# Patient Record
Sex: FEMALE | Race: WHITE | NOT HISPANIC OR LATINO
[De-identification: names, ages, dates, MRNs, and addresses within clinical notes are randomized per-mention and may not be internally consistent; named-entity substitution may affect disease eponyms.]

---

## 2017-04-11 ENCOUNTER — APPOINTMENT (OUTPATIENT)
Dept: FAMILY MEDICINE | Facility: CLINIC | Age: 77
End: 2017-04-11

## 2017-04-11 VITALS
BODY MASS INDEX: 35.43 KG/M2 | RESPIRATION RATE: 12 BRPM | OXYGEN SATURATION: 98 % | DIASTOLIC BLOOD PRESSURE: 90 MMHG | SYSTOLIC BLOOD PRESSURE: 154 MMHG | TEMPERATURE: 98.1 F | WEIGHT: 200 LBS | HEART RATE: 8 BPM

## 2017-04-24 ENCOUNTER — APPOINTMENT (OUTPATIENT)
Dept: FAMILY MEDICINE | Facility: CLINIC | Age: 77
End: 2017-04-24

## 2017-04-24 VITALS
BODY MASS INDEX: 34.91 KG/M2 | HEART RATE: 76 BPM | HEIGHT: 63 IN | DIASTOLIC BLOOD PRESSURE: 80 MMHG | TEMPERATURE: 98.5 F | WEIGHT: 197 LBS | OXYGEN SATURATION: 97 % | RESPIRATION RATE: 12 BRPM | SYSTOLIC BLOOD PRESSURE: 152 MMHG

## 2017-04-24 DIAGNOSIS — Z78.9 OTHER SPECIFIED HEALTH STATUS: ICD-10-CM

## 2017-08-12 ENCOUNTER — APPOINTMENT (OUTPATIENT)
Dept: FAMILY MEDICINE | Facility: CLINIC | Age: 77
End: 2017-08-12
Payer: MEDICARE

## 2017-08-12 VITALS
OXYGEN SATURATION: 97 % | HEART RATE: 76 BPM | HEIGHT: 63 IN | BODY MASS INDEX: 35.44 KG/M2 | SYSTOLIC BLOOD PRESSURE: 130 MMHG | DIASTOLIC BLOOD PRESSURE: 82 MMHG | WEIGHT: 200 LBS | RESPIRATION RATE: 14 BRPM

## 2017-08-12 PROCEDURE — 99214 OFFICE O/P EST MOD 30 MIN: CPT | Mod: 25

## 2017-08-12 PROCEDURE — 36415 COLL VENOUS BLD VENIPUNCTURE: CPT

## 2017-08-12 RX ORDER — ATORVASTATIN CALCIUM 80 MG/1
80 TABLET, FILM COATED ORAL
Qty: 30 | Refills: 0 | Status: ACTIVE | COMMUNITY
Start: 2017-01-09

## 2017-08-14 LAB
25(OH)D3 SERPL-MCNC: 34.9 NG/ML
ALBUMIN SERPL ELPH-MCNC: 4.3 G/DL
ALP BLD-CCNC: 93 U/L
ALT SERPL-CCNC: 18 U/L
ANION GAP SERPL CALC-SCNC: 17 MMOL/L
APPEARANCE: CLEAR
AST SERPL-CCNC: 17 U/L
BASOPHILS # BLD AUTO: 0.05 K/UL
BASOPHILS NFR BLD AUTO: 0.6 %
BILIRUB SERPL-MCNC: 0.7 MG/DL
BILIRUBIN URINE: NEGATIVE
BLOOD URINE: NEGATIVE
BUN SERPL-MCNC: 17 MG/DL
C PEPTIDE SERPL-MCNC: 3.4 NG/ML
CALCIUM SERPL-MCNC: 10.3 MG/DL
CHLORIDE SERPL-SCNC: 101 MMOL/L
CHOLEST SERPL-MCNC: 142 MG/DL
CHOLEST/HDLC SERPL: 3.3 RATIO
CO2 SERPL-SCNC: 25 MMOL/L
COLOR: YELLOW
CREAT SERPL-MCNC: 0.81 MG/DL
EOSINOPHIL # BLD AUTO: 0.08 K/UL
EOSINOPHIL NFR BLD AUTO: 1 %
GLUCOSE QUALITATIVE U: NORMAL MG/DL
GLUCOSE SERPL-MCNC: 98 MG/DL
HBA1C MFR BLD HPLC: 5.8 %
HCT VFR BLD CALC: 40.4 %
HDLC SERPL-MCNC: 43 MG/DL
HGB BLD-MCNC: 12.9 G/DL
IMM GRANULOCYTES NFR BLD AUTO: 0.4 %
KETONES URINE: NEGATIVE
LDLC SERPL CALC-MCNC: 68 MG/DL
LEUKOCYTE ESTERASE URINE: NEGATIVE
LYMPHOCYTES # BLD AUTO: 1.85 K/UL
LYMPHOCYTES NFR BLD AUTO: 23.4 %
MAN DIFF?: NORMAL
MCHC RBC-ENTMCNC: 30.6 PG
MCHC RBC-ENTMCNC: 31.9 GM/DL
MCV RBC AUTO: 95.7 FL
MONOCYTES # BLD AUTO: 0.44 K/UL
MONOCYTES NFR BLD AUTO: 5.6 %
NEUTROPHILS # BLD AUTO: 5.47 K/UL
NEUTROPHILS NFR BLD AUTO: 69 %
NITRITE URINE: NEGATIVE
PH URINE: 6
PLATELET # BLD AUTO: 322 K/UL
POTASSIUM SERPL-SCNC: 4.7 MMOL/L
PROT SERPL-MCNC: 7.4 G/DL
PROTEIN URINE: NEGATIVE MG/DL
RBC # BLD: 4.22 M/UL
RBC # FLD: 14.1 %
SODIUM SERPL-SCNC: 143 MMOL/L
SPECIFIC GRAVITY URINE: 1.01
TRIGL SERPL-MCNC: 154 MG/DL
UROBILINOGEN URINE: NORMAL MG/DL
WBC # FLD AUTO: 7.92 K/UL

## 2017-09-23 ENCOUNTER — APPOINTMENT (OUTPATIENT)
Dept: FAMILY MEDICINE | Facility: CLINIC | Age: 77
End: 2017-09-23
Payer: MEDICARE

## 2017-09-23 ENCOUNTER — MED ADMIN CHARGE (OUTPATIENT)
Age: 77
End: 2017-09-23

## 2017-09-23 VITALS
HEART RATE: 68 BPM | RESPIRATION RATE: 14 BRPM | BODY MASS INDEX: 35.44 KG/M2 | SYSTOLIC BLOOD PRESSURE: 122 MMHG | OXYGEN SATURATION: 98 % | HEIGHT: 63 IN | WEIGHT: 200 LBS | DIASTOLIC BLOOD PRESSURE: 82 MMHG | TEMPERATURE: 97.9 F

## 2017-09-23 DIAGNOSIS — Z23 ENCOUNTER FOR IMMUNIZATION: ICD-10-CM

## 2017-09-23 DIAGNOSIS — Z13.1 ENCOUNTER FOR SCREENING FOR DIABETES MELLITUS: ICD-10-CM

## 2017-09-23 DIAGNOSIS — Z12.11 ENCOUNTER FOR SCREENING FOR MALIGNANT NEOPLASM OF COLON: ICD-10-CM

## 2017-09-23 DIAGNOSIS — E55.9 VITAMIN D DEFICIENCY, UNSPECIFIED: ICD-10-CM

## 2017-09-23 DIAGNOSIS — E66.3 OVERWEIGHT: ICD-10-CM

## 2017-09-23 PROCEDURE — 90662 IIV NO PRSV INCREASED AG IM: CPT

## 2017-09-23 PROCEDURE — 99214 OFFICE O/P EST MOD 30 MIN: CPT | Mod: 25

## 2017-09-23 PROCEDURE — G0008: CPT | Mod: 59

## 2017-11-14 ENCOUNTER — APPOINTMENT (OUTPATIENT)
Dept: FAMILY MEDICINE | Facility: CLINIC | Age: 77
End: 2017-11-14
Payer: COMMERCIAL

## 2017-11-14 VITALS
HEIGHT: 63 IN | SYSTOLIC BLOOD PRESSURE: 122 MMHG | OXYGEN SATURATION: 99 % | RESPIRATION RATE: 12 BRPM | BODY MASS INDEX: 36.14 KG/M2 | DIASTOLIC BLOOD PRESSURE: 74 MMHG | WEIGHT: 204 LBS | HEART RATE: 71 BPM | TEMPERATURE: 97.9 F

## 2017-11-14 DIAGNOSIS — R09.81 NASAL CONGESTION: ICD-10-CM

## 2017-11-14 DIAGNOSIS — R05 COUGH: ICD-10-CM

## 2017-11-14 PROCEDURE — 99214 OFFICE O/P EST MOD 30 MIN: CPT

## 2018-05-05 ENCOUNTER — APPOINTMENT (OUTPATIENT)
Dept: FAMILY MEDICINE | Facility: CLINIC | Age: 78
End: 2018-05-05

## 2018-08-10 ENCOUNTER — APPOINTMENT (OUTPATIENT)
Dept: FAMILY MEDICINE | Facility: CLINIC | Age: 78
End: 2018-08-10
Payer: COMMERCIAL

## 2018-08-10 VITALS
HEIGHT: 63 IN | RESPIRATION RATE: 13 BRPM | DIASTOLIC BLOOD PRESSURE: 76 MMHG | OXYGEN SATURATION: 98 % | HEART RATE: 69 BPM | SYSTOLIC BLOOD PRESSURE: 134 MMHG | TEMPERATURE: 98 F
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
## 2018-08-10 DIAGNOSIS — S62.101A FRACTURE OF UNSPECIFIED CARPAL BONE, RIGHT WRIST, INITIAL ENCOUNTER FOR CLOSED FRACTURE: ICD-10-CM

## 2018-08-10 PROCEDURE — 99214 OFFICE O/P EST MOD 30 MIN: CPT

## 2018-08-10 NOTE — DATA REVIEWED
[No studies available for review at this time.] : No studies available for review at this time. [FreeTextEntry1] : H

## 2022-06-22 ENCOUNTER — APPOINTMENT (OUTPATIENT)
Dept: FAMILY MEDICINE | Facility: CLINIC | Age: 82
End: 2022-06-22
Payer: MEDICARE

## 2022-06-22 VITALS
BODY MASS INDEX: 32.64 KG/M2 | HEIGHT: 63 IN | WEIGHT: 184.25 LBS | OXYGEN SATURATION: 99 % | SYSTOLIC BLOOD PRESSURE: 122 MMHG | TEMPERATURE: 99.3 F | DIASTOLIC BLOOD PRESSURE: 68 MMHG | HEART RATE: 80 BPM

## 2022-06-22 DIAGNOSIS — Z95.5 PRESENCE OF CORONARY ANGIOPLASTY IMPLANT AND GRAFT: ICD-10-CM

## 2022-06-22 PROCEDURE — 99203 OFFICE O/P NEW LOW 30 MIN: CPT

## 2022-06-22 RX ORDER — RAMIPRIL 5 MG/1
5 CAPSULE ORAL
Qty: 30 | Refills: 0 | Status: DISCONTINUED | COMMUNITY
Start: 2017-05-08 | End: 2022-06-22

## 2022-06-22 RX ORDER — SPIRONOLACTONE 25 MG/1
25 TABLET ORAL
Qty: 30 | Refills: 0 | Status: DISCONTINUED | COMMUNITY
Start: 2022-05-19 | End: 2022-06-22

## 2022-06-22 RX ORDER — POTASSIUM CHLORIDE 1500 MG/1
20 TABLET, EXTENDED RELEASE ORAL
Qty: 90 | Refills: 0 | Status: ACTIVE | COMMUNITY
Start: 2022-03-14

## 2022-06-22 RX ORDER — CEPHALEXIN 500 MG/1
500 CAPSULE ORAL
Qty: 12 | Refills: 0 | Status: DISCONTINUED | COMMUNITY
Start: 2022-05-19 | End: 2022-06-22

## 2022-06-22 RX ORDER — ERGOCALCIFEROL 1.25 MG/1
1.25 MG CAPSULE, LIQUID FILLED ORAL
Qty: 12 | Refills: 5 | Status: DISCONTINUED | COMMUNITY
Start: 2017-04-24 | End: 2022-06-22

## 2022-06-22 RX ORDER — AZITHROMYCIN 250 MG/1
250 TABLET, FILM COATED ORAL
Qty: 1 | Refills: 3 | Status: DISCONTINUED | COMMUNITY
Start: 2017-11-14 | End: 2022-06-22

## 2022-06-22 RX ORDER — TORSEMIDE 100 MG/1
100 TABLET ORAL
Qty: 30 | Refills: 0 | Status: ACTIVE | COMMUNITY
Start: 2022-05-09

## 2022-06-22 RX ORDER — METOPROLOL TARTRATE 25 MG/1
25 TABLET, FILM COATED ORAL
Qty: 90 | Refills: 0 | Status: DISCONTINUED | COMMUNITY
Start: 2022-06-11 | End: 2022-06-22

## 2022-06-22 RX ORDER — APIXABAN 5 MG/1
5 TABLET, FILM COATED ORAL
Qty: 180 | Refills: 0 | Status: ACTIVE | COMMUNITY
Start: 2022-02-21

## 2022-06-22 RX ORDER — METOPROLOL SUCCINATE 50 MG/1
50 TABLET, EXTENDED RELEASE ORAL
Qty: 30 | Refills: 0 | Status: DISCONTINUED | COMMUNITY
Start: 2016-12-15 | End: 2022-06-22

## 2022-06-22 RX ORDER — PROMETHAZINE HYDROCHLORIDE AND DEXTROMETHORPHAN HYDROBROMIDE ORAL SOLUTION 15; 6.25 MG/5ML; MG/5ML
6.25-15 SOLUTION ORAL
Qty: 240 | Refills: 1 | Status: DISCONTINUED | COMMUNITY
Start: 2017-11-14 | End: 2022-06-22

## 2022-06-22 RX ORDER — FUROSEMIDE 20 MG/1
20 TABLET ORAL
Qty: 90 | Refills: 0 | Status: DISCONTINUED | COMMUNITY
Start: 2021-12-21 | End: 2022-06-22

## 2022-06-22 RX ORDER — CLOPIDOGREL BISULFATE 75 MG/1
75 TABLET, FILM COATED ORAL
Qty: 90 | Refills: 0 | Status: DISCONTINUED | COMMUNITY
Start: 2016-12-27 | End: 2022-06-22

## 2022-06-22 RX ORDER — TORSEMIDE 20 MG/1
20 TABLET ORAL
Qty: 90 | Refills: 0 | Status: DISCONTINUED | COMMUNITY
Start: 2022-03-22 | End: 2022-06-22

## 2022-06-24 PROBLEM — Z95.5 HISTORY OF HEART ARTERY STENT: Status: ACTIVE | Noted: 2017-04-11

## 2022-06-24 NOTE — PHYSICAL EXAM
[Normal Outer Ear/Nose] : the outer ears and nose were normal in appearance [Supple] : supple [No Respiratory Distress] : no respiratory distress  [No Accessory Muscle Use] : no accessory muscle use [Clear to Auscultation] : lungs were clear to auscultation bilaterally [Normal Rate] : normal rate  [Regular Rhythm] : with a regular rhythm [Normal S1, S2] : normal S1 and S2 [Pedal Pulses Present] : the pedal pulses are present [No Edema] : there was no peripheral edema [Normal] : no joint swelling and grossly normal strength and tone [Coordination Grossly Intact] : coordination grossly intact [No Focal Deficits] : no focal deficits [Normal Gait] : normal gait [Normal Affect] : the affect was normal [Normal Insight/Judgement] : insight and judgment were intact [de-identified] : supplemental oxygen, 2L via NC

## 2022-06-24 NOTE — HISTORY OF PRESENT ILLNESS
[Family Member] : family member [FreeTextEntry1] : Patient is here to establish care. [de-identified] : Ms. MARITZA DE LOS SANTOS is a 82 year female in office to establish care. Patient was recently hospitalized for CHF exacerbation due to medication non-adherence. She is now on supplemental oxygen, requests script for inogen. She had labs done by cardiologist, not available for review appointment. We reviewed medication list.

## 2022-06-24 NOTE — HEALTH RISK ASSESSMENT
[Former] : Former [No] : In the past 12 months have you used drugs other than those required for medical reasons? No [One fall no injury in past year] : Patient reported one fall in the past year without injury [0] : 2) Feeling down, depressed, or hopeless: Not at all (0) [PHQ-2 Negative - No further assessment needed] : PHQ-2 Negative - No further assessment needed [Fully functional (bathing, dressing, toileting, transferring, walking, feeding)] : Fully functional (bathing, dressing, toileting, transferring, walking, feeding) [Independent] : managing finances [Some assistance needed] : using transportation [Patient/Caregiver not ready to engage] : , patient/caregiver not ready to engage [Fair] : ~his/her~ current health as fair  [Good] : ~his/her~  mood as  good [de-identified] : Dr. Kennedy Mercer ([facility]) [YearQuit] : 2013 [Audit-CScore] : 0 [CAZ8Csdax] : 0 [AdvancecareDate] : 06/2022

## 2022-09-21 ENCOUNTER — APPOINTMENT (OUTPATIENT)
Dept: FAMILY MEDICINE | Facility: CLINIC | Age: 82
End: 2022-09-21

## 2022-09-21 VITALS
SYSTOLIC BLOOD PRESSURE: 122 MMHG | WEIGHT: 185 LBS | HEART RATE: 69 BPM | BODY MASS INDEX: 32.78 KG/M2 | OXYGEN SATURATION: 94 % | HEIGHT: 63 IN | DIASTOLIC BLOOD PRESSURE: 80 MMHG

## 2022-09-21 DIAGNOSIS — G47.00 INSOMNIA, UNSPECIFIED: ICD-10-CM

## 2022-09-21 PROCEDURE — 99214 OFFICE O/P EST MOD 30 MIN: CPT

## 2022-09-23 NOTE — PLAN
[FreeTextEntry1] : Sleep hygiene, take diuretic 3 days in a row, monitor for symptom improvement. Follow up with cardiology.

## 2022-09-23 NOTE — HISTORY OF PRESENT ILLNESS
[Family Member] : family member [FreeTextEntry1] : Patient is following up on HTN HLD CHF\par She is complaining of problems sleeping. [de-identified] : Patient reports slightly increased SOB and dependence on supplemental oxygen over the past 2 months. Coincidentally, two months ago, cardiologist decreased diuretic to every other day dosing. she used to be able to walk around her house without needing oxygen, but she is now more dependant on it. Her legs seem to be more swollen as well. \par \par She is complaining of difficulty falling asleep, sometimes awake the entire night. she does have a TV in her room, but she tends to turn it off when she wants to sleep. She sleeps with nasal cannula.

## 2022-09-23 NOTE — PHYSICAL EXAM
[Normal Outer Ear/Nose] : the outer ears and nose were normal in appearance [Supple] : supple [No Respiratory Distress] : no respiratory distress  [No Accessory Muscle Use] : no accessory muscle use [Clear to Auscultation] : lungs were clear to auscultation bilaterally [Normal Rate] : normal rate  [Regular Rhythm] : with a regular rhythm [Normal S1, S2] : normal S1 and S2 [Pedal Pulses Present] : the pedal pulses are present [Normal] : no joint swelling and grossly normal strength and tone [Coordination Grossly Intact] : coordination grossly intact [No Focal Deficits] : no focal deficits [Normal Gait] : normal gait [Normal Affect] : the affect was normal [Normal Insight/Judgement] : insight and judgment were intact [de-identified] : supplemental oxygen, 2.5L via NC [de-identified] : pedal edema L>R ~ +2

## 2022-10-17 ENCOUNTER — APPOINTMENT (OUTPATIENT)
Dept: PULMONOLOGY | Facility: CLINIC | Age: 82
End: 2022-10-17

## 2022-10-17 VITALS
HEIGHT: 63 IN | BODY MASS INDEX: 32.78 KG/M2 | DIASTOLIC BLOOD PRESSURE: 70 MMHG | TEMPERATURE: 98 F | OXYGEN SATURATION: 95 % | RESPIRATION RATE: 16 BRPM | WEIGHT: 185 LBS | HEART RATE: 68 BPM | SYSTOLIC BLOOD PRESSURE: 112 MMHG

## 2022-10-17 DIAGNOSIS — R06.02 SHORTNESS OF BREATH: ICD-10-CM

## 2022-10-17 DIAGNOSIS — Z86.39 PERSONAL HISTORY OF OTHER ENDOCRINE, NUTRITIONAL AND METABOLIC DISEASE: ICD-10-CM

## 2022-10-17 DIAGNOSIS — Z87.891 PERSONAL HISTORY OF NICOTINE DEPENDENCE: ICD-10-CM

## 2022-10-17 PROCEDURE — 99205 OFFICE O/P NEW HI 60 MIN: CPT

## 2022-10-17 NOTE — CONSULT LETTER
[Dear  ___] : Dear  [unfilled], [FreeTextEntry1] : I had the pleasure of evaluating your patient, MARITZA DE LOS SANTOS , in the office today.  Please review my consultation and evaluation report that follows below.  Please do not hesitate to call me if further information is necessary or if you wish to discuss ongoing care or diagnostic work-up.   \par I very much appreciate your referral and it is a privilege to be able to provide care for your patient.\par \par Sincerely,\par  \par Zack Dolan MD, MHCM, FACP, GEORGE-C\par Pulmonary Medicine\par  of Medicine\par [facility] [employer] [facility] [employer] [facility] at [facility]\par [email or web address]\par \par [facility] [place]\par [address]\[place]  [address]\par    Fax \par \par [facility] [place]\par [address]\par [place], [place] \par \par

## 2022-10-17 NOTE — ASSESSMENT
[FreeTextEntry1] : 81 yo woman comes with her daughter and son in law\par On oxygen since hospitalization at [facility] in [place]--wants to get off\par Smoker of 1-2 ppd for 50 yrs until 7 years ago without signficant COPD symptoms\par \par Treated for CHF in [facility] in May, hx CAD s/p stents in past, saw Dr Jeter last week who felt she was NOT in CHF\par Afib for which she takes amiodarone, metoprolol 200 ER\par On Eliquis ASA, Torsemide and atorvastatin\par Nasal oxygen at 2 LPM with exercsie and with sleep\par \par No DM, C section 3 children\par Hx of Jan 22 Covid not hospitalized\par Recent Hct 25 %\par Recent TSH elevated\par \par \par Imp\par 81 yo woman with CAD, previous CHF, atrial fibrillation\par Long smoking history--can have COPD\par Amiodarone treatment,  new hypothyroidism and new anemia\par \par Multiple issues:\par R/o COPD, hypoxemia possibly from COPD, ??amiodarone toxicity---obtain PFTs and CT chest\par New anemia contributing to dyspnea--needs blood studies , ? GI evaluation, this appears to be new\par She will be seeing Dr Quan this week\par Hypothyroid --could be related to amiodarone treatment\par \par

## 2022-10-17 NOTE — HISTORY OF PRESENT ILLNESS
[TextBox_4] : 81 yo woman comes with her daughter and son in law\par On oxygen since hospitalization at [facility] in [place]--wants to get off\par Smoker of 1-2 ppd for 50 yrs until 7 years ago without signficant COPD symptoms\par \par Treated for CHF in [facility] in May, hx CAD s/p stents in past, saw Dr Jeter last week who felt she was NOT in CHF\par Afib for which she takes amiodarone, metoprolol 200 ER\par On Eliquis ASA, Torsemide and atorvastatin\par Nasal oxygen at 2 LPM with exercsie and with sleep\par \par No DM, C section 3 children\par Hx of Jan 22 Covid not hospitalized\par Recent Hct 25 %\par Recent TSH elevated\par

## 2022-10-20 ENCOUNTER — APPOINTMENT (OUTPATIENT)
Dept: FAMILY MEDICINE | Facility: CLINIC | Age: 82
End: 2022-10-20

## 2022-10-20 VITALS
SYSTOLIC BLOOD PRESSURE: 134 MMHG | DIASTOLIC BLOOD PRESSURE: 80 MMHG | TEMPERATURE: 97.3 F | OXYGEN SATURATION: 97 % | HEIGHT: 63 IN | HEART RATE: 62 BPM | BODY MASS INDEX: 32.78 KG/M2 | WEIGHT: 185 LBS

## 2022-10-20 DIAGNOSIS — E03.2 POISONING BY OTHER ANTIDYSRHYTHMIC DRUGS, ACCIDENTAL (UNINTENTIONAL), INITIAL ENCOUNTER: ICD-10-CM

## 2022-10-20 DIAGNOSIS — T46.2X1A POISONING BY OTHER ANTIDYSRHYTHMIC DRUGS, ACCIDENTAL (UNINTENTIONAL), INITIAL ENCOUNTER: ICD-10-CM

## 2022-10-20 DIAGNOSIS — R22.1 LOCALIZED SWELLING, MASS AND LUMP, NECK: ICD-10-CM

## 2022-10-20 PROCEDURE — 99214 OFFICE O/P EST MOD 30 MIN: CPT | Mod: 25

## 2022-10-20 PROCEDURE — 36415 COLL VENOUS BLD VENIPUNCTURE: CPT

## 2022-10-23 PROBLEM — T46.2X1A HYPOTHYROIDISM DUE TO AMIODARONE: Status: ACTIVE | Noted: 2022-10-23

## 2022-10-23 NOTE — PHYSICAL EXAM
[Normal Outer Ear/Nose] : the outer ears and nose were normal in appearance [No Respiratory Distress] : no respiratory distress  [No Accessory Muscle Use] : no accessory muscle use [Clear to Auscultation] : lungs were clear to auscultation bilaterally [Normal Rate] : normal rate  [Regular Rhythm] : with a regular rhythm [Normal S1, S2] : normal S1 and S2 [Pedal Pulses Present] : the pedal pulses are present [Normal] : no joint swelling and grossly normal strength and tone [Coordination Grossly Intact] : coordination grossly intact [No Focal Deficits] : no focal deficits [Normal Affect] : the affect was normal [Normal Insight/Judgement] : insight and judgment were intact [de-identified] : Right lateral neck with soft tissue mass [de-identified] : supplemental oxygen, 2.5L via NC [de-identified] : pedal edema L>R ~ +2

## 2022-10-23 NOTE — HISTORY OF PRESENT ILLNESS
[FreeTextEntry1] : Patient is following up on abnormal labs. [de-identified] : Patient was noted with worsening anemia and elevated TSH with normal T4. Patient had also complained of feeling SOB and it was unclear if decompensating re: CHF or COPD. Patient with exercise induced hypoxemia. From cardiac standpoint, patient was noted to be stable.\par \par Patient also states that she has noticed swelling on the right side of neck for past few weeks. \par She has been taking oral iron for past 2 weeks.

## 2022-10-27 LAB
FERRITIN SERPL-MCNC: 116 NG/ML
IRON SATN MFR SERPL: 30 %
IRON SERPL-MCNC: 114 UG/DL
TIBC SERPL-MCNC: 379 UG/DL
TRANSFERRIN SERPL-MCNC: 307 MG/DL
UIBC SERPL-MCNC: 265 UG/DL

## 2022-10-31 ENCOUNTER — OUTPATIENT (OUTPATIENT)
Dept: OUTPATIENT SERVICES | Facility: HOSPITAL | Age: 82
LOS: 1 days | End: 2022-10-31
Payer: MEDICARE

## 2022-10-31 ENCOUNTER — APPOINTMENT (OUTPATIENT)
Dept: ULTRASOUND IMAGING | Facility: CLINIC | Age: 82
End: 2022-10-31

## 2022-10-31 DIAGNOSIS — R22.1 LOCALIZED SWELLING, MASS AND LUMP, NECK: ICD-10-CM

## 2022-10-31 PROCEDURE — 76536 US EXAM OF HEAD AND NECK: CPT | Mod: 26

## 2022-10-31 PROCEDURE — 76536 US EXAM OF HEAD AND NECK: CPT

## 2022-11-03 ENCOUNTER — APPOINTMENT (OUTPATIENT)
Dept: FAMILY MEDICINE | Facility: CLINIC | Age: 82
End: 2022-11-03

## 2022-11-03 DIAGNOSIS — R22.1 LOCALIZED SWELLING, MASS AND LUMP, NECK: ICD-10-CM

## 2022-11-03 PROCEDURE — 99441: CPT

## 2022-11-03 RX ORDER — POTASSIUM CHLORIDE 1500 MG/1
20 TABLET, FILM COATED, EXTENDED RELEASE ORAL
Qty: 45 | Refills: 0 | Status: COMPLETED | COMMUNITY
Start: 2022-08-09

## 2022-11-03 RX ORDER — POTASSIUM CHLORIDE 750 MG/1
10 TABLET, FILM COATED, EXTENDED RELEASE ORAL
Qty: 90 | Refills: 0 | Status: COMPLETED | COMMUNITY
Start: 2022-08-02

## 2022-11-03 NOTE — HISTORY OF PRESENT ILLNESS
[Home] : at home, [unfilled] , at the time of the visit. [Medical Office: ([facility])___] : at the medical office located in  [Verbal consent obtained from patient] : the patient, [unfilled] [FreeTextEntry1] : Patient following up to discuss findings on US of the neck [de-identified] : Results show that  there is a 3.2 cm nodule at site of concern, and a recommendation for biopsy. Patient admits to feeling scared that it could be cancer. She notes she has no pain in the area of concern.  Surgeon Performing Repair (Optional): FRANCES DonisPPabloH

## 2022-11-03 NOTE — END OF VISIT
[FreeTextEntry3] : start. 1:16 pm\par end.  1: 21 pm [Time Spent: ___ minutes] : I have spent [unfilled] minutes of time on the encounter.

## 2022-11-03 NOTE — PHYSICAL EXAM
[de-identified] : Telehealth precludes traditional, comprehensive physical exam. Patient appeared stable and alert.

## 2022-11-03 NOTE — PLAN
[FreeTextEntry1] : Discussed with patient importance of timely follow up to get biopsy done, she expressed understanding. She was allowed to ask questions. Further follow up pending results of biopsy.

## 2022-11-08 ENCOUNTER — RX RENEWAL (OUTPATIENT)
Age: 82
End: 2022-11-08

## 2022-11-14 ENCOUNTER — APPOINTMENT (OUTPATIENT)
Dept: PULMONOLOGY | Facility: CLINIC | Age: 82
End: 2022-11-14

## 2022-11-14 VITALS — HEIGHT: 60 IN | WEIGHT: 186 LBS | BODY MASS INDEX: 36.52 KG/M2

## 2022-11-14 VITALS
HEART RATE: 58 BPM | DIASTOLIC BLOOD PRESSURE: 80 MMHG | OXYGEN SATURATION: 92 % | SYSTOLIC BLOOD PRESSURE: 126 MMHG | RESPIRATION RATE: 16 BRPM

## 2022-11-14 DIAGNOSIS — Z79.899 OTHER LONG TERM (CURRENT) DRUG THERAPY: ICD-10-CM

## 2022-11-14 PROCEDURE — 85018 HEMOGLOBIN: CPT | Mod: QW

## 2022-11-14 PROCEDURE — 94010 BREATHING CAPACITY TEST: CPT

## 2022-11-14 PROCEDURE — 94727 GAS DIL/WSHOT DETER LNG VOL: CPT

## 2022-11-14 PROCEDURE — 99215 OFFICE O/P EST HI 40 MIN: CPT | Mod: 25

## 2022-11-14 PROCEDURE — 94729 DIFFUSING CAPACITY: CPT

## 2022-11-14 RX ORDER — METOPROLOL SUCCINATE 100 MG/1
100 TABLET, EXTENDED RELEASE ORAL
Qty: 30 | Refills: 4 | Status: ACTIVE | COMMUNITY
Start: 2022-11-14 | End: 1900-01-01

## 2022-11-14 NOTE — CONSULT LETTER
[Dear  ___] : Dear  [unfilled], [FreeTextEntry1] : I had the pleasure of evaluating your patient, MARITZA DE LOS SANTOS , in the office today.  Please review my consultation and evaluation report that follows below.  Please do not hesitate to call me if further information is necessary or if you wish to discuss ongoing care or diagnostic work-up.   \par I very much appreciate your referral and it is a privilege to be able to provide care for your patient.\par \par Sincerely,\par  \par Zack Dolan MD, MHCM, FACP, GEORGE-C\par Pulmonary Medicine\par  of Medicine\par [employer] [facility] [employer] [facility] at [facility]\par [email or web address]\par \par [facility] in [place]\par [address]\[place]  [address]\par    Fax \par \par [facility] [place]\par [address]\par [place], [place] \par \par

## 2022-11-14 NOTE — ASSESSMENT
[FreeTextEntry1] : 81 yo woman returns with daughter and son in law\par Hx ASHD, CAD and s/p stents in pasts, CHF treated at [facility] in May 22\par On oxygen 24 hrs, found to desaturate easily when first seen in office\par A fib on Amiodarone, metoprolol 200 ER a day, Eliquis, torsemide atorvastatin\par HCt 25 % in past recently, now on iron but iron studies are normal\par recent elevation TSH\par \par Interim\par Dyspneic with any exertion without oxygen\par Otherwise has been stable \par Did not get CT chest just yet--just got her approval\par Also, has new Right neck lesion, about to have biopsy of this lesion\par \par PFTs\par FEV1 1.04 63% predicted\par FEV1 % 60%\par <midexpir 32% pred\par \par Restriction to 74% \par Normalized DLCO\par Hgb today  9.6\par \par Imp\par 81 yo woman with CAD, atrial fib, hx CHF on amiodarone and Eliquis\par Long smoking history\par PFTs confirm signficant obstructive airways disease ---COPD\par Recommend begin Anoro LABA/LAMA\par Would decrease metoprolol to 100 mg a day now that we have confirmed COPD\par Will likely require oxygen longterm, c/w her COPD\par Still await CT chest\par \par Etiology of anemia is unknown, it is improved but not resolved\par ??Hypothyroid\par ??Cervical node etiology--await biopsy\par \par Patient to notify Dr SIRIA Khan that her metoprolol was cut

## 2022-11-14 NOTE — PROCEDURE
[FreeTextEntry1] : FEV1 1.04 63% predicted\par FEV1 % 60%\par <midexpir 32% pred\par \par Restriction to 74% \par Normalized DLCO\par Hgb today  9.6

## 2022-11-14 NOTE — HISTORY OF PRESENT ILLNESS
[TextBox_4] : 81 yo woman returns with daughter and son in law\par Hx ASHD, CAD and s/p stents in pasts, CHF treated at [facility] in May 22\par On oxygen 24 hrs, found to desaturate easily when first seen in office\par A fib on Amiodarone, metoprolol 200 ER a day, Eliquis, torsemide atorvastatin\par HCt 25 % in past recently, now on iron but iron studies are normal\par recent elevation TSH\par \par Interim\par Dyspneic with any exertion without oxygen\par Otherwise has been stable \par Did not get CT chest just yet--just got her approval

## 2022-11-16 RX ORDER — METOPROLOL SUCCINATE 200 MG/1
200 TABLET, EXTENDED RELEASE ORAL
Qty: 30 | Refills: 0 | Status: DISCONTINUED | COMMUNITY
Start: 2022-05-10 | End: 2022-11-16

## 2022-11-21 ENCOUNTER — RESULT REVIEW (OUTPATIENT)
Age: 82
End: 2022-11-21

## 2022-11-21 RX ORDER — UMECLIDINIUM BROMIDE AND VILANTEROL TRIFENATATE 62.5; 25 UG/1; UG/1
62.5-25 POWDER RESPIRATORY (INHALATION)
Qty: 1 | Refills: 5 | Status: DISCONTINUED | COMMUNITY
Start: 2022-11-14 | End: 2022-11-21

## 2022-11-28 ENCOUNTER — APPOINTMENT (OUTPATIENT)
Dept: CT IMAGING | Facility: CLINIC | Age: 82
End: 2022-11-28

## 2022-11-28 ENCOUNTER — OUTPATIENT (OUTPATIENT)
Dept: OUTPATIENT SERVICES | Facility: HOSPITAL | Age: 82
LOS: 1 days | End: 2022-11-28
Payer: MEDICARE

## 2022-11-28 DIAGNOSIS — R09.02 HYPOXEMIA: ICD-10-CM

## 2022-11-28 PROCEDURE — 71250 CT THORAX DX C-: CPT

## 2022-11-28 PROCEDURE — 71250 CT THORAX DX C-: CPT | Mod: 26

## 2022-12-07 ENCOUNTER — APPOINTMENT (OUTPATIENT)
Dept: FAMILY MEDICINE | Facility: CLINIC | Age: 82
End: 2022-12-07

## 2022-12-08 ENCOUNTER — LABORATORY RESULT (OUTPATIENT)
Age: 82
End: 2022-12-08

## 2022-12-09 ENCOUNTER — APPOINTMENT (OUTPATIENT)
Dept: INTERVENTIONAL RADIOLOGY/VASCULAR | Facility: CLINIC | Age: 82
End: 2022-12-09

## 2022-12-09 ENCOUNTER — OUTPATIENT (OUTPATIENT)
Dept: OUTPATIENT SERVICES | Facility: HOSPITAL | Age: 82
LOS: 1 days | End: 2022-12-09

## 2022-12-09 ENCOUNTER — RESULT REVIEW (OUTPATIENT)
Age: 82
End: 2022-12-09

## 2022-12-09 DIAGNOSIS — R22.1 LOCALIZED SWELLING, MASS AND LUMP, NECK: ICD-10-CM

## 2022-12-09 PROCEDURE — 10005 FNA BX W/US GDN 1ST LES: CPT

## 2022-12-09 PROCEDURE — 88305 TISSUE EXAM BY PATHOLOGIST: CPT | Mod: 26

## 2022-12-09 PROCEDURE — 88173 CYTOPATH EVAL FNA REPORT: CPT | Mod: 26

## 2022-12-12 ENCOUNTER — NON-APPOINTMENT (OUTPATIENT)
Age: 82
End: 2022-12-12

## 2022-12-12 ENCOUNTER — APPOINTMENT (OUTPATIENT)
Dept: PULMONOLOGY | Facility: CLINIC | Age: 82
End: 2022-12-12

## 2022-12-12 VITALS
SYSTOLIC BLOOD PRESSURE: 126 MMHG | HEIGHT: 60 IN | DIASTOLIC BLOOD PRESSURE: 74 MMHG | OXYGEN SATURATION: 92 % | RESPIRATION RATE: 16 BRPM | BODY MASS INDEX: 35.93 KG/M2 | WEIGHT: 183 LBS | HEART RATE: 64 BPM

## 2022-12-12 DIAGNOSIS — R09.02 HYPOXEMIA: ICD-10-CM

## 2022-12-12 PROCEDURE — 99214 OFFICE O/P EST MOD 30 MIN: CPT

## 2022-12-12 RX ORDER — AMIODARONE HYDROCHLORIDE 200 MG/1
200 TABLET ORAL
Qty: 90 | Refills: 0 | Status: DISCONTINUED | COMMUNITY
Start: 2022-04-21 | End: 2022-12-12

## 2022-12-12 RX ORDER — TRAZODONE HYDROCHLORIDE 50 MG/1
50 TABLET ORAL
Qty: 30 | Refills: 3 | Status: DISCONTINUED | COMMUNITY
Start: 2022-09-21 | End: 2022-12-12

## 2022-12-12 NOTE — ASSESSMENT
[FreeTextEntry1] : 83 yo woman with hx ASHD, CAD and s/p stenting in past\par CHF last in [facility] in May 22\par On oxygen for 24 hrs and has 02sat 84% on RA rest\par A fib on amiodarone, metoprolol 200 Eliquis torsemide\par Anemia from ???cause treated now with Iron\par \par PFTs showed restriction but also obstructive airways disease\par She was started on Stiolto Rather than anoro sec to insurance\par \par \par She says she is doing very well on current regimen\par Her daughter agrees\par \par CT was obtained and showed NO evidence of interstitial lung disease--does not support amiodarone toxicity\par Note emphysema changes were noted\par \par Imp\par 83 yo womna with heart failure, atrial fibrillation and CAD\par She also has COPD based upon her PFTs\par Responding well to Stiolto\par Would also consider drop in Metoprolol dosage in view of her airways disease\par \par RTC 3 months

## 2022-12-12 NOTE — HISTORY OF PRESENT ILLNESS
[TextBox_4] : 81 yo woman with hx ASHD, CAD and s/p stenting in past\par CHF last in [facility] in May 22\par On oxygen for 24 hrs and has 02sat 84% on RA rest\par A fib on amiodarone, metoprolol 200 Eliquis torsemide\par Anemia from ???cause treated now with Iron\par \par PFTs showed restriction but also obstructive airways disease\par She was started on Stiolto Rather than anoro sec to insurance\par \par \par She says she is doing very well on current regimen\par Her daughter agrees\par \par CT was obtained and showed NO evidence of interstitial lung disease--does not support amiodarone toxicity\par Note emphysema changes were noted

## 2022-12-27 ENCOUNTER — INPATIENT (INPATIENT)
Facility: HOSPITAL | Age: 82
LOS: 3 days | Discharge: HOME | DRG: 378 | End: 2022-12-31
Attending: INTERNAL MEDICINE | Admitting: HOSPITALIST
Payer: MEDICARE

## 2022-12-27 VITALS
HEIGHT: 60 IN | DIASTOLIC BLOOD PRESSURE: 66 MMHG | TEMPERATURE: 98 F | RESPIRATION RATE: 18 BRPM | HEART RATE: 63 BPM | WEIGHT: 175.05 LBS | SYSTOLIC BLOOD PRESSURE: 101 MMHG | OXYGEN SATURATION: 97 %

## 2022-12-27 DIAGNOSIS — K92.2 GASTROINTESTINAL HEMORRHAGE, UNSPECIFIED: ICD-10-CM

## 2022-12-27 LAB
ABO RH CONFIRMATION: SIGNIFICANT CHANGE UP
ALBUMIN SERPL ELPH-MCNC: 3.1 G/DL — LOW (ref 3.3–5.2)
ALP SERPL-CCNC: 191 U/L — HIGH (ref 40–120)
ALT FLD-CCNC: 20 U/L — SIGNIFICANT CHANGE UP
ANION GAP SERPL CALC-SCNC: 13 MMOL/L — SIGNIFICANT CHANGE UP (ref 5–17)
ANISOCYTOSIS BLD QL: SLIGHT — SIGNIFICANT CHANGE UP
APTT BLD: 43.3 SEC — HIGH (ref 27.5–35.5)
AST SERPL-CCNC: 20 U/L — SIGNIFICANT CHANGE UP
BASOPHILS # BLD AUTO: 0 K/UL — SIGNIFICANT CHANGE UP (ref 0–0.2)
BASOPHILS NFR BLD AUTO: 0 % — SIGNIFICANT CHANGE UP (ref 0–2)
BILIRUB SERPL-MCNC: 0.6 MG/DL — SIGNIFICANT CHANGE UP (ref 0.4–2)
BLD GP AB SCN SERPL QL: SIGNIFICANT CHANGE UP
BUN SERPL-MCNC: 33.3 MG/DL — HIGH (ref 8–20)
CALCIUM SERPL-MCNC: 8.7 MG/DL — SIGNIFICANT CHANGE UP (ref 8.4–10.5)
CHLORIDE SERPL-SCNC: 100 MMOL/L — SIGNIFICANT CHANGE UP (ref 96–108)
CO2 SERPL-SCNC: 25 MMOL/L — SIGNIFICANT CHANGE UP (ref 22–29)
CREAT SERPL-MCNC: 1.66 MG/DL — HIGH (ref 0.5–1.3)
EGFR: 31 ML/MIN/1.73M2 — LOW
EOSINOPHIL # BLD AUTO: 0.18 K/UL — SIGNIFICANT CHANGE UP (ref 0–0.5)
EOSINOPHIL NFR BLD AUTO: 1.8 % — SIGNIFICANT CHANGE UP (ref 0–6)
GIANT PLATELETS BLD QL SMEAR: PRESENT — SIGNIFICANT CHANGE UP
GLUCOSE SERPL-MCNC: 104 MG/DL — HIGH (ref 70–99)
HCT VFR BLD CALC: 16 % — CRITICAL LOW (ref 34.5–45)
HGB BLD-MCNC: 4.5 G/DL — CRITICAL LOW (ref 11.5–15.5)
HYPOCHROMIA BLD QL: SIGNIFICANT CHANGE UP
INR BLD: 2.8 RATIO — HIGH (ref 0.88–1.16)
LYMPHOCYTES # BLD AUTO: 0.25 K/UL — LOW (ref 1–3.3)
LYMPHOCYTES # BLD AUTO: 2.6 % — LOW (ref 13–44)
MACROCYTES BLD QL: SLIGHT — SIGNIFICANT CHANGE UP
MANUAL SMEAR VERIFICATION: SIGNIFICANT CHANGE UP
MCHC RBC-ENTMCNC: 28.1 GM/DL — LOW (ref 32–36)
MCHC RBC-ENTMCNC: 30.4 PG — SIGNIFICANT CHANGE UP (ref 27–34)
MCV RBC AUTO: 108.1 FL — HIGH (ref 80–100)
MONOCYTES # BLD AUTO: 0.43 K/UL — SIGNIFICANT CHANGE UP (ref 0–0.9)
MONOCYTES NFR BLD AUTO: 4.4 % — SIGNIFICANT CHANGE UP (ref 2–14)
NEUTROPHILS # BLD AUTO: 8.81 K/UL — HIGH (ref 1.8–7.4)
NEUTROPHILS NFR BLD AUTO: 90.3 % — HIGH (ref 43–77)
NRBC # BLD: 1 /100 — HIGH (ref 0–0)
OB PNL STL: POSITIVE
PLAT MORPH BLD: NORMAL — SIGNIFICANT CHANGE UP
PLATELET # BLD AUTO: 357 K/UL — SIGNIFICANT CHANGE UP (ref 150–400)
POIKILOCYTOSIS BLD QL AUTO: SLIGHT — SIGNIFICANT CHANGE UP
POLYCHROMASIA BLD QL SMEAR: SIGNIFICANT CHANGE UP
POTASSIUM SERPL-MCNC: 4.3 MMOL/L — SIGNIFICANT CHANGE UP (ref 3.5–5.3)
POTASSIUM SERPL-SCNC: 4.3 MMOL/L — SIGNIFICANT CHANGE UP (ref 3.5–5.3)
PROMYELOCYTES # FLD: 0.9 % — HIGH (ref 0–0)
PROT SERPL-MCNC: 5.5 G/DL — LOW (ref 6.6–8.7)
PROTHROM AB SERPL-ACNC: 32.8 SEC — HIGH (ref 10.5–13.4)
RBC # BLD: 1.48 M/UL — LOW (ref 3.8–5.2)
RBC # FLD: 18.6 % — HIGH (ref 10.3–14.5)
RBC BLD AUTO: ABNORMAL
SODIUM SERPL-SCNC: 138 MMOL/L — SIGNIFICANT CHANGE UP (ref 135–145)
WBC # BLD: 9.76 K/UL — SIGNIFICANT CHANGE UP (ref 3.8–10.5)
WBC # FLD AUTO: 9.76 K/UL — SIGNIFICANT CHANGE UP (ref 3.8–10.5)

## 2022-12-27 PROCEDURE — 99497 ADVNCD CARE PLAN 30 MIN: CPT | Mod: 25

## 2022-12-27 PROCEDURE — 99223 1ST HOSP IP/OBS HIGH 75: CPT

## 2022-12-27 PROCEDURE — 99285 EMERGENCY DEPT VISIT HI MDM: CPT

## 2022-12-27 RX ORDER — PANTOPRAZOLE SODIUM 20 MG/1
40 TABLET, DELAYED RELEASE ORAL EVERY 12 HOURS
Refills: 0 | Status: DISCONTINUED | OUTPATIENT
Start: 2022-12-27 | End: 2022-12-31

## 2022-12-27 RX ORDER — ONDANSETRON 8 MG/1
4 TABLET, FILM COATED ORAL EVERY 8 HOURS
Refills: 0 | Status: DISCONTINUED | OUTPATIENT
Start: 2022-12-27 | End: 2022-12-31

## 2022-12-27 RX ORDER — SODIUM CHLORIDE 9 MG/ML
500 INJECTION INTRAMUSCULAR; INTRAVENOUS; SUBCUTANEOUS ONCE
Refills: 0 | Status: COMPLETED | OUTPATIENT
Start: 2022-12-27 | End: 2022-12-27

## 2022-12-27 RX ORDER — LANOLIN ALCOHOL/MO/W.PET/CERES
3 CREAM (GRAM) TOPICAL AT BEDTIME
Refills: 0 | Status: DISCONTINUED | OUTPATIENT
Start: 2022-12-27 | End: 2022-12-31

## 2022-12-27 RX ORDER — ACETAMINOPHEN 500 MG
650 TABLET ORAL EVERY 6 HOURS
Refills: 0 | Status: DISCONTINUED | OUTPATIENT
Start: 2022-12-27 | End: 2022-12-31

## 2022-12-27 RX ORDER — PANTOPRAZOLE SODIUM 20 MG/1
80 TABLET, DELAYED RELEASE ORAL ONCE
Refills: 0 | Status: COMPLETED | OUTPATIENT
Start: 2022-12-27 | End: 2022-12-27

## 2022-12-27 RX ADMIN — SODIUM CHLORIDE 500 MILLILITER(S): 9 INJECTION INTRAMUSCULAR; INTRAVENOUS; SUBCUTANEOUS at 20:35

## 2022-12-27 RX ADMIN — PANTOPRAZOLE SODIUM 80 MILLIGRAM(S): 20 TABLET, DELAYED RELEASE ORAL at 20:35

## 2022-12-27 NOTE — ED PROVIDER NOTE - NS ED MD DISPO ISOLATION TYPES
SW received call from pt today to reschedule her appointment for tomorrow. Pt rescheduled for Monday, 3/20/17 at 8:00 a.m.       Electronically signed by:JEFF DIXON   Mar 15 2017  4:54PM CST Author    None

## 2022-12-27 NOTE — ED ADULT NURSE NOTE - NS ED PATIENT SAFETY CONCERN
Inpatient Rehabilitation  Weekly Team Conference Note  The [address] [place] [address],[address]  [phone]  Patient Name: Fabien Hdez        MRN: [id number]    : 1934  (80 y.o.)  Gender: female   The team conference for this patient was held on 2022 at 10:30am by:  Amanuel Rucker DO    Current/Goal QM SCORES  QM Current/Goal Score   Eating CARE Score: 5 / Discharge Goal: Independent   Oral Hygiene CARE Score: 4 / Discharge Goal: Independent   Shower/Bathing CARE Score: 4 / Discharge Goal: Independent   UB Dressing CARE Score: 5 / Discharge Goal: Independent   LB Dressing CARE Score: 1 / Discharge Goal: Independent   Putting on/off Footwear CARE Score: 1 / Discharge Goal: Independent   Toileting Hygiene CARE Score: 4 / Discharge Goal: Independent   Bladder Continence Bladder Continence: Stress incontinence only    Bowel Continence Bowel Continence: Always continent    Toilet Transfers CARE Score: 4 / Discharge Goal: Independent   Shower/Bathe Self  CARE Score: 4 / Discharge Goal: Independent   Rolling Left and Right CARE Score: 4 / Discharge Goal: Independent   Sit to Lying CARE Score: 3 / Discharge Goal: Independent   Lying to Sitting on Bedside CARE Score: 3 / Discharge Goal: Independent   Sit to Stand CARE Score: 3 / Discharge Goal: Independent   Chair/Bed to Chair Transfer CARE Score: 1 / Discharge Goal: Independent   Car Transfers CARE Score: 3 / Discharge Goal: Independent   Walk 10 Feet CARE Score: 4 / Discharge Goal: Independent   Walk 50 Feet with Two Turns CARE Score: 4 / Discharge Goal: Independent   Walk 150 Feet CARE Score: 88 / Discharge Goal: Independent   Walk 10 Feet on Uneven Surfaces CARE Score: 4 / Discharge Goal: Independent   1 Step (Curb) CARE Score: 88 / Discharge Goal: Independent   4 Steps CARE Score: 88 / Discharge Goal: Not Applicable   12 Steps CARE Score: 88 / Discharge Goal: Not Applicable   Picking up Object from Floor CARE Score: 88 / Discharge Goal: Independent   Wheel 50 Feet with 2 Turns   /     Type         [] Manual        [] Motorized        [x] N/A   Wheel 150 Feet   /     Type         [] Manual        [] Motorized        [x] N/A     NURSING:  A&Ox: Level of Consciousness: Alert (0)  Orientation Level: Oriented to person,Oriented to place,Oriented to situation,Disoriented to time  Matlock Grahn Risk Score: Total Score: 65  Admission BIMS: 7  Wounds/Incisions/Ulcers: scattered bruising and abrasions, tears to BUE, R head laceration w/ sutures, R hip bruising   Medication Review: reviewed with pt   Pain: no  Consultations: case management   Imaging: no new  No orders to display     Active Comorbid Conditions: GERD, hyperlipids  Systems Review:   Renal: W, :   Neurological: x  Musculoskeletal: x  Respiratory: x  Cardiac/Circulatory/Peripheral Vascular: x  Abnormal/Relevant Test Results: BUN 24   RBCs 3.94  Abnormal/Relevant Lab Values:   CBC:   Recent Labs     06/08/22 0724   WBC 5.5   HGB 12.6   HCT 37.9   MCV 96.2        BMP:   Recent Labs     06/08/22 0724      K 4.1   CL 98*   CO2 29   BUN 24*   CREATININE 1.0     PT/INR: No results for input(s): PROTIME, INR in the last 72 hours. APTT: No results for input(s): APTT in the last 72 hours. Liver Profile:  Lab Results   Component Value Date    AST 19 05/30/2022    ALT 11 05/30/2022    BILIDIR 0.21 05/16/2012    BILITOT 0.3 05/30/2022    ALKPHOS 43 05/30/2022     Lab Results   Component Value Date    CHOL 183 05/31/2022    HDL 52 05/31/2022    HDL 46 05/16/2012    TRIG 58 05/31/2022     Recent Labs     06/08/22 0724   WBC 5.5   HGB 12.6   HCT 37.9      MCV 96.2     Recent Labs     06/08/22 0724      K 4.1   CL 98*   CO2 29   BUN 24*   CREATININE 1.0     No results for input(s): AST, ALT, ALB, BILIDIR, BILITOT, ALKPHOS in the last 72 hours. No results for input(s): MG in the last 72 hours.   Recent Labs     06/08/22  0724   WBC 5.5   HGB 12.6   HCT 37.9      Recent Labs     06/08/22  0724      K 4.1   CL 98*   CO2 29   BUN 24*   CREATININE 1.0   CALCIUM 9.3     No results for input(s): AST, ALT, BILIDIR, BILITOT, ALKPHOS in the last 72 hours. No results for input(s): INR in the last 72 hours. No results for input(s): Ardelle Chalk in the last 72 hours. PHYSICAL THERAPY:  Bed Mobility: Scooting: Contact guard assistance (seated EOB)    Transfers:  Sit to Stand: Minimal Assistance,Moderate Assistance (fluctuates fluctuates min<>mod at EOB/wc, min A at raised toilet seat ; all with RW)  Stand to sit: Contact guard assistance,Minimal Assistance (completed at [facility]. min A d/t decreased eccentric control, CGA with increased VC for eccentric control)    Ambulation  Device: Rolling Walker  Assistance: Contact guard assistance,Minimal assistance (CGA with occasional min A required for RW placement)  Quality of Gait: slow, effortful, flexed trunk, decreased step height/length B LE, shuffling steps, step to pattern with increased step length  Distance: 15' x2, small distances in bathroom  Comments: VC for WBing status, increased step length, and safe RW placement    OCCUPATIONAL THERAPY:  ADL  Grooming: Setup  Grooming Skilled Clinical Factors: Pt combed her hair seated in recliner chair  UE Bathing: Setup,Verbal cueing,Increased time to complete,Stand by assistance  UE Bathing Skilled Clinical Factors: Pt washed and dried 4/4 components of UE bathing seated on TTB. Pt demonstrated slow initiation and sequencing of all bathing tasks  LE Bathing: Setup,Verbal cueing,Increased time to complete,Contact guard assistance  LE Bathing Skilled Clinical Factors: Pt washed and dried BLEs and mindi area seated on TTB. Pt was able to reach BLEs but required ++ time to complete and slow to initiate and sequence. Pt required CGA in stance at the GB to dry buttocks.   UE Dressing: Setup  UE Dressing Skilled Clinical Factors: Pt donned button up shirt w/ setup  LE Dressing: Setup,Verbal cueing,Increased time to complete,Maximum assistance  LE Dressing Skilled Clinical Factors: Pt required total A to thread BLEs into underwear and pants seated on TTB w/ min A in stance to manage clothes over hips. Pt required + assist due to fatigue following the shower. Pt required total A to don socks  Toileting: Moderate assistance  Toileting Skilled Clinical Factors: Pt required  Mod A for pants management in the back. Pt completed mindi care I'ly. Pt was continent of bladder 2x during session however pt's urine was foul smelling-RN notified    Toilet Transfers: Toilet Transfers  Toilet - Technique: Ambulating  Equipment Used: Raised toilet seat with rails  Toilet Transfer: Minimal assistance    Tub/ShowerTransfers:  Shower Transfers  Shower - Transfer From: Lendel Lolling - Transfer Type: To and From  Shower - Transfer To: Transfer tub bench  Shower - Technique: Ambulating  Shower Transfers: Minimal assistance  Shower Transfers Comments: Pt w/ slow pace and required VCs for safe approach to TTB. Min A needed to stand from TTB    SPEECH THERAPY:  Assessment: Speech Therapy Diagnosis  Cognitive Diagnosis: Moderate cognitive-linguistic impairment  Aphasia Diagnosis: Mild to resolving expressive aphasia  Speech Diagnosis: Mild dysarthria   Pt with moderate cognitive impairment characterized by reduced memory, attention, cognitive flexibility, and executive function skills. Reduced insight to deficits and safety awareness. NUTRITION:    Current Weight: 124 lb 1.9 oz (56.3 kg) BMI (Calculated): 18.9  Current diet order: Current diet and supplement order: ADULT DIET;  Regular  ADULT ORAL NUTRITION SUPPLEMENT; Breakfast, Dinner; Standard High Calorie/High Protein Oral Supplement  ADULT ORAL NUTRITION SUPPLEMENT; Lunch; Frozen Oral Supplement     Feeding: Able to feed self,Needs set up  Room Service: Selective   NSG Recorded PO: PO Meals Eaten (%): 26 - 50%    Malnutrition Status Malnutrition Status: Mild malnutrition  Please see nutrition evaluation per nutrition standards of care for additional info. CASE MANAGEMENT:  Psychosocial/Behavioral Issues: NA  Assessment:  Family hopes that pt can return home with home health care, but also open to short-term SNF stay if needed before returning home. CM discussed options for aide services - COA, private pay. Pt lives in [place] so Willie Paris found the [facility] which has similar services. CM can make referral.  Family is aware that [facility] 78 may be difficult to staff in pt's area, as it happened in the past.  Pt stayed at son and DIL's home in [place] to receive therapies, then returned to her home in [place]. They are agreeable to doing this again if needed. Patient/Family Education provided by team:  Role of PT/OT, role of SLP, safe transfers, delirium precautions     Patient Goals for Rehab stay:  1. get stronger      Rehab Team Goals for patient for the Upcoming Week:  1. increase independence w/ ADLs  2.  Increase independence w/ functional transfers and gait     Barriers to Progress/Attainment of Goals & Efforts/Interventions to remove Barriers:  1. difficulty w/ PWB precautions- cont to address in therapy     Discharge Plan:  Estimated Length of Stay: 13 days  Destination: home health and discharge home with supervision  Services at Discharge: [id number], Occupational Therapy, Speech Therapy and Nursing 3x week  Equipment at Discharge: cont to assess OT needs , RW  Factors facilitating achievement of predicted outcomes: Cooperative and Pleasant  Barriers to the achievement of predicted outcomes: Cognitive deficit    Special Needs in the Upcoming Week:   [x] Family/Caregiver Education  [] Home visit  []Therapeutic Pass [] Consults:_______   [] Family/Caregiver Training  [] Stroke Risk Factor Education     [] Other;_______     TEAM SUMMARY: Will continue with current poc & goals until anticipated d/c date of 6/19/2022.     MD:   Stroke Risk Factors:   [] N/A for this patient [] HTN  []  Diabetes  [x] Hyperlipidemia  []Obesity BMI >25  [] Atrial Fibrillation [] Smoker (current)  [] Smoker (quit in last 12 months)  [] Sleep Apnea [] Other    Risk for Readmission: Moderate (12%)    Justification for Continued Stay:   Criteria for continued IRF stay:  Based on my medical assessment of the patient and review of information from the interdisciplinary team, as part of this weekly team conference, the patient continues to meet the following criteria for IRF level of care:  [x] The patient requires 24-hour rehabilitation nursing care   [x] The patient requires an intensive rehabilitation therapy program  [x] The patient requires active and ongoing intervention of multiple therapy disciplines  [x] The patient requires continued physician supervision by a rehabilitation physician  [x] The patient requires an intensive and coordinated interdisciplinary team approach to the delivery of rehabilitative care    Medical Necessity-continued close physician medical management is required for:   [] Cardiac/Circulatory dysfunction  [] Respiratory/Pulmonary dysfunction  [] Integumentary complications  [] Peripheral Vascular dysfunction  [x] Musculoskeletal dysfunction  [x] Neurological dysfunction d/t:  [x] CVA  [] SCI  [] TBI  [] Other: __________  [] Renal dysfunction  [] Hematologic dysfunction    [] Endocrine disorders  [] GI disorders     [] Genito-Urinary dysfunction    Assessment/Plan:  [x] The patient is making good progression towards their LTG's, is actively participating in, and has a reasonable expectation to continue to benefit from the intensive rehabilitation program.  [] The estimated discharge date has been changed from initial team conference due to:   [] The estimated discharge destination has been changed from initial team conference due to:     Rehab Team Members in attendance for Team Conference:  ARU Supervisor/PPS Coordinator:  Aniyah Hanna, OTR/L    Therapy Manager/ARU :  Gabe Shankar, PT, DPT    Nursing Manager:  Celia Núñez, RN    Social Work:  OLIVER Salamanca    Nursing:  Demetrio Watson, ARLEEN Hdez, RN    Therapy:  Milagro Chatterjee, PT  Alberto Mancera, PT, DPT  Padma Holt PT, DPT     Betty Wong, OTR/L  Angel Luis Foreman, OTR/L  Sherrell, OTR/L    Wilfrid Mccullough, [address] Cath[address] Peguero, -SLP    Nutrition:  Alex Ortiz, ROGERIO LD    I approve the established interdisciplinary plan of care as documented within the medical record of [address] [place] [address].     MD Lazara Bliss D.O. M.P.H  PM&R  6/9/2022  11:44 AM No

## 2022-12-27 NOTE — ED ADULT TRIAGE NOTE - CHIEF COMPLAINT QUOTE
Pt BIBA from home c.o weakness and shakiness x 1 week. Pt denies fever, cough, congestion, chest pain or SOB.  Pt states 2 weeks ago, she had an episode of rectal bleeding that has now resolved.

## 2022-12-27 NOTE — H&P ADULT - HISTORY OF PRESENT ILLNESS
81 yo female with pmhx of COPD, CAD s/p stents, HLD, HTN, pAF on Eliquis, anemia (unknown baseline hgb) presenting to the ED with complaint of generalized weakness.  83 yo female with pmhx of COPD, CAD s/p stents, HLD, HTN, pAF on Eliquis, anemia (unknown baseline hgb) presenting to the ED with complaint of generalized weakness. Patient states she only noted the weakness starting this morning. She does, however, endorse BRBPR that started 12/16, approx 2 episodes per day x 2 -3 days. After that, she developed diarrhea with bright red blood. For the past 5 days, she hasn't noted any bright red blood, but has noticed her stool to be particularly dark. She does admit a history of anemia, but does not know her baseline hgb. She has never required a blood transfusion in the past, she has never had a colonoscopy. She denies chest pain, SOB, headache, dizziness, or other associated symptoms.

## 2022-12-27 NOTE — H&P ADULT - ASSESSMENT
83 yo female with pmhx of COPD, CAD s/p stents, HLD, HTN, pAF on Eliquis, anemia (unknown baseline hgb) presenting to the ED with complaint of generalized weakness.     Acute Blood Loss Anemia  -Admit to medicine  -Hgb 4.5  -CTA could not be done due to GFR  -Patient states she initially had BRBPR starting 12/16, however, has had only dark stool since 12/21 without bright red blood  -Transfuse 2 units PRBCs now, repeat CBC post-transfusion  -Start Protonix 40 mg IV BID  -Keep NPO pending GI evaluation for possible EGD  -Monitor CBC  -Transfuse for Hgb < 8.0 in patient with cardiac disease     pAF on Eliquis, CAD, HTN  -Hold Eliquis in setting of acute bleed  -Continue Metoprolol 100 mg daily and Amiodarone 200 mg daily  -Continue Torsemide 100 mg daily    HLD  -Continue Plavix 75 mg daily  -Continue Atorvastatin 80 mg daily    Macrocytic Anemia  -Check Vitamin B12/Folate    COPD  -Uses Stiolito Respimat inhaler  -Continue     DVTppx: SCDs, hold chemical ppx in setting of acute bleed  GOC: 83 yo female with pmhx of COPD, CAD s/p stents, HLD, HTN, pAF on Eliquis, anemia (unknown baseline hgb) presenting to the ED with complaint of generalized weakness.     Acute Blood Loss Anemia  -Admit to medicine  -Hgb 4.5  -CTA could not be done due to GFR  -Patient states she initially had BRBPR starting 12/16, however, has had only dark stool since 12/21 without bright red blood  -Transfuse 2 units PRBCs now, repeat CBC post-transfusion  -Start Protonix 40 mg IV BID  -Keep NPO pending GI evaluation for possible EGD  -Monitor CBC  -Transfuse for Hgb < 8.0 in patient with cardiac disease     DANETTE, unknown baseline Cr   -Last Cr from 2017 in patient record was 0.8, no known hx of CKD  -Possibly 2/2 hypovolemia with severe anemia  -Repeat BMP after transfusion    pAF on Eliquis, CAD, HTN  -Hold Eliquis in setting of acute bleed  -Continue Metoprolol 100 mg daily and Amiodarone 200 mg daily  -Continue Torsemide 100 mg daily    HLD  -Continue Plavix 75 mg daily  -Continue Atorvastatin 80 mg daily    Macrocytic Anemia  -Check Vitamin B12/Folate  -Trend CBC    COPD  -Uses Stiolito Respimat inhaler  -Continue inhaler as prescribeed    DVTppx: SCDs, hold chemical ppx in setting of acute bleed  GOC: Full Code, conversation as documented above 81 yo female with pmhx of COPD, CAD s/p stents, HLD, HTN, pAF on Eliquis, anemia (unknown baseline hgb) presenting to the ED with complaint of generalized weakness.     Acute Blood Loss Anemia  -Admit to medicine  -Hgb 4.5  -CTA could not be done due to GFR  -Patient states she initially had BRBPR starting 12/16, however, has had only dark stool since 12/21 without bright red blood  -Transfuse 2 units PRBCs now, repeat CBC post-transfusion  -Start Protonix 40 mg IV BID  -Keep NPO pending GI evaluation for possible EGD  -Monitor CBC  -Transfuse for Hgb < 8.0 in patient with cardiac disease     DANETTE, unknown baseline Cr   -Last Cr from 2017 in patient record was 0.8, no known hx of CKD  -Possibly 2/2 hypovolemia with severe anemia  -Repeat BMP after transfusion    pAF on Eliquis, CAD, HTN  -Hold Eliquis in setting of acute bleed, can hold Plavix for now as patient states stents were many years ago  -Continue Metoprolol 100 mg daily and Amiodarone 200 mg daily  -Continue Torsemide 100 mg daily    HLD  -Continue Plavix 75 mg daily  -Continue Atorvastatin 80 mg daily    Macrocytic Anemia  -Check Vitamin B12/Folate  -Trend CBC    COPD  -Uses Stiolito Respimat inhaler  -Continue inhaler as prescribeed    DVTppx: SCDs, hold chemical ppx in setting of acute bleed  GOC: Full Code, conversation as documented above

## 2022-12-27 NOTE — H&P ADULT - NSICDXFAMILYHX_GEN_ALL_CORE_FT
FAMILY HISTORY:  Sibling  Still living? Unknown  FHx: heart disease, Age at diagnosis: Age Unknown

## 2022-12-27 NOTE — ED ADULT NURSE NOTE - OBJECTIVE STATEMENT
pt reporting weakness x more than 1 week. pt also reports diarrhea which has resolved a couple of days ago. pt on eliquis (for stents) and appears pale. hemoccult specimen done by Dr. hernandez and sent to lab. pt AOX4. on chronic 0xygen at 2lpm via nc for COPD.

## 2022-12-27 NOTE — ED ADULT NURSE NOTE - NSICDXPASTMEDICALHX_GEN_ALL_CORE_FT
PAST MEDICAL HISTORY:  CAD (coronary artery disease)     COPD (chronic obstructive pulmonary disease)     HLD (hyperlipidemia)     HTN (hypertension)

## 2022-12-27 NOTE — H&P ADULT - NSHPPHYSICALEXAM_GEN_ALL_CORE
Vital Signs Last 24 Hrs  T(C): 36.6 (27 Dec 2022 19:24), Max: 36.6 (27 Dec 2022 19:24)  T(F): 97.9 (27 Dec 2022 19:24), Max: 97.9 (27 Dec 2022 19:24)  HR: 63 (27 Dec 2022 19:24) (63 - 63)  BP: 101/66 (27 Dec 2022 19:24) (101/66 - 101/66)  BP(mean): --  RR: 18 (27 Dec 2022 19:24) (18 - 18)  SpO2: 97% (27 Dec 2022 19:24) (97% - 97%)    Parameters below as of 27 Dec 2022 19:24  Patient On (Oxygen Delivery Method): nasal cannula  O2 Flow (L/min): 2      General: Age-appearing, in no acute distress  Head: Normochephalic, atraumatic  ENMT: EOMI, neck supple  Cardiovascular: +S1, S2; Regular rate and rhythm, no murmurs, rubs, gallops  Respiratory: CTA BL, no wheezes, rales, rhonchi  Gastrointestinal: Abdomen soft, non-tender, +BS in all 4 quadrants  Extremities: No clubbing, cyanosis, or edema  Vascular: 2+ pulses, cap refill < 2 seconds  Neuro: Non-focal, AAOx4, sensation intact BL  Musculoskeletal: Normal tone, no deformities  Skin: Warm, dry; no acute rash seen  Psych: Appropriate, cooperative Vital Signs Last 24 Hrs  T(C): 36.6 (27 Dec 2022 19:24), Max: 36.6 (27 Dec 2022 19:24)  T(F): 97.9 (27 Dec 2022 19:24), Max: 97.9 (27 Dec 2022 19:24)  HR: 63 (27 Dec 2022 19:24) (63 - 63)  BP: 101/66 (27 Dec 2022 19:24) (101/66 - 101/66)  BP(mean): --  RR: 18 (27 Dec 2022 19:24) (18 - 18)  SpO2: 97% (27 Dec 2022 19:24) (97% - 97%)    Parameters below as of 27 Dec 2022 19:24  Patient On (Oxygen Delivery Method): nasal cannula  O2 Flow (L/min): 2    General: Age-appearing, in no acute distress  Head: Normocephalic, atraumatic  ENMT: EOMI, neck supple, pale sclera  Cardiovascular: +S1, S2; Regular rate and rhythm, no murmurs, rubs, gallops  Respiratory: CTA BL, no wheezes, rales, rhonchi  Gastrointestinal: Abdomen soft, non-tender, +BS in all 4 quadrants  Extremities: No clubbing, cyanosis, or edema  Vascular: 2+ pulses, cap refill > 2 seconds  Neuro: Non-focal, AAOx4, sensation intact BL  Musculoskeletal: Normal tone, no deformities  Skin: Warm, dry; no acute rash seen  Psych: Appropriate, cooperative

## 2022-12-27 NOTE — ED PROVIDER NOTE - IV ALTEPLASE EXCL ABS HIDDEN
show Picato Counseling:  I discussed with the patient the risks of Picato including but not limited to erythema, scaling, itching, weeping, crusting, and pain.

## 2022-12-27 NOTE — ED PROVIDER NOTE - NSICDXPASTMEDICALHX_GEN_ALL_CORE_FT
PAST MEDICAL HISTORY:  CAD (coronary artery disease)     COPD (chronic obstructive pulmonary disease)     HLD (hyperlipidemia)     HTN (hypertension)     
Initial

## 2022-12-27 NOTE — H&P ADULT - NSHPLABSRESULTS_GEN_ALL_CORE
4.5    9.76  )-----------( 357      ( 27 Dec 2022 20:35 )             16.0     27 Dec 2022 20:35    138    |  100    |  33.3   ----------------------------<  104    4.3     |  25.0   |  1.66     Ca    8.7        27 Dec 2022 20:35    TPro  5.5    /  Alb  3.1    /  TBili  0.6    /  DBili  x      /  AST  20     /  ALT  20     /  AlkPhos  191    27 Dec 2022 20:35    PT/INR - ( 27 Dec 2022 20:35 )   PT: 32.8 sec;   INR: 2.80 ratio         PTT - ( 27 Dec 2022 20:35 )  PTT:43.3 sec  CAPILLARY BLOOD GLUCOSE        LIVER FUNCTIONS - ( 27 Dec 2022 20:35 )  Alb: 3.1 g/dL / Pro: 5.5 g/dL / ALK PHOS: 191 U/L / ALT: 20 U/L / AST: 20 U/L / GGT: x

## 2022-12-27 NOTE — ED PROVIDER NOTE - OBJECTIVE STATEMENT
81 y/o female with PMHx of COPD, CAD s/p stents, HLD, HTN, on eliquis, anemia on iron presents to the ED c/o generalized weakness. Pt states starting on 12/16 she developed painless bright red blood per rectum that lasted for about 3 days, had about 2 episodes per day, states 2 days ago she developed diarrhea that had blood in it, last time she had blood in stool was on 12/21. Pt takes metamucil chronically for constipation. Pt states she has hx of anemia for which she takes iron for, notes she feels like somebody is sitting on her legs. Pt denies rectal pain. Pt last BM was yesterday. She states she feels like she is sitting on a hemorrhoid. 81 y/o female with PMHx of COPD, CAD s/p stents, HLD, HTN, on Eliquis, anemia on iron presents to the ED c/o generalized weakness. Pt states starting on 12/16 she developed painless bright red blood per rectum that lasted for about 3 days, had about 2 episodes per day, states 2 days ago she developed diarrhea that had blood in it, last time she had blood in stool was on 12/21. Pt takes metamucil chronically for constipation. Pt states she has hx of anemia for which she takes iron for, notes she feels like somebody is sitting on her legs. Pt denies rectal pain. Pt last BM was yesterday. She states she feels like she is sitting on a hemorrhoid.

## 2022-12-27 NOTE — ED PROVIDER NOTE - PHYSICAL EXAMINATION
Gen: Well appearing in NAD  Head: NC/AT  Neck: trachea midline  Card: regular rate and rhythm  Resp:  CTAB  Abd: soft, non-distended, non-tender, Rectal chaperoned by RNShilpi: no hemorrhoids, + dark stool   Ext: no deformities  Neuro:  A&O appears non focal  Skin:  Warm and dry as visualized  Psych:  Normal affect and mood

## 2022-12-27 NOTE — ED ADULT NURSE REASSESSMENT NOTE - NS ED NURSE REASSESS COMMENT FT1
consent for blood transfusion signed by patient and witnessed by Mitchell CAMPBELL. consent given to kim clerk to have consent scanned into chart.

## 2022-12-27 NOTE — ED ADULT NURSE REASSESSMENT NOTE - NS ED NURSE REASSESS COMMENT FT1
spoke with blood bank at this time, confirmatory pink top just received, was instructed to callback in 1 hour to check if unit of blood is ready for pt.

## 2022-12-27 NOTE — H&P ADULT - CONVERSATION DETAILS
Patient AAOx4. Discussed the treatment options in the event that patient were to go into cardiac arrest including chest compressions and intubation. Explain in detail what this entails. Patient would like all measure to be taken. Verbalized understanding and consent.
[Negative] : Heme/Lymph

## 2022-12-27 NOTE — ED PROVIDER NOTE - CLINICAL SUMMARY MEDICAL DECISION MAKING FREE TEXT BOX
82-year-old female history of CAD on Eliquis presenting with worsening weakness after bright red blood per rectum last week, and dark stools for last few days. Concern for anemia and GI bleed.  plan for Protonix, labs, fecal occult, transfuse for hemoglobin less than 7 and reassess.

## 2022-12-28 DIAGNOSIS — Z78.9 OTHER SPECIFIED HEALTH STATUS: Chronic | ICD-10-CM

## 2022-12-28 DIAGNOSIS — D64.9 ANEMIA, UNSPECIFIED: ICD-10-CM

## 2022-12-28 DIAGNOSIS — Z98.891 HISTORY OF UTERINE SCAR FROM PREVIOUS SURGERY: Chronic | ICD-10-CM

## 2022-12-28 LAB
ANION GAP SERPL CALC-SCNC: 12 MMOL/L — SIGNIFICANT CHANGE UP (ref 5–17)
APTT BLD: 44.4 SEC — HIGH (ref 27.5–35.5)
BASOPHILS # BLD AUTO: 0.07 K/UL — SIGNIFICANT CHANGE UP (ref 0–0.2)
BASOPHILS NFR BLD AUTO: 0.8 % — SIGNIFICANT CHANGE UP (ref 0–2)
BUN SERPL-MCNC: 36.2 MG/DL — HIGH (ref 8–20)
CALCIUM SERPL-MCNC: 7.8 MG/DL — LOW (ref 8.4–10.5)
CHLORIDE SERPL-SCNC: 101 MMOL/L — SIGNIFICANT CHANGE UP (ref 96–108)
CO2 SERPL-SCNC: 24 MMOL/L — SIGNIFICANT CHANGE UP (ref 22–29)
CREAT SERPL-MCNC: 1.84 MG/DL — HIGH (ref 0.5–1.3)
EGFR: 27 ML/MIN/1.73M2 — LOW
EOSINOPHIL # BLD AUTO: 0 K/UL — SIGNIFICANT CHANGE UP (ref 0–0.5)
EOSINOPHIL NFR BLD AUTO: 0 % — SIGNIFICANT CHANGE UP (ref 0–6)
FOLATE SERPL-MCNC: >20 NG/ML — SIGNIFICANT CHANGE UP
GLUCOSE SERPL-MCNC: 96 MG/DL — SIGNIFICANT CHANGE UP (ref 70–99)
HCOV PNL SPEC NAA+PROBE: DETECTED
HCT VFR BLD CALC: 23.5 % — LOW (ref 34.5–45)
HGB BLD-MCNC: 7.1 G/DL — LOW (ref 11.5–15.5)
IMM GRANULOCYTES NFR BLD AUTO: 1.3 % — HIGH (ref 0–0.9)
INR BLD: 2.16 RATIO — HIGH (ref 0.88–1.16)
LYMPHOCYTES # BLD AUTO: 0.9 K/UL — LOW (ref 1–3.3)
LYMPHOCYTES # BLD AUTO: 10.7 % — LOW (ref 13–44)
MCHC RBC-ENTMCNC: 29.7 PG — SIGNIFICANT CHANGE UP (ref 27–34)
MCHC RBC-ENTMCNC: 30.2 GM/DL — LOW (ref 32–36)
MCV RBC AUTO: 98.3 FL — SIGNIFICANT CHANGE UP (ref 80–100)
MONOCYTES # BLD AUTO: 0.94 K/UL — HIGH (ref 0–0.9)
MONOCYTES NFR BLD AUTO: 11.2 % — SIGNIFICANT CHANGE UP (ref 2–14)
NEUTROPHILS # BLD AUTO: 6.39 K/UL — SIGNIFICANT CHANGE UP (ref 1.8–7.4)
NEUTROPHILS NFR BLD AUTO: 76 % — SIGNIFICANT CHANGE UP (ref 43–77)
PLATELET # BLD AUTO: 327 K/UL — SIGNIFICANT CHANGE UP (ref 150–400)
POTASSIUM SERPL-MCNC: 4.1 MMOL/L — SIGNIFICANT CHANGE UP (ref 3.5–5.3)
POTASSIUM SERPL-SCNC: 4.1 MMOL/L — SIGNIFICANT CHANGE UP (ref 3.5–5.3)
PROTHROM AB SERPL-ACNC: 25.2 SEC — HIGH (ref 10.5–13.4)
RAPID RVP RESULT: DETECTED
RBC # BLD: 2.39 M/UL — LOW (ref 3.8–5.2)
RBC # FLD: 20.9 % — HIGH (ref 10.3–14.5)
SARS-COV-2 RNA SPEC QL NAA+PROBE: SIGNIFICANT CHANGE UP
SODIUM SERPL-SCNC: 137 MMOL/L — SIGNIFICANT CHANGE UP (ref 135–145)
VIT B12 SERPL-MCNC: 831 PG/ML — SIGNIFICANT CHANGE UP (ref 232–1245)
WBC # BLD: 8.41 K/UL — SIGNIFICANT CHANGE UP (ref 3.8–10.5)
WBC # FLD AUTO: 8.41 K/UL — SIGNIFICANT CHANGE UP (ref 3.8–10.5)

## 2022-12-28 PROCEDURE — 99233 SBSQ HOSP IP/OBS HIGH 50: CPT

## 2022-12-28 PROCEDURE — 99223 1ST HOSP IP/OBS HIGH 75: CPT

## 2022-12-28 RX ORDER — SOD SULF/SODIUM/NAHCO3/KCL/PEG
4000 SOLUTION, RECONSTITUTED, ORAL ORAL ONCE
Refills: 0 | Status: COMPLETED | OUTPATIENT
Start: 2022-12-28 | End: 2022-12-28

## 2022-12-28 RX ORDER — METOPROLOL TARTRATE 50 MG
100 TABLET ORAL DAILY
Refills: 0 | Status: DISCONTINUED | OUTPATIENT
Start: 2022-12-28 | End: 2022-12-31

## 2022-12-28 RX ORDER — AMIODARONE HYDROCHLORIDE 400 MG/1
200 TABLET ORAL DAILY
Refills: 0 | Status: DISCONTINUED | OUTPATIENT
Start: 2022-12-28 | End: 2022-12-31

## 2022-12-28 RX ORDER — PHYTONADIONE (VIT K1) 5 MG
5 TABLET ORAL ONCE
Refills: 0 | Status: COMPLETED | OUTPATIENT
Start: 2022-12-28 | End: 2022-12-28

## 2022-12-28 RX ORDER — SODIUM CHLORIDE 9 MG/ML
1000 INJECTION INTRAMUSCULAR; INTRAVENOUS; SUBCUTANEOUS
Refills: 0 | Status: DISCONTINUED | OUTPATIENT
Start: 2022-12-28 | End: 2022-12-30

## 2022-12-28 RX ORDER — ATORVASTATIN CALCIUM 80 MG/1
80 TABLET, FILM COATED ORAL AT BEDTIME
Refills: 0 | Status: DISCONTINUED | OUTPATIENT
Start: 2022-12-28 | End: 2022-12-31

## 2022-12-28 RX ORDER — TIOTROPIUM BROMIDE AND OLODATEROL 3.124; 2.736 UG/1; UG/1
2 SPRAY, METERED RESPIRATORY (INHALATION) DAILY
Refills: 0 | Status: DISCONTINUED | OUTPATIENT
Start: 2022-12-28 | End: 2022-12-31

## 2022-12-28 RX ADMIN — ATORVASTATIN CALCIUM 80 MILLIGRAM(S): 80 TABLET, FILM COATED ORAL at 21:23

## 2022-12-28 RX ADMIN — Medication 5 MILLIGRAM(S): at 11:04

## 2022-12-28 RX ADMIN — Medication 4000 MILLILITER(S): at 18:27

## 2022-12-28 RX ADMIN — SODIUM CHLORIDE 65 MILLILITER(S): 9 INJECTION INTRAMUSCULAR; INTRAVENOUS; SUBCUTANEOUS at 20:17

## 2022-12-28 RX ADMIN — PANTOPRAZOLE SODIUM 40 MILLIGRAM(S): 20 TABLET, DELAYED RELEASE ORAL at 18:27

## 2022-12-28 RX ADMIN — PANTOPRAZOLE SODIUM 40 MILLIGRAM(S): 20 TABLET, DELAYED RELEASE ORAL at 05:34

## 2022-12-28 NOTE — CONSULT NOTE ADULT - PROBLEM SELECTOR RECOMMENDATION 9
Initial Hgb 4.5, + FOBT. Eliquis last dose 12/27 AM. S/p 1 u pRBC 12/27, 1 u pRBC 12/28     - Clear liquid diet okay for today. NPO after midnight  - Correct INR  - Tentative plan for EGD/colonoscopy tomorrow (12/29) if medically optimized   - Colonoscopy prep ordered, goal bowel movements clear and yellow to ensure complete exam  - Maintain current COVID PCR, T&S, INR <1.5, Hgb >7.0, Plt >50 prior to procedure.  - Trend CBC, transfuse as needed. Monitor for signs of bleeding. Avoid NSAIDs  - IV PPI BID  _________________________________________________________________  Assessment and recommendations are final when note is signed by the attending physician.

## 2022-12-28 NOTE — CONSULT NOTE ADULT - NS ATTEND AMEND GEN_ALL_CORE FT
81 y/o F with PMHx COPD (home O2), CAD s/p stent, HLD, HTN, pAF on Eliquis, anemia 4.5.   Plan  EGD and colonoscopy tomorrow  will need pulm clearance for COPD/home O2  clears, prep, NPO after midnight

## 2022-12-28 NOTE — CONSULT NOTE ADULT - SUBJECTIVE AND OBJECTIVE BOX
Patient is a 82y old  Female who presents with a chief complaint of Acute Blood Loss Anemia (27 Dec 2022 23:22)      HPI: 81 y/o F with PMHx COPD (home O2), CAD s/p stent, HLD, HTN, pAF on Eliquis, Anemia presented to ED with generalized weakness starting yesterday morning. She states she was so weak she needed to come to the hospital. Around 1 week ago she had BRBPR approx 2 episodes per day for several days, after that she developed diarrhea with bright red blood. This resolved for several days. She does report history of anemia but does not know her baseline H/H. No hx of blood transfusion. She has never had colonoscopy or endoscopy. She is on Eliquis at home, last dose  in the morning.     Initial Hgb 4.5, + FOBT, INR 2.80, BUN 33.3, Cr 1.66, transfused 2 u pRBC, awaiting repeat labs.         PAST MEDICAL & SURGICAL HISTORY:  HTN (hypertension)      HLD (hyperlipidemia)      CAD (coronary artery disease)      COPD (chronic obstructive pulmonary disease)      S/P  section          Allergies    No Known Allergies    Intolerances        MEDICATIONS  (STANDING):  aMIOdarone    Tablet 200 milliGRAM(s) Oral daily  atorvastatin 80 milliGRAM(s) Oral at bedtime  metoprolol succinate  milliGRAM(s) Oral daily  pantoprazole  Injectable 40 milliGRAM(s) IV Push every 12 hours  tiotropium 2.5 MICROgram(s)/olodaterol 2.5 MICROgram(s) (STIOLTO) Inhaler 2 Puff(s) Inhalation daily    MEDICATIONS  (PRN):  acetaminophen     Tablet .. 650 milliGRAM(s) Oral every 6 hours PRN Temp greater or equal to 38C (100.4F), Mild Pain (1 - 3)  aluminum hydroxide/magnesium hydroxide/simethicone Suspension 30 milliLiter(s) Oral every 4 hours PRN Dyspepsia  melatonin 3 milliGRAM(s) Oral at bedtime PRN Insomnia  ondansetron Injectable 4 milliGRAM(s) IV Push every 8 hours PRN Nausea and/or Vomiting      Social History:    Marital Status:  (   )    (   ) Single    (   )    (  )   Occupation:   Lives with: (  ) alone  (  ) children   (  ) spouse   (  ) parents  (  ) other    Substance Use (street drugs): (  ) never used  (  ) other:  Tobacco Usage:  (   ) never smoked   ( x  ) former smoker   (   ) current smoker  (     ) pack year  (        ) last cigarette date  Alcohol Usage:  Sexual History:     Family History   IBD (  ) Yes   (  ) No  GI Malignancy (  )  Yes    (  ) No    Health Management     Last Colonoscopy -      (     ) Advanced Directives: (     ) None    (      ) DNR    (     ) DNI    (     ) Health Care Proxy:       REVIEW OF SYSTEMS:   General: Negative  HEENT: Negative  CV: Negative  Respiratory: Negative  GI: See HPI  : Negative  MSK: Negative  Hematologic: Negative  Skin: Negative      Vital Signs Last 24 Hrs  T(C): 36.5 (28 Dec 2022 07:18), Max: 37.1 (28 Dec 2022 01:40)  T(F): 97.7 (28 Dec 2022 07:18), Max: 98.7 (28 Dec 2022 01:40)  HR: 55 (28 Dec 2022 07:18) (55 - 63)  BP: 90/58 (28 Dec 2022 07:18) (83/48 - 101/66)  BP(mean): --  RR: 18 (28 Dec 2022 07:18) (18 - 18)  SpO2: 96% (28 Dec 2022 07:18) (96% - 99%)    Parameters below as of 28 Dec 2022 07:18  Patient On (Oxygen Delivery Method): mask, nonrebreather  O2 Flow (L/min): 2      PHYSICAL EXAM:   GENERAL:  No acute distress  HEENT:  NC/AT, conjunctiva clear, sclera anicteric, on nasal cannula   CHEST:  No increased effort, breath sounds clear  HEART:  Regular rhythm  ABDOMEN:  Soft, non-tender, non-distended, normoactive bowel sounds, no rebound or guarding  EXTREMITIES: No edema  SKIN:  Warm, dry  NEURO:  Calm, cooperative        LABS:                        4.5    9.76  )-----------( 357      ( 27 Dec 2022 20:35 )             16.0     12-    138  |  100  |  33.3<H>  ----------------------------<  104<H>  4.3   |  25.0  |  1.66<H>    Ca    8.7      27 Dec 2022 20:35    TPro  5.5<L>  /  Alb  3.1<L>  /  TBili  0.6  /  DBili  x   /  AST  20  /  ALT  20  /  AlkPhos  191<H>  12-    PT/INR - ( 27 Dec 2022 20:35 )   PT: 32.8 sec;   INR: 2.80 ratio         PTT - ( 27 Dec 2022 20:35 )  PTT:43.3 sec    LIVER FUNCTIONS - ( 27 Dec 2022 20:35 )  Alb: 3.1 g/dL / Pro: 5.5 g/dL / ALK PHOS: 191 U/L / ALT: 20 U/L / AST: 20 U/L / GGT: x             RADIOLOGY & ADDITIONAL TESTS:       Patient is a 82y old  Female who presents with a chief complaint of Acute Blood Loss Anemia (27 Dec 2022 23:22)      HPI: 81 y/o F with PMHx COPD (home O2), CAD s/p stent, HLD, HTN, pAF on Eliquis (last dose yesterday), Anemia presented to ED with generalized weakness starting yesterday morning. She states she was so weak she needed to come to the hospital. Around 1 week ago she had BRBPR approx 2 episodes per day for several days, after that she developed diarrhea with bright red blood. This resolved for several days. She does report history of anemia but does not know her baseline H/H. No hx of blood transfusion. She has never had colonoscopy or endoscopy. She is on Eliquis at home, last dose  in the morning.     Initial Hgb 4.5, + FOBT, INR 2.80, BUN 33.3, Cr 1.66, transfused 2 u pRBC, awaiting repeat labs.         PAST MEDICAL & SURGICAL HISTORY:  HTN (hypertension)      HLD (hyperlipidemia)      CAD (coronary artery disease)      COPD (chronic obstructive pulmonary disease)      S/P  section          Allergies    No Known Allergies    Intolerances        MEDICATIONS  (STANDING):  aMIOdarone    Tablet 200 milliGRAM(s) Oral daily  atorvastatin 80 milliGRAM(s) Oral at bedtime  metoprolol succinate  milliGRAM(s) Oral daily  pantoprazole  Injectable 40 milliGRAM(s) IV Push every 12 hours  tiotropium 2.5 MICROgram(s)/olodaterol 2.5 MICROgram(s) (STIOLTO) Inhaler 2 Puff(s) Inhalation daily    MEDICATIONS  (PRN):  acetaminophen     Tablet .. 650 milliGRAM(s) Oral every 6 hours PRN Temp greater or equal to 38C (100.4F), Mild Pain (1 - 3)  aluminum hydroxide/magnesium hydroxide/simethicone Suspension 30 milliLiter(s) Oral every 4 hours PRN Dyspepsia  melatonin 3 milliGRAM(s) Oral at bedtime PRN Insomnia  ondansetron Injectable 4 milliGRAM(s) IV Push every 8 hours PRN Nausea and/or Vomiting      Social History:    Marital Status:  (   )    (   ) Single    (   )    (  )   Occupation:   Lives with: (  ) alone  (  ) children   (  ) spouse   (  ) parents  (  ) other    Substance Use (street drugs): (  ) never used  (  ) other:  Tobacco Usage:  (   ) never smoked   ( x  ) former smoker   (   ) current smoker  (     ) pack year  (        ) last cigarette date  Alcohol Usage:  Sexual History:     Family History   IBD (  ) Yes   (  ) No  GI Malignancy (  )  Yes    (  ) No    Health Management     Last Colonoscopy -      (     ) Advanced Directives: (     ) None    (      ) DNR    (     ) DNI    (     ) Health Care Proxy:       REVIEW OF SYSTEMS:   General: Negative  HEENT: Negative  CV: Negative  Respiratory: Negative  GI: See HPI  : Negative  MSK: Negative  Hematologic: Negative  Skin: Negative      Vital Signs Last 24 Hrs  T(C): 36.5 (28 Dec 2022 07:18), Max: 37.1 (28 Dec 2022 01:40)  T(F): 97.7 (28 Dec 2022 07:18), Max: 98.7 (28 Dec 2022 01:40)  HR: 55 (28 Dec 2022 07:18) (55 - 63)  BP: 90/58 (28 Dec 2022 07:18) (83/48 - 101/66)  BP(mean): --  RR: 18 (28 Dec 2022 07:18) (18 - 18)  SpO2: 96% (28 Dec 2022 07:18) (96% - 99%)    Parameters below as of 28 Dec 2022 07:18  Patient On (Oxygen Delivery Method): mask, nonrebreather  O2 Flow (L/min): 2      PHYSICAL EXAM:   GENERAL:  No acute distress  HEENT:  NC/AT, conjunctiva clear, sclera anicteric, on nasal cannula   CHEST:  No increased effort, breath sounds clear  HEART:  Regular rhythm  ABDOMEN:  Soft, non-tender, non-distended, normoactive bowel sounds, no rebound or guarding  EXTREMITIES: No edema  SKIN:  Warm, dry  NEURO:  Calm, cooperative        LABS:                        4.5    9.76  )-----------( 357      ( 27 Dec 2022 20:35 )             16.0     12    138  |  100  |  33.3<H>  ----------------------------<  104<H>  4.3   |  25.0  |  1.66<H>    Ca    8.7      27 Dec 2022 20:35    TPro  5.5<L>  /  Alb  3.1<L>  /  TBili  0.6  /  DBili  x   /  AST  20  /  ALT  20  /  AlkPhos  191<H>  12-    PT/INR - ( 27 Dec 2022 20:35 )   PT: 32.8 sec;   INR: 2.80 ratio         PTT - ( 27 Dec 2022 20:35 )  PTT:43.3 sec    LIVER FUNCTIONS - ( 27 Dec 2022 20:35 )  Alb: 3.1 g/dL / Pro: 5.5 g/dL / ALK PHOS: 191 U/L / ALT: 20 U/L / AST: 20 U/L / GGT: x             RADIOLOGY & ADDITIONAL TESTS:

## 2022-12-28 NOTE — PATIENT PROFILE ADULT - FALL HARM RISK - HARM RISK INTERVENTIONS

## 2022-12-29 ENCOUNTER — TRANSCRIPTION ENCOUNTER (OUTPATIENT)
Age: 82
End: 2022-12-29

## 2022-12-29 ENCOUNTER — RESULT REVIEW (OUTPATIENT)
Age: 82
End: 2022-12-29

## 2022-12-29 LAB
ANION GAP SERPL CALC-SCNC: 11 MMOL/L — SIGNIFICANT CHANGE UP (ref 5–17)
APTT BLD: 37.5 SEC — HIGH (ref 27.5–35.5)
BUN SERPL-MCNC: 30.6 MG/DL — HIGH (ref 8–20)
CALCIUM SERPL-MCNC: 7.9 MG/DL — LOW (ref 8.4–10.5)
CHLORIDE SERPL-SCNC: 102 MMOL/L — SIGNIFICANT CHANGE UP (ref 96–108)
CO2 SERPL-SCNC: 25 MMOL/L — SIGNIFICANT CHANGE UP (ref 22–29)
CREAT SERPL-MCNC: 1.39 MG/DL — HIGH (ref 0.5–1.3)
EGFR: 38 ML/MIN/1.73M2 — LOW
GLUCOSE SERPL-MCNC: 84 MG/DL — SIGNIFICANT CHANGE UP (ref 70–99)
HCT VFR BLD CALC: 25.7 % — LOW (ref 34.5–45)
HCT VFR BLD CALC: 29.7 % — LOW (ref 34.5–45)
HCT VFR BLD CALC: 30.4 % — LOW (ref 34.5–45)
HGB BLD-MCNC: 7.8 G/DL — LOW (ref 11.5–15.5)
HGB BLD-MCNC: 9.1 G/DL — LOW (ref 11.5–15.5)
HGB BLD-MCNC: 9.4 G/DL — LOW (ref 11.5–15.5)
INR BLD: 1.41 RATIO — HIGH (ref 0.88–1.16)
MAGNESIUM SERPL-MCNC: 2.4 MG/DL — SIGNIFICANT CHANGE UP (ref 1.6–2.6)
MCHC RBC-ENTMCNC: 28.6 PG — SIGNIFICANT CHANGE UP (ref 27–34)
MCHC RBC-ENTMCNC: 28.9 PG — SIGNIFICANT CHANGE UP (ref 27–34)
MCHC RBC-ENTMCNC: 29 PG — SIGNIFICANT CHANGE UP (ref 27–34)
MCHC RBC-ENTMCNC: 30.4 GM/DL — LOW (ref 32–36)
MCHC RBC-ENTMCNC: 30.6 GM/DL — LOW (ref 32–36)
MCHC RBC-ENTMCNC: 30.9 GM/DL — LOW (ref 32–36)
MCV RBC AUTO: 93.4 FL — SIGNIFICANT CHANGE UP (ref 80–100)
MCV RBC AUTO: 93.5 FL — SIGNIFICANT CHANGE UP (ref 80–100)
MCV RBC AUTO: 95.5 FL — SIGNIFICANT CHANGE UP (ref 80–100)
PLATELET # BLD AUTO: 290 K/UL — SIGNIFICANT CHANGE UP (ref 150–400)
PLATELET # BLD AUTO: 298 K/UL — SIGNIFICANT CHANGE UP (ref 150–400)
PLATELET # BLD AUTO: 309 K/UL — SIGNIFICANT CHANGE UP (ref 150–400)
POTASSIUM SERPL-MCNC: 3.9 MMOL/L — SIGNIFICANT CHANGE UP (ref 3.5–5.3)
POTASSIUM SERPL-SCNC: 3.9 MMOL/L — SIGNIFICANT CHANGE UP (ref 3.5–5.3)
PROTHROM AB SERPL-ACNC: 16.4 SEC — HIGH (ref 10.5–13.4)
RBC # BLD: 2.69 M/UL — LOW (ref 3.8–5.2)
RBC # BLD: 3.18 M/UL — LOW (ref 3.8–5.2)
RBC # BLD: 3.25 M/UL — LOW (ref 3.8–5.2)
RBC # FLD: 21.2 % — HIGH (ref 10.3–14.5)
RBC # FLD: 21.2 % — HIGH (ref 10.3–14.5)
RBC # FLD: 21.3 % — HIGH (ref 10.3–14.5)
SODIUM SERPL-SCNC: 138 MMOL/L — SIGNIFICANT CHANGE UP (ref 135–145)
WBC # BLD: 10.11 K/UL — SIGNIFICANT CHANGE UP (ref 3.8–10.5)
WBC # BLD: 9.35 K/UL — SIGNIFICANT CHANGE UP (ref 3.8–10.5)
WBC # BLD: 9.8 K/UL — SIGNIFICANT CHANGE UP (ref 3.8–10.5)
WBC # FLD AUTO: 10.11 K/UL — SIGNIFICANT CHANGE UP (ref 3.8–10.5)
WBC # FLD AUTO: 9.35 K/UL — SIGNIFICANT CHANGE UP (ref 3.8–10.5)
WBC # FLD AUTO: 9.8 K/UL — SIGNIFICANT CHANGE UP (ref 3.8–10.5)

## 2022-12-29 PROCEDURE — 99233 SBSQ HOSP IP/OBS HIGH 50: CPT

## 2022-12-29 PROCEDURE — 88305 TISSUE EXAM BY PATHOLOGIST: CPT | Mod: 26

## 2022-12-29 PROCEDURE — 88342 IMHCHEM/IMCYTCHM 1ST ANTB: CPT | Mod: 26

## 2022-12-29 PROCEDURE — 99223 1ST HOSP IP/OBS HIGH 75: CPT

## 2022-12-29 PROCEDURE — 43239 EGD BIOPSY SINGLE/MULTIPLE: CPT

## 2022-12-29 PROCEDURE — 45378 DIAGNOSTIC COLONOSCOPY: CPT | Mod: 59

## 2022-12-29 DEVICE — NAIL OSTEO 1.5X16MM STRL: Type: IMPLANTABLE DEVICE | Status: FUNCTIONAL

## 2022-12-29 RX ADMIN — PANTOPRAZOLE SODIUM 40 MILLIGRAM(S): 20 TABLET, DELAYED RELEASE ORAL at 17:20

## 2022-12-29 RX ADMIN — PANTOPRAZOLE SODIUM 40 MILLIGRAM(S): 20 TABLET, DELAYED RELEASE ORAL at 05:18

## 2022-12-29 RX ADMIN — ATORVASTATIN CALCIUM 80 MILLIGRAM(S): 80 TABLET, FILM COATED ORAL at 21:32

## 2022-12-29 RX ADMIN — Medication 3 MILLIGRAM(S): at 21:33

## 2022-12-29 NOTE — CONSULT NOTE ADULT - ASSESSMENT
81 y/o F with PMHx COPD (home O2), CAD s/p stent, HLD, HTN, pAF on Eliquis, Anemia presented to ED with generalized weakness starting yesterday morning; reporting BRBPR, found to be anemic initial Hgb 4.5 with + FOBT 
82 y.o.  female with past medical history of rapid atrial fibrillation, fluid overload, status post severe COVID-19 infection, history of drug-eluting stent therapy to the RCA, LAD, first diagonal branch, known occlusion of the left circumflex with congestive heart failure, with preserved ejection fraction, carotid disease. Patient came to the ED complaining of BRBPR, was found to be profoundly anemic  had 3 units PRBC transfusions thus far since admission, admitted for GIB. Pt s/p EGD/colonoscopy. Cardiology service consulted for recommendations regarding long term anticoagulation and antiplatelet therapy.  Patient denies cardiac symptoms at this time.     "Normal EGD to D2. Duodenal biopsies taken to r/o celiac disease and gastric biopsies taken to r/o H. Pylori infection. Colonoscopy: Sigmoid diverticulosis and internal hemorrhoids. OK to restart AC +/or DAP therapy from a GI standpoint"      Plan   patient optimized from GI perspective to receive AC + antiplatelets   given worsening of anemia and old PCI (Over a year ago), will continue with AC+ plavix   if recurrent issues related to worsening of anemia, plavix would need to be discontinued and at that point patient will just continue with AC  continue with current CV meds   No cardiac testing indicated at this time   Please call us back with questions or concerns.

## 2022-12-29 NOTE — CHART NOTE - NSCHARTNOTEFT_GEN_A_CORE
Repeat H&H 7.8/25.7 S/P PRBC transfusion. Pt had 3 units PRBC transfusions thus far since admission, admitted for GIB.  GI onboard. Pt scheduled for EGD/colonoscopy in AM.   Patient is stable at this time and without complaints.  Will transfuse 1 unit PRBC STAT for goal Hgb >/=8. Repeat CBC s/p completion.   PMD will be notified in AM.  RN to continue to monitor and escalate PRN.

## 2022-12-29 NOTE — CHART NOTE - NSCHARTNOTEFT_GEN_A_CORE
Normal EGD to D2. Duodenal biopsies taken to r/o celiac disease and gastric biopsies taken to r/o H. Pylori infection. Colonoscopy: Sigmoid diverticulosis and internal hemorrhoids. BUN and creatinine abnormal therefore can't do CT Enterography. Will need OPT VCE. OK to restart AC +/or DAP therapy from a GI standpoint. OK to feed pt.

## 2022-12-30 ENCOUNTER — TRANSCRIPTION ENCOUNTER (OUTPATIENT)
Age: 82
End: 2022-12-30

## 2022-12-30 LAB
ANION GAP SERPL CALC-SCNC: 9 MMOL/L — SIGNIFICANT CHANGE UP (ref 5–17)
BASOPHILS # BLD AUTO: 0.04 K/UL — SIGNIFICANT CHANGE UP (ref 0–0.2)
BASOPHILS NFR BLD AUTO: 0.5 % — SIGNIFICANT CHANGE UP (ref 0–2)
BUN SERPL-MCNC: 24.6 MG/DL — HIGH (ref 8–20)
CALCIUM SERPL-MCNC: 8.2 MG/DL — LOW (ref 8.4–10.5)
CHLORIDE SERPL-SCNC: 104 MMOL/L — SIGNIFICANT CHANGE UP (ref 96–108)
CO2 SERPL-SCNC: 26 MMOL/L — SIGNIFICANT CHANGE UP (ref 22–29)
CREAT SERPL-MCNC: 1.17 MG/DL — SIGNIFICANT CHANGE UP (ref 0.5–1.3)
EGFR: 47 ML/MIN/1.73M2 — LOW
EOSINOPHIL # BLD AUTO: 0 K/UL — SIGNIFICANT CHANGE UP (ref 0–0.5)
EOSINOPHIL NFR BLD AUTO: 0 % — SIGNIFICANT CHANGE UP (ref 0–6)
GLUCOSE SERPL-MCNC: 102 MG/DL — HIGH (ref 70–99)
HCT VFR BLD CALC: 29.3 % — LOW (ref 34.5–45)
HCT VFR BLD CALC: 30.5 % — LOW (ref 34.5–45)
HGB BLD-MCNC: 8.8 G/DL — LOW (ref 11.5–15.5)
HGB BLD-MCNC: 9 G/DL — LOW (ref 11.5–15.5)
IMM GRANULOCYTES NFR BLD AUTO: 0.6 % — SIGNIFICANT CHANGE UP (ref 0–0.9)
LYMPHOCYTES # BLD AUTO: 0.62 K/UL — LOW (ref 1–3.3)
LYMPHOCYTES # BLD AUTO: 7.3 % — LOW (ref 13–44)
MAGNESIUM SERPL-MCNC: 2.4 MG/DL — SIGNIFICANT CHANGE UP (ref 1.8–2.6)
MCHC RBC-ENTMCNC: 28.3 PG — SIGNIFICANT CHANGE UP (ref 27–34)
MCHC RBC-ENTMCNC: 28.4 PG — SIGNIFICANT CHANGE UP (ref 27–34)
MCHC RBC-ENTMCNC: 29.5 GM/DL — LOW (ref 32–36)
MCHC RBC-ENTMCNC: 30 GM/DL — LOW (ref 32–36)
MCV RBC AUTO: 94.2 FL — SIGNIFICANT CHANGE UP (ref 80–100)
MCV RBC AUTO: 96.2 FL — SIGNIFICANT CHANGE UP (ref 80–100)
MONOCYTES # BLD AUTO: 0.7 K/UL — SIGNIFICANT CHANGE UP (ref 0–0.9)
MONOCYTES NFR BLD AUTO: 8.2 % — SIGNIFICANT CHANGE UP (ref 2–14)
NEUTROPHILS # BLD AUTO: 7.13 K/UL — SIGNIFICANT CHANGE UP (ref 1.8–7.4)
NEUTROPHILS NFR BLD AUTO: 83.4 % — HIGH (ref 43–77)
PHOSPHATE SERPL-MCNC: 3.5 MG/DL — SIGNIFICANT CHANGE UP (ref 2.4–4.7)
PLATELET # BLD AUTO: 263 K/UL — SIGNIFICANT CHANGE UP (ref 150–400)
PLATELET # BLD AUTO: 274 K/UL — SIGNIFICANT CHANGE UP (ref 150–400)
POTASSIUM SERPL-MCNC: 3.4 MMOL/L — LOW (ref 3.5–5.3)
POTASSIUM SERPL-SCNC: 3.4 MMOL/L — LOW (ref 3.5–5.3)
RBC # BLD: 3.11 M/UL — LOW (ref 3.8–5.2)
RBC # BLD: 3.17 M/UL — LOW (ref 3.8–5.2)
RBC # FLD: 20.3 % — HIGH (ref 10.3–14.5)
RBC # FLD: 20.7 % — HIGH (ref 10.3–14.5)
SODIUM SERPL-SCNC: 139 MMOL/L — SIGNIFICANT CHANGE UP (ref 135–145)
WBC # BLD: 8.54 K/UL — SIGNIFICANT CHANGE UP (ref 3.8–10.5)
WBC # BLD: 9.22 K/UL — SIGNIFICANT CHANGE UP (ref 3.8–10.5)
WBC # FLD AUTO: 8.54 K/UL — SIGNIFICANT CHANGE UP (ref 3.8–10.5)
WBC # FLD AUTO: 9.22 K/UL — SIGNIFICANT CHANGE UP (ref 3.8–10.5)

## 2022-12-30 PROCEDURE — 99233 SBSQ HOSP IP/OBS HIGH 50: CPT

## 2022-12-30 RX ORDER — POTASSIUM CHLORIDE 20 MEQ
40 PACKET (EA) ORAL ONCE
Refills: 0 | Status: COMPLETED | OUTPATIENT
Start: 2022-12-30 | End: 2022-12-30

## 2022-12-30 RX ORDER — CLOPIDOGREL BISULFATE 75 MG/1
75 TABLET, FILM COATED ORAL DAILY
Refills: 0 | Status: DISCONTINUED | OUTPATIENT
Start: 2022-12-30 | End: 2022-12-31

## 2022-12-30 RX ORDER — APIXABAN 2.5 MG/1
5 TABLET, FILM COATED ORAL EVERY 12 HOURS
Refills: 0 | Status: DISCONTINUED | OUTPATIENT
Start: 2022-12-30 | End: 2022-12-31

## 2022-12-30 RX ADMIN — Medication 100 MILLIGRAM(S): at 05:29

## 2022-12-30 RX ADMIN — APIXABAN 5 MILLIGRAM(S): 2.5 TABLET, FILM COATED ORAL at 17:31

## 2022-12-30 RX ADMIN — PANTOPRAZOLE SODIUM 40 MILLIGRAM(S): 20 TABLET, DELAYED RELEASE ORAL at 05:29

## 2022-12-30 RX ADMIN — Medication 3 MILLIGRAM(S): at 22:09

## 2022-12-30 RX ADMIN — Medication 40 MILLIEQUIVALENT(S): at 08:28

## 2022-12-30 RX ADMIN — PANTOPRAZOLE SODIUM 40 MILLIGRAM(S): 20 TABLET, DELAYED RELEASE ORAL at 17:30

## 2022-12-30 RX ADMIN — AMIODARONE HYDROCHLORIDE 200 MILLIGRAM(S): 400 TABLET ORAL at 05:29

## 2022-12-30 RX ADMIN — CLOPIDOGREL BISULFATE 75 MILLIGRAM(S): 75 TABLET, FILM COATED ORAL at 17:31

## 2022-12-30 RX ADMIN — ATORVASTATIN CALCIUM 80 MILLIGRAM(S): 80 TABLET, FILM COATED ORAL at 22:09

## 2022-12-30 NOTE — DISCHARGE NOTE PROVIDER - NSDCMRMEDTOKEN_GEN_ALL_CORE_FT
amiodarone 200 mg oral tablet: 1 tab(s) orally once a day  atorvastatin 80 mg oral tablet: 1 tab(s) orally once a day  Eliquis 5 mg oral tablet: 1 tab(s) orally 2 times a day  metoprolol succinate 100 mg oral tablet, extended release: 1 tab(s) orally once a day  Plavix 75 mg oral tablet: 1 tab(s) orally once a day  potassium chloride 20 mEq oral tablet, extended release: 1 tab(s) orally every other day  Stiolto Respimat 10 ACT 2.5 mcg-2.5 mcg/inh inhalation aerosol: 2 puff(s) inhaled every 24 hours  torsemide 100 mg oral tablet: 1 tab(s) orally once a day   amiodarone 200 mg oral tablet: 1 tab(s) orally once a day  atorvastatin 80 mg oral tablet: 1 tab(s) orally once a day  Eliquis 5 mg oral tablet: 1 tab(s) orally 2 times a day  metoprolol succinate 100 mg oral tablet, extended release: 1 tab(s) orally once a day  pantoprazole 40 mg oral granule, delayed release: 1 each orally once a day   Plavix 75 mg oral tablet: 1 tab(s) orally once a day  torsemide 10 mg oral tablet: 1 tab(s) orally once a day

## 2022-12-30 NOTE — DISCHARGE NOTE PROVIDER - CARE PROVIDERS DIRECT ADDRESSES
,DirectAddress_Unknown,[email or web address],DirectAddress_Unknown

## 2022-12-30 NOTE — DISCHARGE NOTE PROVIDER - ATTENDING DISCHARGE PHYSICAL EXAMINATION:
Vital Signs Last 24 Hrs  T(C): 36.4 (31 Dec 2022 11:23), Max: 36.8 (30 Dec 2022 16:58)  T(F): 97.5 (31 Dec 2022 11:23), Max: 98.2 (30 Dec 2022 16:58)  HR: 60 (31 Dec 2022 11:23) (58 - 62)  BP: 105/63 (31 Dec 2022 11:23) (104/67 - 115/60)  BP(mean): --  RR: 20 (31 Dec 2022 11:23) (18 - 20)  SpO2: 96% (31 Dec 2022 11:23) (96% - 97%)    Parameters below as of 31 Dec 2022 09:46  Patient On (Oxygen Delivery Method): nasal cannula     General: NAD,    ENMT: no conjunctival injection, moist oral mucoses, good dentition   Neck and Lymph: no palpable masses in thyroids, no JVD, no lymphadenopathy   Lungs: good air entry b/l, no wheezing/rails/crackles on auscultation, no stridor, on 2.5 L nc - base line  CVS: RRR, no M/R/G, trace ble ededma, palpable pedal pulses +2  Abdomen: soft, not distended  Extremities: no apparent deformities, preserved ROM  Skin: warm, dry, no rashes,  Neuro: OAX3, didn't noted CN abnormalities during my exam, observed moving all 4 ext against gravity and cooperating with physical exam, no apparent loss of sensation.   Pschyc; appropriate thought process, mood, response

## 2022-12-30 NOTE — PROGRESS NOTE ADULT - PROBLEM SELECTOR PLAN 1
Initial Hgb 4.5, + FOBT. Eliquis last dose 12/27 AM. S/p 1 u pRBC 12/27, 2 u pRBC 12/28, 1 u pRBC 12/29  EGD 12/29/22- normal; colonoscopy- severe diverticulosis of sigmoid colon, grade/stage III internal hemorrhoids.     - Trend CBC, transfuse as needed. Monitor for signs of bleeding.   - Advance diet as tolerated  - IV PPI BID for GI mucosal cytoprotection   - Await pathology   - Will need outpatient VCE. May follow up in our office:     [facility] at [address], [place] [address]  [phone]   _________________________________________________________________  Assessment and recommendations are final when note is signed by the attending physician.

## 2022-12-30 NOTE — PROGRESS NOTE ADULT - ASSESSMENT
81 y/o F with PMHx COPD (home O2), CAD s/p stent, HLD, HTN, pAF on Eliquis, Anemia presented to ED with generalized weakness starting yesterday morning; reporting BRBPR, found to be anemic initial Hgb 4.5 with + FOBT 
83 yo female with pmhx of COPD, CAD s/p stents, HLD, HTN, pAF on Eliquis, anemia (unknown baseline hgb) presenting to the ED with complaint of generalized weakness.     Acute Blood Loss Anemia  -Hgb 4.5 on admission s/p 2 units rbc, pending repeat hgb  -CTA could not be done due to GFR  -Continue Protonix 40 mg IV BID  -CLD  -GI consulted, egd/colon tentatively for tomorrow.   -Monitor CBC  -Transfuse for Hgb < 8.0 in patient with cardiac disease     Elevated INR  - One dose vitamin K    DANETTE, unknown baseline Cr   -Last Cr from 2017 in patient record was 0.8, no known hx of CKD  -Possibly 2/2 hypovolemia with severe anemia  -Monitor cr    pAF on Eliquis, CAD, HTN  -Hold Eliquis in setting of acute bleed, can hold Plavix for now as patient states stents were many years ago  -Continue Metoprolol 100 mg daily and Amiodarone 200 mg daily  -Hold Torsemide 100 mg daily due to Hypotension  - Follows with [facility] Dr. Jeter.     HLD  -Hold Plavix 75 mg daily  -Continue Atorvastatin 80 mg daily    Macrocytic Anemia  -Check Vitamin B12/Folate  -Trend CBC    Chronic Respiratory Failure due to COPD  on 2.5L at home  -Uses Stiolito Respimat inhaler  -Continue inhaler as prescribed  - Follows with Dr Mcintyre    DVTppx: SCDs, hold chemical ppx in setting of acute bleed  GOC: Full Code.   
81 y/o F w/ PMH of COPD, CAD (s/p stents), HLD, HTN, pAF (on Eliquis), anemia (unknown baseline hgb) came in c/o generalized weakness.  Initial w/u on arrival significant for hb 4.5.  Pt admitted for acute blood loss anemia, GI consulted and pt transfused PRBCs.  Had EGD/colonoscopy and significant for severe diverticulosis and internal hemorrhoids.        Acute Blood Loss Anemia  - Hb 4.5 on admission s/p 4 units PRBC total w/ improved H/H   - EGD/colonoscopy reported severe diverticulosis and internal hemorrhoids   - Will resume eliquis and plavix today and if no signs of bleeding over next 24hrs pt can be d/c'd and will f/u w/ GI outpt for VCE   - CTA could not be done due to GFR  - c/w Protonix 40 mg IV BID will transition to po   - Hemodynamically stable and no acitve bleeding reported   - Monitor CBC and transfuse for Hgb < 8.0 given cardiac hx       DANETTE likely 2/2 hypoperfusion from severe anemia   - Unknown baseline Cr; last Cr from 2017 in pt record was 0.8  - Cr trending down since being transfused  - Monitor renal function and I/O's   - Avoid nephrotoxic meds or renally dose if needed       Hypokalemia  - Supplemented  - Maintain K>4       pAF / CAD / HTN / HLD  - Eliquis and plavix resumed and ASA d/c'd per cardio recs   - c/w Metoprolol and Amiodarone for rate control   - c/w atorvastatin   - Torsemide held due to hypotension however will likely resume after EGD/colonoscopy   - Follows with [facility] Dr. Jeter       Chronic Respiratory Failure due to COPD  - Not in exacerbation   - On 2.5L at home  - Uses Stiolito Respimat inhaler  - Continue inhaler as prescribed  - Follows with Dr Mcintyre      VTE ppx: resumed eliquis     Dispo: If tolerates resuming AC for 24hrs will d/c home tomorrow.    
83 y/o F w/ PMH of COPD, CAD (s/p stents), HLD, HTN, pAF (on Eliquis), anemia (unknown baseline hgb) came in c/o generalized weakness.  Initial w/u on arrival significant for hb 4.5.  Pt admitted for acute blood loss anemia, GI consulted and pt transfused PRBCs.  Awaiting EGD/colonoscopy likely today.       Acute Blood Loss Anemia  - Hb 4.5 on admission s/p 4 units PRBC total, repeat Hb this morning 9.4  - INR on admission elevated, likely 2/2 eliquis   - Eliquis held and pt given Vit K yesterday to lower INR to goal of <1.5; INR this morning 1.41  - CTA could not be done due to GFR  - c/w Protonix 40 mg IV BID  - Hemodynamically stable and no acitve bleeding reported   - Anticipate EGD/colonoscopy today   - Monitor CBC and transfuse for Hgb < 8.0 given cardiac hx       DANETTE likely 2/2 hypoperfusion from severe anemia   - Unknown baseline Cr; last Cr from 2017 in pt record was 0.8  - Cr trending down since being transfused  - Monitor renal function and I/O's   - Avoid nephrotoxic meds or renally dose if needed       pAF / CAD / HTN / HLD  - Eliquis on hold given concern for GIB   - Plavix also on hold as pts stents were many years ago   - c/w Metoprolol and Amiodarone for rate control   - c/w atorvastatin   - Torsemide held due to hypotension however will likely resume after EGD/colonoscopy   - Follows with [facility] Dr. Jeter       Chronic Respiratory Failure due to COPD  - Not in exacerbation   - On 2.5L at home  - Uses Stiolito Respimat inhaler  - Continue inhaler as prescribed  - Follows with Dr Mcintyre      VTE ppx: SCDs, hold chemical ppx in setting of acute bleed    Dispo: Pt remains acute requiring inpt hospitalization.

## 2022-12-30 NOTE — DISCHARGE NOTE PROVIDER - CARE PROVIDER_API CALL
Sara Calix)  Family Medicine  [address], [address]  [place] [address]  Phone: [phone]  Fax: [phone]  Follow Up Time: 1-3 days    Jong Castanon)  Gastroenterology; Internal Medicine  [address], [address]  [place] [address]  Phone: [phone]  Fax: [phone]  Follow Up Time: 1-3 days    Dr. Jeter,   Phone: (   )    -  Fax: (   )    -  Follow Up Time: 1 week

## 2022-12-30 NOTE — PROGRESS NOTE ADULT - SUBJECTIVE AND OBJECTIVE BOX
Chief Complaint:  anemia     SUBJECTIVE / OVERNIGHT EVENTS:  No acute complaints reported overnight.  pt offers no acute complaints at this time.  Denies chest pain, SOB, abd pain, N/V, fever, chills, dysuria.      I&O's Summary    28 Dec 2022 07:01  -  29 Dec 2022 07:00  --------------------------------------------------------  IN: 312 mL / OUT: 0 mL / NET: 312 mL          PHYSICAL EXAM:  Vital Signs Last 24 Hrs  T(C): 36.8 (29 Dec 2022 10:52), Max: 37.1 (28 Dec 2022 17:01)  T(F): 98.2 (29 Dec 2022 10:52), Max: 98.8 (28 Dec 2022 17:01)  HR: 61 (29 Dec 2022 10:52) (56 - 74)  BP: 100/52 (29 Dec 2022 10:52) (97/62 - 116/68)  BP(mean): --  RR: 18 (29 Dec 2022 10:52) (18 - 18)  SpO2: 95% (29 Dec 2022 06:05) (95% - 97%)    Parameters below as of 29 Dec 2022 06:05  Patient On (Oxygen Delivery Method): nasal cannula  O2 Flow (L/min): 2        GENERAL: pt examined bedside, laying comfortably in bed in NAD  HEENT: NC/AT, moist oral mucosa, clear conjunctiva, sclera nonicteric  RESPIRATORY: Normal respiratory effort, no wheezing, rhonchi, rales  CARDIOVASCULAR: RRR, normal S1 and S2, no murmur/rub/gallop  ABDOMEN: soft, NT/ND, +bowel sounds, no rebound/guarding  EXTREMITIES: No cynaosis, no clubbing, no LE edema, pulses are 2+ bilaterally  NEUROLOGY: A+O to person, place, and time, no focal neurologic deficits appreciated   SKIN: No rashes or no palpable lesions        LABS:                                         8.8    8.54  )-----------( 263      ( 30 Dec 2022 05:58 )             29.3       12-30    139  |  104  |  24.6<H>  ----------------------------<  102<H>  3.4<L>   |  26.0  |  1.17    Ca    8.2<L>      30 Dec 2022 05:58  Phos  3.5     12-30  Mg     2.4     12-30          CAPILLARY BLOOD GLUCOSE            RADIOLOGY & ADDITIONAL TESTS:    < from: EGD-Colonoscopy (12.29.22 @ 10:53) >  Plan:        CT Scan Small Bowel Enterography        Additional Notes:        Normal EGD and Sigmoid diverticulosis and internal hemorrhoids.    < end of copied text >        MEDICATIONS  (STANDING):  aMIOdarone    Tablet 200 milliGRAM(s) Oral daily  atorvastatin 80 milliGRAM(s) Oral at bedtime  metoprolol succinate  milliGRAM(s) Oral daily  pantoprazole  Injectable 40 milliGRAM(s) IV Push every 12 hours  sodium chloride 0.9%. 1000 milliLiter(s) (65 mL/Hr) IV Continuous <Continuous>  tiotropium 2.5 MICROgram(s)/olodaterol 2.5 MICROgram(s) (STIOLTO) Inhaler 2 Puff(s) Inhalation daily    MEDICATIONS  (PRN):  acetaminophen     Tablet .. 650 milliGRAM(s) Oral every 6 hours PRN Temp greater or equal to 38C (100.4F), Mild Pain (1 - 3)  aluminum hydroxide/magnesium hydroxide/simethicone Suspension 30 milliLiter(s) Oral every 4 hours PRN Dyspepsia  melatonin 3 milliGRAM(s) Oral at bedtime PRN Insomnia  ondansetron Injectable 4 milliGRAM(s) IV Push every 8 hours PRN Nausea and/or Vomiting                                                                        
  Chief Complaint:  anemia     SUBJECTIVE / OVERNIGHT EVENTS:  No acute complaints reported overnight.  pt offers no acute complaints at this time.  Denies chest pain, SOB, abd pain, N/V, fever, chills, dysuria.      I&O's Summary    28 Dec 2022 07:01  -  29 Dec 2022 07:00  --------------------------------------------------------  IN: 312 mL / OUT: 0 mL / NET: 312 mL          PHYSICAL EXAM:  Vital Signs Last 24 Hrs  T(C): 36.8 (29 Dec 2022 10:52), Max: 37.1 (28 Dec 2022 17:01)  T(F): 98.2 (29 Dec 2022 10:52), Max: 98.8 (28 Dec 2022 17:01)  HR: 61 (29 Dec 2022 10:52) (56 - 74)  BP: 100/52 (29 Dec 2022 10:52) (97/62 - 116/68)  BP(mean): --  RR: 18 (29 Dec 2022 10:52) (18 - 18)  SpO2: 95% (29 Dec 2022 06:05) (95% - 97%)    Parameters below as of 29 Dec 2022 06:05  Patient On (Oxygen Delivery Method): nasal cannula  O2 Flow (L/min): 2        GENERAL: pt examined bedside, laying comfortably in bed in NAD  HEENT: NC/AT, moist oral mucosa, clear conjunctiva, sclera nonicteric  RESPIRATORY: Normal respiratory effort, no wheezing, rhonchi, rales  CARDIOVASCULAR: RRR, normal S1 and S2, no murmur/rub/gallop  ABDOMEN: soft, NT/ND, +bowel sounds, no rebound/guarding  EXTREMITIES: No cynaosis, no clubbing, no LE edema, pulses are 2+ bilaterally  NEUROLOGY: A+O to person, place, and time, no focal neurologic deficits appreciated   SKIN: No rashes or no palpable lesions        LABS:                        9.4    10.11 )-----------( 309      ( 29 Dec 2022 08:45 )             30.4     12-29    138  |  102  |  30.6<H>  ----------------------------<  84  3.9   |  25.0  |  1.39<H>    Ca    7.9<L>      29 Dec 2022 08:45  Mg     2.4     12-29    TPro  5.5<L>  /  Alb  3.1<L>  /  TBili  0.6  /  DBili  x   /  AST  20  /  ALT  20  /  AlkPhos  191<H>  12-27    PT/INR - ( 29 Dec 2022 08:45 )   PT: 16.4 sec;   INR: 1.41 ratio         PTT - ( 29 Dec 2022 08:45 )  PTT:37.5 sec          CAPILLARY BLOOD GLUCOSE            RADIOLOGY & ADDITIONAL TESTS:          MEDICATIONS  (STANDING):  aMIOdarone    Tablet 200 milliGRAM(s) Oral daily  atorvastatin 80 milliGRAM(s) Oral at bedtime  metoprolol succinate  milliGRAM(s) Oral daily  pantoprazole  Injectable 40 milliGRAM(s) IV Push every 12 hours  sodium chloride 0.9%. 1000 milliLiter(s) (65 mL/Hr) IV Continuous <Continuous>  tiotropium 2.5 MICROgram(s)/olodaterol 2.5 MICROgram(s) (STIOLTO) Inhaler 2 Puff(s) Inhalation daily    MEDICATIONS  (PRN):  acetaminophen     Tablet .. 650 milliGRAM(s) Oral every 6 hours PRN Temp greater or equal to 38C (100.4F), Mild Pain (1 - 3)  aluminum hydroxide/magnesium hydroxide/simethicone Suspension 30 milliLiter(s) Oral every 4 hours PRN Dyspepsia  melatonin 3 milliGRAM(s) Oral at bedtime PRN Insomnia  ondansetron Injectable 4 milliGRAM(s) IV Push every 8 hours PRN Nausea and/or Vomiting                                                                        
[facility] Division of Hospital Medicine    Chief Complaint:  Acute blood loss anemia    SUBJECTIVE / OVERNIGHT EVENTS: Patient seen and examined. Denies feeling lightheaded or dizzy. No abdominal pain. Last BM was yesterday. Reports she has COPD and is on 2.5L NC at baseline.     Patient denies chest pain, SOB, abd pain, N/V, fever, chills, dysuria or any other complaints. All remainder ROS negative.     MEDICATIONS  (STANDING):  aMIOdarone    Tablet 200 milliGRAM(s) Oral daily  atorvastatin 80 milliGRAM(s) Oral at bedtime  metoprolol succinate  milliGRAM(s) Oral daily  pantoprazole  Injectable 40 milliGRAM(s) IV Push every 12 hours  polyethylene glycol/electrolyte Solution. 4000 milliLiter(s) Oral once  tiotropium 2.5 MICROgram(s)/olodaterol 2.5 MICROgram(s) (STIOLTO) Inhaler 2 Puff(s) Inhalation daily    MEDICATIONS  (PRN):  acetaminophen     Tablet .. 650 milliGRAM(s) Oral every 6 hours PRN Temp greater or equal to 38C (100.4F), Mild Pain (1 - 3)  aluminum hydroxide/magnesium hydroxide/simethicone Suspension 30 milliLiter(s) Oral every 4 hours PRN Dyspepsia  melatonin 3 milliGRAM(s) Oral at bedtime PRN Insomnia  ondansetron Injectable 4 milliGRAM(s) IV Push every 8 hours PRN Nausea and/or Vomiting        I&O's Summary      PHYSICAL EXAM:  Vital Signs Last 24 Hrs  T(C): 36.5 (28 Dec 2022 07:18), Max: 37.1 (28 Dec 2022 01:40)  T(F): 97.7 (28 Dec 2022 07:18), Max: 98.7 (28 Dec 2022 01:40)  HR: 55 (28 Dec 2022 07:18) (55 - 63)  BP: 90/58 (28 Dec 2022 07:18) (83/48 - 101/66)  BP(mean): --  RR: 18 (28 Dec 2022 07:18) (18 - 18)  SpO2: 96% (28 Dec 2022 07:18) (96% - 99%)    Parameters below as of 28 Dec 2022 07:18  Patient On (Oxygen Delivery Method): mask, nonrebreather  O2 Flow (L/min): 2          CONSTITUTIONAL: NAD,   ENMT: Moist oral mucosa, no pharyngeal injection or exudates;   RESPIRATORY: Normal respiratory effort; lungs are clear to auscultation bilaterally, + NC  CARDIOVASCULAR: Regular rate and rhythm, normal S1 and S2,  No lower extremity edema;   ABDOMEN: Nontender to palpation, normoactive bowel sounds, no rebound/guarding; No hepatosplenomegaly  MUSCLOSKELETAL:   no joint swelling or tenderness to palpation  PSYCH: A+O to person, place, and time; affect appropriate  NEUROLOGY: CN 2-12 are intact and symmetric; no gross sensory deficits;   SKIN: No rashes; no palpable lesions    LABS:                        7.1    8.41  )-----------( 327      ( 28 Dec 2022 08:42 )             23.5     12-28    137  |  101  |  36.2<H>  ----------------------------<  96  4.1   |  24.0  |  1.84<H>    Ca    7.8<L>      28 Dec 2022 08:42    TPro  5.5<L>  /  Alb  3.1<L>  /  TBili  0.6  /  DBili  x   /  AST  20  /  ALT  20  /  AlkPhos  191<H>  12-27    PT/INR - ( 28 Dec 2022 08:42 )   PT: 25.2 sec;   INR: 2.16 ratio         PTT - ( 28 Dec 2022 08:42 )  PTT:44.4 sec          CAPILLARY BLOOD GLUCOSE            RADIOLOGY & ADDITIONAL TESTS:  Results Reviewed:   Imaging Personally Reviewed:  Electrocardiogram Personally Reviewed:                                          
    Chief Complaint:  Patient is a 82y old  Female who presents with a chief complaint of Acute Blood Loss Anemia (29 Dec 2022 12:04)      HPI/ 24 hr events: Patient seen and examined at bedside. Pt feeling well this morning. She is tolerating diet. No BM since procedure yesterday. Denies nausea, vomiting, abdominal pain. Vitals are overall stable, Hgb 8.8. EGD yesterday was normal, colonoscopy showed severe diverticulosis of sigmoid colon and grade/stage III internal hemorrhoids.        REVIEW OF SYSTEMS:   General: Negative  HEENT: Negative  CV: Negative  Respiratory: Negative  GI: See HPI  : Negative  MSK: Negative  Hematologic: Negative  Skin: Negative    MEDICATIONS:   MEDICATIONS  (STANDING):  aMIOdarone    Tablet 200 milliGRAM(s) Oral daily  atorvastatin 80 milliGRAM(s) Oral at bedtime  metoprolol succinate  milliGRAM(s) Oral daily  pantoprazole  Injectable 40 milliGRAM(s) IV Push every 12 hours  sodium chloride 0.9%. 1000 milliLiter(s) (65 mL/Hr) IV Continuous <Continuous>  tiotropium 2.5 MICROgram(s)/olodaterol 2.5 MICROgram(s) (STIOLTO) Inhaler 2 Puff(s) Inhalation daily    MEDICATIONS  (PRN):  acetaminophen     Tablet .. 650 milliGRAM(s) Oral every 6 hours PRN Temp greater or equal to 38C (100.4F), Mild Pain (1 - 3)  aluminum hydroxide/magnesium hydroxide/simethicone Suspension 30 milliLiter(s) Oral every 4 hours PRN Dyspepsia  melatonin 3 milliGRAM(s) Oral at bedtime PRN Insomnia  ondansetron Injectable 4 milliGRAM(s) IV Push every 8 hours PRN Nausea and/or Vomiting      ALLERGIES:   Allergies    No Known Allergies    Intolerances        VITAL SIGNS:   Vital Signs Last 24 Hrs  T(C): 37.1 (30 Dec 2022 11:16), Max: 37.1 (30 Dec 2022 11:16)  T(F): 98.8 (30 Dec 2022 11:16), Max: 98.8 (30 Dec 2022 11:16)  HR: 60 (30 Dec 2022 11:16) (60 - 75)  BP: 92/57 (30 Dec 2022 11:16) (92/57 - 115/62)  BP(mean): --  RR: 20 (30 Dec 2022 11:16) (18 - 20)  SpO2: 97% (30 Dec 2022 11:16) (95% - 97%)    Parameters below as of 30 Dec 2022 04:24  Patient On (Oxygen Delivery Method): nasal cannula  O2 Flow (L/min): 2    I&O's Summary      PHYSICAL EXAM:   GENERAL:  No acute distress  HEENT:  NC/AT, conjunctiva clear, sclera anicteric  CHEST:  No increased effort, on nasal cannula   HEART:  Regular rhythm  ABDOMEN:  Soft, non-tender, non-distended, normoactive bowel sounds, no rebound or guarding  SKIN:  Warm, dry  NEURO:  Calm, cooperative    LABS:                        8.8    8.54  )-----------( 263      ( 30 Dec 2022 05:58 )             29.3     12-30    139  |  104  |  24.6<H>  ----------------------------<  102<H>  3.4<L>   |  26.0  |  1.17    Ca    8.2<L>      30 Dec 2022 05:58  Phos  3.5     12-30  Mg     2.4     12-30        PT/INR - ( 29 Dec 2022 08:45 )   PT: 16.4 sec;   INR: 1.41 ratio         PTT - ( 29 Dec 2022 08:45 )  PTT:37.5 sec                                    RADIOLOGY & ADDITIONAL STUDIES:          EGD-Colonoscopy Report    Date: 12/29/2022 10:53 AM          EGD Findings:    Esophagus Mucosa Normal mucosa was noted in the whole esophagus. No esophagitis    or Z-line disruption or hiatal hernia seen. G-E jncn. @ 40 cm.    Stomach Mucosa Normal mucosa was noted in the whole stomach. Biopsies were    obtained to evaluate for H.Pylori infection.Multiple cold forceps biopsies were    performed for histology.    Normal mucosa was noted in the whole stomach. On retroflexed view, the stomach    appeared to be normal. No hiatal hernia seen.    Duodenum Mucosa Normal mucosa was noted in the whole duodenum. Random mucosal    biopsies were obtained to evaluate for histologic features of celiac    disease.Multiple cold forceps biopsies were performed for histology in the    second part of the duodenum.        EGD Impressions:    Normal mucosa in the whole examined duodenum. (Biopsy).    Normal mucosa in the antrum, stomach body and whole stomach. (Biopsy).    Normal mucosa in the cardia and fundus.    Normal mucosa in the whole esophagus.            Colonoscopy Findings:    Excavated lesions Multiple non-bleeding diverticula with extensive openings were    seen in the sigmoid colon. Diverticulosis appeared to be severe. Sigmoid    diverticulosis.    Mucosa Normal mucosa was noted in the whole colon. There were no AVMs, masses,    evidence of colitis or other abnormalities seen. Retroflexion of the scope in    the rectum revealed no abnormalities other than internal hemorrhoids.    Protruding lesions Large grade/stage III internal hemorrhoids that were    thrombosed were noted. Thrombosed internal hemorrhoids.        Colonoscopy Impressions:    Normal mucosa in the whole colon.    Severe diverticulosis of the sigmoid colon.    Grade/Stage III internal hemorrhoids.            Plan:    CT Scan Small Bowel Enterography        Additional Notes:    Normal EGD and Sigmoid diverticulosis and internal hemorrhoids.        Attending Statement:        I was present and participated during the entire procedure, including non-key    portions.        Jong Castanon MD        Version 1, Electronically signed on 12/29/2022 12:10:20 PM by Jong Castanon MD

## 2022-12-30 NOTE — DISCHARGE NOTE PROVIDER - HOSPITAL COURSE
82 year old female with PMHx of COPD (home O2), CAD s/p stent, HLD, HTN, pAF on Eliquis, & anemia, presented to [facility] ED with c/o generalized weakness and recent episodes of bloody diarrhea In the ED, patient found to be severely anemic with Hgb of 4.5 with elevated INR, FOBT positive. Cr elevated to 1.66. Eliquis was held, and she received pRBCs and vitamin K. GI and Cardiology consulted, and patient admitted for acute blood loss anemia. Patient underwent EGD, which was normal to D2, with biopsies taken to rule out celiac and H. pylori. Colonoscopy revealed sigmoid diverticulosis and internal hemorrhoids. Patient was unable to undergo CTA due to renal function, and will need to follow up with GI outpatient for VCE. She was cleared to restart anticoagulation. Renal function monitored closely and remained stable.     - Tomorrow after AC restarted.      82 year old female with PMHx of COPD (home O2), CAD s/p stent, HLD, HTN, pAF on Eliquis, & anemia, presented to [facility] ED with c/o generalized weakness and recent episodes of bloody diarrhea In the ED, patient found to be severely anemic with Hgb of 4.5 with elevated INR, FOBT positive. Cr elevated to 1.66. Eliquis was held, and she received pRBCs and vitamin K. GI and Cardiology consulted, and patient admitted for acute blood loss anemia. Patient underwent EGD, which was normal to D2, with biopsies taken to rule out celiac and H. pylori. Colonoscopy revealed sigmoid diverticulosis and internal hemorrhoids. Patient was unable to undergo CTA due to renal function, and will need to follow up with GI outpatient for VCE. She was cleared to restart anticoagulation. Renal function monitored closely and remained stable. Pt is stable to be discharge home.

## 2022-12-30 NOTE — DISCHARGE NOTE PROVIDER - NSDCCPCAREPLAN_GEN_ALL_CORE_FT
PRINCIPAL DISCHARGE DIAGNOSIS  Diagnosis: Severe anemia  Assessment and Plan of Treatment: - Treated and improved with blood transfusion, blood levels remained stable with no episodes of acute bleeding.   - EGD and Colonoscopy were completed without active GI bleeding identified. Follow up outpatient with GI for VCE and with your PCP to monitor your blood levels.      SECONDARY DISCHARGE DIAGNOSES  Diagnosis: GI bleed  Assessment and Plan of Treatment: - Your stool was positive for blood. No active bleeding on EGD or Colonoscopy, unable to undergo CTE due to renal function.   - Follow up with GI as above.    Diagnosis: DANETTE (acute kidney injury)  Assessment and Plan of Treatment: - Improving, likely secondary to poor perfusion.   - Follow up outpatient with your PCP for monitoring.    Diagnosis: Chronic atrial fibrillation  Assessment and Plan of Treatment: - Your blood thinner was held due to severe anemia. You were cleared to restart your anticoagulation by the GI and cardiology teams.   - Follow up outpatient with your cardiologist for monitoring.    Diagnosis: Severe anemia  Assessment and Plan of Treatment:      PRINCIPAL DISCHARGE DIAGNOSIS  Diagnosis: Severe anemia  Assessment and Plan of Treatment: - Treated and improved with blood transfusion, blood levels remained stable with no episodes of acute bleeding.   - EGD and Colonoscopy were completed without active GI bleeding identified. We found you have extenseve deverticulosis and grade 3 internal hemorrhoids, which could be reason for your anemia most likely.   -  Follow up outpatient with GI for VCE  - Follow up with your PCP to monitor your blood levels.      SECONDARY DISCHARGE DIAGNOSES  Diagnosis: Severe anemia  Assessment and Plan of Treatment:     Diagnosis: GI bleed  Assessment and Plan of Treatment: - Your stool was positive for blood. No active bleeding on EGD or Colonoscopy, unable to undergo CTE due to renal function.   - Follow up with GI as above.    Diagnosis: DANETTE (acute kidney injury)  Assessment and Plan of Treatment: - Improving, likely secondary to poor perfusion.   - may resume yout Torsemide on 01/02/23  - Follow up outpatient with your PCP for monitoring.    Diagnosis: Chronic atrial fibrillation  Assessment and Plan of Treatment: - Your blood thinner was held due to severe anemia. You were cleared to restart your anticoagulation by the GI and cardiology teams.   - Follow up outpatient with your cardiologist for monitoring.

## 2022-12-30 NOTE — PROGRESS NOTE ADULT - NS ATTEND AMEND GEN_ALL_CORE FT
82 F  Hb 4.5 on admission  extensive tics and large hemorrhoids, which might have caused anemia, though severity out of proportion to these findings  Will need to see me in the office for VCE evaluation of small bowel  Stable to advance diet, restart Eliquis.

## 2022-12-31 ENCOUNTER — TRANSCRIPTION ENCOUNTER (OUTPATIENT)
Age: 82
End: 2022-12-31

## 2022-12-31 VITALS
HEART RATE: 65 BPM | SYSTOLIC BLOOD PRESSURE: 130 MMHG | RESPIRATION RATE: 19 BRPM | OXYGEN SATURATION: 96 % | TEMPERATURE: 98 F | DIASTOLIC BLOOD PRESSURE: 63 MMHG

## 2022-12-31 LAB
ANION GAP SERPL CALC-SCNC: 10 MMOL/L — SIGNIFICANT CHANGE UP (ref 5–17)
BUN SERPL-MCNC: 26.5 MG/DL — HIGH (ref 8–20)
CALCIUM SERPL-MCNC: 8.2 MG/DL — LOW (ref 8.4–10.5)
CHLORIDE SERPL-SCNC: 104 MMOL/L — SIGNIFICANT CHANGE UP (ref 96–108)
CO2 SERPL-SCNC: 23 MMOL/L — SIGNIFICANT CHANGE UP (ref 22–29)
CREAT SERPL-MCNC: 1.21 MG/DL — SIGNIFICANT CHANGE UP (ref 0.5–1.3)
EGFR: 45 ML/MIN/1.73M2 — LOW
GLUCOSE SERPL-MCNC: 98 MG/DL — SIGNIFICANT CHANGE UP (ref 70–99)
HCT VFR BLD CALC: 28.8 % — LOW (ref 34.5–45)
HGB BLD-MCNC: 8.6 G/DL — LOW (ref 11.5–15.5)
MAGNESIUM SERPL-MCNC: 2.4 MG/DL — SIGNIFICANT CHANGE UP (ref 1.8–2.6)
MCHC RBC-ENTMCNC: 28.3 PG — SIGNIFICANT CHANGE UP (ref 27–34)
MCHC RBC-ENTMCNC: 29.9 GM/DL — LOW (ref 32–36)
MCV RBC AUTO: 94.7 FL — SIGNIFICANT CHANGE UP (ref 80–100)
PLATELET # BLD AUTO: 270 K/UL — SIGNIFICANT CHANGE UP (ref 150–400)
POTASSIUM SERPL-MCNC: 3.9 MMOL/L — SIGNIFICANT CHANGE UP (ref 3.5–5.3)
POTASSIUM SERPL-SCNC: 3.9 MMOL/L — SIGNIFICANT CHANGE UP (ref 3.5–5.3)
RBC # BLD: 3.04 M/UL — LOW (ref 3.8–5.2)
RBC # FLD: 19.8 % — HIGH (ref 10.3–14.5)
SODIUM SERPL-SCNC: 137 MMOL/L — SIGNIFICANT CHANGE UP (ref 135–145)
WBC # BLD: 8.39 K/UL — SIGNIFICANT CHANGE UP (ref 3.8–10.5)
WBC # FLD AUTO: 8.39 K/UL — SIGNIFICANT CHANGE UP (ref 3.8–10.5)

## 2022-12-31 PROCEDURE — 82746 ASSAY OF FOLIC ACID SERUM: CPT

## 2022-12-31 PROCEDURE — 85027 COMPLETE CBC AUTOMATED: CPT

## 2022-12-31 PROCEDURE — 82272 OCCULT BLD FECES 1-3 TESTS: CPT

## 2022-12-31 PROCEDURE — 86923 COMPATIBILITY TEST ELECTRIC: CPT

## 2022-12-31 PROCEDURE — 96374 THER/PROPH/DIAG INJ IV PUSH: CPT

## 2022-12-31 PROCEDURE — 94640 AIRWAY INHALATION TREATMENT: CPT

## 2022-12-31 PROCEDURE — 99239 HOSP IP/OBS DSCHRG MGMT >30: CPT

## 2022-12-31 PROCEDURE — 85610 PROTHROMBIN TIME: CPT

## 2022-12-31 PROCEDURE — 83735 ASSAY OF MAGNESIUM: CPT

## 2022-12-31 PROCEDURE — 85025 COMPLETE CBC W/AUTO DIFF WBC: CPT

## 2022-12-31 PROCEDURE — 36415 COLL VENOUS BLD VENIPUNCTURE: CPT

## 2022-12-31 PROCEDURE — 0225U NFCT DS DNA&RNA 21 SARSCOV2: CPT

## 2022-12-31 PROCEDURE — 86850 RBC ANTIBODY SCREEN: CPT

## 2022-12-31 PROCEDURE — P9016: CPT

## 2022-12-31 PROCEDURE — 88305 TISSUE EXAM BY PATHOLOGIST: CPT

## 2022-12-31 PROCEDURE — 84100 ASSAY OF PHOSPHORUS: CPT

## 2022-12-31 PROCEDURE — 80053 COMPREHEN METABOLIC PANEL: CPT

## 2022-12-31 PROCEDURE — 82607 VITAMIN B-12: CPT

## 2022-12-31 PROCEDURE — 86901 BLOOD TYPING SEROLOGIC RH(D): CPT

## 2022-12-31 PROCEDURE — 99285 EMERGENCY DEPT VISIT HI MDM: CPT

## 2022-12-31 PROCEDURE — 86900 BLOOD TYPING SEROLOGIC ABO: CPT

## 2022-12-31 PROCEDURE — 88342 IMHCHEM/IMCYTCHM 1ST ANTB: CPT

## 2022-12-31 PROCEDURE — 85730 THROMBOPLASTIN TIME PARTIAL: CPT

## 2022-12-31 PROCEDURE — 36430 TRANSFUSION BLD/BLD COMPNT: CPT

## 2022-12-31 PROCEDURE — 80048 BASIC METABOLIC PNL TOTAL CA: CPT

## 2022-12-31 RX ORDER — PANTOPRAZOLE SODIUM 20 MG/1
1 TABLET, DELAYED RELEASE ORAL
Qty: 28 | Refills: 0
Start: 2022-12-31 | End: 2023-01-27

## 2022-12-31 RX ADMIN — PANTOPRAZOLE SODIUM 40 MILLIGRAM(S): 20 TABLET, DELAYED RELEASE ORAL at 06:17

## 2022-12-31 RX ADMIN — AMIODARONE HYDROCHLORIDE 200 MILLIGRAM(S): 400 TABLET ORAL at 06:17

## 2022-12-31 RX ADMIN — CLOPIDOGREL BISULFATE 75 MILLIGRAM(S): 75 TABLET, FILM COATED ORAL at 12:55

## 2022-12-31 RX ADMIN — APIXABAN 5 MILLIGRAM(S): 2.5 TABLET, FILM COATED ORAL at 06:17

## 2022-12-31 RX ADMIN — TIOTROPIUM BROMIDE AND OLODATEROL 2 PUFF(S): 3.124; 2.736 SPRAY, METERED RESPIRATORY (INHALATION) at 09:44

## 2022-12-31 RX ADMIN — Medication 100 MILLIGRAM(S): at 06:17

## 2022-12-31 NOTE — DISCHARGE NOTE NURSING/CASE MANAGEMENT/SOCIAL WORK - PATIENT PORTAL LINK FT
You can access the FollowMyHealth Patient Portal offered by [facility] by registering at the following website: [email or web address]. By joining [facility]’s FollowMyHealth portal, you will also be able to view your health information using other applications (apps) compatible with our system.

## 2023-01-03 ENCOUNTER — NON-APPOINTMENT (OUTPATIENT)
Age: 83
End: 2023-01-03

## 2023-01-04 LAB — SURGICAL PATHOLOGY STUDY: SIGNIFICANT CHANGE UP

## 2023-01-27 PROBLEM — Z00.00 ENCOUNTER FOR PREVENTIVE HEALTH EXAMINATION: Noted: 2023-01-27

## 2023-01-30 ENCOUNTER — APPOINTMENT (OUTPATIENT)
Dept: FAMILY MEDICINE | Facility: CLINIC | Age: 83
End: 2023-01-30

## 2023-01-30 ENCOUNTER — APPOINTMENT (OUTPATIENT)
Dept: FAMILY MEDICINE | Facility: CLINIC | Age: 83
End: 2023-01-30
Payer: MEDICARE

## 2023-01-30 VITALS
TEMPERATURE: 97.3 F | DIASTOLIC BLOOD PRESSURE: 86 MMHG | BODY MASS INDEX: 34.95 KG/M2 | OXYGEN SATURATION: 90 % | HEART RATE: 69 BPM | HEIGHT: 60 IN | WEIGHT: 178 LBS | SYSTOLIC BLOOD PRESSURE: 126 MMHG

## 2023-01-30 DIAGNOSIS — J96.90 RESPIRATORY FAILURE, UNSPECIFIED, UNSPECIFIED WHETHER WITH HYPOXIA OR HYPERCAPNIA: ICD-10-CM

## 2023-01-30 DIAGNOSIS — Z99.11 DEPENDENCE ON RESPIRATOR [VENTILATOR] STATUS: ICD-10-CM

## 2023-01-30 DIAGNOSIS — J44.9 CHRONIC OBSTRUCTIVE PULMONARY DISEASE, UNSPECIFIED: ICD-10-CM

## 2023-01-30 DIAGNOSIS — I50.9 HEART FAILURE, UNSPECIFIED: ICD-10-CM

## 2023-01-30 DIAGNOSIS — I48.0 PAROXYSMAL ATRIAL FIBRILLATION: ICD-10-CM

## 2023-01-30 DIAGNOSIS — I73.9 PERIPHERAL VASCULAR DISEASE, UNSPECIFIED: ICD-10-CM

## 2023-01-30 PROCEDURE — 99215 OFFICE O/P EST HI 40 MIN: CPT

## 2023-01-30 NOTE — HISTORY OF PRESENT ILLNESS
[Post-hospitalization from ___ Hospital] : Post-hospitalization from [unfilled] Hospital [Admitted on: ___] : The patient was admitted on [unfilled] [Discharged on ___] : discharged on [unfilled] [Patient Contacted By: ____] : and contacted by [unfilled] [FreeTextEntry2] : 82 year female with PMH of CHF, COPD (home 02), CAD s/p stent, claudication, afib (on Eliquis), HTN, HLD seen today in office for followup. Patient BIB daughter whom assist with exam. Patient report she is doing well, c/o constipation x 1 month. Patient admitted to [facility] on 12/27 for gastrointestinal hemorrhage. Patient found to be severely anemic with Hgb 4.5 with elevated INR, FOBT positive, Cr elevated to 1.66. Patient received pRBC x 4 and vitamin K. Patient underwent EGD which was normal to D2, biopsies taken to rule out celiac and H. pylori. Colonoscopy revealed sigmoid diverticulosis and internal hemorrhoids. No CTA done due to renal function. Patient discharge 12/31. Patient report following up with cardiology ([facility]) approximately January 9, blood drawn Wednesday, will have cardio office fax results. Awake, alert and oriented x4, in no acute distress. Denies any chest pain, shortness of breath, palpitation or dizziness. Patient verified medications and medical diagnosis. Report taking medications as prescribed

## 2023-01-30 NOTE — HEALTH RISK ASSESSMENT
[No falls in past year] : Patient reported no falls in the past year [0] : 2) Feeling down, depressed, or hopeless: Not at all (0) [PHQ-2 Negative - No further assessment needed] : PHQ-2 Negative - No further assessment needed [de-identified] : walk as tolerated [de-identified] : good [[id number]] : 0 [Change in mental status noted] : No change in mental status noted [None] : None [Fully functional (bathing, dressing, toileting, transferring, walking, feeding)] : Fully functional (bathing, dressing, toileting, transferring, walking, feeding) [Reports changes in hearing] : Reports no changes in hearing [Reports changes in vision] : Reports no changes in vision [Reports normal functional visual acuity (ie: able to read med bottle)] : Reports poor functional visual acuity.  [Reports changes in dental health] : Reports no changes in dental health [Smoke Detector] : smoke detector [Carbon Monoxide Detector] : carbon monoxide detector [Guns at Home] : no guns at home [Seat Belt] :  uses seat belt [Travel to Developing Areas] : does not  travel to developing areas [TB Exposure] : is not being exposed to tuberculosis [ColonoscopyDate] : 12/22 [ColonoscopyComments] : [facility] [de-identified] : Limited IADL - daughter and son-in-law assist with care [Patient/Caregiver unclear of wishes] : , patient/caregiver unclear of wishes [AdvancecareDate] : 01/23

## 2023-01-30 NOTE — HEALTH RISK ASSESSMENT
[No falls in past year] : Patient reported no falls in the past year [0] : 2) Feeling down, depressed, or hopeless: Not at all (0) [PHQ-2 Negative - No further assessment needed] : PHQ-2 Negative - No further assessment needed [de-identified] : walk as tolerated [de-identified] : good [[id number]] : 0 [Change in mental status noted] : No change in mental status noted [None] : None [Fully functional (bathing, dressing, toileting, transferring, walking, feeding)] : Fully functional (bathing, dressing, toileting, transferring, walking, feeding) [Reports changes in hearing] : Reports no changes in hearing [Reports changes in vision] : Reports no changes in vision [Reports normal functional visual acuity (ie: able to read med bottle)] : Reports poor functional visual acuity.  [Reports changes in dental health] : Reports no changes in dental health [Smoke Detector] : smoke detector [Carbon Monoxide Detector] : carbon monoxide detector [Guns at Home] : no guns at home [Seat Belt] :  uses seat belt [Travel to Developing Areas] : does not  travel to developing areas [TB Exposure] : is not being exposed to tuberculosis [ColonoscopyDate] : 12/22 [ColonoscopyComments] : [facility] [de-identified] : Limited IADL - daughter and son-in-law assist with care [Patient/Caregiver unclear of wishes] : , patient/caregiver unclear of wishes [AdvancecareDate] : 01/23

## 2023-01-30 NOTE — ASSESSMENT
[FreeTextEntry1] : Monitor for signs of GI bleed\par Continue medication as prescribed\par Continue to F/U with Pulmonology and Cardiology\par Fax over blood results\par Report any change in condition\par Discussed with patient importance of timely follow up RTO 3 months\par \par Patient and daughter understands and agree with plan of care

## 2023-01-30 NOTE — PHYSICAL EXAM
[No Respiratory Distress] : no respiratory distress  [No Accessory Muscle Use] : no accessory muscle use [Normal] : affect was normal and insight and judgment were intact [de-identified] : supplemental oxygen, 2.5L via NC

## 2023-01-30 NOTE — PHYSICAL EXAM
[No Respiratory Distress] : no respiratory distress  [No Accessory Muscle Use] : no accessory muscle use [Normal] : affect was normal and insight and judgment were intact [de-identified] : supplemental oxygen, 2.5L via NC

## 2023-01-30 NOTE — HISTORY OF PRESENT ILLNESS
[Post-hospitalization from ___ Hospital] : Post-hospitalization from [unfilled] Hospital [Admitted on: ___] : The patient was admitted on [unfilled] [Discharged on ___] : discharged on [unfilled] [Patient Contacted By: ____] : and contacted by [unfilled] [FreeTextEntry2] : 82 year female with PMH of CHF, COPD (home 02), CAD s/p stent, claudication, afib (on Eliquis), HTN, HLD seen today in office for followup. Patient BIB daughter whom assist with exam. Patient report she is doing well, c/o constipation x 1 month. Patient admitted to [facility] on 12/27 for gastrointestinal hemorrhage. Patient found to be severely anemic with Hgb 4.5 with elevated INR, FOBT positive, Cr elevated to 1.66. Patient received pRBC x 4 and vitamin K. Patient underwent EGD which was normal to D2, biopsies taken to rule out celiac and H. pylori. Colonoscopy revealed sigmoid diverticulosis and internal hemorrhoids. No CTA done due to renal function. Patient discharge 12/31. Patient report following up with cardiology ([facility]) approximately January 9, blood drawn Wednesday, will have cardio office fax results. Awake, alert and oriented x4, in no acute distress. Denies any chest pain, shortness of breath, palpitation or dizziness. Patient verified medications and medical diagnosis. Report taking medications as prescribed

## 2023-02-20 NOTE — PATIENT PROFILE ADULT - FLU SEASON?
"33 year old  Chief Complaint   Patient presents with     Pharyngitis     Ongoing since Saturday, strep sx, covid negative.        Blood pressure 127/82, pulse 55, temperature 98  F (36.7  C), temperature source Skin, resp. rate 15, height 1.778 m (5' 10\"), weight 83.9 kg (185 lb), SpO2 96 %. Body mass index is 26.54 kg/m .  Patient Active Problem List   Diagnosis     Anxiety and depression     Adjustment disorder with mixed anxiety and depressed mood       Wt Readings from Last 2 Encounters:   02/20/23 83.9 kg (185 lb)   09/16/22 83.9 kg (185 lb 0.6 oz)     BP Readings from Last 3 Encounters:   02/20/23 127/82   09/16/22 118/75   03/25/22 132/70         Current Outpatient Medications   Medication     citalopram (CELEXA) 10 MG tablet     loratadine (CLARITIN) 10 MG tablet     erythromycin (ROMYCIN) 5 MG/GM ophthalmic ointment     triamcinolone (KENALOG) 0.025 % cream     No current facility-administered medications for this visit.       Social History     Tobacco Use     Smoking status: Former     Smokeless tobacco: Never     Tobacco comments:     occasionally   Vaping Use     Vaping Use: Never used   Substance Use Topics     Alcohol use: Yes     Drug use: Never       Health Maintenance Due   Topic Date Due     ADVANCE CARE PLANNING  Never done     HIV SCREENING  Never done     HEPATITIS C SCREENING  Never done     YEARLY PREVENTIVE VISIT  09/30/2021     PHQ-9  07/21/2022       No results found for: PAP      February 20, 2023 10:12 AM    "
Yes...

## 2023-03-10 RX ORDER — TIOTROPIUM BROMIDE AND OLODATEROL 3.124; 2.736 UG/1; UG/1
2 SPRAY, METERED RESPIRATORY (INHALATION)
Qty: 0 | Refills: 0 | DISCHARGE

## 2023-03-10 RX ORDER — POTASSIUM CHLORIDE 20 MEQ
1 PACKET (EA) ORAL
Qty: 0 | Refills: 0 | DISCHARGE

## 2023-03-13 ENCOUNTER — APPOINTMENT (OUTPATIENT)
Dept: PULMONOLOGY | Facility: CLINIC | Age: 83
End: 2023-03-13

## 2023-07-07 PROBLEM — I10 ESSENTIAL (PRIMARY) HYPERTENSION: Chronic | Status: ACTIVE | Noted: 2022-12-27

## 2023-07-07 PROBLEM — J44.9 CHRONIC OBSTRUCTIVE PULMONARY DISEASE, UNSPECIFIED: Chronic | Status: ACTIVE | Noted: 2022-12-27

## 2023-07-07 PROBLEM — I25.10 ATHEROSCLEROTIC HEART DISEASE OF NATIVE CORONARY ARTERY WITHOUT ANGINA PECTORIS: Chronic | Status: ACTIVE | Noted: 2022-12-27

## 2023-07-07 PROBLEM — E78.5 HYPERLIPIDEMIA, UNSPECIFIED: Chronic | Status: ACTIVE | Noted: 2022-12-27

## 2023-07-18 RX ORDER — TIOTROPIUM BROMIDE AND OLODATEROL 3.124; 2.736 UG/1; UG/1
2.5-2.5 SPRAY, METERED RESPIRATORY (INHALATION)
Qty: 3 | Refills: 3 | Status: ACTIVE | COMMUNITY
Start: 2022-11-21 | End: 1900-01-01

## 2023-07-20 ENCOUNTER — LABORATORY RESULT (OUTPATIENT)
Age: 83
End: 2023-07-20

## 2023-07-20 ENCOUNTER — APPOINTMENT (OUTPATIENT)
Dept: FAMILY MEDICINE | Facility: CLINIC | Age: 83
End: 2023-07-20
Payer: MEDICARE

## 2023-07-20 VITALS
HEIGHT: 60 IN | HEART RATE: 64 BPM | TEMPERATURE: 97 F | SYSTOLIC BLOOD PRESSURE: 112 MMHG | BODY MASS INDEX: 33.38 KG/M2 | DIASTOLIC BLOOD PRESSURE: 62 MMHG | OXYGEN SATURATION: 91 % | WEIGHT: 170 LBS

## 2023-07-20 DIAGNOSIS — I10 ESSENTIAL (PRIMARY) HYPERTENSION: ICD-10-CM

## 2023-07-20 DIAGNOSIS — E03.9 HYPOTHYROIDISM, UNSPECIFIED: ICD-10-CM

## 2023-07-20 DIAGNOSIS — D64.9 ANEMIA, UNSPECIFIED: ICD-10-CM

## 2023-07-20 DIAGNOSIS — R26.81 UNSTEADINESS ON FEET: ICD-10-CM

## 2023-07-20 DIAGNOSIS — E78.5 HYPERLIPIDEMIA, UNSPECIFIED: ICD-10-CM

## 2023-07-20 DIAGNOSIS — L29.9 PRURITUS, UNSPECIFIED: ICD-10-CM

## 2023-07-20 PROCEDURE — 36415 COLL VENOUS BLD VENIPUNCTURE: CPT

## 2023-07-20 PROCEDURE — 99214 OFFICE O/P EST MOD 30 MIN: CPT | Mod: 25

## 2023-07-22 PROBLEM — L29.9 PRURITUS: Status: ACTIVE | Noted: 2023-07-22

## 2023-07-22 RX ORDER — HYDROCORTISONE ACETATE AND PRAMOXINE HYDROCHLORIDE 10; 10 MG/G; MG/G
1-1 CREAM TOPICAL
Qty: 1 | Refills: 0 | Status: ACTIVE | COMMUNITY
Start: 2023-07-22 | End: 1900-01-01

## 2023-07-24 PROBLEM — D64.9 ANEMIA: Status: ACTIVE | Noted: 2022-10-17

## 2023-07-24 PROBLEM — R26.81 UNSTEADY GAIT WHEN WALKING: Status: ACTIVE | Noted: 2023-07-24

## 2023-07-24 NOTE — HISTORY OF PRESENT ILLNESS
[Family Member] : family member [FreeTextEntry1] : Patient is following up on HTN, HLD, Hypothyroidism. [de-identified] : Ms. MARITZA DE LOS SANTOS is a 83 year female who presents following up on HTN, HLD, Hypothyroidism. \par Patient is complaining of itching skin of bilateral arms and chest at night. Sometimes it is a sensation of insect crawling on her skin. She has tried various creams, including OTC hydrocortisone at night, with no relief.\par Patient is additionally complaining of feeling wobbly. She is holding on to the walls of her home in order to ambulate. she has not had a fall recently but has a hx of fall in the past.

## 2023-07-24 NOTE — PHYSICAL EXAM
[Normal Outer Ear/Nose] : the outer ears and nose were normal in appearance [Supple] : supple [Pedal Pulses Present] : the pedal pulses are present [No Edema] : there was no peripheral edema [Normal] : no joint swelling and grossly normal strength and tone [No Rash] : no rash [No Focal Deficits] : no focal deficits [Coordination Grossly Intact] : coordination grossly intact [Normal Gait] : normal gait [Normal Affect] : the affect was normal [Normal Insight/Judgement] : insight and judgment were intact

## 2023-07-25 LAB
ALBUMIN SERPL ELPH-MCNC: 4 G/DL
ALP BLD-CCNC: 478 U/L
ALT SERPL-CCNC: 22 U/L
ANION GAP SERPL CALC-SCNC: 14 MMOL/L
AST SERPL-CCNC: 18 U/L
BASOPHILS # BLD AUTO: 0.1 K/UL
BASOPHILS NFR BLD AUTO: 1.3 %
BILIRUB SERPL-MCNC: 0.8 MG/DL
BUN SERPL-MCNC: 54 MG/DL
CALCIUM SERPL-MCNC: 9.7 MG/DL
CHLORIDE SERPL-SCNC: 95 MMOL/L
CHOLEST SERPL-MCNC: 109 MG/DL
CO2 SERPL-SCNC: 27 MMOL/L
CREAT SERPL-MCNC: 1.85 MG/DL
EGFR: 27 ML/MIN/1.73M2
EOSINOPHIL # BLD AUTO: 0.07 K/UL
EOSINOPHIL NFR BLD AUTO: 0.9 %
ESTIMATED AVERAGE GLUCOSE: 108 MG/DL
GLUCOSE SERPL-MCNC: 100 MG/DL
HBA1C MFR BLD HPLC: 5.4 %
HCT VFR BLD CALC: 31.6 %
HDLC SERPL-MCNC: 22 MG/DL
HGB BLD-MCNC: 10 G/DL
IMM GRANULOCYTES NFR BLD AUTO: 0.7 %
LDLC SERPL CALC-MCNC: 60 MG/DL
LYMPHOCYTES # BLD AUTO: 1.5 K/UL
LYMPHOCYTES NFR BLD AUTO: 20 %
MAN DIFF?: NORMAL
MCHC RBC-ENTMCNC: 31.6 GM/DL
MCHC RBC-ENTMCNC: 31.8 PG
MCV RBC AUTO: 100.6 FL
MONOCYTES # BLD AUTO: 0.99 K/UL
MONOCYTES NFR BLD AUTO: 13.2 %
NEUTROPHILS # BLD AUTO: 4.78 K/UL
NEUTROPHILS NFR BLD AUTO: 63.9 %
NONHDLC SERPL-MCNC: 87 MG/DL
PLATELET # BLD AUTO: 218 K/UL
POTASSIUM SERPL-SCNC: 4.4 MMOL/L
PROT SERPL-MCNC: 6.9 G/DL
RBC # BLD: 3.14 M/UL
RBC # FLD: 15.9 %
SODIUM SERPL-SCNC: 136 MMOL/L
TRIGL SERPL-MCNC: 160 MG/DL
TSH SERPL-ACNC: 21.5 UIU/ML
WBC # FLD AUTO: 7.49 K/UL

## 2023-07-26 ENCOUNTER — INPATIENT (INPATIENT)
Facility: HOSPITAL | Age: 83
LOS: 27 days | Discharge: NURSING, REHAB OR LONG-TERM CARE | DRG: 377 | End: 2023-08-23
Attending: INTERNAL MEDICINE | Admitting: STUDENT IN AN ORGANIZED HEALTH CARE EDUCATION/TRAINING PROGRAM
Payer: MEDICARE

## 2023-07-26 VITALS
WEIGHT: 169.98 LBS | SYSTOLIC BLOOD PRESSURE: 72 MMHG | OXYGEN SATURATION: 99 % | TEMPERATURE: 98 F | HEART RATE: 74 BPM | HEIGHT: 63 IN | RESPIRATION RATE: 16 BRPM | DIASTOLIC BLOOD PRESSURE: 31 MMHG

## 2023-07-26 DIAGNOSIS — Z98.891 HISTORY OF UTERINE SCAR FROM PREVIOUS SURGERY: Chronic | ICD-10-CM

## 2023-07-26 DIAGNOSIS — K92.2 GASTROINTESTINAL HEMORRHAGE, UNSPECIFIED: ICD-10-CM

## 2023-07-26 DIAGNOSIS — R93.89 ABNORMAL FINDINGS ON DIAGNOSTIC IMAGING OF OTHER SPECIFIED BODY STRUCTURES: ICD-10-CM

## 2023-07-26 DIAGNOSIS — D62 ACUTE POSTHEMORRHAGIC ANEMIA: ICD-10-CM

## 2023-07-26 LAB
ALBUMIN SERPL ELPH-MCNC: 3 G/DL — LOW (ref 3.3–5.2)
ALBUMIN SERPL ELPH-MCNC: 3.1 G/DL — LOW (ref 3.3–5.2)
ALP SERPL-CCNC: 211 U/L — HIGH (ref 40–120)
ALP SERPL-CCNC: 231 U/L — HIGH (ref 40–120)
ALT FLD-CCNC: 13 U/L — SIGNIFICANT CHANGE UP
ALT FLD-CCNC: 14 U/L — SIGNIFICANT CHANGE UP
ANION GAP SERPL CALC-SCNC: 15 MMOL/L — SIGNIFICANT CHANGE UP (ref 5–17)
ANION GAP SERPL CALC-SCNC: 19 MMOL/L — HIGH (ref 5–17)
ANISOCYTOSIS BLD QL: SLIGHT — SIGNIFICANT CHANGE UP
APPEARANCE UR: CLEAR — SIGNIFICANT CHANGE UP
APTT BLD: 37.7 SEC — HIGH (ref 24.5–35.6)
APTT BLD: 46 SEC — HIGH (ref 24.5–35.6)
AST SERPL-CCNC: 13 U/L — SIGNIFICANT CHANGE UP
AST SERPL-CCNC: 14 U/L — SIGNIFICANT CHANGE UP
BACTERIA # UR AUTO: ABNORMAL
BASE EXCESS BLDV CALC-SCNC: -4.5 MMOL/L — LOW (ref -2–3)
BASOPHILS # BLD AUTO: 0 K/UL — SIGNIFICANT CHANGE UP (ref 0–0.2)
BASOPHILS # BLD AUTO: 0.07 K/UL — SIGNIFICANT CHANGE UP (ref 0–0.2)
BASOPHILS NFR BLD AUTO: 0 % — SIGNIFICANT CHANGE UP (ref 0–2)
BASOPHILS NFR BLD AUTO: 0.6 % — SIGNIFICANT CHANGE UP (ref 0–2)
BILIRUB SERPL-MCNC: 0.4 MG/DL — SIGNIFICANT CHANGE UP (ref 0.4–2)
BILIRUB SERPL-MCNC: 0.7 MG/DL — SIGNIFICANT CHANGE UP (ref 0.4–2)
BILIRUB UR-MCNC: NEGATIVE — SIGNIFICANT CHANGE UP
BLD GP AB SCN SERPL QL: SIGNIFICANT CHANGE UP
BUN SERPL-MCNC: 74.2 MG/DL — HIGH (ref 8–20)
BUN SERPL-MCNC: 74.9 MG/DL — HIGH (ref 8–20)
CA-I SERPL-SCNC: 1.18 MMOL/L — SIGNIFICANT CHANGE UP (ref 1.15–1.33)
CALCIUM SERPL-MCNC: 7.9 MG/DL — LOW (ref 8.4–10.5)
CALCIUM SERPL-MCNC: 8.7 MG/DL — SIGNIFICANT CHANGE UP (ref 8.4–10.5)
CHLORIDE BLDV-SCNC: 105 MMOL/L — SIGNIFICANT CHANGE UP (ref 96–108)
CHLORIDE SERPL-SCNC: 102 MMOL/L — SIGNIFICANT CHANGE UP (ref 96–108)
CHLORIDE SERPL-SCNC: 103 MMOL/L — SIGNIFICANT CHANGE UP (ref 96–108)
CO2 SERPL-SCNC: 19 MMOL/L — LOW (ref 22–29)
CO2 SERPL-SCNC: 20 MMOL/L — LOW (ref 22–29)
COLOR SPEC: YELLOW — SIGNIFICANT CHANGE UP
CREAT SERPL-MCNC: 1.78 MG/DL — HIGH (ref 0.5–1.3)
CREAT SERPL-MCNC: 1.81 MG/DL — HIGH (ref 0.5–1.3)
DACRYOCYTES BLD QL SMEAR: SLIGHT — SIGNIFICANT CHANGE UP
DIFF PNL FLD: NEGATIVE — SIGNIFICANT CHANGE UP
EGFR: 27 ML/MIN/1.73M2 — LOW
EGFR: 28 ML/MIN/1.73M2 — LOW
ELLIPTOCYTES BLD QL SMEAR: SLIGHT — SIGNIFICANT CHANGE UP
EOSINOPHIL # BLD AUTO: 0 K/UL — SIGNIFICANT CHANGE UP (ref 0–0.5)
EOSINOPHIL # BLD AUTO: 0 K/UL — SIGNIFICANT CHANGE UP (ref 0–0.5)
EOSINOPHIL NFR BLD AUTO: 0 % — SIGNIFICANT CHANGE UP (ref 0–6)
EOSINOPHIL NFR BLD AUTO: 0 % — SIGNIFICANT CHANGE UP (ref 0–6)
EPI CELLS # UR: SIGNIFICANT CHANGE UP
GAS PNL BLDV: 137 MMOL/L — SIGNIFICANT CHANGE UP (ref 136–145)
GAS PNL BLDV: SIGNIFICANT CHANGE UP
GAS PNL BLDV: SIGNIFICANT CHANGE UP
GIANT PLATELETS BLD QL SMEAR: PRESENT — SIGNIFICANT CHANGE UP
GLUCOSE BLDV-MCNC: 140 MG/DL — HIGH (ref 70–99)
GLUCOSE SERPL-MCNC: 110 MG/DL — HIGH (ref 70–99)
GLUCOSE SERPL-MCNC: 146 MG/DL — HIGH (ref 70–99)
GLUCOSE UR QL: NEGATIVE MG/DL — SIGNIFICANT CHANGE UP
HCO3 BLDV-SCNC: 21 MMOL/L — LOW (ref 22–29)
HCT VFR BLD CALC: 14.5 % — CRITICAL LOW (ref 34.5–45)
HCT VFR BLD CALC: 23 % — LOW (ref 34.5–45)
HCT VFR BLD CALC: 23.3 % — LOW (ref 34.5–45)
HCT VFR BLD CALC: 24.3 % — LOW (ref 34.5–45)
HCT VFR BLDA CALC: 15 % — SIGNIFICANT CHANGE UP
HGB BLD CALC-MCNC: <6.9 G/DL — CRITICAL LOW (ref 11.7–16.1)
HGB BLD-MCNC: 4.6 G/DL — CRITICAL LOW (ref 11.5–15.5)
HGB BLD-MCNC: 7.6 G/DL — LOW (ref 11.5–15.5)
HGB BLD-MCNC: 7.8 G/DL — LOW (ref 11.5–15.5)
HGB BLD-MCNC: 7.9 G/DL — LOW (ref 11.5–15.5)
HYALINE CASTS # UR AUTO: ABNORMAL /LPF
HYPOCHROMIA BLD QL: SLIGHT — SIGNIFICANT CHANGE UP
IMM GRANULOCYTES NFR BLD AUTO: 1.6 % — HIGH (ref 0–0.9)
INR BLD: 1.23 RATIO — HIGH (ref 0.85–1.18)
INR BLD: 2.04 RATIO — HIGH (ref 0.85–1.18)
KETONES UR-MCNC: NEGATIVE — SIGNIFICANT CHANGE UP
LACTATE BLDV-MCNC: 1.6 MMOL/L — SIGNIFICANT CHANGE UP (ref 0.5–2)
LACTATE BLDV-MCNC: 4.3 MMOL/L — CRITICAL HIGH (ref 0.5–2)
LEUKOCYTE ESTERASE UR-ACNC: NEGATIVE — SIGNIFICANT CHANGE UP
LYMPHOCYTES # BLD AUTO: 1.49 K/UL — SIGNIFICANT CHANGE UP (ref 1–3.3)
LYMPHOCYTES # BLD AUTO: 1.64 K/UL — SIGNIFICANT CHANGE UP (ref 1–3.3)
LYMPHOCYTES # BLD AUTO: 12 % — LOW (ref 13–44)
LYMPHOCYTES # BLD AUTO: 13 % — SIGNIFICANT CHANGE UP (ref 13–44)
MACROCYTES BLD QL: SLIGHT — SIGNIFICANT CHANGE UP
MAGNESIUM SERPL-MCNC: 2.2 MG/DL — SIGNIFICANT CHANGE UP (ref 1.6–2.6)
MANUAL SMEAR VERIFICATION: SIGNIFICANT CHANGE UP
MCHC RBC-ENTMCNC: 30.7 PG — SIGNIFICANT CHANGE UP (ref 27–34)
MCHC RBC-ENTMCNC: 31 PG — SIGNIFICANT CHANGE UP (ref 27–34)
MCHC RBC-ENTMCNC: 31.1 PG — SIGNIFICANT CHANGE UP (ref 27–34)
MCHC RBC-ENTMCNC: 31.7 GM/DL — LOW (ref 32–36)
MCHC RBC-ENTMCNC: 32.5 GM/DL — SIGNIFICANT CHANGE UP (ref 32–36)
MCHC RBC-ENTMCNC: 33 GM/DL — SIGNIFICANT CHANGE UP (ref 32–36)
MCHC RBC-ENTMCNC: 33.3 PG — SIGNIFICANT CHANGE UP (ref 27–34)
MCHC RBC-ENTMCNC: 33.5 GM/DL — SIGNIFICANT CHANGE UP (ref 32–36)
MCV RBC AUTO: 105.1 FL — HIGH (ref 80–100)
MCV RBC AUTO: 92.5 FL — SIGNIFICANT CHANGE UP (ref 80–100)
MCV RBC AUTO: 94.3 FL — SIGNIFICANT CHANGE UP (ref 80–100)
MCV RBC AUTO: 94.6 FL — SIGNIFICANT CHANGE UP (ref 80–100)
METAMYELOCYTES # FLD: 0.9 % — HIGH (ref 0–0)
MONOCYTES # BLD AUTO: 0.44 K/UL — SIGNIFICANT CHANGE UP (ref 0–0.9)
MONOCYTES # BLD AUTO: 1.34 K/UL — HIGH (ref 0–0.9)
MONOCYTES NFR BLD AUTO: 10.8 % — SIGNIFICANT CHANGE UP (ref 2–14)
MONOCYTES NFR BLD AUTO: 3.5 % — SIGNIFICANT CHANGE UP (ref 2–14)
MRSA PCR RESULT.: SIGNIFICANT CHANGE UP
MYELOCYTES NFR BLD: 0.9 % — HIGH (ref 0–0)
NEUTROPHILS # BLD AUTO: 10.28 K/UL — HIGH (ref 1.8–7.4)
NEUTROPHILS # BLD AUTO: 9.28 K/UL — HIGH (ref 1.8–7.4)
NEUTROPHILS NFR BLD AUTO: 75 % — SIGNIFICANT CHANGE UP (ref 43–77)
NEUTROPHILS NFR BLD AUTO: 81.7 % — HIGH (ref 43–77)
NITRITE UR-MCNC: NEGATIVE — SIGNIFICANT CHANGE UP
NRBC # BLD: 1 /100 — HIGH (ref 0–0)
NRBC # BLD: 2 /100 WBCS — HIGH (ref 0–0)
NRBC # BLD: 2 /100 WBCS — HIGH (ref 0–0)
OB PNL STL: POSITIVE
OVALOCYTES BLD QL SMEAR: SLIGHT — SIGNIFICANT CHANGE UP
PCO2 BLDV: 36 MMHG — LOW (ref 39–42)
PH BLDV: 7.37 — SIGNIFICANT CHANGE UP (ref 7.32–7.43)
PH UR: 5 — SIGNIFICANT CHANGE UP (ref 5–8)
PHOSPHATE SERPL-MCNC: 4.4 MG/DL — SIGNIFICANT CHANGE UP (ref 2.4–4.7)
PLAT MORPH BLD: NORMAL — SIGNIFICANT CHANGE UP
PLATELET # BLD AUTO: 265 K/UL — SIGNIFICANT CHANGE UP (ref 150–400)
PLATELET # BLD AUTO: 295 K/UL — SIGNIFICANT CHANGE UP (ref 150–400)
PLATELET # BLD AUTO: 312 K/UL — SIGNIFICANT CHANGE UP (ref 150–400)
PLATELET # BLD AUTO: 347 K/UL — SIGNIFICANT CHANGE UP (ref 150–400)
PO2 BLDV: 154 MMHG — HIGH (ref 25–45)
POIKILOCYTOSIS BLD QL AUTO: SLIGHT — SIGNIFICANT CHANGE UP
POLYCHROMASIA BLD QL SMEAR: SLIGHT — SIGNIFICANT CHANGE UP
POTASSIUM BLDV-SCNC: 3.9 MMOL/L — SIGNIFICANT CHANGE UP (ref 3.5–5.1)
POTASSIUM SERPL-MCNC: 3.7 MMOL/L — SIGNIFICANT CHANGE UP (ref 3.5–5.3)
POTASSIUM SERPL-MCNC: 4 MMOL/L — SIGNIFICANT CHANGE UP (ref 3.5–5.3)
POTASSIUM SERPL-SCNC: 3.7 MMOL/L — SIGNIFICANT CHANGE UP (ref 3.5–5.3)
POTASSIUM SERPL-SCNC: 4 MMOL/L — SIGNIFICANT CHANGE UP (ref 3.5–5.3)
PROT SERPL-MCNC: 5 G/DL — LOW (ref 6.6–8.7)
PROT SERPL-MCNC: 5.4 G/DL — LOW (ref 6.6–8.7)
PROT UR-MCNC: NEGATIVE — SIGNIFICANT CHANGE UP
PROTHROM AB SERPL-ACNC: 13.6 SEC — HIGH (ref 9.5–13)
PROTHROM AB SERPL-ACNC: 22.2 SEC — HIGH (ref 9.5–13)
RBC # BLD: 1.38 M/UL — LOW (ref 3.8–5.2)
RBC # BLD: 2.44 M/UL — LOW (ref 3.8–5.2)
RBC # BLD: 2.52 M/UL — LOW (ref 3.8–5.2)
RBC # BLD: 2.57 M/UL — LOW (ref 3.8–5.2)
RBC # FLD: 17.7 % — HIGH (ref 10.3–14.5)
RBC # FLD: 18.4 % — HIGH (ref 10.3–14.5)
RBC # FLD: 18.7 % — HIGH (ref 10.3–14.5)
RBC # FLD: 18.9 % — HIGH (ref 10.3–14.5)
RBC BLD AUTO: ABNORMAL
RBC CASTS # UR COMP ASSIST: NEGATIVE /HPF — SIGNIFICANT CHANGE UP (ref 0–4)
S AUREUS DNA NOSE QL NAA+PROBE: SIGNIFICANT CHANGE UP
SAO2 % BLDV: 99 % — SIGNIFICANT CHANGE UP
SODIUM SERPL-SCNC: 138 MMOL/L — SIGNIFICANT CHANGE UP (ref 135–145)
SODIUM SERPL-SCNC: 139 MMOL/L — SIGNIFICANT CHANGE UP (ref 135–145)
SP GR SPEC: 1.01 — SIGNIFICANT CHANGE UP (ref 1.01–1.02)
TROPONIN T SERPL-MCNC: <0.01 NG/ML — SIGNIFICANT CHANGE UP (ref 0–0.06)
UROBILINOGEN FLD QL: NEGATIVE MG/DL — SIGNIFICANT CHANGE UP
WBC # BLD: 11.08 K/UL — HIGH (ref 3.8–10.5)
WBC # BLD: 12.38 K/UL — HIGH (ref 3.8–10.5)
WBC # BLD: 12.58 K/UL — HIGH (ref 3.8–10.5)
WBC # BLD: 15.27 K/UL — HIGH (ref 3.8–10.5)
WBC # FLD AUTO: 11.08 K/UL — HIGH (ref 3.8–10.5)
WBC # FLD AUTO: 12.38 K/UL — HIGH (ref 3.8–10.5)
WBC # FLD AUTO: 12.58 K/UL — HIGH (ref 3.8–10.5)
WBC # FLD AUTO: 15.27 K/UL — HIGH (ref 3.8–10.5)
WBC UR QL: NEGATIVE /HPF — SIGNIFICANT CHANGE UP (ref 0–5)

## 2023-07-26 PROCEDURE — 99291 CRITICAL CARE FIRST HOUR: CPT

## 2023-07-26 PROCEDURE — 99222 1ST HOSP IP/OBS MODERATE 55: CPT

## 2023-07-26 PROCEDURE — 93010 ELECTROCARDIOGRAM REPORT: CPT | Mod: 76

## 2023-07-26 PROCEDURE — 93306 TTE W/DOPPLER COMPLETE: CPT | Mod: 26

## 2023-07-26 PROCEDURE — 74177 CT ABD & PELVIS W/CONTRAST: CPT | Mod: 26,MA

## 2023-07-26 PROCEDURE — 99223 1ST HOSP IP/OBS HIGH 75: CPT

## 2023-07-26 PROCEDURE — 71045 X-RAY EXAM CHEST 1 VIEW: CPT | Mod: 26

## 2023-07-26 RX ORDER — ERGOCALCIFEROL 1.25 MG/1
1 CAPSULE ORAL
Refills: 0 | DISCHARGE

## 2023-07-26 RX ORDER — NOREPINEPHRINE BITARTRATE/D5W 8 MG/250ML
0.05 PLASTIC BAG, INJECTION (ML) INTRAVENOUS
Qty: 8 | Refills: 0 | Status: DISCONTINUED | OUTPATIENT
Start: 2023-07-26 | End: 2023-07-26

## 2023-07-26 RX ORDER — VANCOMYCIN HCL 1 G
500 VIAL (EA) INTRAVENOUS EVERY 24 HOURS
Refills: 0 | Status: DISCONTINUED | OUTPATIENT
Start: 2023-07-26 | End: 2023-07-27

## 2023-07-26 RX ORDER — METOPROLOL TARTRATE 50 MG
1 TABLET ORAL
Qty: 0 | Refills: 0 | DISCHARGE

## 2023-07-26 RX ORDER — PROTHROMBIN COMPLEX CONCENTRATE (HUMAN) 25.5; 16.5; 24; 22; 22; 26 [IU]/ML; [IU]/ML; [IU]/ML; [IU]/ML; [IU]/ML; [IU]/ML
4000 POWDER, FOR SOLUTION INTRAVENOUS ONCE
Refills: 0 | Status: COMPLETED | OUTPATIENT
Start: 2023-07-26 | End: 2023-07-26

## 2023-07-26 RX ORDER — TIOTROPIUM BROMIDE AND OLODATEROL 3.124; 2.736 UG/1; UG/1
2 SPRAY, METERED RESPIRATORY (INHALATION) DAILY
Refills: 0 | Status: DISCONTINUED | OUTPATIENT
Start: 2023-07-26 | End: 2023-08-23

## 2023-07-26 RX ORDER — PIPERACILLIN AND TAZOBACTAM 4; .5 G/20ML; G/20ML
3.38 INJECTION, POWDER, LYOPHILIZED, FOR SOLUTION INTRAVENOUS EVERY 8 HOURS
Refills: 0 | Status: DISCONTINUED | OUTPATIENT
Start: 2023-07-27 | End: 2023-07-30

## 2023-07-26 RX ORDER — CLOPIDOGREL BISULFATE 75 MG/1
1 TABLET, FILM COATED ORAL
Qty: 0 | Refills: 0 | DISCHARGE

## 2023-07-26 RX ORDER — VANCOMYCIN HCL 1 G
1000 VIAL (EA) INTRAVENOUS EVERY 12 HOURS
Refills: 0 | Status: DISCONTINUED | OUTPATIENT
Start: 2023-07-26 | End: 2023-07-26

## 2023-07-26 RX ORDER — PIPERACILLIN AND TAZOBACTAM 4; .5 G/20ML; G/20ML
3.38 INJECTION, POWDER, LYOPHILIZED, FOR SOLUTION INTRAVENOUS ONCE
Refills: 0 | Status: COMPLETED | OUTPATIENT
Start: 2023-07-26 | End: 2023-07-26

## 2023-07-26 RX ORDER — TIOTROPIUM BROMIDE AND OLODATEROL 3.124; 2.736 UG/1; UG/1
2 SPRAY, METERED RESPIRATORY (INHALATION)
Refills: 0 | DISCHARGE

## 2023-07-26 RX ORDER — SODIUM CHLORIDE 9 MG/ML
1000 INJECTION INTRAMUSCULAR; INTRAVENOUS; SUBCUTANEOUS ONCE
Refills: 0 | Status: COMPLETED | OUTPATIENT
Start: 2023-07-26 | End: 2023-07-26

## 2023-07-26 RX ORDER — PANTOPRAZOLE SODIUM 20 MG/1
80 TABLET, DELAYED RELEASE ORAL ONCE
Refills: 0 | Status: COMPLETED | OUTPATIENT
Start: 2023-07-26 | End: 2023-07-26

## 2023-07-26 RX ORDER — PIPERACILLIN AND TAZOBACTAM 4; .5 G/20ML; G/20ML
3.38 INJECTION, POWDER, LYOPHILIZED, FOR SOLUTION INTRAVENOUS ONCE
Refills: 0 | Status: COMPLETED | OUTPATIENT
Start: 2023-07-26 | End: 2023-07-27

## 2023-07-26 RX ORDER — ATORVASTATIN CALCIUM 80 MG/1
1 TABLET, FILM COATED ORAL
Qty: 0 | Refills: 0 | DISCHARGE

## 2023-07-26 RX ORDER — ATORVASTATIN CALCIUM 80 MG/1
80 TABLET, FILM COATED ORAL AT BEDTIME
Refills: 0 | Status: DISCONTINUED | OUTPATIENT
Start: 2023-07-26 | End: 2023-08-02

## 2023-07-26 RX ORDER — PANTOPRAZOLE SODIUM 20 MG/1
8 TABLET, DELAYED RELEASE ORAL
Qty: 80 | Refills: 0 | Status: DISCONTINUED | OUTPATIENT
Start: 2023-07-26 | End: 2023-07-28

## 2023-07-26 RX ORDER — PIPERACILLIN AND TAZOBACTAM 4; .5 G/20ML; G/20ML
3.38 INJECTION, POWDER, LYOPHILIZED, FOR SOLUTION INTRAVENOUS ONCE
Refills: 0 | Status: COMPLETED | OUTPATIENT
Start: 2023-07-27 | End: 2023-07-27

## 2023-07-26 RX ORDER — NOREPINEPHRINE BITARTRATE/D5W 8 MG/250ML
0.05 PLASTIC BAG, INJECTION (ML) INTRAVENOUS
Qty: 8 | Refills: 0 | Status: DISCONTINUED | OUTPATIENT
Start: 2023-07-26 | End: 2023-07-29

## 2023-07-26 RX ORDER — FUROSEMIDE 40 MG
20 TABLET ORAL ONCE
Refills: 0 | Status: COMPLETED | OUTPATIENT
Start: 2023-07-26 | End: 2023-07-26

## 2023-07-26 RX ADMIN — Medication 100 MILLIGRAM(S): at 18:18

## 2023-07-26 RX ADMIN — SODIUM CHLORIDE 1000 MILLILITER(S): 9 INJECTION INTRAMUSCULAR; INTRAVENOUS; SUBCUTANEOUS at 11:00

## 2023-07-26 RX ADMIN — PIPERACILLIN AND TAZOBACTAM 200 GRAM(S): 4; .5 INJECTION, POWDER, LYOPHILIZED, FOR SOLUTION INTRAVENOUS at 20:27

## 2023-07-26 RX ADMIN — PANTOPRAZOLE SODIUM 10 MG/HR: 20 TABLET, DELAYED RELEASE ORAL at 10:47

## 2023-07-26 RX ADMIN — PANTOPRAZOLE SODIUM 80 MILLIGRAM(S): 20 TABLET, DELAYED RELEASE ORAL at 10:14

## 2023-07-26 RX ADMIN — Medication 7.23 MICROGRAM(S)/KG/MIN: at 14:54

## 2023-07-26 RX ADMIN — PANTOPRAZOLE SODIUM 10 MG/HR: 20 TABLET, DELAYED RELEASE ORAL at 21:07

## 2023-07-26 RX ADMIN — ATORVASTATIN CALCIUM 80 MILLIGRAM(S): 80 TABLET, FILM COATED ORAL at 22:14

## 2023-07-26 RX ADMIN — PROTHROMBIN COMPLEX CONCENTRATE (HUMAN) 400 INTERNATIONAL UNIT(S): 25.5; 16.5; 24; 22; 22; 26 POWDER, FOR SOLUTION INTRAVENOUS at 13:59

## 2023-07-26 NOTE — CONSULT NOTE ADULT - PROBLEM SELECTOR RECOMMENDATION 9
Initial Hgb 4.6, BUN 74.9, INR 2.04. Recently on Eliquis until mid last week, now on Xarelto. Also on aspirin. Melena on rectal exam. Hypotensive.  CT A/P 7/27/23- 1.7cm intraluminal fatty lesion in stomach antrum likely reflecting lipoma, colonic diverticulosis, possible short segment of luminal narrowing near the splenic flexure, no CT evidence for active GIB.    - Recommend reversal of anticoagulation due to hemodynamic instability   - Recommend ICU admission  - Will plan for EGD tomorrow 7/27/23  - Maintain current T&S, INR <1.5, Hgb >7.0, Plt >50 prior to procedure  - Maintain NPO   - Trend CBC, transfuse as needed. Monitor for signs of bleeding.   - Avoid NSAIDs  - PPI ggt for GI mucosal cytoprotection

## 2023-07-26 NOTE — ED ADULT NURSE NOTE - NSFALLUNIVINTERV_ED_ALL_ED
Bed/Stretcher in lowest position, wheels locked, appropriate side rails in place/Call bell, personal items and telephone in reach/Instruct patient to call for assistance before getting out of bed/chair/stretcher/Non-slip footwear applied when patient is off stretcher/[place] to call system/Physically safe environment - no spills, clutter or unnecessary equipment/Purposeful proactive rounding/Room/bathroom lighting operational, light cord in reach

## 2023-07-26 NOTE — ED PROVIDER NOTE - PHYSICAL EXAMINATION
VITAL SIGNS: I have reviewed nursing notes and confirm.  CONSTITUTIONAL: pale appearing elderly female in no acute distress.  SKIN: Skin exam is warm and dry, no acute rash.  HEAD: Normocephalic; atraumatic.  EYES: PERRL, EOM intact; conjunctiva and sclera clear.  ENT: No nasal discharge; airway clear. Throat clear.  NECK: Supple; non tender.    CARD: Regular rate with occasional rapid a-fib on monitor  RESP: No wheezes,  no rales or rhonchi.   ABD:  soft; non-distended; non-tender;   RECTAL: Chaperoned by SHANNAN Jacinto: melena   EXT: Normal ROM. No clubbing, cyanosis or edema.  NEURO: Alert, oriented. Grossly unremarkable. No focal deficits.   PSYCH: Cooperative, appropriate.

## 2023-07-26 NOTE — CONSULT NOTE ADULT - TIME BILLING
I reviewed the last admission notes and procedure notes  reviewed labs, notes from this admission  reviewed CT images  spoke to MICU attending regarding plan   spoke to pt and daughter at bedside regarding risk and benefits of EGD

## 2023-07-26 NOTE — ED ADULT TRIAGE NOTE - CHIEF COMPLAINT QUOTE
Pt recently had her blood thinner changed to Xarelto and since then she has had generalized weakness for the last few days. She states that she has urinary frequency but that is normal for her. Has no other complanmits. Pt is O2 dependent on 2l NC. As per EMS she was initially hypotensive at home in the 80's .

## 2023-07-26 NOTE — H&P ADULT - NSHPSOCIALHISTORY_GEN_ALL_CORE
Former smoker, quit in 2019. No alcohol or other substance use. Lives with her son, daughter in law, and grandson.

## 2023-07-26 NOTE — CONSULT NOTE ADULT - NS ATTEND AMEND GEN_ALL_CORE FT
Patient seen and examined  with ACP   Patient D/C ed elaquis and started xeralto. Is on ASA as well.   Having weakness and black stool X 4 days. Hgb mid 4's. Hypotension.     NO abdominal pain , no nausea, no vomiting.   Had 3 units PRBC .   rectal exam- melena    CT images reviewed. My interpretation- panc diverticulosis. NO diverticulitis. No active bleeding     A/P  UGI bleed, melena  check post transfusion hemoglobin  PPI drip  NPO after midnight  needs cardiac clearance for EGD tomorrow  EGD after adequate resuscitation

## 2023-07-26 NOTE — H&P ADULT - HISTORY OF PRESENT ILLNESS
MARITZA DE LOS SANTOS  [id number]  Patient is a 83y old  Female who presents with a chief complaint of     HPI: 82 y/o female with PMHx of COPD on 2L home O2, CHF, CAD s/p stents, HTN, HLD, pAF previously on eliquis now xarelto (switched 1 week ago 2/2 cost), diverticulosis, and grade/stage 3 hemorrhoids who presents to the ED c/o generalized weakness and dark stools over the past few days. Hypotensive on ED arrival and hgb found to be 4.6 with +occult/melanotic stool. CT showed diverticulosis, no active bleed visualized. GI consulted and patient made NPO for endoscopy tomorrow. Protonix gtt started, patient given 3U PRBC and 1L IVF but persistently hypotensive. Started on levophed and admitted to MICU.     Last 24 hours: Started on levophed. s/p 3u prbc but hypotensive at 86/43. Repeat CBC ordered and pending. Protonix running. Cleared by cardiology for EGD in AM.    REVIEW OF SYSTEMS:  +dizziness, weakness, MCKAY, melena  -vomiting, abdominal pain, leg swelling      Physical Exam:  Vital Signs Last 24 Hrs  T(C): 36.4 (2023 13:20), Max: 36.6 (2023 12:15)  T(F): 97.5 (2023 13:20), Max: 97.9 (2023 12:15)  HR: 57 (2023 14:45) (57 - 87)  BP: 86/43 (2023 14:45) (72/31 - 100/48)  BP(mean): --  RR: 16 (2023 13:20) (16 - 19)  SpO2: 100% (2023 13:20) (99% - 100%)    Parameters below as of 2023 13:20  Patient On (Oxygen Delivery Method): nasal cannula  O2 Flow (L/min): 2      General: well appearing, NAD  Head: NC, AT  EENT: EOMI, no scleral icterus  Cardiac: RRR, no apparent murmurs, no lower extremity edema or calf TTP   Respiratory: CTABL, no respiratory distress   Abdomen: soft, ND, NT, nonperitonitic  MSK/Vascular: full ROM, soft compartments, warm extremities  Neuro: AAOx3, sensation to light touch intact  Psych: calm, cooperative      ============================I/O===========================   I&O's Detail    ============================ LABS =========================                        4.6    12.58 )-----------( 347      ( 2023 09:50 )             14.5         139  |  102  |  74.9<H>  ----------------------------<  146<H>  4.0   |  19.0<L>  |  1.78<H>    Ca    8.7      2023 09:50    TPro  5.4<L>  /  Alb  3.1<L>  /  TBili  0.4  /  DBili  x   /  AST  14  /  ALT  14  /  AlkPhos  231<H>      LIVER FUNCTIONS - ( 2023 09:50 )  Alb: 3.1 g/dL / Pro: 5.4 g/dL / ALK PHOS: 231 U/L / ALT: 14 U/L / AST: 14 U/L / GGT: x           PT/INR - ( 2023 09:50 )   PT: 22.2 sec;   INR: 2.04 ratio         PTT - ( 2023 09:50 )  PTT:46.0 sec    Urinalysis Basic - ( 2023 09:50 )    Color: x / Appearance: x / SG: x / pH: x  Gluc: 146 mg/dL / Ketone: x  / Bili: x / Urobili: x   Blood: x / Protein: x / Nitrite: x   Leuk Esterase: x / RBC: x / WBC x   Sq Epi: x / Non Sq Epi: x / Bacteria: x      ======================================================  PAST MEDICAL & SURGICAL HISTORY:  HTN (hypertension)      HLD (hyperlipidemia)      CAD (coronary artery disease)      COPD (chronic obstructive pulmonary disease)      S/P  section

## 2023-07-26 NOTE — CONSULT NOTE ADULT - PROBLEM SELECTOR RECOMMENDATION 2
CT A/P 7/27/23- 1.7cm intraluminal fatty lesion in stomach antrum likely reflecting lipoma, colonic diverticulosis, possible short segment of luminal narrowing near the splenic flexure, no CT evidence for active GIB.  Dec 2022- EGD- unremarkable. Colon- severe diverticulosis of sigmoid colon and stage III internal hemorrhoids.     - EGD as above  _________________________________________________________________  Assessment and recommendations are final when note is signed by the attending physician.

## 2023-07-26 NOTE — PATIENT PROFILE ADULT - ARE SIGNIFICANT INDICATORS COMPLETE.
Refill Request  Medication name: Pending Prescriptions:                       Disp   Refills    busPIRone (BUSPAR) 15 MG tablet           90 tab*0            Sig: Take 1 tablet (15 mg) by mouth daily    gabapentin (NEURONTIN) 300 MG capsule     270 ca*0            Sig: TAKE 3 CAPSULES BY MOUTH EVERY DAY AT BEDTIME    atorvastatin (LIPITOR) 80 MG tablet       90 tab*1            Sig: Take 1 tablet (80 mg) by mouth At Bedtime    Requested Pharmacy: [facility]   Yes

## 2023-07-26 NOTE — CONSULT NOTE ADULT - SUBJECTIVE AND OBJECTIVE BOX
Patient is a 83y old  Female who presents with a chief complaint of generalized weakness     HPI: 84 y/o F with PMHx HTN, HLD, CAD s/p stent, COPD (2L home O2), pAF presents to ED c/o worsening generalized weakness over the past few days. She notes that her stool has been "black" in color for past 3-4 days but she attributed this to her iron pills. She has been having ~1 soft BM per day. She was on Eliquis until mid last week that was switched over to Xarelto. She notes the weakness started shortly after starting Xarelto. She does not use NSAIDs. Upon arrival to the ED she was hypotensive to ~70/30, she was given 2 u pRBC with improvement in blood pressure.            PAST MEDICAL & SURGICAL HISTORY:  HTN (hypertension)      HLD (hyperlipidemia)      CAD (coronary artery disease)      COPD (chronic obstructive pulmonary disease)      S/P  section          Allergies    No Known Allergies    Intolerances        MEDICATIONS  (STANDING):  furosemide   Injectable 20 milliGRAM(s) IV Push Once  pantoprazole Infusion 8 mG/Hr (10 mL/Hr) IV Continuous <Continuous>    MEDICATIONS  (PRN):      Social History:    Marital Status:  (   )    (   ) Single    (   )    (  )   Occupation:   Lives with: (  ) alone  (  ) children   (  ) spouse   (  ) parents  (  ) other    Substance Use (street drugs): (  ) never used  (  ) other:  Tobacco Usage:  (   ) never smoked   ( x  ) former smoker   (   ) current smoker  (     ) pack year  (        ) last cigarette date  Alcohol Usage: denies   Sexual History:     Family History   IBD (  ) Yes   (  ) No  GI Malignancy (  )  Yes    (  ) No    Health Management     Last Colonoscopy -      (     ) Advanced Directives: (     ) None    (      ) DNR    (     ) DNI    (     ) Health Care Proxy:       REVIEW OF SYSTEMS:   General: Negative  HEENT: Negative  CV: Negative  Respiratory: Negative  GI: See HPI  : Negative  MSK: Negative  Hematologic: Negative  Skin: Negative      Vital Signs Last 24 Hrs  T(C): 36.6 (2023 12:15), Max: 36.6 (2023 12:15)  T(F): 97.9 (2023 12:15), Max: 97.9 (2023 12:15)  HR: 58 (2023 12:40) (58 - 87)  BP: 92/41 (2023 12:40) (72/31 - 95/44)  BP(mean): --  RR: 16 (2023 12:15) (16 - 19)  SpO2: 100% (2023 12:15) (99% - 100%)    Parameters below as of 2023 12:15  Patient On (Oxygen Delivery Method): nasal cannula  O2 Flow (L/min): 2      PHYSICAL EXAM:   GENERAL:  No acute distress, pale   HEENT:  NC/AT, conjunctiva clear, sclera anicteric  CHEST:  No increased effort  HEART:  Regular rate  ABDOMEN:  Soft, non-tender, non-distended, normoactive bowel sounds, no rebound or guarding  EXTREMITIES: No edema  SKIN:  Warm, dry  NEURO:  Calm, cooperative        LABS:                        4.6    12.58 )-----------( 347      ( 2023 09:50 )             14.5         139  |  102  |  74.9<H>  ----------------------------<  146<H>  4.0   |  19.0<L>  |  1.78<H>    Ca    8.7      2023 09:50    TPro  5.4<L>  /  Alb  3.1<L>  /  TBili  0.4  /  DBili  x   /  AST  14  /  ALT  14  /  AlkPhos  231<H>      PT/INR - ( 2023 09:50 )   PT: 22.2 sec;   INR: 2.04 ratio         PTT - ( 2023 09:50 )  PTT:46.0 sec  LIVER FUNCTIONS - ( 2023 09:50 )  Alb: 3.1 g/dL / Pro: 5.4 g/dL / ALK PHOS: 231 U/L / ALT: 14 U/L / AST: 14 U/L / GGT: x             RADIOLOGY & ADDITIONAL TESTS:    < from: CT Abdomen and Pelvis w/ IV Cont (23 @ 11:28) >    ACC: [id number] EXAM:  CT ABDOMEN AND PELVIS IC   ORDERED BY: JAQUI BUSCH     PROCEDURE DATE:  2023          INTERPRETATION:  CLINICAL INFORMATION: Melena. Hypotension.    COMPARISON: None.    CONTRAST/COMPLICATIONS:  IV Contrast: Omnipaque 350  90cc administered   10 cc discarded  Oral Contrast: NONE  Complications: None reported at time of study completion    PROCEDURE:  CT of the Abdomen and Pelvis was performed.  Precontrast, Arterial and Delayed phases were performed.  Sagittal and coronal reformats were performed.    FINDINGS:    LOWER CHEST: Cardiomegaly. Coronary artery calcification.    LIVER: Within normal limits.  BILE DUCTS: Normal caliber.  GALLBLADDER: Cholelithiasis.  SPLEEN: Within normal limits.  PANCREAS: Within normal limits.  ADRENALS: Within normal limits.  KIDNEYS/URETERS: Focal scarring upper pole left kidney.  Small hypodense lesions right kidney too small for characterization.  No hydronephrosis.    BLADDER: Within normal limits.  REPRODUCTIVE ORGANS: 1.7 cm left adnexal cyst.    BOWEL:   No bowel obstruction.  1.7 cm intraluminal fatty lesion antrum of stomach, likely reflecting   lipoma.  Colonic diverticulosis, without CT evidence of diverticulitis.  There is a possible short segment of intraluminal narrowing at the   splenic flexure.  There is no intraluminal extravasation of contrast to suggest active   gastrointestinal bleeding.  Appendix is not visualized.  PERITONEUM: No ascites.    VESSELS:  Atherosclerotic changes.  RETROPERITONEUM/LYMPH NODES: No lymphadenopathy.    ABDOMINAL WALL: Within normal limits.    BONES: Degenerative changes.    IMPRESSION:    Colonic diverticulosis.  Possible short segment of luminal narrowing near the splenic flexure.    Recommend further evaluation with colonoscopy to exclude underlying   intrinsic mucosal lesion or neoplasm.  No CT evidence for active gastrointestinal bleeding.    Other findings as discussed above.    --- End of Report ---            JENSEN MUSTAFA MD; Attending Radiologist  This document has been electronically signed. 2023 11:54AM    < end of copied text >    < from: EGD-Colonoscopy (12.29.22 @ 10:53) >    EGD Impressions:    Normal mucosa in the whole examined duodenum. (Biopsy).    Normal mucosa in the antrum, stomach body and whole stomach. (Biopsy).    Normal mucosa in the cardia and fundus.    Normal mucosa in the whole esophagus.    Colonoscopy Impressions:    Normal mucosa in the whole colon.    Severe diverticulosis of the sigmoid colon.    Grade/Stage III internal hemorrhoids.    < end of copied text >       Patient is a 83y old  Female who presents with a chief complaint of generalized weakness     HPI: 82 y/o F with PMHx HTN, HLD, CAD s/p stent, COPD (2L home O2), pAF presents to ED c/o worsening generalized weakness over the past few days. She notes that her stool has been "black" in color for past 3-4 days but she attributed this to her iron pills but she does not usually have dark BMs. She has been having ~1 soft BM per day. She was on Eliquis until mid last week that was switched over to Xarelto. She notes the weakness started shortly after starting Xarelto. She does not use NSAIDs other than daily aspirin. She is unsure if she is on a PPI. Upon arrival to the ED she was hypotensive to ~70/30, she was given 2 u pRBC with improvement in blood pressure. She currently is receiving her 3rd unit of pRBC. ICU is consulted. CT A/P was performed that showed 1.7cm intraluminal fatty lesion in stomach antrum likely reflecting lipoma, colonic diverticulosis, possible short segment of luminal narrowing near the splenic flexure, no CT evidence for active GIB. Labs in the ED remarakble for Hgb 4.6, BUN 74.9, INR 2.04, Alk phos 231. Of note she was admitted to hospital in 2022 with rectal bleeding requiring transfusion. She had an EGD that was unremarkable and a colonoscopy that showed severe diverticulosis of sigmoid colon and stage III internal hemorrhoids.         PAST MEDICAL & SURGICAL HISTORY:  HTN (hypertension)      HLD (hyperlipidemia)      CAD (coronary artery disease)      COPD (chronic obstructive pulmonary disease)      S/P  section          Allergies    No Known Allergies    Intolerances        MEDICATIONS  (STANDING):  furosemide   Injectable 20 milliGRAM(s) IV Push Once  pantoprazole Infusion 8 mG/Hr (10 mL/Hr) IV Continuous <Continuous>    MEDICATIONS  (PRN):      Social History:    Marital Status:  (   )    (   ) Single    (   )    (  )   Occupation:   Lives with: (  ) alone  (  ) children   (  ) spouse   (  ) parents  (  ) other    Substance Use (street drugs): (  ) never used  (  ) other:  Tobacco Usage:  (   ) never smoked   ( x  ) former smoker   (   ) current smoker  (     ) pack year  (        ) last cigarette date  Alcohol Usage: denies   Sexual History:     Family History   IBD (  ) Yes   (  ) No  GI Malignancy (  )  Yes    (  ) No    Health Management     Last Colonoscopy -      (     ) Advanced Directives: (     ) None    (      ) DNR    (     ) DNI    (     ) Health Care Proxy:       REVIEW OF SYSTEMS:   General: Negative  HEENT: Negative  CV: Negative  Respiratory: Negative  GI: See HPI  : Negative  MSK: Negative  Hematologic: Negative  Skin: Negative      Vital Signs Last 24 Hrs  T(C): 36.6 (2023 12:15), Max: 36.6 (2023 12:15)  T(F): 97.9 (2023 12:15), Max: 97.9 (2023 12:15)  HR: 58 (2023 12:40) (58 - 87)  BP: 92/41 (2023 12:40) (72/31 - 95/44)  BP(mean): --  RR: 16 (2023 12:15) (16 - 19)  SpO2: 100% (2023 12:15) (99% - 100%)    Parameters below as of 2023 12:15  Patient On (Oxygen Delivery Method): nasal cannula  O2 Flow (L/min): 2      PHYSICAL EXAM:   GENERAL:  No acute distress, pale   HEENT:  NC/AT, conjunctiva clear, sclera anicteric  CHEST:  No increased effort  HEART:  Regular rate  ABDOMEN:  Soft, non-tender, non-distended, normoactive bowel sounds, no rebound or guarding  EXTREMITIES: No edema  SKIN:  Warm, dry  NEURO:  Calm, cooperative        LABS:                        4.6    12.58 )-----------( 347      ( 2023 09:50 )             14.5     -    139  |  102  |  74.9<H>  ----------------------------<  146<H>  4.0   |  19.0<L>  |  1.78<H>    Ca    8.7      2023 09:50    TPro  5.4<L>  /  Alb  3.1<L>  /  TBili  0.4  /  DBili  x   /  AST  14  /  ALT  14  /  AlkPhos  231<H>  07-26    PT/INR - ( 2023 09:50 )   PT: 22.2 sec;   INR: 2.04 ratio         PTT - ( 2023 09:50 )  PTT:46.0 sec  LIVER FUNCTIONS - ( 2023 09:50 )  Alb: 3.1 g/dL / Pro: 5.4 g/dL / ALK PHOS: 231 U/L / ALT: 14 U/L / AST: 14 U/L / GGT: x             RADIOLOGY & ADDITIONAL TESTS:    < from: CT Abdomen and Pelvis w/ IV Cont (23 @ 11:28) >    ACC: [id number] EXAM:  CT ABDOMEN AND PELVIS IC   ORDERED BY: JAQUI BUSCH     PROCEDURE DATE:  2023          INTERPRETATION:  CLINICAL INFORMATION: Melena. Hypotension.    COMPARISON: None.    CONTRAST/COMPLICATIONS:  IV Contrast: Omnipaque 350  90cc administered   10 cc discarded  Oral Contrast: NONE  Complications: None reported at time of study completion    PROCEDURE:  CT of the Abdomen and Pelvis was performed.  Precontrast, Arterial and Delayed phases were performed.  Sagittal and coronal reformats were performed.    FINDINGS:    LOWER CHEST: Cardiomegaly. Coronary artery calcification.    LIVER: Within normal limits.  BILE DUCTS: Normal caliber.  GALLBLADDER: Cholelithiasis.  SPLEEN: Within normal limits.  PANCREAS: Within normal limits.  ADRENALS: Within normal limits.  KIDNEYS/URETERS: Focal scarring upper pole left kidney.  Small hypodense lesions right kidney too small for characterization.  No hydronephrosis.    BLADDER: Within normal limits.  REPRODUCTIVE ORGANS: 1.7 cm left adnexal cyst.    BOWEL:   No bowel obstruction.  1.7 cm intraluminal fatty lesion antrum of stomach, likely reflecting   lipoma.  Colonic diverticulosis, without CT evidence of diverticulitis.  There is a possible short segment of intraluminal narrowing at the   splenic flexure.  There is no intraluminal extravasation of contrast to suggest active   gastrointestinal bleeding.  Appendix is not visualized.  PERITONEUM: No ascites.    VESSELS:  Atherosclerotic changes.  RETROPERITONEUM/LYMPH NODES: No lymphadenopathy.    ABDOMINAL WALL: Within normal limits.    BONES: Degenerative changes.    IMPRESSION:    Colonic diverticulosis.  Possible short segment of luminal narrowing near the splenic flexure.    Recommend further evaluation with colonoscopy to exclude underlying   intrinsic mucosal lesion or neoplasm.  No CT evidence for active gastrointestinal bleeding.    Other findings as discussed above.    --- End of Report ---            JENSEN MUSTAFA MD; Attending Radiologist  This document has been electronically signed. 2023 11:54AM    < end of copied text >    < from: EGD-Colonoscopy (22 @ 10:53) >    EGD Impressions:    Normal mucosa in the whole examined duodenum. (Biopsy).    Normal mucosa in the antrum, stomach body and whole stomach. (Biopsy).    Normal mucosa in the cardia and fundus.    Normal mucosa in the whole esophagus.    Colonoscopy Impressions:    Normal mucosa in the whole colon.    Severe diverticulosis of the sigmoid colon.    Grade/Stage III internal hemorrhoids.    < end of copied text >

## 2023-07-26 NOTE — ED PROVIDER NOTE - NS ED ROS FT
Review of Systems  •	CONSTITUTIONAL - no  fever, no diaphoresis, no weight change  •	SKIN - no rash  •	HEMATOLOGIC - no bleeding, no bruising  •	EYES - no eye pain, no blurred vision  •	ENT - no change in hearing, no pain  •	RESPIRATORY - no shortness of breath, no cough  •	CARDIAC - no chest pain, no palpitations  •	GI - no abd pain, no nausea, no vomiting, no diarrhea, no constipation, no bleeding, + dark stool  •	GENITO-URINARY - no discharge, no dysuria; no hematuria,   •	ENDO - no polydipsia, no polyuria, no heat/no cold intolerance  •	MUSCULOSKELETAL - no joint pain, no swelling, no redness  •	NEUROLOGIC - no weakness, no headache, no anesthesia, no paresthesias  •	PSYCH - no anxiety, non suicidal, non homicidal, no hallucination, no depression

## 2023-07-26 NOTE — H&P ADULT - ATTENDING COMMENTS
84 y/o F, BMI 30, COPD on home 2 ( saw Dr. Dolan outpt, on stiolto), CHF, CAD s/p stents, pAF on eliquis and recently changed to xarelto due to cost, hx of diverticulosis / hemorrhoid, present to ED for melena for 4 days.  Patient reported some dizziness, but overall denies acute complaint.  In the ED, patien twas hypotensive despite resuscitation, started on levo.  S/P 3U pRBC.  Cardiology called to optimize the patient,   Given significant shock state, will empirically treat for possible sepsis.  No 30 cc/kg given due to heart failure history.  Currently started on antibiotic, sent cultures,   Currently on vasopressor, actively managing, patient at high risk for deterioration.

## 2023-07-26 NOTE — ED PROVIDER NOTE - CLINICAL SUMMARY MEDICAL DECISION MAKING FREE TEXT BOX
82 y/o female with PMHx of COPD on 2L home O2, CAD s/p stents, HTN, HLD, pAF on eliquis presents to the ED c/o generalized weakness over the past few days, rectal exam with melena, will give 1 unit of emergency release PRBC secondary to hypotension, give Protonix 80 and start Protonix drip, give 1L NS, obtain CT after BP stabilizes, will require admission

## 2023-07-26 NOTE — CONSULT NOTE ADULT - SUBJECTIVE AND OBJECTIVE BOX
CHIEF COMPLAINT:  weakness/dark black stools    HPI: Patient is a  83y Female with h/o PAF, CAD s/p PCI to RCA/LAD/D1, occluded LCX, chronic  HFpEF, COPD on home O2, HLD here with weakness and dark stools.      PAST MEDICAL & SURGICAL HISTORY:  HTN (hypertension)      HLD (hyperlipidemia)      CAD (coronary artery disease)      COPD (chronic obstructive pulmonary disease)      S/P  section          MEDICATIONS:  MEDICATIONS  (STANDING):  pantoprazole Infusion 8 mG/Hr (10 mL/Hr) IV Continuous <Continuous>  prothrombin complex concentrate IVPB (KCENTRA) 4000 International Unit(s) IV Intermittent Once    MEDICATIONS  (PRN):    pantoprazole Infusion 8 mG/Hr IV Continuous <Continuous>  prothrombin complex concentrate IVPB (KCENTRA) 4000 International Unit(s) IV Intermittent Once      FAMILY HISTORY:  FAMILY HISTORY:  FHx: heart disease (Sibling)        SOCIAL HISTORY: no EtOH, drugs or tobacco    ROS: All others negative    PHYSCIAL EXAM:  Vital Signs Last 24 Hrs  T(C): 36.4 (2023 13:20), Max: 36.6 (2023 12:15)  T(F): 97.5 (2023 13:20), Max: 97.9 (2023 12:15)  HR: 57 (2023 13:29) (57 - 87)  BP: 91/53 (2023 13:29) (72/31 - 100/48)  BP(mean): --  RR: 16 (2023 13:20) (16 - 19)  SpO2: 100% (2023 13:20) (99% - 100%)    Parameters below as of 2023 13:20  Patient On (Oxygen Delivery Method): nasal cannula  O2 Flow (L/min): 2    I&O's Summary    GEN: NAD  HEENT: MMM, sclera anicteric  RESP: CTA bilaterally  CVS: S1S2, RRR, no JVD, no M/R/G  GI: Soft, NT, ND, BS+  EXT: no C/C/E  NEURO: AAOX3  PSYCH: Normal affect    ECG:    LABS:                        4.6    12.58 )-----------( 347      ( 2023 09:50 )             14.5     07-    139  |  102  |  74.9<H>  ----------------------------<  146<H>  4.0   |  19.0<L>  |  1.78<H>    Ca    8.7      2023 09:50    TPro  5.4<L>  /  Alb  3.1<L>  /  TBili  0.4  /  DBili  x   /  AST  14  /  ALT  14  /  AlkPhos  231<H>  07-    CARDIAC MARKERS ( 2023 09:50 )  x     / <0.01 ng/mL / x     / x     / x          PT/INR - ( 2023 09:50 )   PT: 22.2 sec;   INR: 2.04 ratio         PTT - ( 2023 09:50 )  PTT:46.0 sec    Nuclear stress test 20; large fixed defect involving the lateral wall of severe intensity consistent with antecedent infarction, no significant residual ischemia, EF 70%  Echocardiogram 8/10/20; ejection fraction 50%, moderate mitral regurgitation, moderate tricuspid regurgitation.  Carotid ultrasound 8/10/20; 50-65% bilateral carotid stenosis.  Holter monitor 20; sinus rhythm with no additional significant arrhythmia.    CT Abdomen/pelvis:  IMPRESSION:  1. Colonic diverticulosis.  2. Possible short segment of luminal narrowing near the splenic flexure.  3. Recommend further evaluation with colonoscopy to exclude underlying intrinsic mucosal lesion or neoplasm.  4. No CT evidence for active gastrointestinal bleeding.    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   2. GI evaluation   3. Will need to hold antiplatelets and A/C until bleed stabilized then readdress best agent  4. Hold diuretic, will need to monitor for signs volume overload  5. CV stable at moderate risk for EGD/colonoscopy if deemed necessary    Ronen Álvarez MD CHIEF COMPLAINT:  weakness/dark black stools    HPI: Patient is a  83y Female with h/o PAF, CAD s/p PCI to RCA/LAD/D1, occluded LCX, chronic  HFpEF, COPD on home O2, HLD here with weakness and dark stools.  HAs been feeling weak for past month or so. States was no Xarelto more recently instead of eliquis. No BRBPR. No exertional CP and no pnd/orthopnea/palps/syncope/edema. INtermittent mild SOB that occurs when she has back pain. Otherwise feeling fairly well.     PAST MEDICAL & SURGICAL HISTORY:  HTN (hypertension)      HLD (hyperlipidemia)      CAD (coronary artery disease)      COPD (chronic obstructive pulmonary disease)      S/P  section          MEDICATIONS:  MEDICATIONS  (STANDING):  pantoprazole Infusion 8 mG/Hr (10 mL/Hr) IV Continuous <Continuous>  prothrombin complex concentrate IVPB (KCENTRA) 4000 International Unit(s) IV Intermittent Once    MEDICATIONS  (PRN):    pantoprazole Infusion 8 mG/Hr IV Continuous <Continuous>  prothrombin complex concentrate IVPB (KCENTRA) 4000 International Unit(s) IV Intermittent Once      FAMILY HISTORY:  FAMILY HISTORY:  FHx: heart disease (Sibling)        SOCIAL HISTORY: no EtOH, drugs or tobacco    ROS: All others negative    PHYSCIAL EXAM:  Vital Signs Last 24 Hrs  T(C): 36.4 (2023 13:20), Max: 36.6 (2023 12:15)  T(F): 97.5 (2023 13:20), Max: 97.9 (2023 12:15)  HR: 57 (2023 13:29) (57 - 87)  BP: 91/53 (2023 13:29) (72/31 - 100/48)  BP(mean): --  RR: 16 (2023 13:20) (16 - 19)  SpO2: 100% (2023 13:20) (99% - 100%)    Parameters below as of 2023 13:20  Patient On (Oxygen Delivery Method): nasal cannula  O2 Flow (L/min): 2    I&O's Summary    GEN: NAD  HEENT: MMM, sclera anicteric  RESP: CTA bilaterally  CVS: S1S2, RRR, no JVD, II/VI systolic murmur  GI: Soft, NT, ND,   EXT: no C/C/E  NEURO: AAOX3  PSYCH: Normal affect        LABS:                        4.6    12.58 )-----------( 347      ( 2023 09:50 )             14.5         139  |  102  |  74.9<H>  ----------------------------<  146<H>  4.0   |  19.0<L>  |  1.78<H>    Ca    8.7      2023 09:50    TPro  5.4<L>  /  Alb  3.1<L>  /  TBili  0.4  /  DBili  x   /  AST  14  /  ALT  14  /  AlkPhos  231<H>      CARDIAC MARKERS ( 2023 09:50 )  x     / <0.01 ng/mL / x     / x     / x          PT/INR - ( 2023 09:50 )   PT: 22.2 sec;   INR: 2.04 ratio         PTT - ( 2023 09:50 )  PTT:46.0 sec    Nuclear stress test 20; large fixed defect involving the lateral wall of severe intensity consistent with antecedent infarction, no significant residual ischemia, EF 70%  Echocardiogram 8/10/20; ejection fraction 50%, moderate mitral regurgitation, moderate tricuspid regurgitation.  Carotid ultrasound 8/10/20; 50-65% bilateral carotid stenosis.  Holter monitor 20; sinus rhythm with no additional significant arrhythmia.    CT Abdomen/pelvis:  IMPRESSION:  1. Colonic diverticulosis.  2. Possible short segment of luminal narrowing near the splenic flexure.  3. Recommend further evaluation with colonoscopy to exclude underlying intrinsic mucosal lesion or neoplasm.  4. No CT evidence for active gastrointestinal bleeding.    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 PAtient to be admitted to ICU, transfusion in progress. Will need to hold antihypertensives until stabilizes then reinstitute. Continue statin   2. GI evaluation   3. Will need to hold antiplatelets and A/C until bleed stabilized then readdress best agent  4. Hold diuretic, will need to monitor for signs volume overload as may need dose post transfusion   5. CV stable at moderate risk for EGD/colonoscopy   6. Will follow, thank you!       Ronen Álvarez MD

## 2023-07-26 NOTE — PATIENT PROFILE ADULT - FALL HARM RISK - HARM RISK INTERVENTIONS

## 2023-07-26 NOTE — ED PROVIDER NOTE - PROGRESS NOTE DETAILS
patient has creatine 1.7. given GI bleed. medically necessary to for IV contrast. patient on 2nd U PRBC, still hypotensive. GI and MICU consulted. patient finished 3rd unit of blood, still hypotensive. MICU will admit, recommend repeat cbc and start on levophed. kecentra ordered.

## 2023-07-26 NOTE — ED PROVIDER NOTE - CRITICAL CARE ATTENDING CONTRIBUTION TO CARE
Upon my evaluation, this patient had a high probability of imminent or life-threatening deterioration due to hypotensive GI bleed  , which required my direct attention, intervention, and personal management.    I have personally provided _60_ minutes of critical care time exculsive of time spent on separately billable procedures.  Time includes review of laboratory data, radiology results, discussion with consultants, and monitoring for potential decompensation.  Interventions were performed as documented.

## 2023-07-26 NOTE — ED PROVIDER NOTE - OBJECTIVE STATEMENT
84 y/o female with PMHx of COPD on 2L home O2, CAD s/p stents, HTN, HLD, pAF on eliquis presents to the ED c/o generalized weakness over the past few days. Pt states dose of eliquis was recently changed and weakness started shortly after. Pt notes dark stools but is also on iron pills. Pt denies gross blood. Pt denies chest pain, denies SOB. Pt noted to by hypotensive on arrival, evaluated in critical care.

## 2023-07-26 NOTE — ED ADULT NURSE NOTE - OBJECTIVE STATEMENT
Pt BIBA to ED c/o of weakness x3 days. Pt a&ox3 states the weakness started Sunday but has gotten progressively worse since. Pt on arrival hypotensive. brought to CC area for further eval. pt placed on MD ELKIN at bedside evaluating pt. Pt admits to being on 2L of O2 at home and blood thinner. Pt reports her dose of Eliquis was recently changed and believes this is all related, also states she has noticed her stool is more darker than usual but takes iron pills so was not concerned. denies cp, sob, abdominal pain. 1U PRB administered urgently.

## 2023-07-26 NOTE — ED ADULT NURSE NOTE - SUICIDE SCREENING QUESTION 1
Last office visit 02/17/17. Current med list: Norethindrone-ethinyl estradiol (LOESTRIN 1/20, 21,) 1-20 MG-MCG per tablet.    No

## 2023-07-26 NOTE — H&P ADULT - ASSESSMENT
82 y/o female with PMHx of COPD on 2L home O2, CHF, CAD s/p stents, HTN, HLD, pAF previously on eliquis now xarelto (switched 1 week ago 2/2 cost), diverticulosis, and grade/stage 3 hemorrhoids who presented to ED with 4 days of melena. Likely hemorrhagic shock,    #Hemorrhagic  84 y/o female with PMHx of COPD on 2L home O2, CHF, CAD s/p stents, HTN, HLD, pAF previously on eliquis now xarelto (switched 1 week ago 2/2 cost), diverticulosis, and grade/stage 3 hemorrhoids who presented to ED with 4 days of melena. Found to have ABLA likely 2/2 UGIB    #Hemorrhagic shock   #Upper GI bleed  #Acute blood loss anemia  -admit to MICU    -levophed for BP support   -s/p 1L IVF and 3U PRBC, repeat CBC pending  -maintain hgb >8 in patient with cardiac disease   -c/w protonix gtt  -avoid NSAIDs   -NPO at midnight in anticipation of endoscopy in AM, GI following   -holding aspirin and plavix for now     #Atrial fibrillation on xarelto   #CAD  #HTN  -cardiology following, hold A/C until bleed stabilized     #DANETTE  -unclear baseline   -possible DANETTE 2/2 hypovolemia with severe anemia   -strict I&O, avoid nephrotoxic medications     #Hyperlipidemia  -c/w statin, monitor LFTs    DVT ppx: holding given active GIB  GI ppx: protonix gtt  Dispo: admit to MICU      84 y/o female with PMHx of COPD on 2L home O2, CHF, CAD s/p stents, HTN, HLD, pAF previously on eliquis now xarelto (switched 1 week ago 2/2 cost), diverticulosis, and grade/stage 3 hemorrhoids who presented to ED with 4 days of melena. Found to have ABLA likely 2/2 UGIB    #Hemorrhagic shock   #Upper GI bleed  #Acute blood loss anemia  -admit to MICU    -levophed for BP support   -s/p 1L IVF and 3U PRBC, repeat CBC pending  -rpt labs on ICU arrival including BCx, UCx  -maintain hgb >8 in patient with cardiac disease   -c/w protonix gtt  -start empiric abx (Vanc/Zosyn ordered)   -avoid NSAIDs   -NPO at midnight in anticipation of endoscopy in AM, GI following   -holding aspirin and plavix for now     #Atrial fibrillation on xarelto   #CAD  #HTN  -s/p K centra for xarelto reversal   -cardiology following, hold A/C until bleed stabilized   -TTE ordered     #DANETTE  -unclear baseline   -possible DANETTE 2/2 hypovolemia with severe anemia   -strict I&O, avoid nephrotoxic medications     #Hyperlipidemia  -c/w statin, monitor LFTs    DVT ppx: holding given active GIB  GI ppx: protonix gtt  Dispo: admit to MICU

## 2023-07-26 NOTE — H&P ADULT - CRITICAL CARE ATTENDING COMMENT
greater than 50% of time spent reviewing labs, notes, orders and radiographs, coordinating care  discussed with nursing, MICU resident, consultants.

## 2023-07-26 NOTE — H&P ADULT - NSICDXPASTMEDICALHX_GEN_ALL_CORE_FT
PAST MEDICAL HISTORY:  Acute CHF     CAD (coronary artery disease)     COPD (chronic obstructive pulmonary disease)     HLD (hyperlipidemia)     HTN (hypertension)      good balance

## 2023-07-27 ENCOUNTER — RESULT REVIEW (OUTPATIENT)
Age: 83
End: 2023-07-27

## 2023-07-27 ENCOUNTER — TRANSCRIPTION ENCOUNTER (OUTPATIENT)
Age: 83
End: 2023-07-27

## 2023-07-27 LAB
ALBUMIN SERPL ELPH-MCNC: 2.6 G/DL — LOW (ref 3.3–5.2)
ALP SERPL-CCNC: 195 U/L — HIGH (ref 40–120)
ALT FLD-CCNC: 13 U/L — SIGNIFICANT CHANGE UP
ANION GAP SERPL CALC-SCNC: 15 MMOL/L — SIGNIFICANT CHANGE UP (ref 5–17)
AST SERPL-CCNC: 23 U/L — SIGNIFICANT CHANGE UP
BILIRUB SERPL-MCNC: 0.7 MG/DL — SIGNIFICANT CHANGE UP (ref 0.4–2)
BUN SERPL-MCNC: 68.3 MG/DL — HIGH (ref 8–20)
CALCIUM SERPL-MCNC: 7.4 MG/DL — LOW (ref 8.4–10.5)
CHLORIDE SERPL-SCNC: 107 MMOL/L — SIGNIFICANT CHANGE UP (ref 96–108)
CO2 SERPL-SCNC: 17 MMOL/L — LOW (ref 22–29)
CREAT SERPL-MCNC: 1.62 MG/DL — HIGH (ref 0.5–1.3)
EGFR: 31 ML/MIN/1.73M2 — LOW
GLUCOSE SERPL-MCNC: 98 MG/DL — SIGNIFICANT CHANGE UP (ref 70–99)
HCT VFR BLD CALC: 22.2 % — LOW (ref 34.5–45)
HCT VFR BLD CALC: 26.5 % — LOW (ref 34.5–45)
HGB BLD-MCNC: 7.4 G/DL — LOW (ref 11.5–15.5)
HGB BLD-MCNC: 8.8 G/DL — LOW (ref 11.5–15.5)
INR BLD: 1.23 RATIO — HIGH (ref 0.85–1.18)
MCHC RBC-ENTMCNC: 31.2 PG — SIGNIFICANT CHANGE UP (ref 27–34)
MCHC RBC-ENTMCNC: 31.4 PG — SIGNIFICANT CHANGE UP (ref 27–34)
MCHC RBC-ENTMCNC: 33.2 GM/DL — SIGNIFICANT CHANGE UP (ref 32–36)
MCHC RBC-ENTMCNC: 33.3 GM/DL — SIGNIFICANT CHANGE UP (ref 32–36)
MCV RBC AUTO: 94 FL — SIGNIFICANT CHANGE UP (ref 80–100)
MCV RBC AUTO: 94.1 FL — SIGNIFICANT CHANGE UP (ref 80–100)
NRBC # BLD: 2 /100 WBCS — HIGH (ref 0–0)
PLATELET # BLD AUTO: 231 K/UL — SIGNIFICANT CHANGE UP (ref 150–400)
PLATELET # BLD AUTO: 279 K/UL — SIGNIFICANT CHANGE UP (ref 150–400)
POTASSIUM SERPL-MCNC: 3.4 MMOL/L — LOW (ref 3.5–5.3)
POTASSIUM SERPL-SCNC: 3.4 MMOL/L — LOW (ref 3.5–5.3)
PROT SERPL-MCNC: 4.8 G/DL — LOW (ref 6.6–8.7)
PROTHROM AB SERPL-ACNC: 13.6 SEC — HIGH (ref 9.5–13)
RBC # BLD: 2.36 M/UL — LOW (ref 3.8–5.2)
RBC # BLD: 2.82 M/UL — LOW (ref 3.8–5.2)
RBC # FLD: 18.6 % — HIGH (ref 10.3–14.5)
RBC # FLD: 19.5 % — HIGH (ref 10.3–14.5)
SODIUM SERPL-SCNC: 139 MMOL/L — SIGNIFICANT CHANGE UP (ref 135–145)
VANCOMYCIN TROUGH SERPL-MCNC: <4 UG/ML — LOW (ref 10–20)
WBC # BLD: 13.57 K/UL — HIGH (ref 3.8–10.5)
WBC # BLD: 9.85 K/UL — SIGNIFICANT CHANGE UP (ref 3.8–10.5)
WBC # FLD AUTO: 13.57 K/UL — HIGH (ref 3.8–10.5)
WBC # FLD AUTO: 9.85 K/UL — SIGNIFICANT CHANGE UP (ref 3.8–10.5)

## 2023-07-27 PROCEDURE — 99291 CRITICAL CARE FIRST HOUR: CPT | Mod: GC

## 2023-07-27 PROCEDURE — 88305 TISSUE EXAM BY PATHOLOGIST: CPT | Mod: 26

## 2023-07-27 PROCEDURE — 43239 EGD BIOPSY SINGLE/MULTIPLE: CPT

## 2023-07-27 PROCEDURE — 88342 IMHCHEM/IMCYTCHM 1ST ANTB: CPT | Mod: 26

## 2023-07-27 RX ORDER — VANCOMYCIN HCL 1 G
750 VIAL (EA) INTRAVENOUS EVERY 24 HOURS
Refills: 0 | Status: DISCONTINUED | OUTPATIENT
Start: 2023-07-28 | End: 2023-07-29

## 2023-07-27 RX ORDER — SOD SULF/SODIUM/NAHCO3/KCL/PEG
4000 SOLUTION, RECONSTITUTED, ORAL ORAL ONCE
Refills: 0 | Status: COMPLETED | OUTPATIENT
Start: 2023-07-27 | End: 2023-07-27

## 2023-07-27 RX ORDER — POTASSIUM CHLORIDE 20 MEQ
20 PACKET (EA) ORAL
Refills: 0 | Status: COMPLETED | OUTPATIENT
Start: 2023-07-27 | End: 2023-07-27

## 2023-07-27 RX ORDER — DIPHENHYDRAMINE HCL 50 MG
50 CAPSULE ORAL ONCE
Refills: 0 | Status: COMPLETED | OUTPATIENT
Start: 2023-07-27 | End: 2023-07-27

## 2023-07-27 RX ORDER — LANOLIN ALCOHOL/MO/W.PET/CERES
5 CREAM (GRAM) TOPICAL AT BEDTIME
Refills: 0 | Status: DISCONTINUED | OUTPATIENT
Start: 2023-07-27 | End: 2023-08-23

## 2023-07-27 RX ORDER — VANCOMYCIN HCL 1 G
500 VIAL (EA) INTRAVENOUS ONCE
Refills: 0 | Status: COMPLETED | OUTPATIENT
Start: 2023-07-27 | End: 2023-07-27

## 2023-07-27 RX ADMIN — TIOTROPIUM BROMIDE AND OLODATEROL 2 PUFF(S): 3.124; 2.736 SPRAY, METERED RESPIRATORY (INHALATION) at 15:55

## 2023-07-27 RX ADMIN — Medication 4000 MILLILITER(S): at 18:20

## 2023-07-27 RX ADMIN — PIPERACILLIN AND TAZOBACTAM 25 GRAM(S): 4; .5 INJECTION, POWDER, LYOPHILIZED, FOR SOLUTION INTRAVENOUS at 01:43

## 2023-07-27 RX ADMIN — Medication 7.23 MICROGRAM(S)/KG/MIN: at 23:47

## 2023-07-27 RX ADMIN — PIPERACILLIN AND TAZOBACTAM 25 GRAM(S): 4; .5 INJECTION, POWDER, LYOPHILIZED, FOR SOLUTION INTRAVENOUS at 11:33

## 2023-07-27 RX ADMIN — Medication 0.5 MILLIGRAM(S): at 02:11

## 2023-07-27 RX ADMIN — Medication 50 MILLIEQUIVALENT(S): at 20:26

## 2023-07-27 RX ADMIN — ATORVASTATIN CALCIUM 80 MILLIGRAM(S): 80 TABLET, FILM COATED ORAL at 22:43

## 2023-07-27 RX ADMIN — Medication 50 MILLIEQUIVALENT(S): at 15:45

## 2023-07-27 RX ADMIN — PIPERACILLIN AND TAZOBACTAM 25 GRAM(S): 4; .5 INJECTION, POWDER, LYOPHILIZED, FOR SOLUTION INTRAVENOUS at 22:44

## 2023-07-27 RX ADMIN — Medication 100 MILLIGRAM(S): at 22:46

## 2023-07-27 RX ADMIN — Medication 50 MILLIEQUIVALENT(S): at 18:23

## 2023-07-27 RX ADMIN — Medication 7.23 MICROGRAM(S)/KG/MIN: at 04:49

## 2023-07-27 RX ADMIN — Medication 5 MILLIGRAM(S): at 22:43

## 2023-07-27 RX ADMIN — Medication 50 MILLIGRAM(S): at 00:27

## 2023-07-27 RX ADMIN — PIPERACILLIN AND TAZOBACTAM 25 GRAM(S): 4; .5 INJECTION, POWDER, LYOPHILIZED, FOR SOLUTION INTRAVENOUS at 15:44

## 2023-07-27 RX ADMIN — Medication 100 MILLIGRAM(S): at 18:44

## 2023-07-27 RX ADMIN — PANTOPRAZOLE SODIUM 10 MG/HR: 20 TABLET, DELAYED RELEASE ORAL at 05:56

## 2023-07-27 NOTE — PROGRESS NOTE ADULT - SUBJECTIVE AND OBJECTIVE BOX
Patient is a 83y old  Female who presents with a chief complaint of     HOSPITAL COURSE: 84 y/o F with a h/o COPD on 2L home O2, CHFpEF, CAD s/p PCI, HTN, HLD, pAF (on rivaroxaban), diverticulosis, hemorrhoids, admitted on  with c/o generalized weakness and dark stools over the past few days. Hypotensive on ED arrival and hgb found to be 4.6 with +occult/melanotic stool. CT showed diverticulosis, no active bleed visualized. GI consulted and patient made NPO for endoscopy on . Transfused 3U PRBC and 1L IVF but persistently hypotensive. Started on IV vasopressor therapy.    Last 24 hours: She remains dependent on IV vasopressor. Recurrent melena, however H/H holding stable. Plan for EGD this morning. She is complaining of difficulty sleeping and pruritis.          PAST MEDICAL & SURGICAL HISTORY:  HTN (hypertension)      HLD (hyperlipidemia)      CAD (coronary artery disease)      COPD (chronic obstructive pulmonary disease)      Acute CHF      S/P  section          Review of Systems:  CONSTITUTIONAL: No fever, chills, or fatigue  EYES: No eye pain, visual disturbances, or discharge  ENMT:  No difficulty hearing, tinnitus, vertigo; No sinus or throat pain  NECK: No pain or stiffness  RESPIRATORY: No cough, wheezing, chills or hemoptysis; No shortness of breath  CARDIOVASCULAR: No chest pain, palpitations, dizziness, or leg swelling  GASTROINTESTINAL: No abdominal or epigastric pain. No nausea, vomiting, or hematemesis; No diarrhea or constipation. (+) melena   GENITOURINARY: No dysuria, frequency, hematuria, or incontinence  NEUROLOGICAL: No headaches, memory loss, loss of strength, numbness, or tremors  SKIN: (+) itching, no burning, rashes, or lesions   MUSCULOSKELETAL: No joint pain or swelling; No muscle, back, or extremity pain  PSYCHIATRIC: No depression, anxiety, mood swings, (+)difficulty sleeping      Medications:  piperacillin/tazobactam IVPB.- 3.375 Gram(s) IV Intermittent once  piperacillin/tazobactam IVPB.. 3.375 Gram(s) IV Intermittent every 8 hours  vancomycin  IVPB 500 milliGRAM(s) IV Intermittent every 24 hours  norepinephrine Infusion 0.05 MICROgram(s)/kG/Min IV Continuous <Continuous>  tiotropium 2.5 MICROgram(s)/olodaterol 2.5 MICROgram(s) (STIOLTO) Inhaler 2 Puff(s) Inhalation daily  LORazepam   Injectable 0.5 milliGRAM(s) IV Push onc  pantoprazole Infusion 8 mG/Hr IV Continuous <Continuous>  atorvastatin 80 milliGRAM(s) Oral at bedtime  multivitamin 1 Tablet(s) Oral daily                ICU Vital Signs Last 24 Hrs  T(C): 36.9 (2023 23:56), Max: 36.9 (2023 20:00)  T(F): 98.4 (2023 23:56), Max: 98.5 (2023 20:00)  HR: 64 (2023 01:15) (56 - 87)  BP: 113/53 (2023 01:15) (67/54 - 140/93)  BP(mean): 69 (2023 01:15) (48 - 108)  ABP: --  ABP(mean): --  RR: 25 (2023 01:15) (12 - 26)  SpO2: 100% (2023 01:15) (91% - 100%)    O2 Parameters below as of 2023 00:00  Patient On (Oxygen Delivery Method): nasal cannula  O2 Flow (L/min): 2              I&O's Detail    2023 07:01  -  2023 02:06  --------------------------------------------------------  IN:    IV PiggyBack: 100 mL    IV PiggyBack: 100 mL    Norepinephrine: 138.8 mL    Pantoprazole: 80 mL  Total IN: 418.8 mL    OUT:    Voided (mL): 100 mL  Total OUT: 100 mL    Total NET: 318.8 mL            LABS:                        7.8    15.27 )-----------( 312      ( 2023 23:00 )             23.3     07-26    138  |  103  |  74.2<H>  ----------------------------<  110<H>  3.7   |  20.0<L>  |  1.81<H>    Ca    7.9<L>      2023 17:18  Phos  4.4       Mg     2.2         TPro  5.0<L>  /  Alb  3.0<L>  /  TBili  0.7  /  DBili  x   /  AST  13  /  ALT  13  /  AlkPhos  211<H>        CARDIAC MARKERS ( 2023 09:50 )  x     / <0.01 ng/mL / x     / x     / x          CAPILLARY BLOOD GLUCOSE        PT/INR - ( 2023 17:18 )   PT: 13.6 sec;   INR: 1.23 ratio         PTT - ( 2023 17:18 )  PTT:37.7 sec  Urinalysis Basic - ( 2023 17:18 )    Color: Yellow / Appearance: Clear / S.010 / pH: x  Gluc: 110 mg/dL / Ketone: Negative  / Bili: Negative / Urobili: Negative mg/dL   Blood: x / Protein: Negative / Nitrite: Negative   Leuk Esterase: Negative / RBC: Negative /HPF / WBC Negative /HPF   Sq Epi: x / Non Sq Epi: x / Bacteria: Occasional      CULTURES:        Physical Examination:    General: No acute distress.  Alert, oriented, interactive, nonfocal    HEENT: Pupils equal, reactive to light.  Symmetric.    PULM: Clear to auscultation bilaterally, no significant sputum production    CVS: Regular rate and rhythm, no murmurs, rubs, or gallops    ABD: Soft, nondistended, nontender, normoactive bowel sounds, no masses    EXT: No edema, nontender    SKIN: pale    NEURO: A&Ox3, strength 5/5 all extremities, cranial nerves grossly intact, no focal deficits        RADIOLOGY:     < from: CT Abdomen and Pelvis w/ IV Cont (23 @ 11:28) >  FINDINGS:    LOWER CHEST: Cardiomegaly. Coronary artery calcification.    LIVER: Within normal limits.  BILE DUCTS: Normal caliber.  GALLBLADDER: Cholelithiasis.  SPLEEN: Within normal limits.  PANCREAS: Within normal limits.  ADRENALS: Within normal limits.  KIDNEYS/URETERS: Focal scarring upper pole left kidney.  Small hypodense lesions right kidney too small for characterization.  No hydronephrosis.    BLADDER: Within normal limits.  REPRODUCTIVE ORGANS: 1.7 cm left adnexal cyst.    BOWEL:   No bowel obstruction.  1.7 cm intraluminal fatty lesion antrum of stomach, likely reflecting   lipoma.  Colonic diverticulosis, without CT evidence of diverticulitis.  There is a possible short segment of intraluminal narrowing at the   splenic flexure.  There is no intraluminal extravasation of contrast to suggest active   gastrointestinal bleeding.  Appendix is not visualized.  PERITONEUM: No ascites.    VESSELS:  Atherosclerotic changes.  RETROPERITONEUM/LYMPH NODES: No lymphadenopathy.    ABDOMINAL WALL: Within normal limits.    BONES: Degenerative changes.    IMPRESSION:    Colonic diverticulosis.  Possible short segment of luminal narrowing near the splenic flexure.    Recommend further evaluation with colonoscopy to exclude underlying   intrinsic mucosal lesion or neoplasm.  No CT evidence for active gastrointestinal bleeding.          CRITICAL CARE TIME SPENT: 35 mins  Time spent evaluating/treating patient with medical issues that pose a high risk for life threatening deterioration and/or end-organ damage, reviewing data/labs/imaging, discussing case with multidisciplinary team, discussing plan/goals of care with patient/family. Non-inclusive of procedure time.

## 2023-07-27 NOTE — CHART NOTE - NSCHARTNOTEFT_GEN_A_CORE
EGD: LA Grade A distal esophagitis and submucosal gastric nodule and thickened antral fold. Biopsies taken. No source of bleeding found on today's EGD. Will need colonoscopy tomorrow. Bowel prep ordered. EGD findings d/w Dr. Hiram THOMPSON attending.

## 2023-07-27 NOTE — PROGRESS NOTE ADULT - NS PANP COMMENT GEN_ALL_CORE FT
Patient received EGD today, found to have distal esophagitis and submucosal gastric nodule, no obvious source of bleeding.  Decision was made to do colonoscopy tomorrow.  Patient s/p 1 unit of PRBC today, with hgb improved from 7.4 to 8.8.  Pressor requirement is gradually dereasing.  Blood culture NGTD, UA negative.  Likely can d/c antibiotic.

## 2023-07-27 NOTE — PROGRESS NOTE ADULT - ASSESSMENT
82 y/o F with a h/o COPD on 2L home O2, CHFpEF, CAD s/p PCI, HTN, HLD, pAF (on rivaroxaban), diverticulosis, hemorrhoids, with:    # Hemorrhagic shock  # GIB  # Acute blood loss anemia  # DANETTE    - may have distributive component (occult infection vs SIRS from massive blood loss) to shock state given persistent vasopressor requirement despite volume resuscitation, she does not appear to have any brisk gastrointestinal bleeding occurring from a clinical standpoint, H/H holding stable on serial repeats  - actively titrating norepinephrine infusion to maintain a MAP > 65  - formal TTE reviewed, LVEF 70-75%, moderate MR, severe pHTN  - will be more apt to transfuse blood product pending this morning's H/H given the hyperdynamic LV function  - trend CBC, maintain HgB > 7  - blood and urine cultures are pending, continue empiric Zosyn and vancomycin  - PPI gtt  - hold anticoagulation/antiplatelet agents  - keep NPO  - plan for EGD this morning  - DANETTE likely pre-renal in nature with possible ischemic ATN component related to blood loss, optimize end-organ perfusion as above  - trend BUN/Cr, electrolytes, acid-base balance, and monitor hourly UOP  - no indication for urgent RRT at this time    Case discussed with MICU physician, Dr. Pardo.

## 2023-07-28 ENCOUNTER — TRANSCRIPTION ENCOUNTER (OUTPATIENT)
Age: 83
End: 2023-07-28

## 2023-07-28 LAB
ANION GAP SERPL CALC-SCNC: 13 MMOL/L — SIGNIFICANT CHANGE UP (ref 5–17)
ANION GAP SERPL CALC-SCNC: 14 MMOL/L — SIGNIFICANT CHANGE UP (ref 5–17)
BASOPHILS # BLD AUTO: 0.14 K/UL — SIGNIFICANT CHANGE UP (ref 0–0.2)
BASOPHILS NFR BLD AUTO: 1.1 % — SIGNIFICANT CHANGE UP (ref 0–2)
BUN SERPL-MCNC: 38.8 MG/DL — HIGH (ref 8–20)
BUN SERPL-MCNC: 50.6 MG/DL — HIGH (ref 8–20)
CALCIUM SERPL-MCNC: 7.9 MG/DL — LOW (ref 8.4–10.5)
CALCIUM SERPL-MCNC: 8 MG/DL — LOW (ref 8.4–10.5)
CHLORIDE SERPL-SCNC: 110 MMOL/L — HIGH (ref 96–108)
CHLORIDE SERPL-SCNC: 111 MMOL/L — HIGH (ref 96–108)
CO2 SERPL-SCNC: 20 MMOL/L — LOW (ref 22–29)
CO2 SERPL-SCNC: 20 MMOL/L — LOW (ref 22–29)
CREAT SERPL-MCNC: 1.45 MG/DL — HIGH (ref 0.5–1.3)
CREAT SERPL-MCNC: 1.62 MG/DL — HIGH (ref 0.5–1.3)
CULTURE RESULTS: SIGNIFICANT CHANGE UP
EGFR: 31 ML/MIN/1.73M2 — LOW
EGFR: 36 ML/MIN/1.73M2 — LOW
EOSINOPHIL # BLD AUTO: 0 K/UL — SIGNIFICANT CHANGE UP (ref 0–0.5)
EOSINOPHIL NFR BLD AUTO: 0 % — SIGNIFICANT CHANGE UP (ref 0–6)
GLUCOSE SERPL-MCNC: 120 MG/DL — HIGH (ref 70–99)
GLUCOSE SERPL-MCNC: 192 MG/DL — HIGH (ref 70–99)
HCT VFR BLD CALC: 26.6 % — LOW (ref 34.5–45)
HCT VFR BLD CALC: 26.9 % — LOW (ref 34.5–45)
HCT VFR BLD CALC: 28 % — LOW (ref 34.5–45)
HGB BLD-MCNC: 8.6 G/DL — LOW (ref 11.5–15.5)
HGB BLD-MCNC: 8.6 G/DL — LOW (ref 11.5–15.5)
HGB BLD-MCNC: 9.1 G/DL — LOW (ref 11.5–15.5)
IMM GRANULOCYTES NFR BLD AUTO: 1.4 % — HIGH (ref 0–0.9)
LYMPHOCYTES # BLD AUTO: 1.32 K/UL — SIGNIFICANT CHANGE UP (ref 1–3.3)
LYMPHOCYTES # BLD AUTO: 9.9 % — LOW (ref 13–44)
MAGNESIUM SERPL-MCNC: 2.1 MG/DL — SIGNIFICANT CHANGE UP (ref 1.6–2.6)
MAGNESIUM SERPL-MCNC: 2.2 MG/DL — SIGNIFICANT CHANGE UP (ref 1.6–2.6)
MCHC RBC-ENTMCNC: 30.6 PG — SIGNIFICANT CHANGE UP (ref 27–34)
MCHC RBC-ENTMCNC: 30.7 PG — SIGNIFICANT CHANGE UP (ref 27–34)
MCHC RBC-ENTMCNC: 30.8 PG — SIGNIFICANT CHANGE UP (ref 27–34)
MCHC RBC-ENTMCNC: 32 GM/DL — SIGNIFICANT CHANGE UP (ref 32–36)
MCHC RBC-ENTMCNC: 32.3 GM/DL — SIGNIFICANT CHANGE UP (ref 32–36)
MCHC RBC-ENTMCNC: 32.5 GM/DL — SIGNIFICANT CHANGE UP (ref 32–36)
MCV RBC AUTO: 94.9 FL — SIGNIFICANT CHANGE UP (ref 80–100)
MCV RBC AUTO: 95 FL — SIGNIFICANT CHANGE UP (ref 80–100)
MCV RBC AUTO: 95.7 FL — SIGNIFICANT CHANGE UP (ref 80–100)
MONOCYTES # BLD AUTO: 1.37 K/UL — HIGH (ref 0–0.9)
MONOCYTES NFR BLD AUTO: 10.3 % — SIGNIFICANT CHANGE UP (ref 2–14)
NEUTROPHILS # BLD AUTO: 10.26 K/UL — HIGH (ref 1.8–7.4)
NEUTROPHILS NFR BLD AUTO: 77.3 % — HIGH (ref 43–77)
PHOSPHATE SERPL-MCNC: 2.5 MG/DL — SIGNIFICANT CHANGE UP (ref 2.4–4.7)
PHOSPHATE SERPL-MCNC: 2.8 MG/DL — SIGNIFICANT CHANGE UP (ref 2.4–4.7)
PLATELET # BLD AUTO: 242 K/UL — SIGNIFICANT CHANGE UP (ref 150–400)
PLATELET # BLD AUTO: 260 K/UL — SIGNIFICANT CHANGE UP (ref 150–400)
PLATELET # BLD AUTO: 323 K/UL — SIGNIFICANT CHANGE UP (ref 150–400)
POTASSIUM SERPL-MCNC: 3.7 MMOL/L — SIGNIFICANT CHANGE UP (ref 3.5–5.3)
POTASSIUM SERPL-MCNC: 4.2 MMOL/L — SIGNIFICANT CHANGE UP (ref 3.5–5.3)
POTASSIUM SERPL-SCNC: 3.7 MMOL/L — SIGNIFICANT CHANGE UP (ref 3.5–5.3)
POTASSIUM SERPL-SCNC: 4.2 MMOL/L — SIGNIFICANT CHANGE UP (ref 3.5–5.3)
RBC # BLD: 2.8 M/UL — LOW (ref 3.8–5.2)
RBC # BLD: 2.81 M/UL — LOW (ref 3.8–5.2)
RBC # BLD: 2.95 M/UL — LOW (ref 3.8–5.2)
RBC # FLD: 18.7 % — HIGH (ref 10.3–14.5)
RBC # FLD: 18.8 % — HIGH (ref 10.3–14.5)
RBC # FLD: 19 % — HIGH (ref 10.3–14.5)
SODIUM SERPL-SCNC: 144 MMOL/L — SIGNIFICANT CHANGE UP (ref 135–145)
SODIUM SERPL-SCNC: 144 MMOL/L — SIGNIFICANT CHANGE UP (ref 135–145)
SPECIMEN SOURCE: SIGNIFICANT CHANGE UP
WBC # BLD: 13.27 K/UL — HIGH (ref 3.8–10.5)
WBC # BLD: 13.55 K/UL — HIGH (ref 3.8–10.5)
WBC # BLD: 16.22 K/UL — HIGH (ref 3.8–10.5)
WBC # FLD AUTO: 13.27 K/UL — HIGH (ref 3.8–10.5)
WBC # FLD AUTO: 13.55 K/UL — HIGH (ref 3.8–10.5)
WBC # FLD AUTO: 16.22 K/UL — HIGH (ref 3.8–10.5)

## 2023-07-28 PROCEDURE — 45378 DIAGNOSTIC COLONOSCOPY: CPT

## 2023-07-28 DEVICE — NAIL OSTEO 1.5X16MM STRL: Type: IMPLANTABLE DEVICE | Status: FUNCTIONAL

## 2023-07-28 RX ORDER — AMIODARONE HYDROCHLORIDE 400 MG/1
200 TABLET ORAL DAILY
Refills: 0 | Status: DISCONTINUED | OUTPATIENT
Start: 2023-07-28 | End: 2023-08-23

## 2023-07-28 RX ORDER — MIDODRINE HYDROCHLORIDE 2.5 MG/1
10 TABLET ORAL EVERY 8 HOURS
Refills: 0 | Status: DISCONTINUED | OUTPATIENT
Start: 2023-07-28 | End: 2023-08-02

## 2023-07-28 RX ORDER — SODIUM CHLORIDE 9 MG/ML
1000 INJECTION, SOLUTION INTRAVENOUS ONCE
Refills: 0 | Status: COMPLETED | OUTPATIENT
Start: 2023-07-28 | End: 2023-07-28

## 2023-07-28 RX ORDER — SODIUM CHLORIDE 9 MG/ML
500 INJECTION, SOLUTION INTRAVENOUS ONCE
Refills: 0 | Status: COMPLETED | OUTPATIENT
Start: 2023-07-28 | End: 2023-07-28

## 2023-07-28 RX ORDER — PANTOPRAZOLE SODIUM 20 MG/1
40 TABLET, DELAYED RELEASE ORAL DAILY
Refills: 0 | Status: DISCONTINUED | OUTPATIENT
Start: 2023-07-28 | End: 2023-08-23

## 2023-07-28 RX ADMIN — PIPERACILLIN AND TAZOBACTAM 25 GRAM(S): 4; .5 INJECTION, POWDER, LYOPHILIZED, FOR SOLUTION INTRAVENOUS at 21:37

## 2023-07-28 RX ADMIN — ATORVASTATIN CALCIUM 80 MILLIGRAM(S): 80 TABLET, FILM COATED ORAL at 21:37

## 2023-07-28 RX ADMIN — MIDODRINE HYDROCHLORIDE 10 MILLIGRAM(S): 2.5 TABLET ORAL at 21:37

## 2023-07-28 RX ADMIN — Medication 5 MILLIGRAM(S): at 21:38

## 2023-07-28 RX ADMIN — Medication 1 TABLET(S): at 12:11

## 2023-07-28 RX ADMIN — SODIUM CHLORIDE 500 MILLILITER(S): 9 INJECTION, SOLUTION INTRAVENOUS at 04:23

## 2023-07-28 RX ADMIN — PIPERACILLIN AND TAZOBACTAM 25 GRAM(S): 4; .5 INJECTION, POWDER, LYOPHILIZED, FOR SOLUTION INTRAVENOUS at 06:16

## 2023-07-28 RX ADMIN — PANTOPRAZOLE SODIUM 40 MILLIGRAM(S): 20 TABLET, DELAYED RELEASE ORAL at 12:17

## 2023-07-28 RX ADMIN — SODIUM CHLORIDE 1000 MILLILITER(S): 9 INJECTION, SOLUTION INTRAVENOUS at 21:37

## 2023-07-28 RX ADMIN — Medication 250 MILLIGRAM(S): at 16:34

## 2023-07-28 RX ADMIN — AMIODARONE HYDROCHLORIDE 200 MILLIGRAM(S): 400 TABLET ORAL at 21:37

## 2023-07-28 RX ADMIN — PIPERACILLIN AND TAZOBACTAM 25 GRAM(S): 4; .5 INJECTION, POWDER, LYOPHILIZED, FOR SOLUTION INTRAVENOUS at 18:34

## 2023-07-28 NOTE — DIETITIAN INITIAL EVALUATION ADULT - PERTINENT LABORATORY DATA
07-28    144  |  111<H>  |  50.6<H>  ----------------------------<  120<H>  4.2   |  20.0<L>  |  1.62<H>    Ca    8.0<L>      28 Jul 2023 03:20  Phos  2.5     07-28  Mg     2.2     07-28    TPro  4.8<L>  /  Alb  2.6<L>  /  TBili  0.7  /  DBili  x   /  AST  23  /  ALT  13  /  AlkPhos  195<H>  07-27

## 2023-07-28 NOTE — DIETITIAN INITIAL EVALUATION ADULT - ETIOLOGY
Related to inadequate protein energy intake with altered GI function in setting of GIB, hemorrhagic shock, DANETTE, blood loss anemia

## 2023-07-28 NOTE — DIETITIAN INITIAL EVALUATION ADULT - NSICDXPASTMEDICALHX_GEN_ALL_CORE_FT
PAST MEDICAL HISTORY:  Acute CHF     CAD (coronary artery disease)     COPD (chronic obstructive pulmonary disease)     HLD (hyperlipidemia)     HTN (hypertension)

## 2023-07-28 NOTE — PROGRESS NOTE ADULT - ASSESSMENT
84 y/o F with a h/o COPD on 2L home O2, CHFpEF, CAD s/p PCI, HTN, HLD, pAF (on rivaroxaban), diverticulosis, hemorrhoids, with:    # Hemorrhagic shock  # GIB  # Acute blood loss anemia  # DANETTE    - Hgb 8.8 --> 8.6  - concern for hypovolemia, LVEF hyperdynamic, bolus 1L LR x 1  - restart norepinephrine infusion to maintain a MAP > 65  - trend CBC, continue to transfuse PRBC to maintain HgB > 7  - blood cultures are negative for growth thus far, urine culture is pending, continue empiric Zosyn and vancomycin  - PPI gtt changed to IV QD given EGD findings (esophagitis, gastric nodule, s/p biopsy)  - hold anticoagulation/antiplatelet agents  - prepping for colonoscopy today, bleed may be diverticular in nature as colonic diverticulosis was noted on CT A/P  - DANETTE likely pre-renal in nature with possible ischemic ATN component related to blood loss, optimize end-organ perfusion as above  - trend BUN/Cr, electrolytes, acid-base balance, and monitor hourly UOP  - no indication for urgent RRT at this time    Case discussed with MICU physician, Dr. Pardo.

## 2023-07-28 NOTE — DIETITIAN INITIAL EVALUATION ADULT - OTHER INFO
Pt is a 82 y/o F with a h/o COPD on 2L home O2, CHFpEF, CAD s/p PCI, HTN, HLD, pAF (on rivaroxaban), diverticulosis, hemorrhoids, admitted on 7/26 with c/o generalized weakness and dark stools over the past few days. Hypotensive on ED arrival and hgb found to be 4.6 with +occult/melanotic stool. CT showed diverticulosis, no active bleed visualized. GI consulted and patient made NPO for endoscopy on 7/27. Transfused 3U PRBC and 1L IVF but persistently hypotensive. Started on IV vasopressor therapy.  S/p EGD which was unrevealing for source of bleeding. Prepping for colonoscopy. She had dark liquid BM overnight. Restarted on IV vasopressor for recurrent hypotension.   Pt admitted with Hemorrhagic shock, GIB, Acute blood loss anemia, DANETTE

## 2023-07-28 NOTE — PROGRESS NOTE ADULT - SUBJECTIVE AND OBJECTIVE BOX
Patient is a 83y old  Female who presents with a chief complaint of     HOSPITAL COURSE: 84 y/o F with a h/o COPD on 2L home O2, CHFpEF, CAD s/p PCI, HTN, HLD, pAF (on rivaroxaban), diverticulosis, hemorrhoids, admitted on  with c/o generalized weakness and dark stools over the past few days. Hypotensive on ED arrival and hgb found to be 4.6 with +occult/melanotic stool. CT showed diverticulosis, no active bleed visualized. GI consulted and patient made NPO for endoscopy on . Transfused 3U PRBC and 1L IVF but persistently hypotensive. Started on IV vasopressor therapy.    Last 24 hours:  S/p EGD which was unrevealing for source of bleeding. Prepping for colonoscopy. She had dark liquid BM overnight. Restarted on IV vasopressor for recurrent hypotension. Transfused 1u PRBC with improvement in Hgb 7.4 --> 8.8. H/H holding stable overnight into this morning.      PAST MEDICAL & SURGICAL HISTORY:  HTN (hypertension)      HLD (hyperlipidemia)      CAD (coronary artery disease)      COPD (chronic obstructive pulmonary disease)      Acute CHF      S/P  section          Review of Systems:  CONSTITUTIONAL: No fever, chills, or fatigue  EYES: No eye pain, visual disturbances, or discharge  ENMT:  No difficulty hearing, tinnitus, vertigo; No sinus or throat pain  NECK: No pain or stiffness  RESPIRATORY: No cough, wheezing, chills or hemoptysis; No shortness of breath  CARDIOVASCULAR: No chest pain, palpitations, dizziness, or leg swelling  GASTROINTESTINAL: No abdominal or epigastric pain. No nausea, vomiting, or hematemesis; No diarrhea or constipation.   GENITOURINARY: No dysuria, frequency, hematuria, or incontinence  NEUROLOGICAL: No headaches, memory loss, loss of strength, numbness, or tremors  SKIN: No itching, burning, rashes, or lesions   MUSCULOSKELETAL: No joint pain or swelling; No muscle, back, or extremity pain  PSYCHIATRIC: No depression, anxiety, mood swings, (+) difficulty sleeping      Medications:  piperacillin/tazobactam IVPB.. 3.375 Gram(s) IV Intermittent every 8 hours  vancomycin  IVPB 750 milliGRAM(s) IV Intermittent every 24 hours  norepinephrine Infusion 0.05 MICROgram(s)/kG/Min IV Continuous <Continuous>  tiotropium 2.5 MICROgram(s)/olodaterol 2.5 MICROgram(s) (STIOLTO) Inhaler 2 Puff(s) Inhalation daily  melatonin 5 milliGRAM(s) Oral at bedtime PRN  pantoprazole Infusion 8 mG/Hr IV Continuous <Continuous>  atorvastatin 80 milliGRAM(s) Oral at bedtime  multivitamin 1 Tablet(s) Oral daily        ICU Vital Signs Last 24 Hrs  T(C): 36.6 (2023 23:32), Max: 37 (2023 08:00)  T(F): 97.8 (2023 23:32), Max: 98.6 (2023 08:00)  HR: 66 (2023 01:15) (60 - 78)  BP: 120/49 (2023 01:15) (62/54 - 154/51)  BP(mean): 66 (2023 01:15) (54 - 123)  ABP: --  ABP(mean): --  RR: 20 (2023 01:15) (12 - 30)  SpO2: 99% (2023 01:15) (91% - 100%)    O2 Parameters below as of 2023 00:00  Patient On (Oxygen Delivery Method): nasal cannula  O2 Flow (L/min): 2              I&O's Detail    2023 07:01  -  2023 07:00  --------------------------------------------------------  IN:    IV PiggyBack: 100 mL    IV PiggyBack: 200 mL    Norepinephrine: 300.5 mL    Pantoprazole: 150 mL  Total IN: 750.5 mL    OUT:    Voided (mL): 750 mL  Total OUT: 750 mL    Total NET: 0.5 mL      2023 07:01  -  2023 01:28  --------------------------------------------------------  IN:    IV PiggyBack: 200 mL    IV PiggyBack: 200 mL    IV PiggyBack: 275 mL    Norepinephrine: 181.4 mL    Pantoprazole: 180 mL    PRBCs (Packed Red Blood Cells): 315 mL  Total IN: 1351.4 mL    OUT:    Voided (mL): 1350 mL  Total OUT: 1350 mL    Total NET: 1.4 mL            LABS:                        8.6    13.55 )-----------( 242      ( 2023 00:40 )             26.6         139  |  107  |  68.3<H>  ----------------------------<  98  3.4<L>   |  17.0<L>  |  1.62<H>    Ca    7.4<L>      2023 02:30  Phos  4.4       Mg     2.2         TPro  4.8<L>  /  Alb  2.6<L>  /  TBili  0.7  /  DBili  x   /  AST  23  /  ALT  13  /  AlkPhos  195<H>        CARDIAC MARKERS ( 2023 09:50 )  x     / <0.01 ng/mL / x     / x     / x          CAPILLARY BLOOD GLUCOSE        PT/INR - ( 2023 02:30 )   PT: 13.6 sec;   INR: 1.23 ratio         PTT - ( 2023 17:18 )  PTT:37.7 sec  Urinalysis Basic - ( 2023 02:30 )    Color: x / Appearance: x / SG: x / pH: x  Gluc: 98 mg/dL / Ketone: x  / Bili: x / Urobili: x   Blood: x / Protein: x / Nitrite: x   Leuk Esterase: x / RBC: x / WBC x   Sq Epi: x / Non Sq Epi: x / Bacteria: x      CULTURES:  Culture Results:   No growth at 24 hours (23 @ 09:55)  Culture Results:   No growth at 24 hours (23 @ 09:50)        Physical Examination:    General: No acute distress.  Alert, oriented, interactive, nonfocal    HEENT: Pupils equal, reactive to light.  Symmetric.    PULM: Clear to auscultation bilaterally, no significant sputum production    CVS: Regular rate and rhythm, no murmurs, rubs, or gallops    ABD: Soft, nondistended, nontender, normoactive bowel sounds, no masses    EXT: No edema, nontender    SKIN: Warm and well perfused, no rashes noted.    NEURO: A&Ox3, strength 5/5 all extremities, cranial nerves grossly intact, no focal deficits        RADIOLOGY:     < from: CT Abdomen and Pelvis w/ IV Cont (23 @ 11:28) >  FINDINGS:    LOWER CHEST: Cardiomegaly. Coronary artery calcification.    LIVER: Within normal limits.  BILE DUCTS: Normal caliber.  GALLBLADDER: Cholelithiasis.  SPLEEN: Within normal limits.  PANCREAS: Within normal limits.  ADRENALS: Within normal limits.  KIDNEYS/URETERS: Focal scarring upper pole left kidney.  Small hypodense lesions right kidney too small for characterization.  No hydronephrosis.    BLADDER: Within normal limits.  REPRODUCTIVE ORGANS: 1.7 cm left adnexal cyst.    BOWEL:   No bowel obstruction.  1.7 cm intraluminal fatty lesion antrum of stomach, likely reflecting   lipoma.  Colonic diverticulosis, without CT evidence of diverticulitis.  There is a possible short segment of intraluminal narrowing at the   splenic flexure.  There is no intraluminal extravasation of contrast to suggest active   gastrointestinal bleeding.  Appendix is not visualized.  PERITONEUM: No ascites.    VESSELS:  Atherosclerotic changes.  RETROPERITONEUM/LYMPH NODES: No lymphadenopathy.    ABDOMINAL WALL: Within normal limits.    BONES: Degenerative changes.    IMPRESSION:    Colonic diverticulosis.  Possible short segment of luminal narrowing near the splenic flexure.    Recommend further evaluation with colonoscopy to exclude underlying   intrinsic mucosal lesion or neoplasm.  No CT evidence for active gastrointestinal bleeding.          CRITICAL CARE TIME SPENT: 38 mins  Time spent evaluating/treating patient with medical issues that pose a high risk for life threatening deterioration and/or end-organ damage, reviewing data/labs/imaging, discussing case with multidisciplinary team, discussing plan/goals of care with patient/family. Non-inclusive of procedure time.

## 2023-07-28 NOTE — DIETITIAN INITIAL EVALUATION ADULT - ORAL INTAKE PTA/DIET HISTORY
Pt reports typically eating well at home; however over past week pt had poor PO intake secondary to pain and nausea. Pt denies difficulty chewing   or swallowing at this time. Pt is currently NPO for colonoscopy. NFPE conducted

## 2023-07-28 NOTE — DIETITIAN INITIAL EVALUATION ADULT - PERTINENT MEDS FT
MEDICATIONS  (STANDING):  atorvastatin 80 milliGRAM(s) Oral at bedtime  multivitamin 1 Tablet(s) Oral daily  norepinephrine Infusion 0.05 MICROgram(s)/kG/Min (7.23 mL/Hr) IV Continuous <Continuous>  pantoprazole  Injectable 40 milliGRAM(s) IV Push daily  piperacillin/tazobactam IVPB.. 3.375 Gram(s) IV Intermittent every 8 hours  tiotropium 2.5 MICROgram(s)/olodaterol 2.5 MICROgram(s) (STIOLTO) Inhaler 2 Puff(s) Inhalation daily  vancomycin  IVPB 750 milliGRAM(s) IV Intermittent every 24 hours    MEDICATIONS  (PRN):  melatonin 5 milliGRAM(s) Oral at bedtime PRN Sleep

## 2023-07-28 NOTE — DIETITIAN NUTRITION RISK NOTIFICATION - TREATMENT: THE FOLLOWING DIET HAS BEEN RECOMMENDED
Detail Level: Detailed
Diet, Clear Liquid (07-26-23 @ 16:59) [Active]      
ADL retraining/cognitive, visual perceptual/transfer training

## 2023-07-29 LAB
ALBUMIN SERPL ELPH-MCNC: 2.8 G/DL — LOW (ref 3.3–5.2)
ALP SERPL-CCNC: 250 U/L — HIGH (ref 40–120)
ALT FLD-CCNC: 13 U/L — SIGNIFICANT CHANGE UP
ANION GAP SERPL CALC-SCNC: 12 MMOL/L — SIGNIFICANT CHANGE UP (ref 5–17)
AST SERPL-CCNC: 16 U/L — SIGNIFICANT CHANGE UP
BASOPHILS # BLD AUTO: 0.1 K/UL — SIGNIFICANT CHANGE UP (ref 0–0.2)
BASOPHILS NFR BLD AUTO: 1 % — SIGNIFICANT CHANGE UP (ref 0–2)
BILIRUB SERPL-MCNC: 0.7 MG/DL — SIGNIFICANT CHANGE UP (ref 0.4–2)
BUN SERPL-MCNC: 29.8 MG/DL — HIGH (ref 8–20)
CALCIUM SERPL-MCNC: 7.8 MG/DL — LOW (ref 8.4–10.5)
CHLORIDE SERPL-SCNC: 110 MMOL/L — HIGH (ref 96–108)
CO2 SERPL-SCNC: 20 MMOL/L — LOW (ref 22–29)
CREAT SERPL-MCNC: 1.29 MG/DL — SIGNIFICANT CHANGE UP (ref 0.5–1.3)
EGFR: 41 ML/MIN/1.73M2 — LOW
EOSINOPHIL # BLD AUTO: 0 K/UL — SIGNIFICANT CHANGE UP (ref 0–0.5)
EOSINOPHIL NFR BLD AUTO: 0 % — SIGNIFICANT CHANGE UP (ref 0–6)
GLUCOSE SERPL-MCNC: 138 MG/DL — HIGH (ref 70–99)
HCT VFR BLD CALC: 25 % — LOW (ref 34.5–45)
HGB BLD-MCNC: 8.2 G/DL — LOW (ref 11.5–15.5)
IMM GRANULOCYTES NFR BLD AUTO: 0.8 % — SIGNIFICANT CHANGE UP (ref 0–0.9)
LYMPHOCYTES # BLD AUTO: 1.03 K/UL — SIGNIFICANT CHANGE UP (ref 1–3.3)
LYMPHOCYTES # BLD AUTO: 10.4 % — LOW (ref 13–44)
MAGNESIUM SERPL-MCNC: 2.1 MG/DL — SIGNIFICANT CHANGE UP (ref 1.6–2.6)
MCHC RBC-ENTMCNC: 30.7 PG — SIGNIFICANT CHANGE UP (ref 27–34)
MCHC RBC-ENTMCNC: 32.8 GM/DL — SIGNIFICANT CHANGE UP (ref 32–36)
MCV RBC AUTO: 93.6 FL — SIGNIFICANT CHANGE UP (ref 80–100)
MONOCYTES # BLD AUTO: 0.98 K/UL — HIGH (ref 0–0.9)
MONOCYTES NFR BLD AUTO: 9.9 % — SIGNIFICANT CHANGE UP (ref 2–14)
NEUTROPHILS # BLD AUTO: 7.71 K/UL — HIGH (ref 1.8–7.4)
NEUTROPHILS NFR BLD AUTO: 77.9 % — HIGH (ref 43–77)
NT-PROBNP SERPL-SCNC: HIGH PG/ML (ref 0–300)
PHOSPHATE SERPL-MCNC: 2.7 MG/DL — SIGNIFICANT CHANGE UP (ref 2.4–4.7)
PLATELET # BLD AUTO: 239 K/UL — SIGNIFICANT CHANGE UP (ref 150–400)
POTASSIUM SERPL-MCNC: 3.3 MMOL/L — LOW (ref 3.5–5.3)
POTASSIUM SERPL-SCNC: 3.3 MMOL/L — LOW (ref 3.5–5.3)
PROT SERPL-MCNC: 5.1 G/DL — LOW (ref 6.6–8.7)
RBC # BLD: 2.67 M/UL — LOW (ref 3.8–5.2)
RBC # FLD: 18.7 % — HIGH (ref 10.3–14.5)
SODIUM SERPL-SCNC: 142 MMOL/L — SIGNIFICANT CHANGE UP (ref 135–145)
VANCOMYCIN TROUGH SERPL-MCNC: 8.5 UG/ML — LOW (ref 10–20)
WBC # BLD: 9.9 K/UL — SIGNIFICANT CHANGE UP (ref 3.8–10.5)
WBC # FLD AUTO: 9.9 K/UL — SIGNIFICANT CHANGE UP (ref 3.8–10.5)

## 2023-07-29 PROCEDURE — 99231 SBSQ HOSP IP/OBS SF/LOW 25: CPT

## 2023-07-29 RX ORDER — POTASSIUM CHLORIDE 20 MEQ
10 PACKET (EA) ORAL
Refills: 0 | Status: DISCONTINUED | OUTPATIENT
Start: 2023-07-29 | End: 2023-07-29

## 2023-07-29 RX ORDER — POTASSIUM CHLORIDE 20 MEQ
40 PACKET (EA) ORAL ONCE
Refills: 0 | Status: COMPLETED | OUTPATIENT
Start: 2023-07-29 | End: 2023-07-29

## 2023-07-29 RX ORDER — ALBUMIN HUMAN 25 %
250 VIAL (ML) INTRAVENOUS ONCE
Refills: 0 | Status: COMPLETED | OUTPATIENT
Start: 2023-07-29 | End: 2023-07-29

## 2023-07-29 RX ADMIN — PIPERACILLIN AND TAZOBACTAM 25 GRAM(S): 4; .5 INJECTION, POWDER, LYOPHILIZED, FOR SOLUTION INTRAVENOUS at 05:39

## 2023-07-29 RX ADMIN — MIDODRINE HYDROCHLORIDE 10 MILLIGRAM(S): 2.5 TABLET ORAL at 21:34

## 2023-07-29 RX ADMIN — PIPERACILLIN AND TAZOBACTAM 25 GRAM(S): 4; .5 INJECTION, POWDER, LYOPHILIZED, FOR SOLUTION INTRAVENOUS at 15:35

## 2023-07-29 RX ADMIN — Medication 1 TABLET(S): at 11:46

## 2023-07-29 RX ADMIN — PANTOPRAZOLE SODIUM 40 MILLIGRAM(S): 20 TABLET, DELAYED RELEASE ORAL at 11:46

## 2023-07-29 RX ADMIN — MIDODRINE HYDROCHLORIDE 10 MILLIGRAM(S): 2.5 TABLET ORAL at 05:35

## 2023-07-29 RX ADMIN — Medication 5 MILLIGRAM(S): at 21:34

## 2023-07-29 RX ADMIN — Medication 7.23 MICROGRAM(S)/KG/MIN: at 05:38

## 2023-07-29 RX ADMIN — MIDODRINE HYDROCHLORIDE 10 MILLIGRAM(S): 2.5 TABLET ORAL at 15:32

## 2023-07-29 RX ADMIN — ATORVASTATIN CALCIUM 80 MILLIGRAM(S): 80 TABLET, FILM COATED ORAL at 21:34

## 2023-07-29 RX ADMIN — Medication 125 MILLILITER(S): at 15:34

## 2023-07-29 RX ADMIN — PIPERACILLIN AND TAZOBACTAM 25 GRAM(S): 4; .5 INJECTION, POWDER, LYOPHILIZED, FOR SOLUTION INTRAVENOUS at 21:55

## 2023-07-29 RX ADMIN — Medication 40 MILLIEQUIVALENT(S): at 05:35

## 2023-07-29 RX ADMIN — TIOTROPIUM BROMIDE AND OLODATEROL 2 PUFF(S): 3.124; 2.736 SPRAY, METERED RESPIRATORY (INHALATION) at 08:35

## 2023-07-29 NOTE — PROGRESS NOTE ADULT - ASSESSMENT
82 y/o F with a h/o COPD on 2L home O2, CHFpEF, CAD s/p PCI, HTN, HLD, pAF (on rivaroxaban), diverticulosis, hemorrhoids, with:    # Hemorrhagic shock  # GIB  # Acute blood loss anemia  # DANETTE  # AF with RVR  # Hypokalemia    - Hgb 8.8 --> 8.6 --> 9.1 --> 8.2  - bolus 500cc LR x 1  - suspect distributive shock of uncertain etiology at this point, appears euvolemic  - actively titrating norepinephrine infusion to maintain a MAP > 65  - add midodrine 10mg TID to help offload infusion requirement  - trend CBC, continue to transfuse PRBC to maintain HgB > 7  - blood cultures are negative for growth thus far, urine culture is unremarkable, continue empiric Zosyn and vancomycin  - PPI gtt changed to IV QD given EGD findings (esophagitis, gastric nodule, s/p biopsy)  - hold anticoagulation/antiplatelet agents  - s/p colonoscopy which was also unrevealing of bleeding source, may require capsule study  - DANETTE likely pre-renal in nature with possible ischemic ATN component related to blood loss, optimize end-organ perfusion as above  - trend BUN/Cr (improving), electrolytes, acid-base balance, and monitor hourly UOP  - no indication for urgent RRT at this time  - mild rapid AF, restart amiodarone  - replete potassium    Case discussed with MICU physician, Dr. Gandhi.

## 2023-07-29 NOTE — PROGRESS NOTE ADULT - SUBJECTIVE AND OBJECTIVE BOX
Chief Complaint:  Patient is a 83y old  Female who presents with a chief complaint of Gastrointestinal hemorrhage     (28 Jul 2023 12:45)      HPI/ 24 hr events: Patient seen and examined at bedside.No evidence of melena.  Hemoglobin is stable.  She has not had a capsule although she has had 2 colons in 2 uppers in the last 7 months.  We will therefore get a CT enterography to be sure there are no mass lesions in the small bowel.  If this is negative the next step would be an outpatient capsule endoscopy.    REVIEW OF SYSTEMS:   General: Negative  HEENT: Negative  CV: Negative  Respiratory: Negative  GI: See HPI  : Negative  MSK: Negative  Hematologic: Negative  Skin: Negative    MEDICATIONS:   MEDICATIONS  (STANDING):  aMIOdarone    Tablet 200 milliGRAM(s) Oral daily  atorvastatin 80 milliGRAM(s) Oral at bedtime  midodrine 10 milliGRAM(s) Oral every 8 hours  multivitamin 1 Tablet(s) Oral daily  norepinephrine Infusion 0.05 MICROgram(s)/kG/Min (7.23 mL/Hr) IV Continuous <Continuous>  pantoprazole  Injectable 40 milliGRAM(s) IV Push daily  piperacillin/tazobactam IVPB.. 3.375 Gram(s) IV Intermittent every 8 hours  tiotropium 2.5 MICROgram(s)/olodaterol 2.5 MICROgram(s) (STIOLTO) Inhaler 2 Puff(s) Inhalation daily  vancomycin  IVPB 750 milliGRAM(s) IV Intermittent every 24 hours    MEDICATIONS  (PRN):  melatonin 5 milliGRAM(s) Oral at bedtime PRN Sleep      ALLERGIES:   Allergies    No Known Allergies    Intolerances        VITAL SIGNS:   Vital Signs Last 24 Hrs  T(C): 36.6 (29 Jul 2023 11:15), Max: 37.2 (29 Jul 2023 00:00)  T(F): 97.9 (29 Jul 2023 11:15), Max: 99 (29 Jul 2023 00:00)  HR: 105 (29 Jul 2023 10:30) (93 - 156)  BP: 91/46 (29 Jul 2023 10:30) (69/35 - 136/93)  BP(mean): 61 (29 Jul 2023 10:30) (46 - 126)  RR: 16 (29 Jul 2023 10:30) (16 - 29)  SpO2: 100% (29 Jul 2023 10:30) (91% - 100%)    Parameters below as of 29 Jul 2023 08:36  Patient On (Oxygen Delivery Method): nasal cannula, 2 lpm      I&O's Summary    28 Jul 2023 07:01  -  29 Jul 2023 07:00  --------------------------------------------------------  IN: 1634.7 mL / OUT: 1300 mL / NET: 334.7 mL    29 Jul 2023 07:01  -  29 Jul 2023 12:42  --------------------------------------------------------  IN: 54.9 mL / OUT: 100 mL / NET: -45.1 mL        PHYSICAL EXAM:   GENERAL:  No acute distress  HEENT:  NC/AT, conjunctiva clear, sclera anicteric  CHEST:  No increased effort, breath sounds clear  HEART:  Regular rhythm, S1, S2,   ABDOMEN:  Soft, non-tender, non-distended, normoactive bowel sounds, no rebound or guarding  EXTREMITIES: No edema  SKIN:  Warm, dry  NEURO:  Calm, cooperative    LABS:  CBC Full  -  ( 29 Jul 2023 03:00 )  WBC Count : 9.90 K/uL  RBC Count : 2.67 M/uL  Hemoglobin : 8.2 g/dL  Hematocrit : 25.0 %  Platelet Count - Automated : 239 K/uL  Mean Cell Volume : 93.6 fl  Mean Cell Hemoglobin : 30.7 pg  Mean Cell Hemoglobin Concentration : 32.8 gm/dL  Auto Neutrophil # : 7.71 K/uL  Auto Lymphocyte # : 1.03 K/uL  Auto Monocyte # : 0.98 K/uL  Auto Eosinophil # : 0.00 K/uL  Auto Basophil # : 0.10 K/uL  Auto Neutrophil % : 77.9 %  Auto Lymphocyte % : 10.4 %  Auto Monocyte % : 9.9 %  Auto Eosinophil % : 0.0 %  Auto Basophil % : 1.0 %    07-29    142  |  110<H>  |  29.8<H>  ----------------------------<  138<H>  3.3<L>   |  20.0<L>  |  1.29    Ca    7.8<L>      29 Jul 2023 03:00  Phos  2.7     07-29  Mg     2.1     07-29    TPro  5.1<L>  /  Alb  2.8<L>  /  TBili  0.7  /  DBili  x   /  AST  16  /  ALT  13  /  AlkPhos  250<H>  07-29    LIVER FUNCTIONS - ( 29 Jul 2023 03:00 )  Alb: 2.8 g/dL / Pro: 5.1 g/dL / ALK PHOS: 250 U/L / ALT: 13 U/L / AST: 16 U/L / GGT: x             Urinalysis Basic - ( 29 Jul 2023 03:00 )    Color: x / Appearance: x / SG: x / pH: x  Gluc: 138 mg/dL / Ketone: x  / Bili: x / Urobili: x   Blood: x / Protein: x / Nitrite: x   Leuk Esterase: x / RBC: x / WBC x   Sq Epi: x / Non Sq Epi: x / Bacteria: x        Culture - Urine (collected 26 Jul 2023 17:18)  Source: Clean Catch Clean Catch (Midstream)  Final Report (28 Jul 2023 15:14):    <10,000 CFU/mL Normal Urogenital Samaria                  RADIOLOGY & ADDITIONAL STUDIES:

## 2023-07-29 NOTE — PROGRESS NOTE ADULT - SUBJECTIVE AND OBJECTIVE BOX
Patient is a 83y old  Female who presents with a chief complaint of Gastrointestinal hemorrhage     (2023 12:45)      HOSPITAL COURSE: 84 y/o F with a h/o COPD on 2L home O2, CHFpEF, CAD s/p PCI, HTN, HLD, pAF (on rivaroxaban), diverticulosis, hemorrhoids, admitted on  with c/o generalized weakness and dark stools over the past few days. Hypotensive on ED arrival and hgb found to be 4.6 with +occult/melanotic stool. CT showed diverticulosis, no active bleed visualized. GI consulted and patient made NPO for endoscopy on . Transfused 3U PRBC and 1L IVF but persistently hypotensive. Started on IV vasopressor therapy. EGD on  unremarkable for bleeding source.    Last 24 hours:  Underwent colonoscopy which was unrevealing for source of bleeding. Remains dependent on IV vasopressor. No BMs overnight into this morning. H/H hovering in 8-9 range.        PAST MEDICAL & SURGICAL HISTORY:  HTN (hypertension)      HLD (hyperlipidemia)      CAD (coronary artery disease)      COPD (chronic obstructive pulmonary disease)      Acute CHF      S/P  section          Review of Systems:  CONSTITUTIONAL: No fever, chills, or fatigue  EYES: No eye pain, visual disturbances, or discharge  ENMT:  No difficulty hearing, tinnitus, vertigo; No sinus or throat pain  NECK: No pain or stiffness  RESPIRATORY: No cough, wheezing, chills or hemoptysis; No shortness of breath  CARDIOVASCULAR: No chest pain, palpitations, dizziness, or leg swelling  GASTROINTESTINAL: No abdominal or epigastric pain. No nausea, vomiting, or hematemesis; No diarrhea or constipation. No melena or hematochezia.  GENITOURINARY: No dysuria, frequency, hematuria, or incontinence  NEUROLOGICAL: No headaches, memory loss, loss of strength, numbness, or tremors  SKIN: No itching, burning, rashes, or lesions   MUSCULOSKELETAL: No joint pain or swelling; No muscle, back, or extremity pain  PSYCHIATRIC: No depression, anxiety, mood swings, or difficulty sleeping      Medications:  piperacillin/tazobactam IVPB.. 3.375 Gram(s) IV Intermittent every 8 hours  vancomycin  IVPB 750 milliGRAM(s) IV Intermittent every 24 hours  aMIOdarone    Tablet 200 milliGRAM(s) Oral daily  midodrine 10 milliGRAM(s) Oral every 8 hours  norepinephrine Infusion 0.05 MICROgram(s)/kG/Min IV Continuous <Continuous>  tiotropium 2.5 MICROgram(s)/olodaterol 2.5 MICROgram(s) (STIOLTO) Inhaler 2 Puff(s) Inhalation daily  melatonin 5 milliGRAM(s) Oral at bedtime PRN  pantoprazole  Injectable 40 milliGRAM(s) IV Push daily  atorvastatin 80 milliGRAM(s) Oral at bedtime  multivitamin 1 Tablet(s) Oral daily  potassium chloride  10 mEq/100 mL IVPB 10 milliEquivalent(s) IV Intermittent every 1 hour                ICU Vital Signs Last 24 Hrs  T(C): 37.2 (2023 00:00), Max: 37.2 (2023 00:00)  T(F): 99 (2023 00:00), Max: 99 (2023 00:00)  HR: 100 (2023 04:15) (67 - 156)  BP: 100/53 (2023 04:15) (69/35 - 145/43)  BP(mean): 67 (2023 04:15) (46 - 113)  ABP: --  ABP(mean): --  RR: 19 (2023 04:15) (14 - 29)  SpO2: 99% (2023 04:15) (91% - 100%)    O2 Parameters below as of 2023 04:00  Patient On (Oxygen Delivery Method): nasal cannula  O2 Flow (L/min): 2              I&O's Detail    2023 07:01  -  2023 07:00  --------------------------------------------------------  IN:    IV PiggyBack: 200 mL    IV PiggyBack: 200 mL    IV PiggyBack: 325 mL    Lactated Ringers Bolus: 1000 mL    Norepinephrine: 262 mL    Pantoprazole: 180 mL    PRBCs (Packed Red Blood Cells): 315 mL  Total IN: 2482 mL    OUT:    Voided (mL): 1550 mL  Total OUT: 1550 mL    Total NET: 932 mL      2023 07:01  -  2023 04:51  --------------------------------------------------------  IN:    IV PiggyBack: 250 mL    IV PiggyBack: 200 mL    Lactated Ringers Bolus: 500 mL    Norepinephrine: 531.4 mL  Total IN: 1481.4 mL    OUT:    Voided (mL): 900 mL  Total OUT: 900 mL    Total NET: 581.4 mL            LABS:                        8.2    9.90  )-----------( 239      ( 2023 03:00 )             25.0         142  |  110<H>  |  29.8<H>  ----------------------------<  138<H>  3.3<L>   |  20.0<L>  |  1.29    Ca    7.8<L>      2023 03:00  Phos  2.7       Mg     2.1         TPro  5.1<L>  /  Alb  2.8<L>  /  TBili  0.7  /  DBili  x   /  AST  16  /  ALT  13  /  AlkPhos  250<H>            CAPILLARY BLOOD GLUCOSE          Urinalysis Basic - ( 2023 03:00 )    Color: x / Appearance: x / SG: x / pH: x  Gluc: 138 mg/dL / Ketone: x  / Bili: x / Urobili: x   Blood: x / Protein: x / Nitrite: x   Leuk Esterase: x / RBC: x / WBC x   Sq Epi: x / Non Sq Epi: x / Bacteria: x      CULTURES:  Culture Results:   <10,000 CFU/mL Normal Urogenital Samaria (23 @ 17:18)  Culture Results:   No growth at 48 Hours (23 @ 09:55)  Culture Results:   No growth at 48 Hours (23 @ 09:50)        Physical Examination:    General: No acute distress.  Alert, oriented, interactive, nonfocal    HEENT: Pupils equal, reactive to light.  Symmetric.    PULM: Clear to auscultation bilaterally, no significant sputum production    CVS: tachycardic, irreg irreg rhythm, no murmurs, rubs, or gallops    ABD: Soft, nondistended, nontender, normoactive bowel sounds, no masses    EXT: No edema, nontender    SKIN: Warm and well perfused, no rashes noted.    NEURO: A&Ox3, strength 5/5 all extremities, cranial nerves grossly intact, no focal deficits        RADIOLOGY:     < from: CT Abdomen and Pelvis w/ IV Cont (23 @ 11:28) >  FINDINGS:    LOWER CHEST: Cardiomegaly. Coronary artery calcification.    LIVER: Within normal limits.  BILE DUCTS: Normal caliber.  GALLBLADDER: Cholelithiasis.  SPLEEN: Within normal limits.  PANCREAS: Within normal limits.  ADRENALS: Within normal limits.  KIDNEYS/URETERS: Focal scarring upper pole left kidney.  Small hypodense lesions right kidney too small for characterization.  No hydronephrosis.    BLADDER: Within normal limits.  REPRODUCTIVE ORGANS: 1.7 cm left adnexal cyst.    BOWEL:   No bowel obstruction.  1.7 cm intraluminal fatty lesion antrum of stomach, likely reflecting   lipoma.  Colonic diverticulosis, without CT evidence of diverticulitis.  There is a possible short segment of intraluminal narrowing at the   splenic flexure.  There is no intraluminal extravasation of contrast to suggest active   gastrointestinal bleeding.  Appendix is not visualized.  PERITONEUM: No ascites.    VESSELS:  Atherosclerotic changes.  RETROPERITONEUM/LYMPH NODES: No lymphadenopathy.    ABDOMINAL WALL: Within normal limits.    BONES: Degenerative changes.    IMPRESSION:    Colonic diverticulosis.  Possible short segment of luminal narrowing near the splenic flexure.    Recommend further evaluation with colonoscopy to exclude underlying   intrinsic mucosal lesion or neoplasm.  No CT evidence for active gastrointestinal bleeding.          CRITICAL CARE TIME SPENT: 35 mins  Time spent evaluating/treating patient with medical issues that pose a high risk for life threatening deterioration and/or end-organ damage, reviewing data/labs/imaging, discussing case with multidisciplinary team, discussing plan/goals of care with patient/family. Non-inclusive of procedure time.

## 2023-07-30 LAB
ALBUMIN SERPL ELPH-MCNC: 2.8 G/DL — LOW (ref 3.3–5.2)
ALP SERPL-CCNC: 202 U/L — HIGH (ref 40–120)
ALT FLD-CCNC: 12 U/L — SIGNIFICANT CHANGE UP
ANION GAP SERPL CALC-SCNC: 10 MMOL/L — SIGNIFICANT CHANGE UP (ref 5–17)
AST SERPL-CCNC: 12 U/L — SIGNIFICANT CHANGE UP
BASOPHILS # BLD AUTO: 0.07 K/UL — SIGNIFICANT CHANGE UP (ref 0–0.2)
BASOPHILS NFR BLD AUTO: 1 % — SIGNIFICANT CHANGE UP (ref 0–2)
BILIRUB SERPL-MCNC: 0.6 MG/DL — SIGNIFICANT CHANGE UP (ref 0.4–2)
BLD GP AB SCN SERPL QL: SIGNIFICANT CHANGE UP
BUN SERPL-MCNC: 24.5 MG/DL — HIGH (ref 8–20)
CALCIUM SERPL-MCNC: 7.8 MG/DL — LOW (ref 8.4–10.5)
CHLORIDE SERPL-SCNC: 110 MMOL/L — HIGH (ref 96–108)
CO2 SERPL-SCNC: 20 MMOL/L — LOW (ref 22–29)
CORTIS AM PEAK SERPL-MCNC: 20.5 UG/DL — HIGH (ref 6–18.4)
CREAT SERPL-MCNC: 1.18 MG/DL — SIGNIFICANT CHANGE UP (ref 0.5–1.3)
EGFR: 46 ML/MIN/1.73M2 — LOW
EOSINOPHIL # BLD AUTO: 0 K/UL — SIGNIFICANT CHANGE UP (ref 0–0.5)
EOSINOPHIL NFR BLD AUTO: 0 % — SIGNIFICANT CHANGE UP (ref 0–6)
GLUCOSE SERPL-MCNC: 105 MG/DL — HIGH (ref 70–99)
HCT VFR BLD CALC: 22.3 % — LOW (ref 34.5–45)
HCT VFR BLD CALC: 28.6 % — LOW (ref 34.5–45)
HGB BLD-MCNC: 7.1 G/DL — LOW (ref 11.5–15.5)
HGB BLD-MCNC: 9 G/DL — LOW (ref 11.5–15.5)
IMM GRANULOCYTES NFR BLD AUTO: 0.4 % — SIGNIFICANT CHANGE UP (ref 0–0.9)
LYMPHOCYTES # BLD AUTO: 0.87 K/UL — LOW (ref 1–3.3)
LYMPHOCYTES # BLD AUTO: 12.3 % — LOW (ref 13–44)
MAGNESIUM SERPL-MCNC: 2 MG/DL — SIGNIFICANT CHANGE UP (ref 1.6–2.6)
MCHC RBC-ENTMCNC: 29.6 PG — SIGNIFICANT CHANGE UP (ref 27–34)
MCHC RBC-ENTMCNC: 30.2 PG — SIGNIFICANT CHANGE UP (ref 27–34)
MCHC RBC-ENTMCNC: 31.5 GM/DL — LOW (ref 32–36)
MCHC RBC-ENTMCNC: 31.8 GM/DL — LOW (ref 32–36)
MCV RBC AUTO: 94.1 FL — SIGNIFICANT CHANGE UP (ref 80–100)
MCV RBC AUTO: 94.9 FL — SIGNIFICANT CHANGE UP (ref 80–100)
MONOCYTES # BLD AUTO: 0.73 K/UL — SIGNIFICANT CHANGE UP (ref 0–0.9)
MONOCYTES NFR BLD AUTO: 10.3 % — SIGNIFICANT CHANGE UP (ref 2–14)
MRSA PCR RESULT.: SIGNIFICANT CHANGE UP
NEUTROPHILS # BLD AUTO: 5.37 K/UL — SIGNIFICANT CHANGE UP (ref 1.8–7.4)
NEUTROPHILS NFR BLD AUTO: 76 % — SIGNIFICANT CHANGE UP (ref 43–77)
PHOSPHATE SERPL-MCNC: 2.8 MG/DL — SIGNIFICANT CHANGE UP (ref 2.4–4.7)
PLATELET # BLD AUTO: 197 K/UL — SIGNIFICANT CHANGE UP (ref 150–400)
PLATELET # BLD AUTO: 238 K/UL — SIGNIFICANT CHANGE UP (ref 150–400)
POTASSIUM SERPL-MCNC: 3.9 MMOL/L — SIGNIFICANT CHANGE UP (ref 3.5–5.3)
POTASSIUM SERPL-SCNC: 3.9 MMOL/L — SIGNIFICANT CHANGE UP (ref 3.5–5.3)
PROT SERPL-MCNC: 4.8 G/DL — LOW (ref 6.6–8.7)
RBC # BLD: 2.35 M/UL — LOW (ref 3.8–5.2)
RBC # BLD: 3.04 M/UL — LOW (ref 3.8–5.2)
RBC # FLD: 18.5 % — HIGH (ref 10.3–14.5)
RBC # FLD: 18.6 % — HIGH (ref 10.3–14.5)
S AUREUS DNA NOSE QL NAA+PROBE: SIGNIFICANT CHANGE UP
SODIUM SERPL-SCNC: 140 MMOL/L — SIGNIFICANT CHANGE UP (ref 135–145)
WBC # BLD: 7.07 K/UL — SIGNIFICANT CHANGE UP (ref 3.8–10.5)
WBC # BLD: 7.69 K/UL — SIGNIFICANT CHANGE UP (ref 3.8–10.5)
WBC # FLD AUTO: 7.07 K/UL — SIGNIFICANT CHANGE UP (ref 3.8–10.5)
WBC # FLD AUTO: 7.69 K/UL — SIGNIFICANT CHANGE UP (ref 3.8–10.5)

## 2023-07-30 PROCEDURE — 99233 SBSQ HOSP IP/OBS HIGH 50: CPT

## 2023-07-30 PROCEDURE — 99231 SBSQ HOSP IP/OBS SF/LOW 25: CPT

## 2023-07-30 RX ORDER — CHLORHEXIDINE GLUCONATE 213 G/1000ML
1 SOLUTION TOPICAL DAILY
Refills: 0 | Status: DISCONTINUED | OUTPATIENT
Start: 2023-07-30 | End: 2023-08-07

## 2023-07-30 RX ORDER — METOPROLOL TARTRATE 50 MG
2.5 TABLET ORAL EVERY 6 HOURS
Refills: 0 | Status: DISCONTINUED | OUTPATIENT
Start: 2023-07-30 | End: 2023-08-23

## 2023-07-30 RX ORDER — METOPROLOL TARTRATE 50 MG
25 TABLET ORAL EVERY 8 HOURS
Refills: 0 | Status: DISCONTINUED | OUTPATIENT
Start: 2023-07-30 | End: 2023-08-02

## 2023-07-30 RX ADMIN — PANTOPRAZOLE SODIUM 40 MILLIGRAM(S): 20 TABLET, DELAYED RELEASE ORAL at 11:44

## 2023-07-30 RX ADMIN — CHLORHEXIDINE GLUCONATE 1 APPLICATION(S): 213 SOLUTION TOPICAL at 12:54

## 2023-07-30 RX ADMIN — Medication 1 TABLET(S): at 11:45

## 2023-07-30 RX ADMIN — Medication 25 MILLIGRAM(S): at 21:33

## 2023-07-30 RX ADMIN — AMIODARONE HYDROCHLORIDE 200 MILLIGRAM(S): 400 TABLET ORAL at 05:17

## 2023-07-30 RX ADMIN — ATORVASTATIN CALCIUM 80 MILLIGRAM(S): 80 TABLET, FILM COATED ORAL at 21:33

## 2023-07-30 RX ADMIN — Medication 5 MILLIGRAM(S): at 21:34

## 2023-07-30 RX ADMIN — TIOTROPIUM BROMIDE AND OLODATEROL 2 PUFF(S): 3.124; 2.736 SPRAY, METERED RESPIRATORY (INHALATION) at 08:30

## 2023-07-30 RX ADMIN — Medication 25 MILLIGRAM(S): at 16:17

## 2023-07-30 RX ADMIN — MIDODRINE HYDROCHLORIDE 10 MILLIGRAM(S): 2.5 TABLET ORAL at 13:28

## 2023-07-30 RX ADMIN — MIDODRINE HYDROCHLORIDE 10 MILLIGRAM(S): 2.5 TABLET ORAL at 05:17

## 2023-07-30 RX ADMIN — MIDODRINE HYDROCHLORIDE 10 MILLIGRAM(S): 2.5 TABLET ORAL at 21:34

## 2023-07-30 RX ADMIN — PIPERACILLIN AND TAZOBACTAM 25 GRAM(S): 4; .5 INJECTION, POWDER, LYOPHILIZED, FOR SOLUTION INTRAVENOUS at 05:17

## 2023-07-30 NOTE — PROGRESS NOTE ADULT - ASSESSMENT
84 yo f pmhx COPD on 2L home O2, HFpEF, CAD s/p PCI, HTN, HLD, pAF on rivaroxaban, diverticulosis, hemorrhoids admitted with     1. ABLA  2. GIB  3. Hemorrhagic Shock   4. DANETTE  5. Af with RVR    NEURO: no active issues  CV: Midodrine for bp support, close hd monitoring. amio for rate control   RESP: NC for spo2 >92%, inh bronchodilators prn  RENAL: DANETTE improving, avoid nephrotoxic medications, renally dose meds, trend urine output, bun/cr and electrolytes.  replace lytes as needed.    GI: dash/tlc diet  ENDO: no active issues  ID: Zosyn for empiric coverage   HEME: ABLA, ordered for 1U PRBC.    DISPO: Full code

## 2023-07-30 NOTE — PROGRESS NOTE ADULT - ASSESSMENT
84 yo f pmhx COPD on 2L home O2, HFpEF, CAD s/p PCI, HTN, HLD, pAF on rivaroxaban, diverticulosis, hemorrhoids presented with generalized weakness/dark stools admitted anemia, gib without active bleed on ct, received 3U PRBC, and IVF however remained hypotensive requiring vasopressor therapy.  EGD on 7/27 without active bleeding, colonoscopy 7/28 unrevealing as well. Hgb downtrending, 9.1 --> 8.2 -->7.1 -->1uPRBC -->9.0.    Plan:   Continue to trend CBC, transfuse as needed   Continue PPI BID   Diet as tolerated   pending CT-Enterography   Will need OPT capsule endoscopy when discharge

## 2023-07-30 NOTE — PROGRESS NOTE ADULT - ASSESSMENT
82 yo f pmhx COPD on 2L home O2, HFpEF, CAD s/p PCI, HTN, HLD, pAF on rivaroxaban, diverticulosis, hemorrhoids presented with generalized weakness/dark stools admitted anemia, gib without active bleed on ct, received 3U PRBC, and IVF however remained hypotensive requiring vasopressor therapy. for hemorrhagic shock ,   EGD on 7/27 and colonoscopy 7/28 without active bleed. admission c/w  DANETTE and  Af with RVR. resumed back on amio and bb .  hr now better controlled. plan for ct enterography. now downgraded to medicine 7/30/23.       > acute GIB / acute blood loss anemia /  hemorrhagic shock   - hx of GIb in past   - cta neg without active bleed on ct on admission   - s/p EGD on 7/27 without active bleeding  - s/p colonoscopy 7/28 no active bleed   -now off pressors , started on midodrine   - hold AC   - c/w ppis   - ct enterography pending   - plan for capsule endoscopy as op   - gi following   - monitor CBC, transfuse as needed   - Diet as tolerated     > pafib   - resumed on amio and bb  - adjust bb dose for better hr control  - prn iv lopressor for hr > 110      > hlp  - c/w statins     >copd / not in acute exacerbation  - c/w inhalers   - c/w o2     > dvt ppx : scds

## 2023-07-30 NOTE — CHART NOTE - NSCHARTNOTEFT_GEN_A_CORE
82 yo f pmhx COPD on 2L home O2, HFpEF, CAD s/p PCI, HTN, HLD, pAF on rivaroxaban, diverticulosis, hemorrhoids presented with generalized weakness/dark stools admitted anemia, gib without active bleed on ct, received 3U PRBC, and IVF however remained hypotensive requiring vasopressor therapy.  EGD on 7/27 and colonoscopy 7/28 without active bleed.    1. ABLA  2. GIB  3. Hemorrhagic Shock, resolved  4. DANETTE  5. Af with RVR    NEURO: Mentation intact  CV: Remains HDS off levophed >12hrs. Goal MAP >65. Midodrine for bp support, close hd monitoring. amio for rate control. Restarted metoprolol this morning with hold parameters.  RESP: NC for spo2 >92%, inh bronchodilators prn  RENAL: DANETTE improving, avoid nephrotoxic medications, renally dose meds, trend urine output, bun/cr and electrolytes. replace lytes as needed.    GI: dash/tlc diet  ENDO: no active issues  ID: Remains afebrile, WBC now wnl. On empiric Zosyn. No obvious source of infection at this time.  HEME: Hgb with slight downtrend to 7.1 this morning, given 1U PRBC with appropriate response to 9.0. Continue to trend CBC. Hold chemical DVT ppx. SCDs only for now.    DISPO: Patient is medically stable and clear for downgrade to telemetry for further management.  Full code       Case discussed with ICU Attending Dr Melendez. 84 yo f pmhx COPD on 2L home O2, HFpEF, CAD s/p PCI, HTN, HLD, pAF on rivaroxaban, diverticulosis, hemorrhoids presented with generalized weakness/dark stools admitted anemia, gib without active bleed on ct, received 3U PRBC, and IVF however remained hypotensive requiring vasopressor therapy.  EGD on 7/27 and colonoscopy 7/28 without active bleed.    1. ABLA  2. GIB  3. Hemorrhagic Shock, resolved  4. DANETTE  5. Af with RVR    NEURO: Mentation intact  CV: Remains HDS off levophed >12hrs. Goal MAP >65. Midodrine for bp support, close hd monitoring. amio for rate control. Restarted metoprolol this morning with hold parameters.  RESP: NC for spo2 >92%, inh bronchodilators prn  RENAL: DANETTE improving, avoid nephrotoxic medications, renally dose meds, trend urine output, bun/cr and electrolytes. replace lytes as needed.    GI: dash/tlc diet  ENDO: no active issues  ID: Remains afebrile, WBC now wnl. Discontinued empiric Zosyn today, received 5 day course. No obvious source of infection at this time.  HEME: Hgb with slight downtrend to 7.1 this morning, given 1U PRBC with appropriate response to 9.0. Continue to trend CBC. Hold chemical DVT ppx. SCDs only for now.    DISPO: Patient is medically stable and clear for downgrade to telemetry for further management.  Full code       Case discussed with ICU Attending Dr Melendez. 82 yo f pmhx COPD on 2L home O2, HFpEF, CAD s/p PCI, HTN, HLD, pAF on rivaroxaban, diverticulosis, hemorrhoids presented with generalized weakness/dark stools admitted anemia, gib without active bleed on ct, received 3U PRBC, and IVF however remained hypotensive requiring vasopressor therapy.  EGD on 7/27 and colonoscopy 7/28 without active bleed.    1. ABLA  2. GIB  3. Hemorrhagic Shock, resolved  4. DANETTE  5. Af with RVR    NEURO: Mentation intact  CV: Remains HDS off levophed >12hrs. Goal MAP >65. Midodrine for bp support, close hd monitoring. amio for rate control. Restarted metoprolol this morning with hold parameters.  RESP: NC for spo2 >92%, inh bronchodilators prn  RENAL: DANETTE improving, avoid nephrotoxic medications, renally dose meds, trend urine output, bun/cr and electrolytes. replace lytes as needed.    GI: dash/tlc diet. GI following, both EGD and colonoscopy unremarkable, will need eventual CTE and upon discharge a capsule endoscopy.  ENDO: no active issues  ID: Remains afebrile, WBC now wnl. Discontinued empiric Zosyn today, received 5 day course. No obvious source of infection at this time.  HEME: Hgb with slight downtrend to 7.1 this morning, given 1U PRBC with appropriate response to 9.0. Continue to trend CBC. Hold chemical DVT ppx. SCDs only for now.    DISPO: Patient is medically stable and clear for downgrade to telemetry for further management.  Full code       Case discussed with ICU Attending Dr Melendez.

## 2023-07-30 NOTE — PROGRESS NOTE ADULT - SUBJECTIVE AND OBJECTIVE BOX
Chief Complaint:  Patient is a 83y old  Female who presents with a chief complaint of Gastrointestinal hemorrhage     (28 Jul 2023 12:45)      HPI/ 24 hr events: Patient seen and examined at bedside, no overnight events. EGD on 7/27 without active bleeding, colonoscopy 7/28 unrevealing as well. Pt feeling well this morning. Tolerating diet. Vitals are stable. Hgb downtrending, 9.1 --> 8.2 -->7.1 -->1uPRBC -->9.0. Denies nausea, vomiting, abdominal pain.      REVIEW OF SYSTEMS:   General: Negative  HEENT: Negative  CV: Negative  Respiratory: Negative  GI: See HPI  : Negative  MSK: Negative  Hematologic: Negative  Skin: Negative    MEDICATIONS:   MEDICATIONS  (STANDING):  aMIOdarone    Tablet 200 milliGRAM(s) Oral daily  atorvastatin 80 milliGRAM(s) Oral at bedtime  chlorhexidine 2% Cloths 1 Application(s) Topical daily  metoprolol tartrate 25 milliGRAM(s) Oral every 8 hours  midodrine 10 milliGRAM(s) Oral every 8 hours  multivitamin 1 Tablet(s) Oral daily  pantoprazole  Injectable 40 milliGRAM(s) IV Push daily  piperacillin/tazobactam IVPB.. 3.375 Gram(s) IV Intermittent every 8 hours  tiotropium 2.5 MICROgram(s)/olodaterol 2.5 MICROgram(s) (STIOLTO) Inhaler 2 Puff(s) Inhalation daily    MEDICATIONS  (PRN):  melatonin 5 milliGRAM(s) Oral at bedtime PRN Sleep      ALLERGIES:   Allergies    No Known Allergies    Intolerances        VITAL SIGNS:   Vital Signs Last 24 Hrs  T(C): 36.4 (30 Jul 2023 10:41), Max: 37 (29 Jul 2023 16:38)  T(F): 97.5 (30 Jul 2023 10:41), Max: 98.6 (29 Jul 2023 16:38)  HR: 107 (30 Jul 2023 12:00) (94 - 113)  BP: 116/77 (30 Jul 2023 12:00) (89/59 - 128/69)  BP(mean): 90 (30 Jul 2023 12:00) (65 - 90)  RR: 23 (30 Jul 2023 12:00) (13 - 26)  SpO2: 98% (30 Jul 2023 12:00) (97% - 100%)    Parameters below as of 30 Jul 2023 08:00  Patient On (Oxygen Delivery Method): nasal cannula  O2 Flow (L/min): 2    I&O's Summary    29 Jul 2023 07:01  -  30 Jul 2023 07:00  --------------------------------------------------------  IN: 202.6 mL / OUT: 900 mL / NET: -697.4 mL    30 Jul 2023 07:01  -  30 Jul 2023 12:22  --------------------------------------------------------  IN: 392 mL / OUT: 0 mL / NET: 392 mL        PHYSICAL EXAM:   GENERAL:  No acute distress  HEENT:  NC/AT, conjunctiva clear, sclera anicteric  CHEST:  No increased effort  HEART:  Regular rhythm  ABDOMEN:  Soft, non-tender, non-distended, normoactive bowel sounds  EXTREMITIES: No edema  SKIN:  Warm, dry  NEURO:  Calm, cooperative      LABS:  CBC Full  -  ( 30 Jul 2023 12:02 )  WBC Count : 7.69 K/uL  RBC Count : 3.04 M/uL  Hemoglobin : 9.0 g/dL  Hematocrit : 28.6 %  Platelet Count - Automated : 238 K/uL  Mean Cell Volume : 94.1 fl  Mean Cell Hemoglobin : 29.6 pg  Mean Cell Hemoglobin Concentration : 31.5 gm/dL  Auto Neutrophil # : x  Auto Lymphocyte # : x  Auto Monocyte # : x  Auto Eosinophil # : x  Auto Basophil # : x  Auto Neutrophil % : x  Auto Lymphocyte % : x  Auto Monocyte % : x  Auto Eosinophil % : x  Auto Basophil % : x    07-30    140  |  110<H>  |  24.5<H>  ----------------------------<  105<H>  3.9   |  20.0<L>  |  1.18    Ca    7.8<L>      30 Jul 2023 02:30  Phos  2.8     07-30  Mg     2.0     07-30    TPro  4.8<L>  /  Alb  2.8<L>  /  TBili  0.6  /  DBili  x   /  AST  12  /  ALT  12  /  AlkPhos  202<H>  07-30    LIVER FUNCTIONS - ( 30 Jul 2023 02:30 )  Alb: 2.8 g/dL / Pro: 4.8 g/dL / ALK PHOS: 202 U/L / ALT: 12 U/L / AST: 12 U/L / GGT: x             Urinalysis Basic - ( 30 Jul 2023 02:30 )    Color: x / Appearance: x / SG: x / pH: x  Gluc: 105 mg/dL / Ketone: x  / Bili: x / Urobili: x   Blood: x / Protein: x / Nitrite: x   Leuk Esterase: x / RBC: x / WBC x   Sq Epi: x / Non Sq Epi: x / Bacteria: x                RADIOLOGY & ADDITIONAL STUDIES:        < from: CT Abdomen and Pelvis w/ IV Cont (07.26.23 @ 11:28) >  ACC: [id number] EXAM:  CT ABDOMEN AND PELVIS IC   ORDERED BY: JAQUI BUSCH   PROCEDURE DATE:  07/26/2023        FINDINGS:    LOWER CHEST: Cardiomegaly. Coronary artery calcification.    LIVER: Within normal limits.  BILE DUCTS: Normal caliber.  GALLBLADDER: Cholelithiasis.  SPLEEN: Within normal limits.  PANCREAS: Within normal limits.  ADRENALS: Within normal limits.  KIDNEYS/URETERS: Focal scarring upper pole left kidney.  Small hypodense lesions right kidney too small for characterization.  No hydronephrosis.    BLADDER: Within normal limits.  REPRODUCTIVE ORGANS: 1.7 cm left adnexal cyst.    BOWEL:   No bowel obstruction.  1.7 cm intraluminal fatty lesion antrum of stomach, likely reflecting   lipoma.  Colonic diverticulosis, without CT evidence of diverticulitis.  There is a possible short segment of intraluminal narrowing at the   splenic flexure.  There is no intraluminal extravasation of contrast to suggest active   gastrointestinal bleeding.  Appendix is not visualized.  PERITONEUM: No ascites.    VESSELS:  Atherosclerotic changes.  RETROPERITONEUM/LYMPH NODES: No lymphadenopathy.    ABDOMINAL WALL: Within normal limits.    BONES: Degenerative changes.    IMPRESSION:    Colonic diverticulosis.  Possible short segment of luminal narrowing near the splenic flexure.    Recommend further evaluation with colonoscopy to exclude underlying   intrinsic mucosal lesion or neoplasm.  No CT evidence for active gastrointestinal bleeding.    Other findings as discussed above.    --- End of Report ---      < end of copied text >

## 2023-07-30 NOTE — PROGRESS NOTE ADULT - SUBJECTIVE AND OBJECTIVE BOX
Patient is a 83y old  Female who presents with a chief complaint of Gastrointestinal hemorrhage     (2023 12:45)      BRIEF HOSPITAL COURSE:       Events last 24 hours:      PAST MEDICAL & SURGICAL HISTORY:  HTN (hypertension)    HLD (hyperlipidemia)    CAD (coronary artery disease)    COPD (chronic obstructive pulmonary disease)    Acute CHF    S/P  section      Allergies  No Known Allergies      FAMILY HISTORY:  FHx: heart disease (Sibling)      Social History:   from home       Review of Systems:  no active complaints      Physical Examination:    General: pleasant elderly female, lying in bed, nad     HEENT: nc/at    PULM: symmetrical thorax expansion upon respiration    CVS: sinus tach    ABD: Soft, nondistended, nontender, +bs    EXT: No edema, nontender    SKIN: Warm    NEURO: Alert, interactive      Medications:  piperacillin/tazobactam IVPB.. 3.375 Gram(s) IV Intermittent every 8 hours    aMIOdarone    Tablet 200 milliGRAM(s) Oral daily  midodrine 10 milliGRAM(s) Oral every 8 hours    tiotropium 2.5 MICROgram(s)/olodaterol 2.5 MICROgram(s) (STIOLTO) Inhaler 2 Puff(s) Inhalation daily    melatonin 5 milliGRAM(s) Oral at bedtime PRN    pantoprazole  Injectable 40 milliGRAM(s) IV Push daily  atorvastatin 80 milliGRAM(s) Oral at bedtime    multivitamin 1 Tablet(s) Oral daily        ICU Vital Signs Last 24 Hrs  T(C): 36.4 (2023 23:53), Max: 37 (2023 16:38)  T(F): 97.5 (2023 23:53), Max: 98.6 (2023 16:38)  HR: 98 (2023 02:00) (93 - 113)  BP: 89/59 (2023 02:00) (89/58 - 139/75)  BP(mean): 68 (2023 02:00) (61 - 126)  ABP: --  ABP(mean): --  RR: 18 (2023 02:00) (13 - 26)  SpO2: 100% (2023 02:00) (97% - 100%)    O2 Parameters below as of 2023 17:00  Patient On (Oxygen Delivery Method): nasal cannula  O2 Flow (L/min): 2        Vital Signs Last 24 Hrs  T(C): 36.4 (2023 23:53), Max: 37 (2023 16:38)  T(F): 97.5 (2023 23:53), Max: 98.6 (2023 16:38)  HR: 98 (2023 02:00) (93 - 113)  BP: 89/59 (2023 02:00) (89/58 - 139/75)  BP(mean): 68 (:00) (61 - 126)  RR: 18 (:00) (13 - 26)  SpO2: 100% (:00) (97% - 100%)    Parameters below as of 2023 17:00  Patient On (Oxygen Delivery Method): nasal cannula  O2 Flow (L/min): 2      I&O's Detail    2023 07:01  -  2023 07:00  --------------------------------------------------------  IN:    Albumin 5%  - 250 mL: 250 mL    IV PiggyBack: 250 mL    IV PiggyBack: 300 mL    Lactated Ringers Bolus: 500 mL    Norepinephrine: 584.7 mL  Total IN: 1884.7 mL    OUT:    Voided (mL): 1300 mL  Total OUT: 1300 mL  Total NET: 584.7 mL      2023 07:01  -  2023 03:07  --------------------------------------------------------  IN:    IV PiggyBack: 100 mL    Norepinephrine: 102.6 mL  Total IN: 202.6 mL    OUT:    Voided (mL): 700 mL  Total OUT: 700 mL  Total NET: -497.4 mL      LABS:                        7.1    7.07  )-----------( 197      ( 2023 02:30 )             22.3     07-29    142  |  110<H>  |  29.8<H>  ----------------------------<  138<H>  3.3<L>   |  20.0<L>  |  1.29    Ca    7.8<L>      2023 03:00  Phos  2.7       Mg     2.1         TPro  5.1<L>  /  Alb  2.8<L>  /  TBili  0.7  /  DBili  x   /  AST  16  /  ALT  13  /  AlkPhos  250<H>        Urinalysis Basic - ( 2023 03:00 )    Color: x / Appearance: x / SG: x / pH: x  Gluc: 138 mg/dL / Ketone: x  / Bili: x / Urobili: x   Blood: x / Protein: x / Nitrite: x   Leuk Esterase: x / RBC: x / WBC x   Sq Epi: x / Non Sq Epi: x / Bacteria: x      CULTURES:  Culture Results:   <10,000 CFU/mL Normal Urogenital Samaria ( @ 17:18)  Culture Results:   No growth at 72 Hours ( @ 09:55)  Culture Results:   No growth at 72 Hours ( @ 09:50)        RADIOLOGY:   < from: CT Abdomen and Pelvis w/ IV Cont (23 @ 11:28) >  ACC: [id number] EXAM:  CT ABDOMEN AND PELVIS IC   ORDERED BY: JAQUI BUSCH     PROCEDURE DATE:  2023      INTERPRETATION:  CLINICAL INFORMATION: Melena. Hypotension.    COMPARISON: None.    CONTRAST/COMPLICATIONS:  IV Contrast: Omnipaque 350  90cc administered   10 cc discarded  Oral Contrast: NONE  Complications: None reported at time of study completion    PROCEDURE:  CT of the Abdomen and Pelvis was performed.  Precontrast, Arterial and Delayed phases were performed.  Sagittal and coronal reformats were performed.    FINDINGS:    LOWER CHEST: Cardiomegaly. Coronary artery calcification.    LIVER: Within normal limits.  BILE DUCTS: Normal caliber.  GALLBLADDER: Cholelithiasis.  SPLEEN: Within normal limits.  PANCREAS: Within normal limits.  ADRENALS: Within normal limits.  KIDNEYS/URETERS: Focal scarring upper pole left kidney.  Small hypodense lesions right kidney too small for characterization.  No hydronephrosis.    BLADDER: Within normal limits.  REPRODUCTIVE ORGANS: 1.7 cm left adnexal cyst.    BOWEL:   No bowel obstruction.  1.7 cm intraluminal fatty lesion antrum of stomach, likely reflecting   lipoma.  Colonic diverticulosis, without CT evidence of diverticulitis.  There is a possible short segment of intraluminal narrowing at the   splenic flexure.  There is no intraluminal extravasation of contrast to suggest active   gastrointestinal bleeding.  Appendix is not visualized.  PERITONEUM: No ascites.    VESSELS:  Atherosclerotic changes.  RETROPERITONEUM/LYMPH NODES: No lymphadenopathy.    ABDOMINAL WALL: Within normal limits.    BONES: Degenerative changes.    IMPRESSION:    Colonic diverticulosis.  Possible short segment of luminal narrowing near the splenic flexure.    Recommend further evaluation with colonoscopy to exclude underlying   intrinsic mucosal lesion or neoplasm.  No CT evidence for active gastrointestinal bleeding.    Other findings as discussed above.    --- End of Report ---      JENSEN MUSTAFA MD; Attending Radiologist  This document has been electronically signed. 2023 11:54AM    < end of copied text >   Patient is a 83y old  Female who presents with a chief complaint of Gastrointestinal hemorrhage     (2023 12:45)      BRIEF HOSPITAL COURSE:   82 yo f pmhx COPD on 2L home O2, HFpEF, CAD s/p PCI, HTN, HLD, pAF on rivaroxaban, diverticulosis, hemorrhoids presented with generalized weakness/dark stools admitted anemia, gib without active bleed on ct, received 3U PRBC, and IVF however remained hypotensive requiring vasopressor therapy.  EGD on     Events last 24 hours:      PAST MEDICAL & SURGICAL HISTORY:  HTN (hypertension)    HLD (hyperlipidemia)    CAD (coronary artery disease)    COPD (chronic obstructive pulmonary disease)    Acute CHF    S/P  section      Allergies  No Known Allergies      FAMILY HISTORY:  FHx: heart disease (Sibling)      Social History:   from home       Review of Systems:  no active complaints      Physical Examination:    General: pleasant elderly female, lying in bed, nad     HEENT: nc/at    PULM: symmetrical thorax expansion upon respiration    CVS: sinus tach    ABD: Soft, nondistended, nontender, +bs    EXT: No edema, nontender    SKIN: Warm    NEURO: Alert, interactive      Medications:  piperacillin/tazobactam IVPB.. 3.375 Gram(s) IV Intermittent every 8 hours    aMIOdarone    Tablet 200 milliGRAM(s) Oral daily  midodrine 10 milliGRAM(s) Oral every 8 hours    tiotropium 2.5 MICROgram(s)/olodaterol 2.5 MICROgram(s) (STIOLTO) Inhaler 2 Puff(s) Inhalation daily    melatonin 5 milliGRAM(s) Oral at bedtime PRN    pantoprazole  Injectable 40 milliGRAM(s) IV Push daily  atorvastatin 80 milliGRAM(s) Oral at bedtime    multivitamin 1 Tablet(s) Oral daily        ICU Vital Signs Last 24 Hrs  T(C): 36.4 (2023 23:53), Max: 37 (2023 16:38)  T(F): 97.5 (2023 23:53), Max: 98.6 (2023 16:38)  HR: 98 (2023 02:00) (93 - 113)  BP: 89/59 (2023 02:00) (89/58 - 139/75)  BP(mean): 68 (2023 02:00) (61 - 126)  ABP: --  ABP(mean): --  RR: 18 (2023 02:00) (13 - 26)  SpO2: 100% (:00) (97% - 100%)    O2 Parameters below as of 2023 17:00  Patient On (Oxygen Delivery Method): nasal cannula  O2 Flow (L/min): 2        Vital Signs Last 24 Hrs  T(C): 36.4 (2023 23:53), Max: 37 (2023 16:38)  T(F): 97.5 (2023 23:53), Max: 98.6 (2023 16:38)  HR: 98 (2023 02:00) (93 - 113)  BP: 89/59 (2023 02:00) (89/58 - 139/75)  BP(mean): 68 (:00) (61 - 126)  RR: 18 (:00) (13 - 26)  SpO2: 100% (:00) (97% - 100%)    Parameters below as of 2023 17:00  Patient On (Oxygen Delivery Method): nasal cannula  O2 Flow (L/min): 2      I&O's Detail    2023 07:01  -  2023 07:00  --------------------------------------------------------  IN:    Albumin 5%  - 250 mL: 250 mL    IV PiggyBack: 250 mL    IV PiggyBack: 300 mL    Lactated Ringers Bolus: 500 mL    Norepinephrine: 584.7 mL  Total IN: 1884.7 mL    OUT:    Voided (mL): 1300 mL  Total OUT: 1300 mL  Total NET: 584.7 mL      2023 07:01  -  2023 03:07  --------------------------------------------------------  IN:    IV PiggyBack: 100 mL    Norepinephrine: 102.6 mL  Total IN: 202.6 mL    OUT:    Voided (mL): 700 mL  Total OUT: 700 mL  Total NET: -497.4 mL      LABS:                        7.1    7.07  )-----------( 197      ( 2023 02:30 )             22.3         142  |  110<H>  |  29.8<H>  ----------------------------<  138<H>  3.3<L>   |  20.0<L>  |  1.29    Ca    7.8<L>      2023 03:00  Phos  2.7       Mg     2.1         TPro  5.1<L>  /  Alb  2.8<L>  /  TBili  0.7  /  DBili  x   /  AST  16  /  ALT  13  /  AlkPhos  250<H>        Urinalysis Basic - ( 2023 03:00 )    Color: x / Appearance: x / SG: x / pH: x  Gluc: 138 mg/dL / Ketone: x  / Bili: x / Urobili: x   Blood: x / Protein: x / Nitrite: x   Leuk Esterase: x / RBC: x / WBC x   Sq Epi: x / Non Sq Epi: x / Bacteria: x      CULTURES:  Culture Results:   <10,000 CFU/mL Normal Urogenital Samaria ( @ 17:18)  Culture Results:   No growth at 72 Hours ( @ 09:55)  Culture Results:   No growth at 72 Hours ( @ 09:50)        RADIOLOGY:   < from: CT Abdomen and Pelvis w/ IV Cont (23 @ 11:28) >  ACC: [id number] EXAM:  CT ABDOMEN AND PELVIS IC   ORDERED BY: JAQUI BUSCH     PROCEDURE DATE:  2023      INTERPRETATION:  CLINICAL INFORMATION: Melena. Hypotension.    COMPARISON: None.    CONTRAST/COMPLICATIONS:  IV Contrast: Omnipaque 350  90cc administered   10 cc discarded  Oral Contrast: NONE  Complications: None reported at time of study completion    PROCEDURE:  CT of the Abdomen and Pelvis was performed.  Precontrast, Arterial and Delayed phases were performed.  Sagittal and coronal reformats were performed.    FINDINGS:    LOWER CHEST: Cardiomegaly. Coronary artery calcification.    LIVER: Within normal limits.  BILE DUCTS: Normal caliber.  GALLBLADDER: Cholelithiasis.  SPLEEN: Within normal limits.  PANCREAS: Within normal limits.  ADRENALS: Within normal limits.  KIDNEYS/URETERS: Focal scarring upper pole left kidney.  Small hypodense lesions right kidney too small for characterization.  No hydronephrosis.    BLADDER: Within normal limits.  REPRODUCTIVE ORGANS: 1.7 cm left adnexal cyst.    BOWEL:   No bowel obstruction.  1.7 cm intraluminal fatty lesion antrum of stomach, likely reflecting   lipoma.  Colonic diverticulosis, without CT evidence of diverticulitis.  There is a possible short segment of intraluminal narrowing at the   splenic flexure.  There is no intraluminal extravasation of contrast to suggest active   gastrointestinal bleeding.  Appendix is not visualized.  PERITONEUM: No ascites.    VESSELS:  Atherosclerotic changes.  RETROPERITONEUM/LYMPH NODES: No lymphadenopathy.    ABDOMINAL WALL: Within normal limits.    BONES: Degenerative changes.    IMPRESSION:    Colonic diverticulosis.  Possible short segment of luminal narrowing near the splenic flexure.    Recommend further evaluation with colonoscopy to exclude underlying   intrinsic mucosal lesion or neoplasm.  No CT evidence for active gastrointestinal bleeding.    Other findings as discussed above.    --- End of Report ---      JENSEN MUSTAFA MD; Attending Radiologist  This document has been electronically signed. 2023 11:54AM    < end of copied text >   Patient is a 83y old  Female who presents with a chief complaint of Gastrointestinal hemorrhage     (2023 12:45)      BRIEF HOSPITAL COURSE:   84 yo f pmhx COPD on 2L home O2, HFpEF, CAD s/p PCI, HTN, HLD, pAF on rivaroxaban, diverticulosis, hemorrhoids presented with generalized weakness/dark stools admitted anemia, gib without active bleed on ct, received 3U PRBC, and IVF however remained hypotensive requiring vasopressor therapy.  EGD on  without active bleeding, colonoscopy  unrevealing as well.     Events last 24 hours:  cbc downtrending, 9.1 --> 8.2 -->7.1. ordered for 1U PRBC. remains off levophed for >12 hours however BP soft, close hd monitoring.      PAST MEDICAL & SURGICAL HISTORY:  HTN (hypertension)    HLD (hyperlipidemia)    CAD (coronary artery disease)    COPD (chronic obstructive pulmonary disease)    Acute CHF    S/P  section      Allergies  No Known Allergies      FAMILY HISTORY:  FHx: heart disease (Sibling)      Social History:   from home       Review of Systems:  no active complaints      Physical Examination:    General: pleasant elderly female, lying in bed, nad     HEENT: nc/at    PULM: symmetrical thorax expansion upon respiration    CVS: sinus tach    ABD: Soft, nondistended, nontender, +bs    EXT: No edema, nontender    SKIN: Warm    NEURO: Alert, interactive      Medications:  piperacillin/tazobactam IVPB.. 3.375 Gram(s) IV Intermittent every 8 hours    aMIOdarone    Tablet 200 milliGRAM(s) Oral daily  midodrine 10 milliGRAM(s) Oral every 8 hours    tiotropium 2.5 MICROgram(s)/olodaterol 2.5 MICROgram(s) (STIOLTO) Inhaler 2 Puff(s) Inhalation daily    melatonin 5 milliGRAM(s) Oral at bedtime PRN    pantoprazole  Injectable 40 milliGRAM(s) IV Push daily  atorvastatin 80 milliGRAM(s) Oral at bedtime    multivitamin 1 Tablet(s) Oral daily        ICU Vital Signs Last 24 Hrs  T(C): 36.4 (2023 23:53), Max: 37 (2023 16:38)  T(F): 97.5 (2023 23:53), Max: 98.6 (2023 16:38)  HR: 98 (2023 02:00) (93 - 113)  BP: 89/59 (2023 02:00) (89/58 - 139/75)  BP(mean): 68 (2023 02:00) (61 - 126)  ABP: --  ABP(mean): --  RR: 18 (2023 02:00) (13 - 26)  SpO2: 100% (2023 02:00) (97% - 100%)    O2 Parameters below as of 2023 17:  Patient On (Oxygen Delivery Method): nasal cannula  O2 Flow (L/min): 2        Vital Signs Last 24 Hrs  T(C): 36.4 (2023 23:53), Max: 37 (2023 16:38)  T(F): 97.5 (2023 23:53), Max: 98.6 (2023 16:38)  HR: 98 (2023 02:00) (93 - 113)  BP: 89/59 (2023 02:00) (89/58 - 139/75)  BP(mean): 68 (2023 02:00) (61 - 126)  RR: 18 (2023 02:00) (13 - 26)  SpO2: 100% (2023 02:00) (97% - 100%)    Parameters below as of 2023 17:00  Patient On (Oxygen Delivery Method): nasal cannula  O2 Flow (L/min): 2      I&O's Detail    2023 07:01  -  2023 07:00  --------------------------------------------------------  IN:    Albumin 5%  - 250 mL: 250 mL    IV PiggyBack: 250 mL    IV PiggyBack: 300 mL    Lactated Ringers Bolus: 500 mL    Norepinephrine: 584.7 mL  Total IN: 1884.7 mL    OUT:    Voided (mL): 1300 mL  Total OUT: 1300 mL  Total NET: 584.7 mL      2023 07:01  -  2023 03:07  --------------------------------------------------------  IN:    IV PiggyBack: 100 mL    Norepinephrine: 102.6 mL  Total IN: 202.6 mL    OUT:    Voided (mL): 700 mL  Total OUT: 700 mL  Total NET: -497.4 mL      LABS:                        7.1    7.07  )-----------( 197      ( 2023 02:30 )             22.3         142  |  110<H>  |  29.8<H>  ----------------------------<  138<H>  3.3<L>   |  20.0<L>  |  1.29    Ca    7.8<L>      2023 03:00  Phos  2.7       Mg     2.1         TPro  5.1<L>  /  Alb  2.8<L>  /  TBili  0.7  /  DBili  x   /  AST  16  /  ALT  13  /  AlkPhos  250<H>        Urinalysis Basic - ( 2023 03:00 )    Color: x / Appearance: x / SG: x / pH: x  Gluc: 138 mg/dL / Ketone: x  / Bili: x / Urobili: x   Blood: x / Protein: x / Nitrite: x   Leuk Esterase: x / RBC: x / WBC x   Sq Epi: x / Non Sq Epi: x / Bacteria: x      CULTURES:  Culture Results:   <10,000 CFU/mL Normal Urogenital Samaria ( @ 17:18)  Culture Results:   No growth at 72 Hours ( @ 09:55)  Culture Results:   No growth at 72 Hours ( @ 09:50)        RADIOLOGY:   < from: CT Abdomen and Pelvis w/ IV Cont (23 @ 11:28) >  ACC: [id number] EXAM:  CT ABDOMEN AND PELVIS IC   ORDERED BY: JAQUI BUSCH     PROCEDURE DATE:  2023      INTERPRETATION:  CLINICAL INFORMATION: Melena. Hypotension.    COMPARISON: None.    CONTRAST/COMPLICATIONS:  IV Contrast: Omnipaque 350  90cc administered   10 cc discarded  Oral Contrast: NONE  Complications: None reported at time of study completion    PROCEDURE:  CT of the Abdomen and Pelvis was performed.  Precontrast, Arterial and Delayed phases were performed.  Sagittal and coronal reformats were performed.    FINDINGS:    LOWER CHEST: Cardiomegaly. Coronary artery calcification.    LIVER: Within normal limits.  BILE DUCTS: Normal caliber.  GALLBLADDER: Cholelithiasis.  SPLEEN: Within normal limits.  PANCREAS: Within normal limits.  ADRENALS: Within normal limits.  KIDNEYS/URETERS: Focal scarring upper pole left kidney.  Small hypodense lesions right kidney too small for characterization.  No hydronephrosis.    BLADDER: Within normal limits.  REPRODUCTIVE ORGANS: 1.7 cm left adnexal cyst.    BOWEL:   No bowel obstruction.  1.7 cm intraluminal fatty lesion antrum of stomach, likely reflecting   lipoma.  Colonic diverticulosis, without CT evidence of diverticulitis.  There is a possible short segment of intraluminal narrowing at the   splenic flexure.  There is no intraluminal extravasation of contrast to suggest active   gastrointestinal bleeding.  Appendix is not visualized.  PERITONEUM: No ascites.    VESSELS:  Atherosclerotic changes.  RETROPERITONEUM/LYMPH NODES: No lymphadenopathy.    ABDOMINAL WALL: Within normal limits.    BONES: Degenerative changes.    IMPRESSION:    Colonic diverticulosis.  Possible short segment of luminal narrowing near the splenic flexure.    Recommend further evaluation with colonoscopy to exclude underlying   intrinsic mucosal lesion or neoplasm.  No CT evidence for active gastrointestinal bleeding.    Other findings as discussed above.    --- End of Report ---      JENSEN MUSTAFA MD; Attending Radiologist  This document has been electronically signed. 2023 11:54AM    < end of copied text >

## 2023-07-30 NOTE — PROGRESS NOTE ADULT - SUBJECTIVE AND OBJECTIVE BOX
cc: gib      h&p / micu course : 82 yo f pmhx COPD on 2L home O2, HFpEF, CAD s/p PCI, HTN, HLD, pAF on rivaroxaban, diverticulosis, hemorrhoids presented with generalized weakness/dark stools admitted anemia, gib without active bleed on ct, received 3U PRBC, and IVF however remained hypotensive requiring vasopressor therapy. for hemorrhagic shock ,   EGD on 7/27 and colonoscopy 7/28 without active bleed. admission c/w  DANETTE and  Af with RVR. resumed back on amio and bb .  hr now better controlled. plan for ct enterography. now downgraded to medicine 7/30/23.       interval hx: patient seen and eval. awake, alert x 3 , comfortable. sitting in chair. in no acute distress.  tolerating po diet. denies any dizziness. no new haile episodes.       Vital Signs Last 24 Hrs  T(C): 36.7 (30 Jul 2023 16:14), Max: 36.8 (29 Jul 2023 20:36)  T(F): 98 (30 Jul 2023 16:14), Max: 98.3 (29 Jul 2023 20:36)  HR: 110 (30 Jul 2023 16:14) (98 - 113)  BP: 117/84 (30 Jul 2023 16:14) (89/59 - 135/93)  BP(mean): 101 (30 Jul 2023 16:00) (67 - 101)  RR: 20 (30 Jul 2023 16:14) (13 - 25)  SpO2: 98% (30 Jul 2023 16:14) (97% - 100%)    Parameters below as of 30 Jul 2023 16:14  Patient On (Oxygen Delivery Method): nasal cannula  O2 Flow (L/min): 2      PHYSICAL EXAM:   GENERAL:  No acute distress  HEENT:  mmm   CHEST:  No increased effort  HEART:  Regular rhythm  ABDOMEN:  Soft, non-tender, non-distended, normoactive bowel sounds  EXTREMITIES: No edema  SKIN:  Warm, dry  NEURO:  Calm, cooperative                          9.0    7.69  )-----------( 238      ( 30 Jul 2023 12:02 )             28.6   07-30    140  |  110<H>  |  24.5<H>  ----------------------------<  105<H>  3.9   |  20.0<L>  |  1.18    Ca    7.8<L>      30 Jul 2023 02:30  Phos  2.8     07-30  Mg     2.0     07-30    TPro  4.8<L>  /  Alb  2.8<L>  /  TBili  0.6  /  DBili  x   /  AST  12  /  ALT  12  /  AlkPhos  202<H>  07-30      MEDICATIONS  (STANDING):  aMIOdarone    Tablet 200 milliGRAM(s) Oral daily  atorvastatin 80 milliGRAM(s) Oral at bedtime  chlorhexidine 2% Cloths 1 Application(s) Topical daily  metoprolol tartrate 25 milliGRAM(s) Oral every 8 hours  midodrine 10 milliGRAM(s) Oral every 8 hours  multivitamin 1 Tablet(s) Oral daily  pantoprazole  Injectable 40 milliGRAM(s) IV Push daily  tiotropium 2.5 MICROgram(s)/olodaterol 2.5 MICROgram(s) (STIOLTO) Inhaler 2 Puff(s) Inhalation daily    MEDICATIONS  (PRN):  melatonin 5 milliGRAM(s) Oral at bedtime PRN Sleep

## 2023-07-31 LAB
ALBUMIN SERPL ELPH-MCNC: 2.9 G/DL — LOW (ref 3.3–5.2)
ALP SERPL-CCNC: 246 U/L — HIGH (ref 40–120)
ALT FLD-CCNC: 14 U/L — SIGNIFICANT CHANGE UP
ANION GAP SERPL CALC-SCNC: 11 MMOL/L — SIGNIFICANT CHANGE UP (ref 5–17)
AST SERPL-CCNC: 15 U/L — SIGNIFICANT CHANGE UP
BILIRUB SERPL-MCNC: 0.4 MG/DL — SIGNIFICANT CHANGE UP (ref 0.4–2)
BUN SERPL-MCNC: 22.3 MG/DL — HIGH (ref 8–20)
CALCIUM SERPL-MCNC: 7.8 MG/DL — LOW (ref 8.4–10.5)
CHLORIDE SERPL-SCNC: 106 MMOL/L — SIGNIFICANT CHANGE UP (ref 96–108)
CO2 SERPL-SCNC: 18 MMOL/L — LOW (ref 22–29)
CREAT SERPL-MCNC: 1.27 MG/DL — SIGNIFICANT CHANGE UP (ref 0.5–1.3)
CULTURE RESULTS: SIGNIFICANT CHANGE UP
CULTURE RESULTS: SIGNIFICANT CHANGE UP
EGFR: 42 ML/MIN/1.73M2 — LOW
GLUCOSE SERPL-MCNC: 116 MG/DL — HIGH (ref 70–99)
HCT VFR BLD CALC: 29.3 % — LOW (ref 34.5–45)
HGB BLD-MCNC: 9 G/DL — LOW (ref 11.5–15.5)
MAGNESIUM SERPL-MCNC: 2.4 MG/DL — SIGNIFICANT CHANGE UP (ref 1.6–2.6)
MCHC RBC-ENTMCNC: 29.9 PG — SIGNIFICANT CHANGE UP (ref 27–34)
MCHC RBC-ENTMCNC: 30.7 GM/DL — LOW (ref 32–36)
MCV RBC AUTO: 97.3 FL — SIGNIFICANT CHANGE UP (ref 80–100)
PLATELET # BLD AUTO: 277 K/UL — SIGNIFICANT CHANGE UP (ref 150–400)
POTASSIUM SERPL-MCNC: 4.5 MMOL/L — SIGNIFICANT CHANGE UP (ref 3.5–5.3)
POTASSIUM SERPL-SCNC: 4.5 MMOL/L — SIGNIFICANT CHANGE UP (ref 3.5–5.3)
PROT SERPL-MCNC: 5.5 G/DL — LOW (ref 6.6–8.7)
RBC # BLD: 3.01 M/UL — LOW (ref 3.8–5.2)
RBC # FLD: 18.8 % — HIGH (ref 10.3–14.5)
SODIUM SERPL-SCNC: 135 MMOL/L — SIGNIFICANT CHANGE UP (ref 135–145)
SPECIMEN SOURCE: SIGNIFICANT CHANGE UP
SPECIMEN SOURCE: SIGNIFICANT CHANGE UP
WBC # BLD: 8.08 K/UL — SIGNIFICANT CHANGE UP (ref 3.8–10.5)
WBC # FLD AUTO: 8.08 K/UL — SIGNIFICANT CHANGE UP (ref 3.8–10.5)

## 2023-07-31 PROCEDURE — 99232 SBSQ HOSP IP/OBS MODERATE 35: CPT

## 2023-07-31 PROCEDURE — 99233 SBSQ HOSP IP/OBS HIGH 50: CPT

## 2023-07-31 RX ADMIN — CHLORHEXIDINE GLUCONATE 1 APPLICATION(S): 213 SOLUTION TOPICAL at 13:20

## 2023-07-31 RX ADMIN — MIDODRINE HYDROCHLORIDE 10 MILLIGRAM(S): 2.5 TABLET ORAL at 13:19

## 2023-07-31 RX ADMIN — AMIODARONE HYDROCHLORIDE 200 MILLIGRAM(S): 400 TABLET ORAL at 05:25

## 2023-07-31 RX ADMIN — ATORVASTATIN CALCIUM 80 MILLIGRAM(S): 80 TABLET, FILM COATED ORAL at 21:11

## 2023-07-31 RX ADMIN — Medication 25 MILLIGRAM(S): at 05:25

## 2023-07-31 RX ADMIN — Medication 1 TABLET(S): at 13:19

## 2023-07-31 RX ADMIN — Medication 5 MILLIGRAM(S): at 21:14

## 2023-07-31 RX ADMIN — MIDODRINE HYDROCHLORIDE 10 MILLIGRAM(S): 2.5 TABLET ORAL at 21:11

## 2023-07-31 RX ADMIN — Medication 25 MILLIGRAM(S): at 21:11

## 2023-07-31 RX ADMIN — MIDODRINE HYDROCHLORIDE 10 MILLIGRAM(S): 2.5 TABLET ORAL at 05:25

## 2023-07-31 RX ADMIN — PANTOPRAZOLE SODIUM 40 MILLIGRAM(S): 20 TABLET, DELAYED RELEASE ORAL at 13:19

## 2023-07-31 RX ADMIN — Medication 25 MILLIGRAM(S): at 13:20

## 2023-07-31 NOTE — PROGRESS NOTE ADULT - SUBJECTIVE AND OBJECTIVE BOX
MARITZA DE LOS SANTOS  [id number]      Chief Complaint:      Interval History:      Tele:        aMIOdarone    Tablet 200 milliGRAM(s) Oral daily  atorvastatin 80 milliGRAM(s) Oral at bedtime  chlorhexidine 2% Cloths 1 Application(s) Topical daily  melatonin 5 milliGRAM(s) Oral at bedtime PRN  metoprolol tartrate 25 milliGRAM(s) Oral every 8 hours  metoprolol tartrate Injectable 2.5 milliGRAM(s) IV Push every 6 hours PRN  midodrine 10 milliGRAM(s) Oral every 8 hours  multivitamin 1 Tablet(s) Oral daily  pantoprazole  Injectable 40 milliGRAM(s) IV Push daily  tiotropium 2.5 MICROgram(s)/olodaterol 2.5 MICROgram(s) (STIOLTO) Inhaler 2 Puff(s) Inhalation daily          Physical Exam:  T(C): 36.6 (07-31-23 @ 13:00), Max: 36.7 (07-30-23 @ 16:14)  HR: 68 (07-31-23 @ 13:00) (61 - 111)  BP: 94/60 (07-31-23 @ 13:00) (94/60 - 135/93)  RR: 20 (07-31-23 @ 13:00) (18 - 20)  SpO2: 96% (07-31-23 @ 13:00) (96% - 100%)  Wt(kg): --  General: Comfortable in NAD  Neck: No JVD  CVS: nl s1s2, no s3  Pulm: CTA b/l  Abd: soft, non-tender  Ext: No c/c/e  Neuro A&O x3  Psych: Normal affect      Labs:   31 Jul 2023 05:20    135    |  106    |  22.3   ----------------------------<  116    4.5     |  18.0   |  1.27     Ca    7.8        31 Jul 2023 05:20  Phos  2.8       30 Jul 2023 02:30  Mg     2.4       31 Jul 2023 05:20    TPro  5.5    /  Alb  2.9    /  TBili  0.4    /  DBili  x      /  AST  15     /  ALT  14     /  AlkPhos  246    31 Jul 2023 05:20                          9.0    8.08  )-----------( 277      ( 31 Jul 2023 05:20 )             29.3       Nuclear stress test 7/20/20; large fixed defect involving the lateral wall of severe intensity consistent with antecedent infarction, no significant residual ischemia, EF 70%  Echocardiogram 8/10/20; ejection fraction 50%, moderate mitral regurgitation, moderate tricuspid regurgitation.  Carotid ultrasound 8/10/20; 50-65% bilateral carotid stenosis.  Holter monitor 7/7/20; sinus rhythm with no additional significant arrhythmia.        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  EGD on 7/27 without active bleeding, colonoscopy 7/28 unrevealing as well. Hemoglobin remain stable at 9.0 gm.   asked to re-evaluate patient for possible Watchman devise     Plan:  1.  outpatient ([facility]) evaluation of Watchman devise, once anemia is stable  2.  Continue current medical therapy   3.  Please call us back with questions or concerns.

## 2023-07-31 NOTE — PROGRESS NOTE ADULT - SUBJECTIVE AND OBJECTIVE BOX
Chief Complaint: This is a 83y old woman patient being seen in follow-up consultation for Gastrointestinal hemorrhage      Interval HPI/ 24 hr events: Patient seen and examined at bedside, no overnight events. EGD on  without active bleeding, colonoscopy  unrevealing as well.  Awaiting CT enterography. Pt feeling well this morning.  Hgb remain stable at 9.0 gm. Denies nausea, vomiting, abdominal pain.      REVIEW OF SYSTEMS:   General: Negative  HEENT: Negative  CV: Negative  Respiratory: Negative  GI: See HPI  : Negative  MSK: Negative  Hematologic: Negative  Skin: Negative        PAST MEDICAL/SURGICAL HISTORY:  HTN (hypertension)    HLD (hyperlipidemia)    CAD (coronary artery disease)    COPD (chronic obstructive pulmonary disease)    Acute CHF    S/P  section      MEDICATIONS  (STANDING):  aMIOdarone    Tablet 200 milliGRAM(s) Oral daily  atorvastatin 80 milliGRAM(s) Oral at bedtime  chlorhexidine 2% Cloths 1 Application(s) Topical daily  metoprolol tartrate 25 milliGRAM(s) Oral every 8 hours  midodrine 10 milliGRAM(s) Oral every 8 hours  multivitamin 1 Tablet(s) Oral daily  pantoprazole  Injectable 40 milliGRAM(s) IV Push daily  tiotropium 2.5 MICROgram(s)/olodaterol 2.5 MICROgram(s) (STIOLTO) Inhaler 2 Puff(s) Inhalation daily    MEDICATIONS  (PRN):  melatonin 5 milliGRAM(s) Oral at bedtime PRN Sleep  metoprolol tartrate Injectable 2.5 milliGRAM(s) IV Push every 6 hours PRN for hR > 110    No Known Allergies    T(C): 36.4 (23 @ 09:31), Max: 36.7 (23 @ 16:14)  HR: 61 (23 @ 09:31) (61 - 111)  BP: 95/59 (23 @ 09:31) (95/59 - 135/93)  RR: 18 (23 @ 09:31) (16 - 23)  SpO2: 97% (23 @ 09:31) (97% - 100%)    I&O's Summary    2023 07:01  -  2023 07:00  --------------------------------------------------------  IN: 417 mL / OUT: 400 mL / NET: 17 mL      PHYSICAL EXAM:    Constitutional: No acute distress  Neuro: Awake alert, oriented to person, place and situation, non-focal, speech clear and intact  HEENT: anicteric sclerae  Neck: supple, no JVD  CV: regular rate, regular rhythm  Pulm/chest: lung sounds CTA and equal bilaterally, no accessory muscle use noted  Abd: soft, nontender, nondistended +bowel sounds. No rigidity, rebound tenderness, or guarding  Ext: no Cyanosis, clubbing or edema  Skin: warm, no jaundice   Psych: calm, cooperative      LABS:               9.0    8.08  )-----------( 277      (  @ 05:20 )             29.3                9.0    7.69  )-----------( 238      (  @ 12:02 )             28.6                7.1    7.07  )-----------( 197      (  @ 02:30 )             22.3                8.2    9.90  )-----------( 239      (  @ 03:00 )             25.0                9.1    16.22 )-----------( 323      (  @ 12:54 )             28.0           135  |  106  |  22.3<H>  ----------------------------<  116<H>  4.5   |  18.0<L>  |  1.27    Ca    7.8<L>      2023 05:20  Phos  2.8       Mg     2.4         TPro  5.5<L>  /  Alb  2.9<L>  /  TBili  0.4  /  DBili  x   /  AST  15  /  ALT  14  /  AlkPhos  246<H>      LIVER FUNCTIONS - ( 2023 05:20 )  Alb: 2.9 g/dL / Pro: 5.5 g/dL / ALK PHOS: 246 U/L / ALT: 14 U/L / AST: 15 U/L / GGT: x             < from: CT Abdomen and Pelvis w/ IV Cont (23 @ 11:28) >    FINDINGS:    LOWER CHEST: Cardiomegaly. Coronary artery calcification.    LIVER: Within normal limits.  BILE DUCTS: Normal caliber.  GALLBLADDER: Cholelithiasis.  SPLEEN: Within normal limits.  PANCREAS: Within normal limits.  ADRENALS: Within normal limits.  KIDNEYS/URETERS: Focal scarring upper pole left kidney.  Small hypodense lesions right kidney too small for characterization.  No hydronephrosis.    BLADDER: Within normal limits.  REPRODUCTIVE ORGANS: 1.7 cm left adnexal cyst.    BOWEL:   No bowel obstruction.  1.7 cm intraluminal fatty lesion antrum of stomach, likely reflecting   lipoma.  Colonic diverticulosis, without CT evidence of diverticulitis.  There is a possible short segment of intraluminal narrowing at the   splenic flexure.  There is no intraluminal extravasation of contrast to suggest active   gastrointestinal bleeding.  Appendix is not visualized.  PERITONEUM: No ascites.    VESSELS:  Atherosclerotic changes.  RETROPERITONEUM/LYMPH NODES: No lymphadenopathy.    ABDOMINAL WALL: Within normal limits.    BONES: Degenerative changes.    IMPRESSION:    Colonic diverticulosis.  Possible short segment of luminal narrowing near the splenic flexure.    Recommend further evaluation with colonoscopy to exclude underlying   intrinsic mucosal lesion or neoplasm.  No CT evidence for active gastrointestinal bleeding.    Other findings as discussed above.    < end of copied text >      < from: EGD (23 @ 12:30) >    Findings:        Esophagus Mucosa Grade A esophagitis with reflux with no bleeding was seen    starting at 36 cm from the incisors in the lower third of the esophagus from 36    to 40 cm., compatible with non-erosive esophagitis. LA Grade A distal    esophagitis seen from 36 to 40 cm. Biopsies not taken. No hiatal hernia seen.    G-E jncn. @ 40 cm.        Stomach Mucosa Normal mucosa was noted in the whole stomach. On retroflexed    view, the stomach appeared to be normal. Submucosal nodule in proximal body seen    and biopsied. No hiatal hernia seen.        Additional stomach findings -Submucosal nodule seen in proximal body of the    stomach with abnormal appearing antral fold. Both areas biopsied. Otherwise    normal appearing mucosa..        Duodenum Mucosa Normal mucosa was noted in the whole duodenum. No duodenitis or    ulcerations seen.        Impressions:        Normal mucosa in the whole examined duodenum.        -Submucosal nodule seen in proximal body of the stomach with abnormal    appearing antral fold. Both areas biopsied. Otherwise normal appearing mucosa. .            Normal mucosa in the cardia and fundus.        Grade A esophagitis in the lower third of the esophagus from 36 to 40 cm.    Compatible with non-erosive esophagitis.        Plan:        Await pathology results.        Colonoscopy tomorrow. Prep orders written.      Additional Notes:      LA Grade A non erosive esophagitis and submucosal nodule seen and biopsied in    proximal body. Abnormalappearing fold in antrum also biopsied. No source of    bleeding seen today. Will need colonoscopy tomorrow.      Attending Participation:      I was present and participated during the entire procedure, including non-key    portions.        Jong Castanon MD    < end of copied text >    < from: Colonoscopy (23 @ 09:44) >        Findings:        Additional findings After adequate sedation by anesthesiology and with the    patient in the left lateral decubitus position a digital rectal examination was    performed. This revealed dark brown/black stool and a small amount. The Olympus    JC videocolonoscope was then introduced into the rectum and passed quite    easily to the cecum. The cecum was identified by the presence of the appendiceal    orifice, the ileocecal valve and its globular structure. The colon preparation    was overall only good with residual black stool in places. However with    extensive washing and aspiration the colon was well seen. No active bleeding or    bleeding type lesions were seen. There was significant left-sided    diverticulosis. There was grade 1 internal hemorrhoids seen on retroflexed view    in the rectum. Other than this the colon was entirely normal. No source for the    patient's melena and blood loss anemia worse found..        Impressions:        After adequate sedation by anesthesiology and with the patient in the left    lateral decubitus position a digital rectal examination was performed. This    revealed dark brown/black stool and a small amount. The Olympus JC    videocolonoscope was then introduced into the rectum and passed quite easily to    the cecum. The cecum was identified by the presence of the appendiceal orifice,    the ileocecal valve and its globular structure. The colon preparation was    overall only good with residual black stool in places. However with extensive    washing and aspiration the colon was well seen. No active bleeding or bleeding    type lesions were seen. There was significant left-sided diverticulosis. There    was grade 1 internal hemorrhoids seen on retroflexed view in the rectum. Other    than this the colon was entirely normal. No source for the patient's melena and    blood loss anemia worse found. .        Plan:        Return to floor for further management.  The patient has now had 2 endoscopies    and 2 colonoscopies for melena in the last 6 months.  No source has been found.    The patient should have an outpatient capsule endoscopy.  The patient may resume    a regular diet today.  We should keep her in the hospital for another day or 2    to see if the melena hasdissipated.        Manuel Thurman MD        Version 1, Electronically signed on 2023 10:51:18 AM by Manuel Thurman MD    < end of copied text >   Chief Complaint: This is a 83y old woman patient being seen in follow-up consultation for Gastrointestinal hemorrhage      Interval HPI/ 24 hr events: Patient seen and examined at bedside, no overnight events. EGD on  without active bleeding, colonoscopy  unrevealing as well.  Awaiting CT enterography. Pt feeling well this morning.  Hgb remain stable at 9.0 gm. Denies nausea, vomiting, abdominal pain.      REVIEW OF SYSTEMS:   General: Negative  HEENT: Negative  CV: Negative  Respiratory: Negative  GI: See HPI  : Negative  MSK: Negative  Hematologic: Negative  Skin: Negative        PAST MEDICAL/SURGICAL HISTORY:  HTN (hypertension)    HLD (hyperlipidemia)    CAD (coronary artery disease)    COPD (chronic obstructive pulmonary disease)    Acute CHF    S/P  section      MEDICATIONS  (STANDING):  aMIOdarone    Tablet 200 milliGRAM(s) Oral daily  atorvastatin 80 milliGRAM(s) Oral at bedtime  chlorhexidine 2% Cloths 1 Application(s) Topical daily  metoprolol tartrate 25 milliGRAM(s) Oral every 8 hours  midodrine 10 milliGRAM(s) Oral every 8 hours  multivitamin 1 Tablet(s) Oral daily  pantoprazole  Injectable 40 milliGRAM(s) IV Push daily  tiotropium 2.5 MICROgram(s)/olodaterol 2.5 MICROgram(s) (STIOLTO) Inhaler 2 Puff(s) Inhalation daily    MEDICATIONS  (PRN):  melatonin 5 milliGRAM(s) Oral at bedtime PRN Sleep  metoprolol tartrate Injectable 2.5 milliGRAM(s) IV Push every 6 hours PRN for hR > 110    No Known Allergies    T(C): 36.4 (23 @ 09:31), Max: 36.7 (23 @ 16:14)  HR: 61 (23 @ 09:31) (61 - 111)  BP: 95/59 (23 @ 09:31) (95/59 - 135/93)  RR: 18 (23 @ 09:31) (16 - 23)  SpO2: 97% (23 @ 09:31) (97% - 100%)    I&O's Summary    2023 07:01  -  2023 07:00  --------------------------------------------------------  IN: 417 mL / OUT: 400 mL / NET: 17 mL      PHYSICAL EXAM:    Constitutional: No acute distress  Neuro: Awake alert, oriented to person, place and situation, non-focal, speech clear and intact  HEENT: anicteric sclerae  Neck: supple, no JVD  CV: regular rate, regular rhythm  Pulm/chest: lung sounds CTA and equal bilaterally, no accessory muscle use noted  Abd: soft, nontender, nondistended +bowel sounds. No rigidity, rebound tenderness, or guarding  Ext: no Cyanosis, clubbing or edema  Skin: warm, no jaundice   Psych: calm, cooperative      LABS:               9.0    8.08  )-----------( 277      (  @ 05:20 )             29.3                9.0    7.69  )-----------( 238      (  @ 12:02 )             28.6                7.1    7.07  )-----------( 197      (  @ 02:30 )             22.3                8.2    9.90  )-----------( 239      (  @ 03:00 )             25.0                9.1    16.22 )-----------( 323      (  @ 12:54 )             28.0           135  |  106  |  22.3<H>  ----------------------------<  116<H>  4.5   |  18.0<L>  |  1.27    Ca    7.8<L>      2023 05:20  Phos  2.8       Mg     2.4         TPro  5.5<L>  /  Alb  2.9<L>  /  TBili  0.4  /  DBili  x   /  AST  15  /  ALT  14  /  AlkPhos  246<H>      LIVER FUNCTIONS - ( 2023 05:20 )  Alb: 2.9 g/dL / Pro: 5.5 g/dL / ALK PHOS: 246 U/L / ALT: 14 U/L / AST: 15 U/L / GGT: x             < from: CT Abdomen and Pelvis w/ IV Cont (23 @ 11:28) >    FINDINGS:    LOWER CHEST: Cardiomegaly. Coronary artery calcification.    LIVER: Within normal limits.  BILE DUCTS: Normal caliber.  GALLBLADDER: Cholelithiasis.  SPLEEN: Within normal limits.  PANCREAS: Within normal limits.  ADRENALS: Within normal limits.  KIDNEYS/URETERS: Focal scarring upper pole left kidney.  Small hypodense lesions right kidney too small for characterization.  No hydronephrosis.    BLADDER: Within normal limits.  REPRODUCTIVE ORGANS: 1.7 cm left adnexal cyst.    BOWEL:   No bowel obstruction.  1.7 cm intraluminal fatty lesion antrum of stomach, likely reflecting   lipoma.  Colonic diverticulosis, without CT evidence of diverticulitis.  There is a possible short segment of intraluminal narrowing at the   splenic flexure - likely artifact since nl colon recently  There is no intraluminal extravasation of contrast to suggest active   gastrointestinal bleeding.  Appendix is not visualized.  PERITONEUM: No ascites.    VESSELS:  Atherosclerotic changes.  RETROPERITONEUM/LYMPH NODES: No lymphadenopathy.    ABDOMINAL WALL: Within normal limits.    BONES: Degenerative changes.    IMPRESSION:    Colonic diverticulosis.  Possible short segment of luminal narrowing near the splenic flexure.    Recommend further evaluation with colonoscopy to exclude underlying   intrinsic mucosal lesion or neoplasm.  No CT evidence for active gastrointestinal bleeding.    Other findings as discussed above.    < end of copied text >      < from: EGD (23 @ 12:30) >    Findings:        Esophagus Mucosa Grade A esophagitis with reflux with no bleeding was seen    starting at 36 cm from the incisors in the lower third of the esophagus from 36    to 40 cm., compatible with non-erosive esophagitis. LA Grade A distal    esophagitis seen from 36 to 40 cm. Biopsies not taken. No hiatal hernia seen.    G-E jncn. @ 40 cm.        Stomach Mucosa Normal mucosa was noted in the whole stomach. On retroflexed    view, the stomach appeared to be normal. Submucosal nodule in proximal body seen    and biopsied. No hiatal hernia seen.        Additional stomach findings -Submucosal nodule seen in proximal body of the    stomach with abnormal appearing antral fold. Both areas biopsied. Otherwise    normal appearing mucosa..        Duodenum Mucosa Normal mucosa was noted in the whole duodenum. No duodenitis or    ulcerations seen.        Impressions:        Normal mucosa in the whole examined duodenum.        -Submucosal nodule seen in proximal body of the stomach with abnormal    appearing antral fold. Both areas biopsied. Otherwise normal appearing mucosa. .            Normal mucosa in the cardia and fundus.        Grade A esophagitis in the lower third of the esophagus from 36 to 40 cm.    Compatible with non-erosive esophagitis.        Plan:        Await pathology results.        Colonoscopy tomorrow. Prep orders written.      Additional Notes:      LA Grade A non erosive esophagitis and submucosal nodule seen and biopsied in    proximal body. Abnormal appearing fold in antrum also biopsied. No source of    bleeding seen today. Will need colonoscopy tomorrow.      Attending Participation:      I was present and participated during the entire procedure, including non-key    portions.        Jong Castanon MD    < end of copied text >    < from: Colonoscopy (23 @ 09:44) >        Findings:        Additional findings After adequate sedation by anesthesiology and with the    patient in the left lateral decubitus position a digital rectal examination was    performed. This revealed dark brown/black stool and a small amount. The Olympus    JC videocolonoscope was then introduced into the rectum and passed quite    easily to the cecum. The cecum was identified by the presence of the appendiceal    orifice, the ileocecal valve and its globular structure. The colon preparation    was overall only good with residual black stool in places. However with    extensive washing and aspiration the colon was well seen. No active bleeding or    bleeding type lesions were seen. There was significant left-sided    diverticulosis. There was grade 1 internal hemorrhoids seen on retroflexed view    in the rectum. Other than this the colon was entirely normal. No source for the    patient's melena and blood loss anemia worse found..        Impressions:        After adequate sedation by anesthesiology and with the patient in the left    lateral decubitus position a digital rectal examination was performed. This    revealed dark brown/black stool and a small amount. The Olympus JC    videocolonoscope was then introduced into the rectum and passed quite easily to    the cecum. The cecum was identified by the presence of the appendiceal orifice,    the ileocecal valve and its globular structure. The colon preparation was    overall only good with residual black stool in places. However with extensive    washing and aspiration the colon was well seen. No active bleeding or bleeding    type lesions were seen. There was significant left-sided diverticulosis. There    was grade 1 internal hemorrhoids seen on retroflexed view in the rectum. Other    than this the colon was entirely normal. No source for the patient's melena and    blood loss anemia worse found. .        Plan:        Return to floor for further management.  The patient has now had 2 endoscopies    and 2 colonoscopies for melena in the last 6 months.  No source has been found.    The patient should have an outpatient capsule endoscopy.  The patient may resume    a regular diet today.  We should keep her in the hospital for another day or 2    to see if the melena hasdissipated.        Manuel Thurman MD        Version 1, Electronically signed on 2023 10:51:18 AM by Manuel Thurman MD    < end of copied text >

## 2023-07-31 NOTE — PROGRESS NOTE ADULT - ASSESSMENT
84 y/o female with PMH COPD on 2L home O2, HFpEF, CAD s/p PCI, HTN, HLD, pAF on rivaroxaban, diverticulosis, hemorrhoids presented with generalized weakness/dark stools admitted anemia, GIB without active bleed on ct, received 3U PRBC, and IVF however remained hypotensive requiring vasopressor therapy for hemorrhagic shock.  EGD on 7/27 and colonoscopy 7/28 without active bleed. Patient also with DANETTE and Afib with RVR, resumed back on amio and bb .  HR now better controlled. Plan for ct enterography. Patient downgraded to medicine 7/30/23.       > acute GIB / acute blood loss anemia /  hemorrhagic shock   - hx of GIb in past   - cta neg without active bleed on ct on admission   - s/p EGD on 7/27 without active bleeding  - s/p colonoscopy 7/28 no active bleed   -now off pressors , started on midodrine, will wean as tolerated   - hold AC   - c/w ppis   - ct enterography pending   - plan for capsule endoscopy as op   - gi following   - monitor CBC, transfuse as needed   - Diet as tolerated     > pafib   - resumed on amio and bb  - adjust bb dose for better hr control as needed  - prn iv lopressor for hr > 110    >DANETTE on CKD3  - Improved.   - monitor BMP    > HLD  - c/w statins     >copd / not in acute exacerbation  - c/w inhalers   - c/w o2     > dvt ppx : scds

## 2023-07-31 NOTE — PROGRESS NOTE ADULT - SUBJECTIVE AND OBJECTIVE BOX
CC: Gastrointestinal hemorrhage     (28 Jul 2023 12:45)    HPI:  82 y/o female with PMH COPD on 2L home O2, HFpEF, CAD s/p PCI, HTN, HLD, pAF on rivaroxaban, diverticulosis, hemorrhoids presented with generalized weakness/dark stools admitted with anemia, GIB without active bleed on ct, received 3U PRBC, and IVF however remained hypotensive requiring vasopressor therapy for hemorrhagic shock.  EGD on 7/27 and colonoscopy 7/28 without active bleed. Patient also with DANETTE and Afib with RVR, resumed back on amio and bb.  HR now better controlled. Plan for ct enterography. Patient downgraded to medicine 7/30/23.     INTERVAL HPI/OVERNIGHT EVENTS:  Patient seen and examined sitting up in bed.  Patient denies any headache, dizziness, SOB, CP, abdominal pain, nausea, vomiting, dysuria.  (+) BM today, no bleeding noted.  Other ROS reviewed and are negative.    Vital Signs Last 24 Hrs  T(C): 36.4 (31 Jul 2023 09:31), Max: 36.7 (30 Jul 2023 16:14)  T(F): 97.6 (31 Jul 2023 09:31), Max: 98 (30 Jul 2023 16:14)  HR: 61 (31 Jul 2023 09:31) (61 - 111)  BP: 95/59 (31 Jul 2023 09:31) (95/59 - 135/93)  BP(mean): 101 (30 Jul 2023 16:00) (83 - 101)  RR: 18 (31 Jul 2023 09:31) (18 - 23)  SpO2: 97% (31 Jul 2023 09:31) (97% - 100%)    Parameters below as of 31 Jul 2023 09:31  Patient On (Oxygen Delivery Method): nasal cannula  O2 Flow (L/min): 2    I&O's Detail    30 Jul 2023 07:01  -  31 Jul 2023 07:00  --------------------------------------------------------  IN:    IV PiggyBack: 100 mL    PRBCs (Packed Red Blood Cells): 317 mL  Total IN: 417 mL    OUT:    Voided (mL): 400 mL  Total OUT: 400 mL    Total NET: 17 mL    PHYSICAL EXAM:  GENERAL: NAD  HEAD:  Atraumatic, Normocephalic  NECK: Supple, No JVD, Normal thyroid  NERVOUS SYSTEM:  Alert & Oriented X3, Good concentration; Motor Strength 5/5 B/L upper and lower extremities  CHEST/LUNG: Clear to auscultation bilaterally  HEART: Regular rate and rhythm; No murmurs, rubs, or gallops  ABDOMEN: Soft, Nontender, Nondistended; Bowel sounds present  EXTREMITIES:  2+ Peripheral Pulses, No clubbing, cyanosis, or edema  SKIN: No rashes or lesions                          9.0    8.08  )-----------( 277      ( 31 Jul 2023 05:20 )             29.3     31 Jul 2023 05:20    135    |  106    |  22.3   ----------------------------<  116    4.5     |  18.0   |  1.27     Ca    7.8        31 Jul 2023 05:20  Phos  2.8       30 Jul 2023 02:30  Mg     2.4       31 Jul 2023 05:20    TPro  5.5    /  Alb  2.9    /  TBili  0.4    /  DBili  x      /  AST  15     /  ALT  14     /  AlkPhos  246    31 Jul 2023 05:20      LIVER FUNCTIONS - ( 31 Jul 2023 05:20 )  Alb: 2.9 g/dL / Pro: 5.5 g/dL / ALK PHOS: 246 U/L / ALT: 14 U/L / AST: 15 U/L / GGT: x           Urinalysis Basic - ( 31 Jul 2023 05:20 )    Color: x / Appearance: x / SG: x / pH: x  Gluc: 116 mg/dL / Ketone: x  / Bili: x / Urobili: x   Blood: x / Protein: x / Nitrite: x   Leuk Esterase: x / RBC: x / WBC x   Sq Epi: x / Non Sq Epi: x / Bacteria: x        MEDICATIONS  (STANDING):  aMIOdarone    Tablet 200 milliGRAM(s) Oral daily  atorvastatin 80 milliGRAM(s) Oral at bedtime  chlorhexidine 2% Cloths 1 Application(s) Topical daily  metoprolol tartrate 25 milliGRAM(s) Oral every 8 hours  midodrine 10 milliGRAM(s) Oral every 8 hours  multivitamin 1 Tablet(s) Oral daily  pantoprazole  Injectable 40 milliGRAM(s) IV Push daily  tiotropium 2.5 MICROgram(s)/olodaterol 2.5 MICROgram(s) (STIOLTO) Inhaler 2 Puff(s) Inhalation daily    MEDICATIONS  (PRN):  melatonin 5 milliGRAM(s) Oral at bedtime PRN Sleep  metoprolol tartrate Injectable 2.5 milliGRAM(s) IV Push every 6 hours PRN for hR > 110

## 2023-07-31 NOTE — PROGRESS NOTE ADULT - ASSESSMENT
84 yo f pmhx COPD on 2L home O2, HFpEF, CAD s/p PCI, HTN, HLD, pAF on rivaroxaban, diverticulosis, hemorrhoids presented with generalized weakness/dark stools admitted anemia, gib without active bleed on ct, received 3U PRBC, and IVF however remained hypotensive requiring vasopressor therapy.  EGD on 7/27 without active bleeding, colonoscopy 7/28 unrevealing as well. Hemoglobin remain stable at 9.0 gm.       Plan:   Hemoglobin remain stable at 9.0 gm.   Awaiting CTE to r/o small bowel sources of bleeding   Continue to trend CBC, transfuse as needed   Continue PPI BID   Diet as tolerated   Will need OPT capsule endoscopy when discharge  82 yo f pmhx COPD on 2L home O2, HFpEF, CAD s/p PCI, HTN, HLD, pAF on rivaroxaban, diverticulosis, hemorrhoids presented with generalized weakness/dark stools admitted anemia, gib without active bleed on ct, received 5U PRBC, and IVF however remained hypotensive requiring vasopressor therapy.  EGD on 7/27 without active bleeding, colonoscopy 7/28 unrevealing as well. Hemoglobin remain stable at 9.0 gm.       Plan:   Hemoglobin remain stable at 9.0 gm.   Awaiting CTE to r/o small bowel sources of bleeding   Continue to trend CBC, transfuse as needed   Continue PPI BID   Diet as tolerated   Will need OPT capsule endoscopy when discharge

## 2023-08-01 LAB
ALBUMIN SERPL ELPH-MCNC: 3 G/DL — LOW (ref 3.3–5.2)
ALP SERPL-CCNC: 265 U/L — HIGH (ref 40–120)
ALT FLD-CCNC: 17 U/L — SIGNIFICANT CHANGE UP
ANION GAP SERPL CALC-SCNC: 15 MMOL/L — SIGNIFICANT CHANGE UP (ref 5–17)
AST SERPL-CCNC: 19 U/L — SIGNIFICANT CHANGE UP
BASOPHILS # BLD AUTO: 0.08 K/UL — SIGNIFICANT CHANGE UP (ref 0–0.2)
BASOPHILS NFR BLD AUTO: 0.8 % — SIGNIFICANT CHANGE UP (ref 0–2)
BILIRUB SERPL-MCNC: 0.9 MG/DL — SIGNIFICANT CHANGE UP (ref 0.4–2)
BUN SERPL-MCNC: 30.9 MG/DL — HIGH (ref 8–20)
CALCIUM SERPL-MCNC: 8 MG/DL — LOW (ref 8.4–10.5)
CHLORIDE SERPL-SCNC: 102 MMOL/L — SIGNIFICANT CHANGE UP (ref 96–108)
CO2 SERPL-SCNC: 16 MMOL/L — LOW (ref 22–29)
CREAT SERPL-MCNC: 1.7 MG/DL — HIGH (ref 0.5–1.3)
EGFR: 30 ML/MIN/1.73M2 — LOW
EOSINOPHIL # BLD AUTO: 0 K/UL — SIGNIFICANT CHANGE UP (ref 0–0.5)
EOSINOPHIL NFR BLD AUTO: 0 % — SIGNIFICANT CHANGE UP (ref 0–6)
GLUCOSE SERPL-MCNC: 84 MG/DL — SIGNIFICANT CHANGE UP (ref 70–99)
HCT VFR BLD CALC: 28.4 % — LOW (ref 34.5–45)
HGB BLD-MCNC: 9 G/DL — LOW (ref 11.5–15.5)
IMM GRANULOCYTES NFR BLD AUTO: 0.7 % — SIGNIFICANT CHANGE UP (ref 0–0.9)
LYMPHOCYTES # BLD AUTO: 1.45 K/UL — SIGNIFICANT CHANGE UP (ref 1–3.3)
LYMPHOCYTES # BLD AUTO: 14.7 % — SIGNIFICANT CHANGE UP (ref 13–44)
MCHC RBC-ENTMCNC: 30 PG — SIGNIFICANT CHANGE UP (ref 27–34)
MCHC RBC-ENTMCNC: 31.7 GM/DL — LOW (ref 32–36)
MCV RBC AUTO: 94.7 FL — SIGNIFICANT CHANGE UP (ref 80–100)
MONOCYTES # BLD AUTO: 1.35 K/UL — HIGH (ref 0–0.9)
MONOCYTES NFR BLD AUTO: 13.7 % — SIGNIFICANT CHANGE UP (ref 2–14)
NEUTROPHILS # BLD AUTO: 6.93 K/UL — SIGNIFICANT CHANGE UP (ref 1.8–7.4)
NEUTROPHILS NFR BLD AUTO: 70.1 % — SIGNIFICANT CHANGE UP (ref 43–77)
PLATELET # BLD AUTO: 353 K/UL — SIGNIFICANT CHANGE UP (ref 150–400)
POTASSIUM SERPL-MCNC: 4.9 MMOL/L — SIGNIFICANT CHANGE UP (ref 3.5–5.3)
POTASSIUM SERPL-SCNC: 4.9 MMOL/L — SIGNIFICANT CHANGE UP (ref 3.5–5.3)
PROT SERPL-MCNC: 5.7 G/DL — LOW (ref 6.6–8.7)
RBC # BLD: 3 M/UL — LOW (ref 3.8–5.2)
RBC # FLD: 18.2 % — HIGH (ref 10.3–14.5)
SODIUM SERPL-SCNC: 133 MMOL/L — LOW (ref 135–145)
WBC # BLD: 9.88 K/UL — SIGNIFICANT CHANGE UP (ref 3.8–10.5)
WBC # FLD AUTO: 9.88 K/UL — SIGNIFICANT CHANGE UP (ref 3.8–10.5)

## 2023-08-01 PROCEDURE — 99222 1ST HOSP IP/OBS MODERATE 55: CPT

## 2023-08-01 PROCEDURE — 99232 SBSQ HOSP IP/OBS MODERATE 35: CPT

## 2023-08-01 PROCEDURE — 99233 SBSQ HOSP IP/OBS HIGH 50: CPT

## 2023-08-01 RX ORDER — ASPIRIN/CALCIUM CARB/MAGNESIUM 324 MG
81 TABLET ORAL DAILY
Refills: 0 | Status: DISCONTINUED | OUTPATIENT
Start: 2023-08-01 | End: 2023-08-23

## 2023-08-01 RX ORDER — SODIUM BICARBONATE 1 MEQ/ML
650 SYRINGE (ML) INTRAVENOUS
Refills: 0 | Status: DISCONTINUED | OUTPATIENT
Start: 2023-08-01 | End: 2023-08-03

## 2023-08-01 RX ORDER — SODIUM CHLORIDE 9 MG/ML
1000 INJECTION INTRAMUSCULAR; INTRAVENOUS; SUBCUTANEOUS
Refills: 0 | Status: DISCONTINUED | OUTPATIENT
Start: 2023-08-01 | End: 2023-08-14

## 2023-08-01 RX ADMIN — AMIODARONE HYDROCHLORIDE 200 MILLIGRAM(S): 400 TABLET ORAL at 05:27

## 2023-08-01 RX ADMIN — Medication 25 MILLIGRAM(S): at 05:26

## 2023-08-01 RX ADMIN — MIDODRINE HYDROCHLORIDE 10 MILLIGRAM(S): 2.5 TABLET ORAL at 12:53

## 2023-08-01 RX ADMIN — Medication 1 TABLET(S): at 12:54

## 2023-08-01 RX ADMIN — ATORVASTATIN CALCIUM 80 MILLIGRAM(S): 80 TABLET, FILM COATED ORAL at 21:41

## 2023-08-01 RX ADMIN — Medication 650 MILLIGRAM(S): at 12:53

## 2023-08-01 RX ADMIN — SODIUM CHLORIDE 75 MILLILITER(S): 9 INJECTION INTRAMUSCULAR; INTRAVENOUS; SUBCUTANEOUS at 09:07

## 2023-08-01 RX ADMIN — PANTOPRAZOLE SODIUM 40 MILLIGRAM(S): 20 TABLET, DELAYED RELEASE ORAL at 12:53

## 2023-08-01 RX ADMIN — CHLORHEXIDINE GLUCONATE 1 APPLICATION(S): 213 SOLUTION TOPICAL at 12:54

## 2023-08-01 RX ADMIN — MIDODRINE HYDROCHLORIDE 10 MILLIGRAM(S): 2.5 TABLET ORAL at 05:27

## 2023-08-01 RX ADMIN — MIDODRINE HYDROCHLORIDE 10 MILLIGRAM(S): 2.5 TABLET ORAL at 21:41

## 2023-08-01 NOTE — PHYSICAL THERAPY INITIAL EVALUATION ADULT - PERTINENT HX OF CURRENT PROBLEM, REHAB EVAL
84 y/o female with PMH COPD on 2L home O2, HFpEF, CAD s/p PCI, HTN, HLD, pAF on rivaroxaban, diverticulosis, hemorrhoids presented with generalized weakness/dark stools admitted anemia, GIB without active bleed on ct, received 3U PRBC, and IVF however remained hypotensive requiring vasopressor therapy for hemorrhagic shock.  EGD on 7/27 and colonoscopy 7/28 without active bleed. Patient also with DANETTE and Afib with RVR, resumed back on amio and bb .  HR now better controlled. Plan for ct enterography. Patient downgraded to medicine 7/30/23.

## 2023-08-01 NOTE — PROGRESS NOTE ADULT - ASSESSMENT
82 yo f pmhx COPD on 2L home O2, HFpEF, CAD s/p PCI, HTN, HLD, pAF on rivaroxaban, diverticulosis, hemorrhoids presented with generalized weakness/dark stools admitted anemia, gib without active bleed on ct, received 5U PRBC, and IVF however remained hypotensive requiring vasopressor therapy.  EGD on 7/27 without active bleeding, colonoscopy 7/28 unrevealing as well. Hemoglobin remain stable at 9.0 gm.       Plan:   Hemoglobin remain stable at 9.0 gm.   Awaiting CTE to r/o small bowel sources of bleeding   Continue to trend CBC, transfuse as needed   Continue PPI BID   Diet as tolerated   Will need OPT capsule endoscopy when discharge  84 yo f pmhx COPD on 2L home O2, HFpEF, CAD s/p PCI, HTN, HLD, pAF on rivaroxaban, diverticulosis, hemorrhoids presented with generalized weakness/dark stools admitted anemia, gib without active bleed on ct, received 5U PRBC, and IVF however remained hypotensive requiring vasopressor therapy.  EGD on 7/27 without active bleeding, colonoscopy 7/28 unrevealing as well. Hemoglobin remain stable at 9.0 gm.       Plan:   Hemoglobin remain stable at 9.0 gm.   CTE was ordered to r/o small bowel sources of bleeding. Now with DANETTE, worsening scr 1.7 gm this morning from 1.27 yesterday). Will hold off CTE at this time. No evidence of overt GI bleed and hgb remain stable.   Can resume anticoagulation (was on Xarelto and Aspirin) from GI stand point. Please revisit need for Aspirin in setting of rising scr.     Continue to trend CBC, transfuse as needed   Continue PPI BID   Diet as tolerated   Will need OPT capsule endoscopy .  82 yo f pmhx COPD on 2L home O2, HFpEF, CAD s/p PCI, HTN, HLD, pAF on rivaroxaban, diverticulosis, hemorrhoids presented with generalized weakness/dark stools admitted anemia, gib without active bleed on ct, received 5U PRBC, and briefly on pressors.  EGD on 7/27 without active bleeding, colonoscopy 7/28 unrevealing as well. Hemoglobin remain stable at 9.0 gm.       Plan:   Hemoglobin remain stable at 9.0 gm.   CTE was ordered to r/o small bowel sources of bleeding. Now with DANETTE, worsening scr 1.7 gm this morning from 1.27 yesterday). Will hold off CTE at this time. No evidence of overt GI bleed and hgb remain stable.   Can resume anticoagulation (was on Xarelto and Aspirin) from GI stand point. Please revisit need for Aspirin in setting of rising scr.     Continue to trend CBC, transfuse as needed   Continue PPI BID   Diet as tolerated   Will need OPT capsule endoscopy .

## 2023-08-01 NOTE — PROGRESS NOTE ADULT - SUBJECTIVE AND OBJECTIVE BOX
Chief Complaint: This is a 83y old woman patient being seen in follow-up consultation for Gastrointestinal hemorrhage    Interval HPI/ 24 hr events: Patient seen and examined at bedside, no overnight events. EGD on  without active bleeding, colonoscopy  unrevealing as well.  Awaiting CT enterography. Pt feeling well this morning.  Hgb remain stable at 9.0 gm. Denies nausea, vomiting, abdominal pain. Reports normal BM yesterday       REVIEW OF SYSTEMS:   General: Negative  HEENT: Negative  CV: Negative  Respiratory: Negative  GI: See HPI  : Negative  MSK: Negative  Hematologic: Negative  Skin: Negative      PAST MEDICAL/SURGICAL HISTORY:  HTN (hypertension)    HLD (hyperlipidemia)    CAD (coronary artery disease)    COPD (chronic obstructive pulmonary disease)    Acute CHF    S/P  section      MEDICATIONS  (STANDING):  aMIOdarone    Tablet 200 milliGRAM(s) Oral daily  atorvastatin 80 milliGRAM(s) Oral at bedtime  chlorhexidine 2% Cloths 1 Application(s) Topical daily  metoprolol tartrate 25 milliGRAM(s) Oral every 8 hours  midodrine 10 milliGRAM(s) Oral every 8 hours  multivitamin 1 Tablet(s) Oral daily  pantoprazole  Injectable 40 milliGRAM(s) IV Push daily  sodium bicarbonate 650 milliGRAM(s) Oral two times a day  sodium chloride 0.9%. 1000 milliLiter(s) (75 mL/Hr) IV Continuous <Continuous>  tiotropium 2.5 MICROgram(s)/olodaterol 2.5 MICROgram(s) (STIOLTO) Inhaler 2 Puff(s) Inhalation daily    MEDICATIONS  (PRN):  melatonin 5 milliGRAM(s) Oral at bedtime PRN Sleep  metoprolol tartrate Injectable 2.5 milliGRAM(s) IV Push every 6 hours PRN for hR > 110    No Known Allergies    T(C): 36.4 (23 @ 08:38), Max: 36.6 (23 @ 13:00)  HR: 58 (23 @ 08:38) (56 - 68)  BP: 102/56 (23 @ 09:00) (89/53 - 109/68)  RR: 18 (23 @ 08:38) (18 - 20)  SpO2: 98% (23 @ 08:38) (96% - 99%)        PHYSICAL EXAM:    Constitutional: No acute distress  Neuro: Awake alert, oriented to person, place and situation, non-focal, speech clear and intact  HEENT: anicteric sclerae  Neck: supple, no JVD  CV: regular rate, regular rhythm  Pulm/chest: lung sounds CTA and equal bilaterally, no accessory muscle use noted  Abd: soft, nontender, nondistended +bowel sounds. No rigidity, rebound tenderness, or guarding  Ext: no Cyanosis, clubbing or edema  Skin: warm, no jaundice   Psych: calm, cooperative      LABS:               9.0    9.88  )-----------( 353      (  @ 05:20 )             28.4                9.0    8.08  )-----------( 277      (  @ 05:20 )             29.3                9.0    7.69  )-----------( 238      (  @ 12:02 )             28.6                7.1    7.07  )-----------( 197      (  @ 02:30 )             22.3           133<L>  |  102  |  30.9<H>  ----------------------------<  84  4.9   |  16.0<L>  |  1.70<H>    Ca    8.0<L>      01 Aug 2023 05:20  Mg     2.4         TPro  5.7<L>  /  Alb  3.0<L>  /  TBili  0.9  /  DBili  x   /  AST  19  /  ALT  17  /  AlkPhos  265<H>      LIVER FUNCTIONS - ( 01 Aug 2023 05:20 )  Alb: 3.0 g/dL / Pro: 5.7 g/dL / ALK PHOS: 265 U/L / ALT: 17 U/L / AST: 19 U/L / GGT: x               < from: EGD (23 @ 12:30) >    Findings:      Esophagus Mucosa Grade A esophagitis with reflux with no bleeding was seen    starting at 36 cm from the incisors in the lower third of the esophagus from 36    to 40 cm., compatible with non-erosive esophagitis. LA Grade A distal    esophagitis seen from 36 to 40 cm. Biopsies not taken. No hiatal hernia seen.    G-E jncn. @ 40 cm.        Stomach Mucosa Normal mucosa was noted in the whole stomach. On retroflexed    view, the stomach appeared to be normal. Submucosal nodule in proximal body seen    and biopsied. No hiatal hernia seen.        Additional stomach findings -Submucosal nodule seen in proximal body of the    stomach with abnormal appearing antral fold. Both areas biopsied. Otherwise    normal appearing mucosa..        Duodenum Mucosa Normal mucosa was noted in the whole duodenum. No duodenitis or    ulcerations seen.        Impressions:        Normal mucosa in the whole examined duodenum.        -Submucosal nodule seen in proximal body of the stomach with abnormal    appearing antral fold. Both areas biopsied. Otherwise normal appearing mucosa. .            Normal mucosa in the cardia and fundus.        Grade A esophagitis in the lower third of the esophagus from 36 to 40 cm.    Compatible with non-erosive esophagitis.        Plan:        Await pathology results.        Colonoscopy tomorrow. Prep orders written.      Additional Notes:      LA Grade A non erosive esophagitis and submucosal nodule seen and biopsied in    proximal body. Abnormal appearing fold in antrum also biopsied. No source of    bleeding seen today. Will need colonoscopy tomorrow.      Attending Participation:      I was present and participated during the entire procedure, including non-key    portions.        Jong Castanon MD    < end of copied text >    < from: Colonoscopy (23 @ 09:44) >        Findings:        Additional findings After adequate sedation by anesthesiology and with the    patient in the left lateral decubitus position a digital rectal examination was    performed. This revealed dark brown/black stool and a small amount. The Olympus    JC videocolonoscope was then introduced into the rectum and passed quite    easily to the cecum. The cecum was identified by the presence of the appendiceal    orifice, the ileocecal valve and its globular structure. The colon preparation    was overall only good with residual black stool in places. However with    extensive washing and aspiration the colon was well seen. No active bleeding or    bleeding type lesions were seen. There was significant left-sided    diverticulosis. There was grade 1 internal hemorrhoids seen on retroflexed view    in the rectum. Other than this the colon was entirely normal. No source for the    patient's melena and blood loss anemia worse found..        Impressions:        After adequate sedation by anesthesiology and with the patient in the left    lateral decubitus position a digital rectal examination was performed. This    revealed dark brown/black stool and a small amount. The Olympus JC    videocolonoscope was then introduced into the rectum and passed quite easily to    the cecum. The cecum was identified by the presence of the appendiceal orifice,    the ileocecal valve and its globular structure. The colon preparation was    overall only good with residual black stool in places. However with extensive    washing and aspiration the colon was well seen. No active bleeding or bleeding    type lesions were seen. There was significant left-sided diverticulosis. There    was grade 1 internal hemorrhoids seen on retroflexed view in the rectum. Other    than this the colon was entirely normal. No source for the patient's melena and    blood loss anemia worse found. .        Plan:        Return to floor for further management.  The patient has now had 2 endoscopies    and 2 colonoscopies for melena in the last 6 months.  No source has been found.    The patient should have an outpatient capsule endoscopy.  The patient may resume    a regular diet today.  We should keep her in the hospital for another day or 2    to see if the melena hasdissipated.        Manuel Thurman MD        Version 1, Electronically signed on 2023 10:51:18 AM by Manuel Thurman MD    < end of copied text >   Chief Complaint: This is a 83y old woman patient being seen in follow-up consultation for Gastrointestinal hemorrhage    Interval HPI/ 24 hr events: Patient seen and examined at bedside, no overnight events. EGD on  without active bleeding, colonoscopy  unrevealing as well.  Awaiting CT enterography. Pt feeling well this morning.  Hgb remain stable at 9.0 gm. Denies nausea, vomiting, abdominal pain. Reports normal BM yesterday. DANETTE, Creat 1.7 this morning.       REVIEW OF SYSTEMS:   General: Negative  HEENT: Negative  CV: Negative  Respiratory: Negative  GI: See HPI  : Negative  MSK: Negative  Hematologic: Negative  Skin: Negative      PAST MEDICAL/SURGICAL HISTORY:  HTN (hypertension)    HLD (hyperlipidemia)    CAD (coronary artery disease)    COPD (chronic obstructive pulmonary disease)    Acute CHF    S/P  section      MEDICATIONS  (STANDING):  aMIOdarone    Tablet 200 milliGRAM(s) Oral daily  atorvastatin 80 milliGRAM(s) Oral at bedtime  chlorhexidine 2% Cloths 1 Application(s) Topical daily  metoprolol tartrate 25 milliGRAM(s) Oral every 8 hours  midodrine 10 milliGRAM(s) Oral every 8 hours  multivitamin 1 Tablet(s) Oral daily  pantoprazole  Injectable 40 milliGRAM(s) IV Push daily  sodium bicarbonate 650 milliGRAM(s) Oral two times a day  sodium chloride 0.9%. 1000 milliLiter(s) (75 mL/Hr) IV Continuous <Continuous>  tiotropium 2.5 MICROgram(s)/olodaterol 2.5 MICROgram(s) (STIOLTO) Inhaler 2 Puff(s) Inhalation daily    MEDICATIONS  (PRN):  melatonin 5 milliGRAM(s) Oral at bedtime PRN Sleep  metoprolol tartrate Injectable 2.5 milliGRAM(s) IV Push every 6 hours PRN for hR > 110    No Known Allergies    T(C): 36.4 (23 @ 08:38), Max: 36.6 (23 @ 13:00)  HR: 58 (23 @ 08:38) (56 - 68)  BP: 102/56 (23 @ 09:00) (89/53 - 109/68)  RR: 18 (23 @ 08:38) (18 - 20)  SpO2: 98% (23 @ 08:38) (96% - 99%)        PHYSICAL EXAM:    Constitutional: No acute distress  Neuro: Awake alert, oriented to person, place and situation, non-focal, speech clear and intact  HEENT: anicteric sclerae  Neck: supple, no JVD  CV: regular rate, regular rhythm  Pulm/chest: lung sounds CTA and equal bilaterally, no accessory muscle use noted  Abd: soft, nontender, nondistended +bowel sounds. No rigidity, rebound tenderness, or guarding  Ext: no Cyanosis, clubbing or edema  Skin: warm, no jaundice   Psych: calm, cooperative      LABS:               9.0    9.88  )-----------( 353      (  @ 05:20 )             28.4                9.0    8.08  )-----------( 277      (  @ 05:20 )             29.3                9.0    7.69  )-----------( 238      (  @ 12:02 )             28.6                7.1    7.07  )-----------( 197      (  @ 02:30 )             22.3           133<L>  |  102  |  30.9<H>  ----------------------------<  84  4.9   |  16.0<L>  |  1.70<H>    Ca    8.0<L>      01 Aug 2023 05:20  Mg     2.4         TPro  5.7<L>  /  Alb  3.0<L>  /  TBili  0.9  /  DBili  x   /  AST  19  /  ALT  17  /  AlkPhos  265<H>      LIVER FUNCTIONS - ( 01 Aug 2023 05:20 )  Alb: 3.0 g/dL / Pro: 5.7 g/dL / ALK PHOS: 265 U/L / ALT: 17 U/L / AST: 19 U/L / GGT: x               < from: EGD (23 @ 12:30) >    Findings:      Esophagus Mucosa Grade A esophagitis with reflux with no bleeding was seen    starting at 36 cm from the incisors in the lower third of the esophagus from 36    to 40 cm., compatible with non-erosive esophagitis. LA Grade A distal    esophagitis seen from 36 to 40 cm. Biopsies not taken. No hiatal hernia seen.    G-E jncn. @ 40 cm.        Stomach Mucosa Normal mucosa was noted in the whole stomach. On retroflexed    view, the stomach appeared to be normal. Submucosal nodule in proximal body seen    and biopsied. No hiatal hernia seen.        Additional stomach findings -Submucosal nodule seen in proximal body of the    stomach with abnormal appearing antral fold. Both areas biopsied. Otherwise    normal appearing mucosa..        Duodenum Mucosa Normal mucosa was noted in the whole duodenum. No duodenitis or    ulcerations seen.        Impressions:        Normal mucosa in the whole examined duodenum.        -Submucosal nodule seen in proximal body of the stomach with abnormal    appearing antral fold. Both areas biopsied. Otherwise normal appearing mucosa. .            Normal mucosa in the cardia and fundus.        Grade A esophagitis in the lower third of the esophagus from 36 to 40 cm.    Compatible with non-erosive esophagitis.        Plan:        Await pathology results.        Colonoscopy tomorrow. Prep orders written.      Additional Notes:      LA Grade A non erosive esophagitis and submucosal nodule seen and biopsied in    proximal body. Abnormal appearing fold in antrum also biopsied. No source of    bleeding seen today. Will need colonoscopy tomorrow.      Attending Participation:      I was present and participated during the entire procedure, including non-key    portions.        Jong Castanon MD    < end of copied text >    < from: Colonoscopy (23 @ 09:44) >        Findings:        Additional findings After adequate sedation by anesthesiology and with the    patient in the left lateral decubitus position a digital rectal examination was    performed. This revealed dark brown/black stool and a small amount. The Olympus    JC videocolonoscope was then introduced into the rectum and passed quite    easily to the cecum. The cecum was identified by the presence of the appendiceal    orifice, the ileocecal valve and its globular structure. The colon preparation    was overall only good with residual black stool in places. However with    extensive washing and aspiration the colon was well seen. No active bleeding or    bleeding type lesions were seen. There was significant left-sided    diverticulosis. There was grade 1 internal hemorrhoids seen on retroflexed view    in the rectum. Other than this the colon was entirely normal. No source for the    patient's melena and blood loss anemia worse found..        Impressions:        After adequate sedation by anesthesiology and with the patient in the left    lateral decubitus position a digital rectal examination was performed. This    revealed dark brown/black stool and a small amount. The Olympus JC    videocolonoscope was then introduced into the rectum and passed quite easily to    the cecum. The cecum was identified by the presence of the appendiceal orifice,    the ileocecal valve and its globular structure. The colon preparation was    overall only good with residual black stool in places. However with extensive    washing and aspiration the colon was well seen. No active bleeding or bleeding    type lesions were seen. There was significant left-sided diverticulosis. There    was grade 1 internal hemorrhoids seen on retroflexed view in the rectum. Other    than this the colon was entirely normal. No source for the patient's melena and    blood loss anemia worse found. .        Plan:        Return to floor for further management.  The patient has now had 2 endoscopies    and 2 colonoscopies for melena in the last 6 months.  No source has been found.    The patient should have an outpatient capsule endoscopy.  The patient may resume    a regular diet today.  We should keep her in the hospital for another day or 2    to see if the melena hasdissipated.        Manuel Thurman MD        Version 1, Electronically signed on 2023 10:51:18 AM by Manuel Thurman MD    < end of copied text >   Chief Complaint: This is a 83y old woman patient being seen in follow-up consultation for Gastrointestinal hemorrhage    Interval HPI/ 24 hr events: Patient seen and examined at bedside, no overnight events. EGD on  without active bleeding, colonoscopy  unrevealing as well.  Pt feeling well this morning.  Hgb remain stable at 9.0 gm. Denies nausea, vomiting, abdominal pain. Reports normal BM yesterday. DANETTE, Creat 1.7 this morning.       REVIEW OF SYSTEMS:   General: Negative  HEENT: Negative  CV: Negative  Respiratory: Negative  GI: See HPI  : Negative  MSK: Negative  Hematologic: Negative  Skin: Negative      PAST MEDICAL/SURGICAL HISTORY:  HTN (hypertension)    HLD (hyperlipidemia)    CAD (coronary artery disease)    COPD (chronic obstructive pulmonary disease)    Acute CHF    S/P  section      MEDICATIONS  (STANDING):  aMIOdarone    Tablet 200 milliGRAM(s) Oral daily  atorvastatin 80 milliGRAM(s) Oral at bedtime  chlorhexidine 2% Cloths 1 Application(s) Topical daily  metoprolol tartrate 25 milliGRAM(s) Oral every 8 hours  midodrine 10 milliGRAM(s) Oral every 8 hours  multivitamin 1 Tablet(s) Oral daily  pantoprazole  Injectable 40 milliGRAM(s) IV Push daily  sodium bicarbonate 650 milliGRAM(s) Oral two times a day  sodium chloride 0.9%. 1000 milliLiter(s) (75 mL/Hr) IV Continuous <Continuous>  tiotropium 2.5 MICROgram(s)/olodaterol 2.5 MICROgram(s) (STIOLTO) Inhaler 2 Puff(s) Inhalation daily    MEDICATIONS  (PRN):  melatonin 5 milliGRAM(s) Oral at bedtime PRN Sleep  metoprolol tartrate Injectable 2.5 milliGRAM(s) IV Push every 6 hours PRN for hR > 110    No Known Allergies    T(C): 36.4 (23 @ 08:38), Max: 36.6 (23 @ 13:00)  HR: 58 (23 @ 08:38) (56 - 68)  BP: 102/56 (23 @ 09:00) (89/53 - 109/68)  RR: 18 (23 @ 08:38) (18 - 20)  SpO2: 98% (08-01-23 @ 08:38) (96% - 99%)        PHYSICAL EXAM:    Constitutional: No acute distress  Neuro: Awake alert, oriented to person, place and situation, non-focal, speech clear and intact  HEENT: anicteric sclerae  Neck: supple, no JVD  CV: regular rate, regular rhythm  Pulm/chest: lung sounds CTA and equal bilaterally, no accessory muscle use noted  Abd: soft, nontender, nondistended +bowel sounds. No rigidity, rebound tenderness, or guarding  Ext: no Cyanosis, clubbing or edema  Skin: warm, no jaundice   Psych: calm, cooperative      LABS:               9.0    9.88  )-----------( 353      (  @ 05:20 )             28.4                9.0    8.08  )-----------( 277      (  @ 05:20 )             29.3                9.0    7.69  )-----------( 238      (  @ 12:02 )             28.6                7.1    7.07  )-----------( 197      (  @ 02:30 )             22.3           133<L>  |  102  |  30.9<H>  ----------------------------<  84  4.9   |  16.0<L>  |  1.70<H>    Ca    8.0<L>      01 Aug 2023 05:20  Mg     2.4         TPro  5.7<L>  /  Alb  3.0<L>  /  TBili  0.9  /  DBili  x   /  AST  19  /  ALT  17  /  AlkPhos  265<H>      LIVER FUNCTIONS - ( 01 Aug 2023 05:20 )  Alb: 3.0 g/dL / Pro: 5.7 g/dL / ALK PHOS: 265 U/L / ALT: 17 U/L / AST: 19 U/L / GGT: x               < from: EGD (23 @ 12:30) >    Findings:      Esophagus Mucosa Grade A esophagitis with reflux with no bleeding was seen    starting at 36 cm from the incisors in the lower third of the esophagus from 36    to 40 cm., compatible with non-erosive esophagitis. LA Grade A distal    esophagitis seen from 36 to 40 cm. Biopsies not taken. No hiatal hernia seen.    G-E jncn. @ 40 cm.        Stomach Mucosa Normal mucosa was noted in the whole stomach. On retroflexed    view, the stomach appeared to be normal. Submucosal nodule in proximal body seen    and biopsied. No hiatal hernia seen.        Additional stomach findings -Submucosal nodule seen in proximal body of the    stomach with abnormal appearing antral fold. Both areas biopsied. Otherwise    normal appearing mucosa..        Duodenum Mucosa Normal mucosa was noted in the whole duodenum. No duodenitis or    ulcerations seen.        Impressions:        Normal mucosa in the whole examined duodenum.        -Submucosal nodule seen in proximal body of the stomach with abnormal    appearing antral fold. Both areas biopsied. Otherwise normal appearing mucosa. .            Normal mucosa in the cardia and fundus.        Grade A esophagitis in the lower third of the esophagus from 36 to 40 cm.    Compatible with non-erosive esophagitis.        Plan:        Await pathology results.        Colonoscopy tomorrow. Prep orders written.      Additional Notes:      LA Grade A non erosive esophagitis and submucosal nodule seen and biopsied in    proximal body. Abnormal appearing fold in antrum also biopsied. No source of    bleeding seen today. Will need colonoscopy tomorrow.      Attending Participation:      I was present and participated during the entire procedure, including non-key    portions.        Jong Castanon MD    < end of copied text >    < from: Colonoscopy (23 @ 09:44) >        Findings:        Additional findings After adequate sedation by anesthesiology and with the    patient in the left lateral decubitus position a digital rectal examination was    performed. This revealed dark brown/black stool and a small amount. The Olympus    JC videocolonoscope was then introduced into the rectum and passed quite    easily to the cecum. The cecum was identified by the presence of the appendiceal    orifice, the ileocecal valve and its globular structure. The colon preparation    was overall only good with residual black stool in places. However with    extensive washing and aspiration the colon was well seen. No active bleeding or    bleeding type lesions were seen. There was significant left-sided    diverticulosis. There was grade 1 internal hemorrhoids seen on retroflexed view    in the rectum. Other than this the colon was entirely normal. No source for the    patient's melena and blood loss anemia worse found..        Impressions:        After adequate sedation by anesthesiology and with the patient in the left    lateral decubitus position a digital rectal examination was performed. This    revealed dark brown/black stool and a small amount. The Olympus JC    videocolonoscope was then introduced into the rectum and passed quite easily to    the cecum. The cecum was identified by the presence of the appendiceal orifice,    the ileocecal valve and its globular structure. The colon preparation was    overall only good with residual black stool in places. However with extensive    washing and aspiration the colon was well seen. No active bleeding or bleeding    type lesions were seen. There was significant left-sided diverticulosis. There    was grade 1 internal hemorrhoids seen on retroflexed view in the rectum. Other    than this the colon was entirely normal. No source for the patient's melena and    blood loss anemia worse found. .        Plan:        Return to floor for further management.  The patient has now had 2 endoscopies    and 2 colonoscopies for melena in the last 6 months.  No source has been found.    The patient should have an outpatient capsule endoscopy.  The patient may resume    a regular diet today.  We should keep her in the hospital for another day or 2    to see if the melena has dissipated.        Manuel Thurman MD        Version 1, Electronically signed on 2023 10:51:18 AM by Manuel Thurman MD    < end of copied text >

## 2023-08-01 NOTE — PROGRESS NOTE ADULT - SUBJECTIVE AND OBJECTIVE BOX
Patient is a 83y old  Female who presents with a chief complaint of GI bleed (31 Jul 2023 13:01)      INTERVAL History of Present Illness/OVERNIGHT EVENTS: hb stable  large brown BM per RN 7/31    MEDICATIONS  (STANDING):  aMIOdarone    Tablet 200 milliGRAM(s) Oral daily  aspirin  chewable 81 milliGRAM(s) Oral daily  atorvastatin 80 milliGRAM(s) Oral at bedtime  chlorhexidine 2% Cloths 1 Application(s) Topical daily  metoprolol tartrate 25 milliGRAM(s) Oral every 8 hours  midodrine 10 milliGRAM(s) Oral every 8 hours  multivitamin 1 Tablet(s) Oral daily  pantoprazole  Injectable 40 milliGRAM(s) IV Push daily  sodium bicarbonate 650 milliGRAM(s) Oral two times a day  sodium chloride 0.9%. 1000 milliLiter(s) (75 mL/Hr) IV Continuous <Continuous>  tiotropium 2.5 MICROgram(s)/olodaterol 2.5 MICROgram(s) (STIOLTO) Inhaler 2 Puff(s) Inhalation daily    MEDICATIONS  (PRN):  melatonin 5 milliGRAM(s) Oral at bedtime PRN Sleep  metoprolol tartrate Injectable 2.5 milliGRAM(s) IV Push every 6 hours PRN for hR > 110      Allergies    No Known Allergies    Intolerances        REVIEW OF SYSTEMS:  Other systems currently negative unless otherwise specified above.    Vital Signs Last 24 Hrs  T(C): 36.4 (01 Aug 2023 08:38), Max: 36.6 (31 Jul 2023 13:00)  T(F): 97.5 (01 Aug 2023 08:38), Max: 97.8 (31 Jul 2023 13:00)  HR: 58 (01 Aug 2023 08:38) (56 - 68)  BP: 102/56 (01 Aug 2023 09:00) (89/53 - 109/68)  BP(mean): --  RR: 18 (01 Aug 2023 08:38) (18 - 20)  SpO2: 98% (01 Aug 2023 08:38) (96% - 99%)    Parameters below as of 01 Aug 2023 08:38  Patient On (Oxygen Delivery Method): nasal cannula  O2 Flow (L/min): 2          PHYSICAL EXAM:  GENERAL: No apparent distress, appears stated age  EYES: Conjunctiva and sclera clear, no discharge  ENMT: Moist mucous membranes, no nasal discharge  CHEST/LUNG: Clear to auscultation bilaterally, no wheeze or rales  HEART: Regular rhythm, no rubs or gallops  ABDOMEN: Soft, Nontender, Nondistended  EXTREMITIES:  No clubbing, cyanosis or edema  SKIN: No rash, no new discoloration      LABS:                        9.0    9.88  )-----------( 353      ( 01 Aug 2023 05:20 )             28.4     01 Aug 2023 05:20    133    |  102    |  30.9   ----------------------------<  84     4.9     |  16.0   |  1.70     Ca    8.0        01 Aug 2023 05:20    TPro  5.7    /  Alb  3.0    /  TBili  0.9    /  DBili  x      /  AST  19     /  ALT  17     /  AlkPhos  265    01 Aug 2023 05:20      Urinalysis Basic - ( 01 Aug 2023 05:20 )    Color: x / Appearance: x / SG: x / pH: x  Gluc: 84 mg/dL / Ketone: x  / Bili: x / Urobili: x   Blood: x / Protein: x / Nitrite: x   Leuk Esterase: x / RBC: x / WBC x   Sq Epi: x / Non Sq Epi: x / Bacteria: x      CAPILLARY BLOOD GLUCOSE            RADIOLOGY & ADDITIONAL TESTS:      Images reviewed personally    Consultant Notes Reviewed and Care Discussed with relevant Consultants.

## 2023-08-01 NOTE — PHYSICAL THERAPY INITIAL EVALUATION ADULT - ADDITIONAL COMMENTS
pt states she lives with her daughter, son in law, and grandson in a 2-story house with 2 steps to enter(+2 rails) then 12 to second floor bedroom (+rail). no DME. daughter and son in law work different shifts, so pt is not alone for long. states her other daughter is available to assist as she is retired.

## 2023-08-01 NOTE — PROGRESS NOTE ADULT - ASSESSMENT
82 y/o female with PMH COPD on 2L home O2, HFpEF, CAD s/p PCI, HTN, HLD, pAF on rivaroxaban, diverticulosis, hemorrhoids presented with generalized weakness/dark stools admitted anemia, GIB without active bleed on ct, received 3U PRBC, and IVF however remained hypotensive requiring vasopressor therapy for hemorrhagic shock.  EGD on 7/27 and colonoscopy 7/28 without active bleed. Patient also with DANETTE and Afib with RVR, resumed back on amio and bb .  HR now better controlled. Plan for ct enterography. Patient downgraded to medicine 7/30/23.       > acute GIB / acute blood loss anemia /  hemorrhagic shock   - hx of GIb in past   - cta neg without active bleed on ct on admission   - s/p EGD on 7/27 without active bleeding  - s/p colonoscopy 7/28 no active bleed   - now off pressors , started on midodrine, will wean as tolerated   - c/w ppis   - ct enterography - will cancel given elevated creatinine and no further bleeding  - plan for capsule endoscopy as op   - gi following - d/w NP Vincy  - Diet as tolerated   - resume aspirin as per GI    > pafib   - resumed on amio and bb  - adjust bb dose for better hr control as needed  - prn iv lopressor for hr > 110  - aspirin    >CKD III/Metabolic acidosis  - f/up renal recs    > HLD  - c/w statins     >copd / not in acute exacerbation  - c/w inhalers   - c/w o2     > dvt ppx : scds     attempted, unable to reach daughter Shelly  d/w renal and GI teams  the plan of care

## 2023-08-01 NOTE — CONSULT NOTE ADULT - SUBJECTIVE AND OBJECTIVE BOX
Patient is a 83y old  Female who presents with a chief complaint of GI bleed (01 Aug 2023 12:05)      HPI:  MARITZA DE LOS SANTOS  [id number]  Patient is a 83y old  Female who presents with a chief complaint of     HPI: 82 y/o female with PMHx of COPD on 2L home O2, CHF, CAD s/p stents, HTN, HLD, pAF previously on eliquis now xarelto (switched 1 week ago 2/2 cost), diverticulosis, and grade/stage 3 hemorrhoids who presents to the ED c/o generalized weakness and dark stools over the past few days. Hypotensive on ED arrival and hgb found to be 4.6 with +occult/melanotic stool. CT showed diverticulosis, no active bleed visualized. GI consulted and patient made NPO for endoscopy tomorrow. Protonix gtt started, patient given 3U PRBC and 1L IVF but persistently hypotensive. Started on levophed and admitted to MICU.     Last 24 hours: Started on levophed. s/p 3u prbc but hypotensive at 86/43. Repeat CBC ordered and pending. Protonix running. Cleared by cardiology for EGD in AM.    REVIEW OF SYSTEMS:  +dizziness, weakness, MCKAY, melena  -vomiting, abdominal pain, leg swelling      Physical Exam:  Vital Signs Last 24 Hrs  T(C): 36.4 (2023 13:20), Max: 36.6 (2023 12:15)  T(F): 97.5 (2023 13:20), Max: 97.9 (2023 12:15)  HR: 57 (2023 14:45) (57 - 87)  BP: 86/43 (2023 14:45) (72/31 - 100/48)  BP(mean): --  RR: 16 (2023 13:20) (16 - 19)  SpO2: 100% (2023 13:20) (99% - 100%)    Parameters below as of 2023 13:20  Patient On (Oxygen Delivery Method): nasal cannula  O2 Flow (L/min): 2      General: well appearing, NAD  Head: NC, AT  EENT: EOMI, no scleral icterus  Cardiac: RRR, no apparent murmurs, no lower extremity edema or calf TTP   Respiratory: CTABL, no respiratory distress   Abdomen: soft, ND, NT, nonperitonitic  MSK/Vascular: full ROM, soft compartments, warm extremities  Neuro: AAOx3, sensation to light touch intact  Psych: calm, cooperative      ============================I/O===========================   I&O's Detail    ============================ LABS =========================                        4.6    12.58 )-----------( 347      ( 2023 09:50 )             14.5         139  |  102  |  74.9<H>  ----------------------------<  146<H>  4.0   |  19.0<L>  |  1.78<H>    Ca    8.7      2023 09:50    TPro  5.4<L>  /  Alb  3.1<L>  /  TBili  0.4  /  DBili  x   /  AST  14  /  ALT  14  /  AlkPhos  231<H>      LIVER FUNCTIONS - ( 2023 09:50 )  Alb: 3.1 g/dL / Pro: 5.4 g/dL / ALK PHOS: 231 U/L / ALT: 14 U/L / AST: 14 U/L / GGT: x           PT/INR - ( 2023 09:50 )   PT: 22.2 sec;   INR: 2.04 ratio         PTT - ( 2023 09:50 )  PTT:46.0 sec    Urinalysis Basic - ( 2023 09:50 )    Color: x / Appearance: x / SG: x / pH: x  Gluc: 146 mg/dL / Ketone: x  / Bili: x / Urobili: x   Blood: x / Protein: x / Nitrite: x   Leuk Esterase: x / RBC: x / WBC x   Sq Epi: x / Non Sq Epi: x / Bacteria: x      ======================================================  PAST MEDICAL & SURGICAL HISTORY:  HTN (hypertension)      HLD (hyperlipidemia)      CAD (coronary artery disease)      COPD (chronic obstructive pulmonary disease)      S/P  section         (2023 15:00)      PAST MEDICAL & SURGICAL HISTORY:  HTN (hypertension)      HLD (hyperlipidemia)      CAD (coronary artery disease)      COPD (chronic obstructive pulmonary disease)      Acute CHF      S/P  section          FAMILY HISTORY:  FHx: heart disease (Sibling)        SOCIAL HISTORY:    MEDICATIONS  (STANDING):  aMIOdarone    Tablet 200 milliGRAM(s) Oral daily  aspirin  chewable 81 milliGRAM(s) Oral daily  atorvastatin 80 milliGRAM(s) Oral at bedtime  chlorhexidine 2% Cloths 1 Application(s) Topical daily  metoprolol tartrate 25 milliGRAM(s) Oral every 8 hours  midodrine 10 milliGRAM(s) Oral every 8 hours  multivitamin 1 Tablet(s) Oral daily  pantoprazole  Injectable 40 milliGRAM(s) IV Push daily  sodium bicarbonate 650 milliGRAM(s) Oral two times a day  sodium chloride 0.9%. 1000 milliLiter(s) (75 mL/Hr) IV Continuous <Continuous>  tiotropium 2.5 MICROgram(s)/olodaterol 2.5 MICROgram(s) (STIOLTO) Inhaler 2 Puff(s) Inhalation daily    MEDICATIONS  (PRN):  melatonin 5 milliGRAM(s) Oral at bedtime PRN Sleep  metoprolol tartrate Injectable 2.5 milliGRAM(s) IV Push every 6 hours PRN for hR > 110      Allergies    No Known Allergies    Intolerances        REVIEW OF SYSTEMS:  CONSTITUTIONAL: No fever, weight loss, or fatigue  EYES: No eye pain, visual disturbances, or discharge  ENMT:  No difficulty hearing, tinnitus, vertigo; No sinus or throat pain  NECK: No pain or stiffness  BREASTS: No pain, masses, or nipple discharge  RESPIRATORY: No cough, wheezing, chills or hemoptysis; No shortness of breath  CARDIOVASCULAR: No chest pain, palpitations, dizziness, or leg swelling  GASTROINTESTINAL: No abdominal or epigastric pain. No nausea, vomiting, or hematemesis; No diarrhea or constipation. No melena or hematochezia.  GENITOURINARY: No dysuria, frequency, hematuria, or incontinence  NEUROLOGICAL: No headaches, memory loss, loss of strength, numbness, or tremors  SKIN: No itching, burning, rashes, or lesions   LYMPH NODES: No enlarged glands  ENDOCRINE: No heat or cold intolerance; No hair loss  MUSCULOSKELETAL: No joint pain or swelling; No muscle, back, or extremity pain  PSYCHIATRIC: No depression, anxiety, mood swings, or difficulty sleeping  HEME/LYMPH: No easy bruising, or bleeding gums  ALLERGY AND IMMUNOLOGIC: No hives or eczema    Vital Signs Last 24 Hrs  T(C): 36.6 (01 Aug 2023 16:00), Max: 36.6 (01 Aug 2023 12:00)  T(F): 97.8 (01 Aug 2023 16:00), Max: 97.9 (01 Aug 2023 12:00)  HR: 56 (01 Aug 2023 16:00) (56 - 66)  BP: 96/58 (01 Aug 2023 16:00) (89/53 - 107/71)  BP(mean): --  RR: 20 (01 Aug 2023 16:00) (18 - 20)  SpO2: 98% (01 Aug 2023 16:00) (97% - 98%)    Parameters below as of 01 Aug 2023 16:00  Patient On (Oxygen Delivery Method): nasal cannula  O2 Flow (L/min): 2      PHYSICAL EXAM:  GENERAL: NAD, well-groomed, well-developed  HEAD:  Atraumatic, Normocephalic  EYES: EOMI, PERRLA, conjunctiva and sclera clear  ENMT: No tonsillar erythema, exudates, or enlargement; Moist mucous membranes, Good dentition, No lesions  NECK: Supple, No JVD, Normal thyroid  NERVOUS SYSTEM:  Alert & Oriented X3, Good concentration; Motor Strength 5/5 B/L upper and lower extremities; DTRs 2+ intact and symmetric  CHEST/LUNG: Clear to percussion bilaterally; No rales, rhonchi, wheezing, or rubs  HEART: Regular rate and rhythm; No murmurs, rubs, or gallops  ABDOMEN: Soft, Nontender, Nondistended; Bowel sounds present  EXTREMITIES:  2+ Peripheral Pulses, No clubbing, cyanosis, or edema  LYMPH: No lymphadenopathy noted  SKIN: No rashes or lesions    LABS:                        9.0    9.88  )-----------( 353      ( 01 Aug 2023 05:20 )             28.4     08-    133<L>  |  102  |  30.9<H>  ----------------------------<  84  4.9   |  16.0<L>  |  1.70<H>    Ca    8.0<L>      01 Aug 2023 05:20  Mg     2.4     07-    TPro  5.7<L>  /  Alb  3.0<L>  /  TBili  0.9  /  DBili  x   /  AST  19  /  ALT  17  /  AlkPhos  265<H>  08      Urinalysis Basic - ( 01 Aug 2023 05:20 )    Color: x / Appearance: x / SG: x / pH: x  Gluc: 84 mg/dL / Ketone: x  / Bili: x / Urobili: x   Blood: x / Protein: x / Nitrite: x   Leuk Esterase: x / RBC: x / WBC x   Sq Epi: x / Non Sq Epi: x / Bacteria: x          RADIOLOGY & ADDITIONAL TESTS: Reviewed   HPI: 83F w/ COPD on 2L home O2, CHF, CAD s/p stents, HTN, HLD, pAF on AC, CKD3, diverticulosis, and grade/stage 3 hemorrhoids who presents to the ED c/o generalized weakness and dark stools over the past few days. Hypotensive on ED arrival and hgb found to be 4.6 with +occult/melanotic stool. CT showed diverticulosis, no active bleed visualized. Scr fluctuating 1.7 today, bicarb 16. BP have been soft 89/53 this AM. Patient received contrast exposure on  and GI was planing CTE again. She denied increased urination frequency, dysuria, gross hematuria, fever, chills, skin rash or itching, nausea, vomiting. Some diarrhea+. Review of other systems was negative.    PAST MEDICAL & SURGICAL HISTORY:  HTN (hypertension)  HLD (hyperlipidemia)  CAD (coronary artery disease)  COPD (chronic obstructive pulmonary disease)  S/P  section    FAMILY HISTORY:  FHx: heart disease (Sibling)  No pertinent family history of renal disease or electrolyte disorder in first degree relatives.    SOCIAL HISTORY:  Denies tobacco, alcohol, drug abuse.     MEDICATIONS  (STANDING):  aMIOdarone    Tablet 200 milliGRAM(s) Oral daily  aspirin  chewable 81 milliGRAM(s) Oral daily  atorvastatin 80 milliGRAM(s) Oral at bedtime  chlorhexidine 2% Cloths 1 Application(s) Topical daily  metoprolol tartrate 25 milliGRAM(s) Oral every 8 hours  midodrine 10 milliGRAM(s) Oral every 8 hours  multivitamin 1 Tablet(s) Oral daily  pantoprazole  Injectable 40 milliGRAM(s) IV Push daily  sodium bicarbonate 650 milliGRAM(s) Oral two times a day  sodium chloride 0.9%. 1000 milliLiter(s) (75 mL/Hr) IV Continuous <Continuous>  tiotropium 2.5 MICROgram(s)/olodaterol 2.5 MICROgram(s) (STIOLTO) Inhaler 2 Puff(s) Inhalation daily    MEDICATIONS  (PRN):  melatonin 5 milliGRAM(s) Oral at bedtime PRN Sleep  metoprolol tartrate Injectable 2.5 milliGRAM(s) IV Push every 6 hours PRN for hR > 110    ALLERGIES:  No Known Allergies    REVIEW OF SYSTEMS: All systems were reviewed in detail. Pertinent positive and negative have been detailed in HPI, otherwise negative.     Vital Signs Last 24 Hrs  T(C): 36.6 (01 Aug 2023 16:00), Max: 36.6 (01 Aug 2023 12:00)  T(F): 97.8 (01 Aug 2023 16:00), Max: 97.9 (01 Aug 2023 12:00)  HR: 56 (01 Aug 2023 16:00) (56 - 66)  BP: 96/58 (01 Aug 2023 16:00) (89/53 - 107/71)  RR: 20 (01 Aug 2023 16:00) (18 - 20)  SpO2: 98% (01 Aug 2023 16:00) (97% - 98%)    Parameters below as of 01 Aug 2023 16:00  Patient On (Oxygen Delivery Method): nasal cannula  O2 Flow (L/min): 2    PHYSICAL EXAM:  Gen: no acute distress  MS: alert, conversing normally  Eyes: EOMI, no icterus, pallor+  HENT: NCAT, MMM, NC+  CV: rhythm reg reg, rate normal, no m/g/r, no LE edema  Chest: CTAB, no w/r/r,  Abd: soft, NT, ND  Neuro: moving all 4 limbs spontaneously, no tremor  Skin: dry, warm, no rash or jaundice    LABS:                        9.0    9.88  )-----------( 353      ( 01 Aug 2023 05:20 )             28.4     08-01    133<L>  |  102  |  30.9<H>  ----------------------------<  84  4.9   |  16.0<L>  |  1.70<H>    Ca    8.0<L>      01 Aug 2023 05:20  Mg     2.4     07-31  TPro  5.7<L>  /  Alb  3.0<L>  /  TBili  0.9  /  DBili  x   /  AST  19  /  ALT  17  /  AlkPhos  265<H>  08    Urinalysis Basic - ( 01 Aug 2023 05:20 )  Color: x / Appearance: x / SG: x / pH: x  Gluc: 84 mg/dL / Ketone: x  / Bili: x / Urobili: x   Blood: x / Protein: x / Nitrite: x   Leuk Esterase: x / RBC: x / WBC x   Sq Epi: x / Non Sq Epi: x / Bacteria: x    RADIOLOGY & ADDITIONAL TESTS: Reviewed

## 2023-08-02 LAB
ALBUMIN SERPL ELPH-MCNC: 3.1 G/DL — LOW (ref 3.3–5.2)
ALP SERPL-CCNC: 416 U/L — HIGH (ref 40–120)
ALT FLD-CCNC: 57 U/L — HIGH
ANION GAP SERPL CALC-SCNC: 16 MMOL/L — SIGNIFICANT CHANGE UP (ref 5–17)
ANISOCYTOSIS BLD QL: SLIGHT — SIGNIFICANT CHANGE UP
AST SERPL-CCNC: 59 U/L — HIGH
BASOPHILS # BLD AUTO: 0.12 K/UL — SIGNIFICANT CHANGE UP (ref 0–0.2)
BASOPHILS NFR BLD AUTO: 0.9 % — SIGNIFICANT CHANGE UP (ref 0–2)
BILIRUB SERPL-MCNC: 0.9 MG/DL — SIGNIFICANT CHANGE UP (ref 0.4–2)
BLD GP AB SCN SERPL QL: SIGNIFICANT CHANGE UP
BUN SERPL-MCNC: 39.6 MG/DL — HIGH (ref 8–20)
BURR CELLS BLD QL SMEAR: PRESENT — SIGNIFICANT CHANGE UP
CALCIUM SERPL-MCNC: 8 MG/DL — LOW (ref 8.4–10.5)
CHLORIDE SERPL-SCNC: 100 MMOL/L — SIGNIFICANT CHANGE UP (ref 96–108)
CO2 SERPL-SCNC: 16 MMOL/L — LOW (ref 22–29)
CREAT SERPL-MCNC: 2.2 MG/DL — HIGH (ref 0.5–1.3)
DACRYOCYTES BLD QL SMEAR: SIGNIFICANT CHANGE UP
EGFR: 22 ML/MIN/1.73M2 — LOW
ELLIPTOCYTES BLD QL SMEAR: SIGNIFICANT CHANGE UP
EOSINOPHIL # BLD AUTO: 0 K/UL — SIGNIFICANT CHANGE UP (ref 0–0.5)
EOSINOPHIL NFR BLD AUTO: 0 % — SIGNIFICANT CHANGE UP (ref 0–6)
GIANT PLATELETS BLD QL SMEAR: PRESENT — SIGNIFICANT CHANGE UP
GLUCOSE SERPL-MCNC: 105 MG/DL — HIGH (ref 70–99)
HCT VFR BLD CALC: 31.1 % — LOW (ref 34.5–45)
HGB BLD-MCNC: 9.3 G/DL — LOW (ref 11.5–15.5)
HYPOCHROMIA BLD QL: SLIGHT — SIGNIFICANT CHANGE UP
LYMPHOCYTES # BLD AUTO: 1.04 K/UL — SIGNIFICANT CHANGE UP (ref 1–3.3)
LYMPHOCYTES # BLD AUTO: 7.8 % — LOW (ref 13–44)
MANUAL SMEAR VERIFICATION: SIGNIFICANT CHANGE UP
MCHC RBC-ENTMCNC: 29 PG — SIGNIFICANT CHANGE UP (ref 27–34)
MCHC RBC-ENTMCNC: 29.9 GM/DL — LOW (ref 32–36)
MCV RBC AUTO: 96.9 FL — SIGNIFICANT CHANGE UP (ref 80–100)
MONOCYTES # BLD AUTO: 1.28 K/UL — HIGH (ref 0–0.9)
MONOCYTES NFR BLD AUTO: 9.6 % — SIGNIFICANT CHANGE UP (ref 2–14)
NEUTROPHILS # BLD AUTO: 10.87 K/UL — HIGH (ref 1.8–7.4)
NEUTROPHILS NFR BLD AUTO: 81.7 % — HIGH (ref 43–77)
NRBC # BLD: 1 /100 — HIGH (ref 0–0)
NT-PROBNP SERPL-SCNC: HIGH PG/ML (ref 0–300)
OVALOCYTES BLD QL SMEAR: SIGNIFICANT CHANGE UP
PLAT MORPH BLD: NORMAL — SIGNIFICANT CHANGE UP
PLATELET # BLD AUTO: 382 K/UL — SIGNIFICANT CHANGE UP (ref 150–400)
POIKILOCYTOSIS BLD QL AUTO: SIGNIFICANT CHANGE UP
POLYCHROMASIA BLD QL SMEAR: SLIGHT — SIGNIFICANT CHANGE UP
POTASSIUM SERPL-MCNC: 5.2 MMOL/L — SIGNIFICANT CHANGE UP (ref 3.5–5.3)
POTASSIUM SERPL-SCNC: 5.2 MMOL/L — SIGNIFICANT CHANGE UP (ref 3.5–5.3)
PROT SERPL-MCNC: 5.6 G/DL — LOW (ref 6.6–8.7)
RBC # BLD: 3.21 M/UL — LOW (ref 3.8–5.2)
RBC # FLD: 17.8 % — HIGH (ref 10.3–14.5)
RBC BLD AUTO: ABNORMAL
SODIUM SERPL-SCNC: 132 MMOL/L — LOW (ref 135–145)
SURGICAL PATHOLOGY STUDY: SIGNIFICANT CHANGE UP
WBC # BLD: 13.3 K/UL — HIGH (ref 3.8–10.5)
WBC # FLD AUTO: 13.3 K/UL — HIGH (ref 3.8–10.5)

## 2023-08-02 PROCEDURE — 99233 SBSQ HOSP IP/OBS HIGH 50: CPT

## 2023-08-02 PROCEDURE — 99232 SBSQ HOSP IP/OBS MODERATE 35: CPT

## 2023-08-02 RX ORDER — MIDODRINE HYDROCHLORIDE 2.5 MG/1
15 TABLET ORAL EVERY 8 HOURS
Refills: 0 | Status: DISCONTINUED | OUTPATIENT
Start: 2023-08-02 | End: 2023-08-11

## 2023-08-02 RX ORDER — METOPROLOL TARTRATE 50 MG
25 TABLET ORAL
Refills: 0 | Status: DISCONTINUED | OUTPATIENT
Start: 2023-08-02 | End: 2023-08-15

## 2023-08-02 RX ORDER — ENOXAPARIN SODIUM 100 MG/ML
40 INJECTION SUBCUTANEOUS EVERY 12 HOURS
Refills: 0 | Status: DISCONTINUED | OUTPATIENT
Start: 2023-08-02 | End: 2023-08-21

## 2023-08-02 RX ORDER — SODIUM CHLORIDE 9 MG/ML
1000 INJECTION INTRAMUSCULAR; INTRAVENOUS; SUBCUTANEOUS
Refills: 0 | Status: DISCONTINUED | OUTPATIENT
Start: 2023-08-02 | End: 2023-08-14

## 2023-08-02 RX ADMIN — ENOXAPARIN SODIUM 40 MILLIGRAM(S): 100 INJECTION SUBCUTANEOUS at 16:03

## 2023-08-02 RX ADMIN — PANTOPRAZOLE SODIUM 40 MILLIGRAM(S): 20 TABLET, DELAYED RELEASE ORAL at 17:28

## 2023-08-02 RX ADMIN — Medication 1 TABLET(S): at 17:28

## 2023-08-02 RX ADMIN — Medication 81 MILLIGRAM(S): at 17:28

## 2023-08-02 RX ADMIN — MIDODRINE HYDROCHLORIDE 10 MILLIGRAM(S): 2.5 TABLET ORAL at 05:33

## 2023-08-02 RX ADMIN — CHLORHEXIDINE GLUCONATE 1 APPLICATION(S): 213 SOLUTION TOPICAL at 17:27

## 2023-08-02 RX ADMIN — MIDODRINE HYDROCHLORIDE 15 MILLIGRAM(S): 2.5 TABLET ORAL at 21:16

## 2023-08-02 RX ADMIN — Medication 650 MILLIGRAM(S): at 05:33

## 2023-08-02 RX ADMIN — AMIODARONE HYDROCHLORIDE 200 MILLIGRAM(S): 400 TABLET ORAL at 05:33

## 2023-08-02 RX ADMIN — Medication 650 MILLIGRAM(S): at 17:28

## 2023-08-02 RX ADMIN — MIDODRINE HYDROCHLORIDE 15 MILLIGRAM(S): 2.5 TABLET ORAL at 13:47

## 2023-08-02 RX ADMIN — SODIUM CHLORIDE 75 MILLILITER(S): 9 INJECTION INTRAMUSCULAR; INTRAVENOUS; SUBCUTANEOUS at 10:33

## 2023-08-02 NOTE — PROGRESS NOTE ADULT - SUBJECTIVE AND OBJECTIVE BOX
MARITZA DE LOS SANTOS  [id number]      Chief Complaint:  GIB/ anemia/ PAF    Interval History:  hypotension     Tele:  not on tele      aMIOdarone    Tablet 200 milliGRAM(s) Oral daily  aspirin  chewable 81 milliGRAM(s) Oral daily  chlorhexidine 2% Cloths 1 Application(s) Topical daily  enoxaparin Injectable 40 milliGRAM(s) SubCutaneous every 12 hours  melatonin 5 milliGRAM(s) Oral at bedtime PRN  metoprolol tartrate 25 milliGRAM(s) Oral two times a day  metoprolol tartrate Injectable 2.5 milliGRAM(s) IV Push every 6 hours PRN  midodrine 15 milliGRAM(s) Oral every 8 hours  multivitamin 1 Tablet(s) Oral daily  pantoprazole  Injectable 40 milliGRAM(s) IV Push daily  sodium bicarbonate 650 milliGRAM(s) Oral two times a day  sodium chloride 0.9%. 1000 milliLiter(s) IV Continuous <Continuous>  sodium chloride 0.9%. 1000 milliLiter(s) IV Continuous <Continuous>  tiotropium 2.5 MICROgram(s)/olodaterol 2.5 MICROgram(s) (STIOLTO) Inhaler 2 Puff(s) Inhalation daily          Physical Exam:  T(C): 36.8 (08-02-23 @ 14:00), Max: 36.8 (08-02-23 @ 08:40)  HR: 62 (08-02-23 @ 14:00) (56 - 62)  BP: 94/58 (08-02-23 @ 14:00) (79/59 - 96/58)  RR: 18 (08-02-23 @ 14:00) (17 - 20)  SpO2: 98% (08-02-23 @ 14:00) (98% - 98%)  Wt(kg): --  General: Comfortable in NAD  Neck: No JVD  CVS: nl s1s2, no s3  Pulm: CTA b/l  Abd: soft, non-tender  Ext: No c/c/e  Neuro A&O x3  Psych: Normal affect      Labs:   02 Aug 2023 08:30    132    |  100    |  39.6   ----------------------------<  105    5.2     |  16.0   |  2.20     Ca    8.0        02 Aug 2023 08:30    TPro  5.6    /  Alb  3.1    /  TBili  0.9    /  DBili  x      /  AST  59     /  ALT  57     /  AlkPhos  416    02 Aug 2023 08:30                          9.3    13.30 )-----------( 382      ( 02 Aug 2023 08:30 )             31.1         Nuclear stress test 7/20/20; large fixed defect involving the lateral wall of severe intensity consistent with antecedent infarction, no significant residual ischemia, EF 70%  Echocardiogram 8/10/20; ejection fraction 50%, moderate mitral regurgitation, moderate tricuspid regurgitation.  Carotid ultrasound 8/10/20; 50-65% bilateral carotid stenosis.  Holter monitor 7/7/20; sinus rhythm with no additional significant arrhythmia.        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  EGD on 7/27 without active bleeding, colonoscopy 7/28 unrevealing as well. Hemoglobin remain stable at 9.0 gm.   asked to re-evaluate patient for hypotension, patient asymptomatic   On exam patient looks dry  BPs ~90/50 on midodrine and IVF     Plan:  1.  continue IVF and midodrine   2.  Continue current medical therapy, if needed metoprolol can be switch to metoprolol 25mg XL   3.  Please call us back with questions or concerns.

## 2023-08-02 NOTE — PROGRESS NOTE ADULT - ASSESSMENT
82 y/o female with PMH COPD on 2L home O2, HFpEF, CAD s/p PCI, HTN, HLD, pAF on rivaroxaban, diverticulosis, hemorrhoids presented with generalized weakness/dark stools admitted anemia, GIB without active bleed on ct, received 3U PRBC, and IVF however remained hypotensive requiring vasopressor therapy for hemorrhagic shock.  EGD on 7/27 and colonoscopy 7/28 without active bleed. Patient also with DANETTE and Afib with RVR, resumed back on amio and bb .  HR now better controlled. Plan was for ct enterography, but given DANETTE on CKD, it was cancelled. Patient downgraded to medicine 7/30/23.       > acute GIB / acute blood loss anemia /  hemorrhagic shock   - hx of GIB in past   - cta neg without active bleed on ct on admission   - s/p EGD on 7/27 without active bleeding  - s/p colonoscopy 7/28 no active bleed   - now off pressors , started on midodrine, increased to 15mg TID given hypotension noted overnight   - c/w ppis   - ct enterography - cancelled given elevated creatinine and no further bleeding  - plan for capsule endoscopy as op   - gi following   - Diet as tolerated   - resumed aspirin as per GI    > pafib   - resumed on amio and bb  - adjust bb dose for better hr control as needed  - prn iv lopressor for hr > 110  - aspirin    >CKD III/Metabolic acidosis  - f/up renal recs  - continue IVF and sodium bicarb    > HLD  - c/w statins     >copd / not in acute exacerbation  - c/w inhalers   - c/w o2     > dvt ppx : scds     daughter Shelly   84 y/o female with PMH COPD on 2L home O2, HFpEF, CAD s/p PCI, HTN, HLD, pAF on rivaroxaban, diverticulosis, hemorrhoids presented with generalized weakness/dark stools admitted anemia, GIB without active bleed on ct, received 3U PRBC, and IVF however remained hypotensive requiring vasopressor therapy for hemorrhagic shock.  EGD on 7/27 and colonoscopy 7/28 without active bleed. Patient also with DANETTE and Afib with RVR, resumed back on amio and bb .  HR now better controlled. Plan was for ct enterography, but given DANETTE on CKD, it was cancelled. Patient downgraded to medicine 7/30/23.       > acute GIB / acute blood loss anemia /  hemorrhagic shock   - hx of GIB in past   - cta neg without active bleed on ct on admission   - s/p EGD on 7/27 without active bleeding  - s/p colonoscopy 7/28 no active bleed   - now off pressors , started on midodrine, increased to 15mg TID given hypotension noted overnight   - c/w ppis   - ct enterography - cancelled given elevated creatinine and no further bleeding  - plan for capsule endoscopy as op   - gi following   - Diet as tolerated   - resumed aspirin as per GI    > pafib   Hypotension, asymptomatic  - resumed on amio and bb  - adjust bb dose for better hr control as needed  - prn iv lopressor for hr > 110  - aspirin  - raised Midodrine dose for improved BP  - lowered metoprolol dose    >DANETTE on CKD III/Metabolic acidosis  Hyponatremia  Renal hypoperfusion, episodic  - f/up renal recs  - continue IVF and sodium bicarb    > HLD  - monitor CMP   - hold statin due to transaminitis    >copd / not in acute exacerbation  - c/w inhalers   - c/w o2     > dvt ppx : trial of Lovenox  GI cleared for Xarelto resumption.    patient with multiple co-morbid conditions; higher risk for future complications despite optimal medical therapy

## 2023-08-02 NOTE — PROGRESS NOTE ADULT - SUBJECTIVE AND OBJECTIVE BOX
CC: GI bleed (01 Aug 2023 12:05)    HPI:  84 y/o female with PMH COPD on 2L home O2, HFpEF, CAD s/p PCI, HTN, HLD, pAF on rivaroxaban, diverticulosis, hemorrhoids presented with generalized weakness/dark stools admitted with anemia, GIB without active bleed on ct, received 3U PRBC, and IVF however remained hypotensive requiring vasopressor therapy for hemorrhagic shock.  EGD on 7/27 and colonoscopy 7/28 without active bleed. Patient also with DANETTE and Afib with RVR, resumed back on amio and bb.  HR now better controlled. Plan for ct enterography. Patient downgraded to medicine 7/30/23.     INTERVAL HPI/OVERNIGHT EVENTS: Patient seen and examined sitting up in the bed.  Patient reports feeling tired, not sleeping well at night.  Low BP noted overnight.  Patient denies any headache, dizziness, SOB, CP, abdominal pain, nausea, vomiting, dysuria.  Other ROS reviewed and are negative.    Vital Signs Last 24 Hrs  T(C): 36.8 (02 Aug 2023 08:40), Max: 36.8 (02 Aug 2023 08:40)  T(F): 98.2 (02 Aug 2023 08:40), Max: 98.2 (02 Aug 2023 08:40)  HR: 60 (02 Aug 2023 08:40) (56 - 60)  BP: 93/57 (02 Aug 2023 08:40) (79/59 - 96/58)  BP(mean): --  RR: 18 (02 Aug 2023 08:40) (17 - 20)  SpO2: 98% (02 Aug 2023 08:40) (98% - 98%)    Parameters below as of 02 Aug 2023 08:40  Patient On (Oxygen Delivery Method): nasal cannula  O2 Flow (L/min): 2    I&O's Detail    PHYSICAL EXAM:  GENERAL: NAD  HEAD:  Atraumatic, Normocephalic  NECK: Supple, No JVD, Normal thyroid  NERVOUS SYSTEM:  Alert & Oriented X3, Good concentration; Motor Strength 5/5 B/L upper and lower extremities  CHEST/LUNG: Clear to auscultation bilaterally  HEART: Regular rate and rhythm; No murmurs, rubs, or gallops  ABDOMEN: Soft, Nontender, Nondistended; Bowel sounds present  EXTREMITIES:  2+ Peripheral Pulses, Trace bilateral LE edema  SKIN: No rashes or lesions                              9.3    13.30 )-----------( 382      ( 02 Aug 2023 08:30 )             31.1     02 Aug 2023 08:30    132    |  100    |  39.6   ----------------------------<  105    5.2     |  16.0   |  2.20     Ca    8.0        02 Aug 2023 08:30    TPro  5.6    /  Alb  3.1    /  TBili  0.9    /  DBili  x      /  AST  59     /  ALT  57     /  AlkPhos  416    02 Aug 2023 08:30        LIVER FUNCTIONS - ( 02 Aug 2023 08:30 )  Alb: 3.1 g/dL / Pro: 5.6 g/dL / ALK PHOS: 416 U/L / ALT: 57 U/L / AST: 59 U/L / GGT: x           Urinalysis Basic - ( 02 Aug 2023 08:30 )    Color: x / Appearance: x / SG: x / pH: x  Gluc: 105 mg/dL / Ketone: x  / Bili: x / Urobili: x   Blood: x / Protein: x / Nitrite: x   Leuk Esterase: x / RBC: x / WBC x   Sq Epi: x / Non Sq Epi: x / Bacteria: x        MEDICATIONS  (STANDING):  aMIOdarone    Tablet 200 milliGRAM(s) Oral daily  aspirin  chewable 81 milliGRAM(s) Oral daily  atorvastatin 80 milliGRAM(s) Oral at bedtime  chlorhexidine 2% Cloths 1 Application(s) Topical daily  metoprolol tartrate 25 milliGRAM(s) Oral two times a day  midodrine 15 milliGRAM(s) Oral every 8 hours  multivitamin 1 Tablet(s) Oral daily  pantoprazole  Injectable 40 milliGRAM(s) IV Push daily  sodium bicarbonate 650 milliGRAM(s) Oral two times a day  sodium chloride 0.9%. 1000 milliLiter(s) (75 mL/Hr) IV Continuous <Continuous>  sodium chloride 0.9%. 1000 milliLiter(s) (75 mL/Hr) IV Continuous <Continuous>  tiotropium 2.5 MICROgram(s)/olodaterol 2.5 MICROgram(s) (STIOLTO) Inhaler 2 Puff(s) Inhalation daily    MEDICATIONS  (PRN):  melatonin 5 milliGRAM(s) Oral at bedtime PRN Sleep  metoprolol tartrate Injectable 2.5 milliGRAM(s) IV Push every 6 hours PRN for hR > 110

## 2023-08-03 LAB
ANION GAP SERPL CALC-SCNC: 16 MMOL/L — SIGNIFICANT CHANGE UP (ref 5–17)
APPEARANCE UR: ABNORMAL
BACTERIA # UR AUTO: ABNORMAL
BILIRUB UR-MCNC: NEGATIVE — SIGNIFICANT CHANGE UP
BUN SERPL-MCNC: 44.6 MG/DL — HIGH (ref 8–20)
CALCIUM SERPL-MCNC: 8.2 MG/DL — LOW (ref 8.4–10.5)
CHLORIDE SERPL-SCNC: 99 MMOL/L — SIGNIFICANT CHANGE UP (ref 96–108)
CO2 SERPL-SCNC: 15 MMOL/L — LOW (ref 22–29)
COLOR SPEC: YELLOW — SIGNIFICANT CHANGE UP
CREAT SERPL-MCNC: 2.36 MG/DL — HIGH (ref 0.5–1.3)
DIFF PNL FLD: NEGATIVE — SIGNIFICANT CHANGE UP
EGFR: 20 ML/MIN/1.73M2 — LOW
EPI CELLS # UR: ABNORMAL
GLUCOSE SERPL-MCNC: 103 MG/DL — HIGH (ref 70–99)
GLUCOSE UR QL: NEGATIVE MG/DL — SIGNIFICANT CHANGE UP
HCT VFR BLD CALC: 29.8 % — LOW (ref 34.5–45)
HGB BLD-MCNC: 9.5 G/DL — LOW (ref 11.5–15.5)
KETONES UR-MCNC: NEGATIVE — SIGNIFICANT CHANGE UP
LEUKOCYTE ESTERASE UR-ACNC: ABNORMAL
MCHC RBC-ENTMCNC: 29.9 PG — SIGNIFICANT CHANGE UP (ref 27–34)
MCHC RBC-ENTMCNC: 31.9 GM/DL — LOW (ref 32–36)
MCV RBC AUTO: 93.7 FL — SIGNIFICANT CHANGE UP (ref 80–100)
NITRITE UR-MCNC: NEGATIVE — SIGNIFICANT CHANGE UP
PH UR: 5 — SIGNIFICANT CHANGE UP (ref 5–8)
PLATELET # BLD AUTO: 398 K/UL — SIGNIFICANT CHANGE UP (ref 150–400)
POTASSIUM SERPL-MCNC: 5 MMOL/L — SIGNIFICANT CHANGE UP (ref 3.5–5.3)
POTASSIUM SERPL-SCNC: 5 MMOL/L — SIGNIFICANT CHANGE UP (ref 3.5–5.3)
PROT UR-MCNC: 30 MG/DL
RBC # BLD: 3.18 M/UL — LOW (ref 3.8–5.2)
RBC # FLD: 17.5 % — HIGH (ref 10.3–14.5)
RBC CASTS # UR COMP ASSIST: SIGNIFICANT CHANGE UP /HPF (ref 0–4)
SODIUM SERPL-SCNC: 130 MMOL/L — LOW (ref 135–145)
SP GR SPEC: 1.02 — SIGNIFICANT CHANGE UP (ref 1.01–1.02)
UROBILINOGEN FLD QL: NEGATIVE MG/DL — SIGNIFICANT CHANGE UP
WBC # BLD: 15.52 K/UL — HIGH (ref 3.8–10.5)
WBC # FLD AUTO: 15.52 K/UL — HIGH (ref 3.8–10.5)
WBC UR QL: ABNORMAL /HPF (ref 0–5)

## 2023-08-03 PROCEDURE — 99233 SBSQ HOSP IP/OBS HIGH 50: CPT

## 2023-08-03 PROCEDURE — 71045 X-RAY EXAM CHEST 1 VIEW: CPT | Mod: 26

## 2023-08-03 RX ORDER — ALBUMIN HUMAN 25 %
50 VIAL (ML) INTRAVENOUS ONCE
Refills: 0 | Status: COMPLETED | OUTPATIENT
Start: 2023-08-03 | End: 2023-08-03

## 2023-08-03 RX ORDER — FUROSEMIDE 40 MG
40 TABLET ORAL ONCE
Refills: 0 | Status: COMPLETED | OUTPATIENT
Start: 2023-08-03 | End: 2023-08-03

## 2023-08-03 RX ORDER — SODIUM BICARBONATE 1 MEQ/ML
1300 SYRINGE (ML) INTRAVENOUS THREE TIMES A DAY
Refills: 0 | Status: DISCONTINUED | OUTPATIENT
Start: 2023-08-03 | End: 2023-08-15

## 2023-08-03 RX ORDER — SODIUM BICARBONATE 1 MEQ/ML
1300 SYRINGE (ML) INTRAVENOUS
Refills: 0 | Status: DISCONTINUED | OUTPATIENT
Start: 2023-08-03 | End: 2023-08-03

## 2023-08-03 RX ADMIN — Medication 1 TABLET(S): at 13:54

## 2023-08-03 RX ADMIN — AMIODARONE HYDROCHLORIDE 200 MILLIGRAM(S): 400 TABLET ORAL at 05:32

## 2023-08-03 RX ADMIN — Medication 40 MILLIGRAM(S): at 13:04

## 2023-08-03 RX ADMIN — Medication 81 MILLIGRAM(S): at 13:54

## 2023-08-03 RX ADMIN — MIDODRINE HYDROCHLORIDE 15 MILLIGRAM(S): 2.5 TABLET ORAL at 05:32

## 2023-08-03 RX ADMIN — ENOXAPARIN SODIUM 40 MILLIGRAM(S): 100 INJECTION SUBCUTANEOUS at 05:32

## 2023-08-03 RX ADMIN — Medication 50 MILLILITER(S): at 12:09

## 2023-08-03 RX ADMIN — Medication 25 MILLIGRAM(S): at 17:44

## 2023-08-03 RX ADMIN — Medication 25 MILLIGRAM(S): at 05:33

## 2023-08-03 RX ADMIN — ENOXAPARIN SODIUM 40 MILLIGRAM(S): 100 INJECTION SUBCUTANEOUS at 17:44

## 2023-08-03 RX ADMIN — MIDODRINE HYDROCHLORIDE 15 MILLIGRAM(S): 2.5 TABLET ORAL at 21:41

## 2023-08-03 RX ADMIN — Medication 650 MILLIGRAM(S): at 05:33

## 2023-08-03 RX ADMIN — PANTOPRAZOLE SODIUM 40 MILLIGRAM(S): 20 TABLET, DELAYED RELEASE ORAL at 13:54

## 2023-08-03 RX ADMIN — MIDODRINE HYDROCHLORIDE 15 MILLIGRAM(S): 2.5 TABLET ORAL at 13:54

## 2023-08-03 RX ADMIN — Medication 1300 MILLIGRAM(S): at 13:53

## 2023-08-03 RX ADMIN — Medication 1300 MILLIGRAM(S): at 21:41

## 2023-08-03 RX ADMIN — CHLORHEXIDINE GLUCONATE 1 APPLICATION(S): 213 SOLUTION TOPICAL at 13:53

## 2023-08-03 RX ADMIN — TIOTROPIUM BROMIDE AND OLODATEROL 2 PUFF(S): 3.124; 2.736 SPRAY, METERED RESPIRATORY (INHALATION) at 12:09

## 2023-08-03 NOTE — PROGRESS NOTE ADULT - SUBJECTIVE AND OBJECTIVE BOX
CC: GI bleed (02 Aug 2023 13:20)    HPI:  82 y/o female with PMH COPD on 2L home O2, HFpEF, CAD s/p PCI, HTN, HLD, pAF on rivaroxaban, diverticulosis, hemorrhoids presented with generalized weakness/dark stools admitted with anemia, GIB without active bleed on ct, received 3U PRBC, and IVF however remained hypotensive requiring vasopressor therapy for hemorrhagic shock.  EGD on 7/27 and colonoscopy 7/28 without active bleed. Patient also with DANETTE and Afib with RVR, resumed back on amio and bb.  HR now better controlled. Plan for ct enterography. Patient downgraded to medicine 7/30/23.       INTERVAL HPI/OVERNIGHT EVENTS: Patient seen and examined sitting up in the chair.  Patient denies any headache, dizziness, SOB, CP, abdominal pain, nausea, vomiting, dysuria.  Other ROS reviewed and are negative.    Vital Signs Last 24 Hrs  T(C): 36.4 (03 Aug 2023 12:00), Max: 36.8 (02 Aug 2023 14:00)  T(F): 97.5 (03 Aug 2023 12:00), Max: 98.3 (02 Aug 2023 14:00)  HR: 61 (03 Aug 2023 13:02) (55 - 75)  BP: 103/71 (03 Aug 2023 13:02) (94/58 - 113/71)  BP(mean): --  RR: 18 (03 Aug 2023 13:02) (17 - 18)  SpO2: 99% (03 Aug 2023 13:02) (96% - 99%)    Parameters below as of 03 Aug 2023 13:02  Patient On (Oxygen Delivery Method): nasal cannula  O2 Flow (L/min): 2    I&O's Detail      PHYSICAL EXAM:  GENERAL: NAD  HEAD:  Atraumatic, Normocephalic  NECK: Supple, No JVD, Normal thyroid  NERVOUS SYSTEM:  Alert & Oriented X3, Good concentration; Motor Strength 5/5 B/L upper and lower extremities  CHEST/LUNG: Clear to auscultation bilaterally  HEART: Regular rate and rhythm; No murmurs, rubs, or gallops  ABDOMEN: Soft, Nontender, Nondistended; Bowel sounds present  EXTREMITIES:  2+ Peripheral Pulses, LE edema  SKIN: No rashes or lesions                            9.5    15.52 )-----------( 398      ( 03 Aug 2023 08:43 )             29.8     03 Aug 2023 08:43    130    |  99     |  44.6   ----------------------------<  103    5.0     |  15.0   |  2.36     Ca    8.2        03 Aug 2023 08:43    TPro  5.6    /  Alb  3.1    /  TBili  0.9    /  DBili  x      /  AST  59     /  ALT  57     /  AlkPhos  416    02 Aug 2023 08:30      LIVER FUNCTIONS - ( 02 Aug 2023 08:30 )  Alb: 3.1 g/dL / Pro: 5.6 g/dL / ALK PHOS: 416 U/L / ALT: 57 U/L / AST: 59 U/L / GGT: x           Urinalysis Basic - ( 03 Aug 2023 08:43 )    Color: x / Appearance: x / SG: x / pH: x  Gluc: 103 mg/dL / Ketone: x  / Bili: x / Urobili: x   Blood: x / Protein: x / Nitrite: x   Leuk Esterase: x / RBC: x / WBC x   Sq Epi: x / Non Sq Epi: x / Bacteria: x        MEDICATIONS  (STANDING):  aMIOdarone    Tablet 200 milliGRAM(s) Oral daily  aspirin  chewable 81 milliGRAM(s) Oral daily  chlorhexidine 2% Cloths 1 Application(s) Topical daily  enoxaparin Injectable 40 milliGRAM(s) SubCutaneous every 12 hours  metoprolol tartrate 25 milliGRAM(s) Oral two times a day  midodrine 15 milliGRAM(s) Oral every 8 hours  multivitamin 1 Tablet(s) Oral daily  pantoprazole  Injectable 40 milliGRAM(s) IV Push daily  sodium bicarbonate 1300 milliGRAM(s) Oral three times a day  sodium chloride 0.9%. 1000 milliLiter(s) (75 mL/Hr) IV Continuous <Continuous>  sodium chloride 0.9%. 1000 milliLiter(s) (75 mL/Hr) IV Continuous <Continuous>  tiotropium 2.5 MICROgram(s)/olodaterol 2.5 MICROgram(s) (STIOLTO) Inhaler 2 Puff(s) Inhalation daily    MEDICATIONS  (PRN):  melatonin 5 milliGRAM(s) Oral at bedtime PRN Sleep  metoprolol tartrate Injectable 2.5 milliGRAM(s) IV Push every 6 hours PRN for hR > 110

## 2023-08-03 NOTE — PROGRESS NOTE ADULT - ASSESSMENT
83F w/ COPD on 2L home O2, CHF, CAD s/p stents, HTN, HLD, pAF on AC, CKD3, diverticulosis, and grade/stage 3 hemorrhoids who presents to the ED c/o generalized weakness and dark stools, CT w/ diverticulitis. Nephrology consulted for renal optimization before another CTE planned.    1. Acute kidney injury on CKD3:  -DANETTE likely hemodynamically mediated in setting of hypotension  -Doubt PLACIDO (got CT IV contrast on 7/26)  -Baseline likely <1.5 mg/dl, SCr 2.3 mg/dl today  -UA bland.  -Renal imaging: KIDNEYS/URETERS: Focal scarring upper pole left kidney. Small hypodense lesions right kidney too small for characterization. No hydronephrosis.  -On midodrine to support BP to maintain SBP>90  -Scr high but seems to plateau  -High proBNP, dec Na. Will give 1 time dose of albumin + Lasix 40 mg IV      2. Low serum bicarbonate: ? GI loss, will increase PO sodium bicarbonate to 1300 mg TID.    3. Anemia: GI loss, check iron profile.    4. Hypotension: slightly better today, c/w midodrine

## 2023-08-03 NOTE — PROGRESS NOTE ADULT - SUBJECTIVE AND OBJECTIVE BOX
Reason for visit: DANETTE    Subjective: No acute overnight event. Patient denied any cardiac or urinary complains. No fever/chills. No abdominal pain. Breathing at baseline. BP slightly better.    ROS: All systems were reviewed in detail pertinent positive and negative mentioned above, rest are negative.    PHYSICAL EXAM:  Gen: no acute distress  MS: alert, conversing normally  Eyes: EOMI, no icterus, pallor+  HENT: NCAT, MMM, NC+  CV: rhythm reg reg, rate normal, + LE edema  Chest: CTAB, no w/r/r,  Abd: soft, NT, ND  Neuro: moving all 4 limbs spontaneously, no tremor  Skin: dry, warm, no rash or jaundice    =======================================================  Vital Signs Last 24 Hrs  T(C): 36.4 (03 Aug 2023 05:36), Max: 36.8 (02 Aug 2023 14:00)  T(F): 97.6 (03 Aug 2023 05:36), Max: 98.3 (02 Aug 2023 14:00)  HR: 66 (03 Aug 2023 05:36) (55 - 75)  BP: 113/66 (03 Aug 2023 05:36) (94/58 - 113/71)  BP(mean): --  RR: 18 (03 Aug 2023 05:36) (17 - 18)  SpO2: 96% (03 Aug 2023 05:36) (96% - 99%)    Parameters below as of 03 Aug 2023 05:36  Patient On (Oxygen Delivery Method): room air      I&O's Summary    =======================================================  Current Antibiotics:    Other medications:  aMIOdarone    Tablet 200 milliGRAM(s) Oral daily  aspirin  chewable 81 milliGRAM(s) Oral daily  chlorhexidine 2% Cloths 1 Application(s) Topical daily  enoxaparin Injectable 40 milliGRAM(s) SubCutaneous every 12 hours  metoprolol tartrate 25 milliGRAM(s) Oral two times a day  midodrine 15 milliGRAM(s) Oral every 8 hours  multivitamin 1 Tablet(s) Oral daily  pantoprazole  Injectable 40 milliGRAM(s) IV Push daily  sodium bicarbonate 650 milliGRAM(s) Oral two times a day  sodium chloride 0.9%. 1000 milliLiter(s) IV Continuous <Continuous>  sodium chloride 0.9%. 1000 milliLiter(s) IV Continuous <Continuous>  tiotropium 2.5 MICROgram(s)/olodaterol 2.5 MICROgram(s) (STIOLTO) Inhaler 2 Puff(s) Inhalation daily    =======================================================  08-03    130<L>  |  99  |  44.6<H>  ----------------------------<  103<H>  5.0   |  15.0<L>  |  2.36<H>    Ca    8.2<L>      03 Aug 2023 08:43    TPro  5.6<L>  /  Alb  3.1<L>  /  TBili  0.9  /  DBili  x   /  AST  59<H>  /  ALT  57<H>  /  AlkPhos  416<H>  08-02    Creatinine: 2.36 mg/dL (08-03-23 @ 08:43)  Creatinine: 2.20 mg/dL (08-02-23 @ 08:30)  Creatinine: 1.70 mg/dL (08-01-23 @ 05:20)  Creatinine: 1.27 mg/dL (07-31-23 @ 05:20)  Creatinine: 1.18 mg/dL (07-30-23 @ 02:30)    Urinalysis Basic - ( 03 Aug 2023 08:43 )    Color: x / Appearance: x / SG: x / pH: x  Gluc: 103 mg/dL / Ketone: x  / Bili: x / Urobili: x   Blood: x / Protein: x / Nitrite: x   Leuk Esterase: x / RBC: x / WBC x   Sq Epi: x / Non Sq Epi: x / Bacteria: x      =======================================================  RADIOLOGY & ADDITIONAL TESTS: Reviewed

## 2023-08-04 LAB
ALBUMIN SERPL ELPH-MCNC: 3.1 G/DL — LOW (ref 3.3–5.2)
ALP SERPL-CCNC: 532 U/L — HIGH (ref 40–120)
ALT FLD-CCNC: 113 U/L — HIGH
ANION GAP SERPL CALC-SCNC: 17 MMOL/L — SIGNIFICANT CHANGE UP (ref 5–17)
AST SERPL-CCNC: 80 U/L — HIGH
BASOPHILS # BLD AUTO: 0.06 K/UL — SIGNIFICANT CHANGE UP (ref 0–0.2)
BASOPHILS NFR BLD AUTO: 0.5 % — SIGNIFICANT CHANGE UP (ref 0–2)
BILIRUB SERPL-MCNC: 0.8 MG/DL — SIGNIFICANT CHANGE UP (ref 0.4–2)
BUN SERPL-MCNC: 50.5 MG/DL — HIGH (ref 8–20)
CALCIUM SERPL-MCNC: 8 MG/DL — LOW (ref 8.4–10.5)
CHLORIDE SERPL-SCNC: 99 MMOL/L — SIGNIFICANT CHANGE UP (ref 96–108)
CO2 SERPL-SCNC: 16 MMOL/L — LOW (ref 22–29)
CREAT SERPL-MCNC: 2.75 MG/DL — HIGH (ref 0.5–1.3)
EGFR: 17 ML/MIN/1.73M2 — LOW
EOSINOPHIL # BLD AUTO: 0 K/UL — SIGNIFICANT CHANGE UP (ref 0–0.5)
EOSINOPHIL NFR BLD AUTO: 0 % — SIGNIFICANT CHANGE UP (ref 0–6)
FERRITIN SERPL-MCNC: 109 NG/ML — SIGNIFICANT CHANGE UP (ref 13–330)
GLUCOSE SERPL-MCNC: 95 MG/DL — SIGNIFICANT CHANGE UP (ref 70–99)
HCT VFR BLD CALC: 28.9 % — LOW (ref 34.5–45)
HCT VFR BLD CALC: 29.3 % — LOW (ref 34.5–45)
HGB BLD-MCNC: 8.6 G/DL — LOW (ref 11.5–15.5)
HGB BLD-MCNC: 9.1 G/DL — LOW (ref 11.5–15.5)
IMM GRANULOCYTES NFR BLD AUTO: 0.5 % — SIGNIFICANT CHANGE UP (ref 0–0.9)
IRON SATN MFR SERPL: 16 UG/DL — LOW (ref 37–145)
LYMPHOCYTES # BLD AUTO: 0.99 K/UL — LOW (ref 1–3.3)
LYMPHOCYTES # BLD AUTO: 7.5 % — LOW (ref 13–44)
MAGNESIUM SERPL-MCNC: 2.5 MG/DL — SIGNIFICANT CHANGE UP (ref 1.6–2.6)
MCHC RBC-ENTMCNC: 28.3 PG — SIGNIFICANT CHANGE UP (ref 27–34)
MCHC RBC-ENTMCNC: 29.7 PG — SIGNIFICANT CHANGE UP (ref 27–34)
MCHC RBC-ENTMCNC: 29.8 GM/DL — LOW (ref 32–36)
MCHC RBC-ENTMCNC: 31.1 GM/DL — LOW (ref 32–36)
MCV RBC AUTO: 95.1 FL — SIGNIFICANT CHANGE UP (ref 80–100)
MCV RBC AUTO: 95.8 FL — SIGNIFICANT CHANGE UP (ref 80–100)
MONOCYTES # BLD AUTO: 1.82 K/UL — HIGH (ref 0–0.9)
MONOCYTES NFR BLD AUTO: 13.8 % — SIGNIFICANT CHANGE UP (ref 2–14)
NEUTROPHILS # BLD AUTO: 10.24 K/UL — HIGH (ref 1.8–7.4)
NEUTROPHILS NFR BLD AUTO: 77.7 % — HIGH (ref 43–77)
PLATELET # BLD AUTO: 340 K/UL — SIGNIFICANT CHANGE UP (ref 150–400)
PLATELET # BLD AUTO: 390 K/UL — SIGNIFICANT CHANGE UP (ref 150–400)
POTASSIUM SERPL-MCNC: 4.8 MMOL/L — SIGNIFICANT CHANGE UP (ref 3.5–5.3)
POTASSIUM SERPL-SCNC: 4.8 MMOL/L — SIGNIFICANT CHANGE UP (ref 3.5–5.3)
PROT SERPL-MCNC: 5.9 G/DL — LOW (ref 6.6–8.7)
RBC # BLD: 3.04 M/UL — LOW (ref 3.8–5.2)
RBC # BLD: 3.06 M/UL — LOW (ref 3.8–5.2)
RBC # FLD: 17.4 % — HIGH (ref 10.3–14.5)
RBC # FLD: 17.5 % — HIGH (ref 10.3–14.5)
SODIUM SERPL-SCNC: 132 MMOL/L — LOW (ref 135–145)
WBC # BLD: 13.18 K/UL — HIGH (ref 3.8–10.5)
WBC # BLD: 13.86 K/UL — HIGH (ref 3.8–10.5)
WBC # FLD AUTO: 13.18 K/UL — HIGH (ref 3.8–10.5)
WBC # FLD AUTO: 13.86 K/UL — HIGH (ref 3.8–10.5)

## 2023-08-04 PROCEDURE — 99233 SBSQ HOSP IP/OBS HIGH 50: CPT

## 2023-08-04 PROCEDURE — 99232 SBSQ HOSP IP/OBS MODERATE 35: CPT

## 2023-08-04 RX ORDER — FUROSEMIDE 40 MG
40 TABLET ORAL ONCE
Refills: 0 | Status: COMPLETED | OUTPATIENT
Start: 2023-08-04 | End: 2023-08-04

## 2023-08-04 RX ORDER — ALBUMIN HUMAN 25 %
50 VIAL (ML) INTRAVENOUS ONCE
Refills: 0 | Status: COMPLETED | OUTPATIENT
Start: 2023-08-04 | End: 2023-08-04

## 2023-08-04 RX ORDER — FERROUS SULFATE 325(65) MG
325 TABLET ORAL DAILY
Refills: 0 | Status: DISCONTINUED | OUTPATIENT
Start: 2023-08-04 | End: 2023-08-23

## 2023-08-04 RX ADMIN — Medication 1300 MILLIGRAM(S): at 13:45

## 2023-08-04 RX ADMIN — Medication 1300 MILLIGRAM(S): at 23:28

## 2023-08-04 RX ADMIN — Medication 5 MILLIGRAM(S): at 23:28

## 2023-08-04 RX ADMIN — MIDODRINE HYDROCHLORIDE 15 MILLIGRAM(S): 2.5 TABLET ORAL at 13:46

## 2023-08-04 RX ADMIN — TIOTROPIUM BROMIDE AND OLODATEROL 2 PUFF(S): 3.124; 2.736 SPRAY, METERED RESPIRATORY (INHALATION) at 13:45

## 2023-08-04 RX ADMIN — CHLORHEXIDINE GLUCONATE 1 APPLICATION(S): 213 SOLUTION TOPICAL at 13:46

## 2023-08-04 RX ADMIN — Medication 50 MILLILITER(S): at 16:37

## 2023-08-04 RX ADMIN — ENOXAPARIN SODIUM 40 MILLIGRAM(S): 100 INJECTION SUBCUTANEOUS at 05:41

## 2023-08-04 RX ADMIN — Medication 25 MILLIGRAM(S): at 08:43

## 2023-08-04 RX ADMIN — PANTOPRAZOLE SODIUM 40 MILLIGRAM(S): 20 TABLET, DELAYED RELEASE ORAL at 13:46

## 2023-08-04 RX ADMIN — ENOXAPARIN SODIUM 40 MILLIGRAM(S): 100 INJECTION SUBCUTANEOUS at 17:52

## 2023-08-04 RX ADMIN — AMIODARONE HYDROCHLORIDE 200 MILLIGRAM(S): 400 TABLET ORAL at 08:43

## 2023-08-04 RX ADMIN — MIDODRINE HYDROCHLORIDE 15 MILLIGRAM(S): 2.5 TABLET ORAL at 05:38

## 2023-08-04 RX ADMIN — MIDODRINE HYDROCHLORIDE 15 MILLIGRAM(S): 2.5 TABLET ORAL at 23:28

## 2023-08-04 RX ADMIN — Medication 81 MILLIGRAM(S): at 13:46

## 2023-08-04 RX ADMIN — Medication 1300 MILLIGRAM(S): at 05:38

## 2023-08-04 RX ADMIN — Medication 40 MILLIGRAM(S): at 17:52

## 2023-08-04 RX ADMIN — Medication 1 TABLET(S): at 13:45

## 2023-08-04 NOTE — PROGRESS NOTE ADULT - SUBJECTIVE AND OBJECTIVE BOX
CC: GI bleed (02 Aug 2023 13:20)    HPI:  82 y/o female with PMH COPD on 2L home O2, HFpEF, CAD s/p PCI, HTN, HLD, pAF on rivaroxaban, diverticulosis, hemorrhoids presented with generalized weakness/dark stools admitted with anemia, GIB without active bleed on ct, received 3U PRBC, and IVF however remained hypotensive requiring vasopressor therapy for hemorrhagic shock.  EGD on 7/27 and colonoscopy 7/28 without active bleed. Patient also with DANETTE and Afib with RVR, resumed back on amio and bb.  HR now better controlled. Plan for ct enterography. Patient downgraded to medicine 7/30/23.       INTERVAL HPI/OVERNIGHT EVENTS: Patient seen and examined sitting up in the chair.  Patient denies any headache, dizziness, SOB, CP, abdominal pain, nausea, vomiting, dysuria.  Other ROS reviewed and are negative.    Vital Signs Last 24 Hrs  T(C): 36.8 (04 Aug 2023 08:35), Max: 36.8 (04 Aug 2023 08:35)  T(F): 98.2 (04 Aug 2023 08:35), Max: 98.2 (04 Aug 2023 08:35)  HR: 90 (04 Aug 2023 08:35) (61 - 90)  BP: 104/68 (04 Aug 2023 08:35) (91/61 - 104/70)  BP(mean): --  RR: 18 (04 Aug 2023 08:35) (17 - 18)  SpO2: 97% (04 Aug 2023 08:35) (96% - 98%)    Parameters below as of 04 Aug 2023 08:35  Patient On (Oxygen Delivery Method): nasal cannula  O2 Flow (L/min): 2    I&O's Detail    03 Aug 2023 07:01  -  04 Aug 2023 07:00  --------------------------------------------------------  IN:    Oral Fluid: 500 mL  Total IN: 500 mL    OUT:  Total OUT: 0 mL    Total NET: 500 mL      PHYSICAL EXAM:  GENERAL: NAD  HEAD:  Atraumatic, Normocephalic  NECK: Supple, No JVD, Normal thyroid  NERVOUS SYSTEM:  Alert & Oriented X3, Good concentration; Motor Strength 5/5 B/L upper and lower extremities  CHEST/LUNG: Clear to auscultation bilaterally  HEART: Regular rate and rhythm; No murmurs, rubs, or gallops  ABDOMEN: Soft, Nontender, Nondistended; Bowel sounds present  EXTREMITIES:  2+ Peripheral Pulses, Bilateral LE edema  SKIN: No rashes or lesions                          8.6    13.18 )-----------( 340      ( 04 Aug 2023 05:25 )             28.9     04 Aug 2023 05:25    132    |  99     |  50.5   ----------------------------<  95     4.8     |  16.0   |  2.75     Ca    8.0        04 Aug 2023 05:25  Mg     2.5       04 Aug 2023 05:25    TPro  5.9    /  Alb  3.1    /  TBili  0.8    /  DBili  x      /  AST  80     /  ALT  113    /  AlkPhos  532    04 Aug 2023 05:25      LIVER FUNCTIONS - ( 04 Aug 2023 05:25 )  Alb: 3.1 g/dL / Pro: 5.9 g/dL / ALK PHOS: 532 U/L / ALT: 113 U/L / AST: 80 U/L / GGT: x           Urinalysis Basic - ( 04 Aug 2023 05:25 )    Color: x / Appearance: x / SG: x / pH: x  Gluc: 95 mg/dL / Ketone: x  / Bili: x / Urobili: x   Blood: x / Protein: x / Nitrite: x   Leuk Esterase: x / RBC: x / WBC x   Sq Epi: x / Non Sq Epi: x / Bacteria: x        MEDICATIONS  (STANDING):  aMIOdarone    Tablet 200 milliGRAM(s) Oral daily  aspirin  chewable 81 milliGRAM(s) Oral daily  chlorhexidine 2% Cloths 1 Application(s) Topical daily  enoxaparin Injectable 40 milliGRAM(s) SubCutaneous every 12 hours  metoprolol tartrate 25 milliGRAM(s) Oral two times a day  midodrine 15 milliGRAM(s) Oral every 8 hours  multivitamin 1 Tablet(s) Oral daily  pantoprazole  Injectable 40 milliGRAM(s) IV Push daily  sodium bicarbonate 1300 milliGRAM(s) Oral three times a day  sodium chloride 0.9%. 1000 milliLiter(s) (75 mL/Hr) IV Continuous <Continuous>  sodium chloride 0.9%. 1000 milliLiter(s) (75 mL/Hr) IV Continuous <Continuous>  tiotropium 2.5 MICROgram(s)/olodaterol 2.5 MICROgram(s) (STIOLTO) Inhaler 2 Puff(s) Inhalation daily    MEDICATIONS  (PRN):  melatonin 5 milliGRAM(s) Oral at bedtime PRN Sleep  metoprolol tartrate Injectable 2.5 milliGRAM(s) IV Push every 6 hours PRN for hR > 110

## 2023-08-04 NOTE — PROGRESS NOTE ADULT - SUBJECTIVE AND OBJECTIVE BOX
Reason for visit: DANETTE    Subjective: No acute overnight event. Patient denied any cardiac or urinary complains. No fever/chills. No abdominal pain. Breathing at baseline. Bp continues to be soft. Midodrine was further increased to 15 mg TID.    ROS: All systems were reviewed in detail pertinent positive and negative mentioned above, rest are negative.    PHYSICAL EXAM:  Gen: no acute distress  MS: alert, conversing normally  Eyes: EOMI, no icterus, pallor+  HENT: NCAT, MMM, NC+  CV: rhythm reg reg, rate normal, + LE edema  Chest: CTAB, no w/r/r,  Abd: soft, NT, ND  Neuro: moving all 4 limbs spontaneously, no tremor  Skin: dry, warm, no rash or jaundice    =======================================================  Vital Signs Last 24 Hrs  T(C): 36.7 (04 Aug 2023 20:46), Max: 37.1 (04 Aug 2023 17:15)  T(F): 98 (04 Aug 2023 20:46), Max: 98.7 (04 Aug 2023 17:15)  HR: 66 (04 Aug 2023 20:46) (61 - 90)  BP: 96/57 (04 Aug 2023 20:46) (91/61 - 104/70)  RR: 18 (04 Aug 2023 20:46) (18 - 19)  SpO2: 97% (04 Aug 2023 20:46) (96% - 98%)    Parameters below as of 04 Aug 2023 20:46  Patient On (Oxygen Delivery Method): nasal cannula  O2 Flow (L/min): 2    I&O's Summary    03 Aug 2023 07:01  -  04 Aug 2023 07:00  --------------------------------------------------------  IN: 500 mL / OUT: 0 mL / NET: 500 mL      =======================================================  Current Antibiotics:    Other medications:  aMIOdarone    Tablet 200 milliGRAM(s) Oral daily  aspirin  chewable 81 milliGRAM(s) Oral daily  chlorhexidine 2% Cloths 1 Application(s) Topical daily  enoxaparin Injectable 40 milliGRAM(s) SubCutaneous every 12 hours  metoprolol tartrate 25 milliGRAM(s) Oral two times a day  midodrine 15 milliGRAM(s) Oral every 8 hours  multivitamin 1 Tablet(s) Oral daily  pantoprazole  Injectable 40 milliGRAM(s) IV Push daily  sodium bicarbonate 1300 milliGRAM(s) Oral three times a day  sodium chloride 0.9%. 1000 milliLiter(s) IV Continuous <Continuous>  sodium chloride 0.9%. 1000 milliLiter(s) IV Continuous <Continuous>  tiotropium 2.5 MICROgram(s)/olodaterol 2.5 MICROgram(s) (STIOLTO) Inhaler 2 Puff(s) Inhalation daily    =======================================================  08-04    132<L>  |  99  |  50.5<H>  ----------------------------<  95  4.8   |  16.0<L>  |  2.75<H>    Ca    8.0<L>      04 Aug 2023 05:25  Mg     2.5     08-04    TPro  5.9<L>  /  Alb  3.1<L>  /  TBili  0.8  /  DBili  x   /  AST  80<H>  /  ALT  113<H>  /  AlkPhos  532<H>  08-04    Creatinine: 2.75 mg/dL (08-04-23 @ 05:25)  Creatinine: 2.36 mg/dL (08-03-23 @ 08:43)  Creatinine: 2.20 mg/dL (08-02-23 @ 08:30)  Creatinine: 1.70 mg/dL (08-01-23 @ 05:20)  Creatinine: 1.27 mg/dL (07-31-23 @ 05:20)    Urinalysis Basic - ( 04 Aug 2023 05:25 )    Color: x / Appearance: x / SG: x / pH: x  Gluc: 95 mg/dL / Ketone: x  / Bili: x / Urobili: x   Blood: x / Protein: x / Nitrite: x   Leuk Esterase: x / RBC: x / WBC x   Sq Epi: x / Non Sq Epi: x / Bacteria: x      =======================================================

## 2023-08-04 NOTE — PROGRESS NOTE ADULT - ASSESSMENT
84 y/o female with PMH COPD on 2L home O2, HFpEF, CAD s/p PCI, HTN, HLD, pAF on rivaroxaban, diverticulosis, hemorrhoids presented with generalized weakness/dark stools admitted anemia, GIB without active bleed on ct, received 3U PRBC, and IVF however remained hypotensive requiring vasopressor therapy for hemorrhagic shock.  EGD on 7/27 and colonoscopy 7/28 without active bleed. Patient also with DANETTE and Afib with RVR, resumed back on amio and bb .  HR now better controlled. Plan was for ct enterography, but given DANETTE on CKD, it was cancelled. Patient downgraded to medicine 7/30/23.       > acute GIB / acute blood loss anemia /  hemorrhagic shock   - hx of GIB in past   - cta neg without active bleed on ct on admission   - s/p EGD on 7/27 without active bleeding  - s/p colonoscopy 7/28 no active bleed   - now off pressors , started on midodrine, increased to 15mg TID   - c/w ppis   - ct enterography - cancelled given elevated creatinine and no further bleeding  - plan for capsule endoscopy as op   - gi following   - Diet as tolerated   - resumed aspirin as per GI  - tolerating Lovenox - can transition to XARELTO on discharge if hb stable, will recheck CBC now    > pafib   Hypotension, asymptomatic  - resumed on amio and bb  - adjust bb dose for better hr control as needed  - prn iv lopressor for hr > 110  - aspirin  - raised Midodrine dose for improved BP  - lowered metoprolol dose  - started lovenox 8/2/23, no bleeding noted    >DANETTE on CKD III/Metabolic acidosis  Hyponatremia  Renal hypoperfusion, episodic  - f/up renal recs  - S/p IVF, sodium bicarb increased to 1300mg TID as per renal recs  - s/p Lasix and Albumin 8/3/23 as per renal recs  - monitor BMP    >Leukocytosis  unclear etiology.  - UA negative; f/u CXR read.  - monitor CBC.    > HLD  - monitor CMP   - hold statin due to transaminitis    >Tranaminitis  - statin held  - will monitor for now, repeat CMP in am.  - If continues to trend up, will consider US abdomen.    >copd / not in acute exacerbation  - c/w inhalers   - c/w o2     > dvt ppx : trial of Lovenox  GI cleared for Xarelto resumption.    patient with multiple co-morbid conditions; higher risk for future complications despite optimal medical therapy    84 y/o female with PMH COPD on 2L home O2, HFpEF, CAD s/p PCI, HTN, HLD, pAF on rivaroxaban, diverticulosis, hemorrhoids presented with generalized weakness/dark stools admitted anemia, GIB without active bleed on ct, received 3U PRBC, and IVF however remained hypotensive requiring vasopressor therapy for hemorrhagic shock.  EGD on 7/27 and colonoscopy 7/28 without active bleed. Patient also with DANETTE and Afib with RVR, resumed back on amio and bb .  HR now better controlled. Plan was for ct enterography, but given DANETTE on CKD, it was cancelled. Patient downgraded to medicine 7/30/23.       > acute GIB / acute blood loss anemia /  hemorrhagic shock   - hx of GIB in past   - cta neg without active bleed on ct on admission   - s/p EGD on 7/27 without active bleeding  - s/p colonoscopy 7/28 no active bleed   - now off pressors , started on midodrine, increased to 15mg TID   - c/w ppis   - ct enterography - cancelled given elevated creatinine and no further bleeding  - plan for capsule endoscopy as op   - gi following   - Diet as tolerated   - resumed aspirin as per GI  - tolerating Lovenox - can transition to XARELTO on discharge if hb stable, will recheck CBC today stable     > pafib   Hypotension, asymptomatic  - resumed on amio and bb  - adjust bb dose for better hr control as needed  - prn iv lopressor for hr > 110  - aspirin  - raised Midodrine dose for improved BP  - lowered metoprolol dose  - started lovenox 8/2/23, no bleeding noted    >DANETTE on CKD III/Metabolic acidosis  Hyponatremia  Renal hypoperfusion, episodic  - f/up renal recs  - S/p IVF, sodium bicarb increased to 1300mg TID as per renal recs  - s/p Lasix and Albumin 8/3/23 as per renal recs  - monitor BMP    >Leukocytosis  unclear etiology.  - UA negative; f/u CXR read.  - monitor CBC.    > HLD  - monitor CMP   - hold statin due to transaminitis    >Tranaminitis  - statin held  - will monitor for now, repeat CMP in am.  - If continues to trend up, will consider US abdomen.    >copd / not in acute exacerbation  - c/w inhalers   - c/w o2     > dvt ppx : trial of Lovenox  GI cleared for Xarelto resumption.    patient with multiple co-morbid conditions; higher risk for future complications despite optimal medical therapy

## 2023-08-04 NOTE — PROGRESS NOTE ADULT - ASSESSMENT
83F w/ COPD on 2L home O2, CHF, CAD s/p stents, HTN, HLD, pAF on AC, CKD3, diverticulosis, and grade/stage 3 hemorrhoids who presents to the ED c/o generalized weakness and dark stools, CT w/ diverticulitis. Nephrology consulted for renal optimization before another CTE planned.    1. Acute kidney injury on CKD3:  -DANETTE likely hemodynamically mediated in setting of hypotension  -Doubt PLACIDO (got CT IV contrast on 7/26)  -Baseline likely <1.5 mg/dl, SCr 2.75 mg/dl today  -UA bland.  -Renal imaging: KIDNEYS/URETERS: Focal scarring upper pole left kidney. Small hypodense lesions right kidney too small for characterization. No hydronephrosis.  -On midodrine to support BP to maintain SBP>90  -Scr high but seems to plateau  -High proBNP, dec Na. Will again give 1 time dose of albumin + Lasix 40 mg IV      2. Low serum bicarbonate: ? GI loss, c/w PO sodium bicarbonate to 1300 mg TID.    3. Anemia: GI loss, iron def. Will add PO ferrous sulphate.    4. Hypotension: on high dose midodrine. leucocytosis ? re culture urine

## 2023-08-05 LAB
ALBUMIN SERPL ELPH-MCNC: 3.2 G/DL — LOW (ref 3.3–5.2)
ALP SERPL-CCNC: 555 U/L — HIGH (ref 40–120)
ALT FLD-CCNC: 99 U/L — HIGH
ANION GAP SERPL CALC-SCNC: 18 MMOL/L — HIGH (ref 5–17)
AST SERPL-CCNC: 47 U/L — HIGH
BILIRUB SERPL-MCNC: 0.8 MG/DL — SIGNIFICANT CHANGE UP (ref 0.4–2)
BUN SERPL-MCNC: 56.6 MG/DL — HIGH (ref 8–20)
CALCIUM SERPL-MCNC: 7.8 MG/DL — LOW (ref 8.4–10.5)
CHLORIDE SERPL-SCNC: 98 MMOL/L — SIGNIFICANT CHANGE UP (ref 96–108)
CO2 SERPL-SCNC: 17 MMOL/L — LOW (ref 22–29)
CREAT SERPL-MCNC: 2.76 MG/DL — HIGH (ref 0.5–1.3)
CULTURE RESULTS: SIGNIFICANT CHANGE UP
EGFR: 17 ML/MIN/1.73M2 — LOW
GLUCOSE SERPL-MCNC: 99 MG/DL — SIGNIFICANT CHANGE UP (ref 70–99)
HAV IGM SER-ACNC: SIGNIFICANT CHANGE UP
HBV CORE IGM SER-ACNC: SIGNIFICANT CHANGE UP
HBV SURFACE AG SER-ACNC: SIGNIFICANT CHANGE UP
HCV AB S/CO SERPL IA: 0.1 S/CO — SIGNIFICANT CHANGE UP (ref 0–0.99)
HCV AB SERPL-IMP: SIGNIFICANT CHANGE UP
POTASSIUM SERPL-MCNC: 5.1 MMOL/L — SIGNIFICANT CHANGE UP (ref 3.5–5.3)
POTASSIUM SERPL-SCNC: 5.1 MMOL/L — SIGNIFICANT CHANGE UP (ref 3.5–5.3)
PROT SERPL-MCNC: 5.8 G/DL — LOW (ref 6.6–8.7)
SODIUM SERPL-SCNC: 133 MMOL/L — LOW (ref 135–145)
SPECIMEN SOURCE: SIGNIFICANT CHANGE UP

## 2023-08-05 PROCEDURE — 76700 US EXAM ABDOM COMPLETE: CPT | Mod: 26

## 2023-08-05 PROCEDURE — 71045 X-RAY EXAM CHEST 1 VIEW: CPT | Mod: 26

## 2023-08-05 PROCEDURE — 99232 SBSQ HOSP IP/OBS MODERATE 35: CPT

## 2023-08-05 RX ORDER — FUROSEMIDE 40 MG
40 TABLET ORAL ONCE
Refills: 0 | Status: COMPLETED | OUTPATIENT
Start: 2023-08-06 | End: 2023-08-06

## 2023-08-05 RX ORDER — ALBUMIN HUMAN 25 %
50 VIAL (ML) INTRAVENOUS ONCE
Refills: 0 | Status: COMPLETED | OUTPATIENT
Start: 2023-08-05 | End: 2023-08-05

## 2023-08-05 RX ORDER — FUROSEMIDE 40 MG
40 TABLET ORAL ONCE
Refills: 0 | Status: COMPLETED | OUTPATIENT
Start: 2023-08-05 | End: 2023-08-05

## 2023-08-05 RX ORDER — IPRATROPIUM/ALBUTEROL SULFATE 18-103MCG
3 AEROSOL WITH ADAPTER (GRAM) INHALATION ONCE
Refills: 0 | Status: COMPLETED | OUTPATIENT
Start: 2023-08-05 | End: 2023-08-05

## 2023-08-05 RX ADMIN — AMIODARONE HYDROCHLORIDE 200 MILLIGRAM(S): 400 TABLET ORAL at 05:29

## 2023-08-05 RX ADMIN — Medication 81 MILLIGRAM(S): at 13:18

## 2023-08-05 RX ADMIN — PANTOPRAZOLE SODIUM 40 MILLIGRAM(S): 20 TABLET, DELAYED RELEASE ORAL at 13:23

## 2023-08-05 RX ADMIN — Medication 1300 MILLIGRAM(S): at 13:19

## 2023-08-05 RX ADMIN — MIDODRINE HYDROCHLORIDE 15 MILLIGRAM(S): 2.5 TABLET ORAL at 05:28

## 2023-08-05 RX ADMIN — Medication 1 TABLET(S): at 13:18

## 2023-08-05 RX ADMIN — Medication 25 MILLIGRAM(S): at 18:39

## 2023-08-05 RX ADMIN — TIOTROPIUM BROMIDE AND OLODATEROL 2 PUFF(S): 3.124; 2.736 SPRAY, METERED RESPIRATORY (INHALATION) at 13:20

## 2023-08-05 RX ADMIN — MIDODRINE HYDROCHLORIDE 15 MILLIGRAM(S): 2.5 TABLET ORAL at 13:19

## 2023-08-05 RX ADMIN — Medication 325 MILLIGRAM(S): at 13:19

## 2023-08-05 RX ADMIN — Medication 5 MILLIGRAM(S): at 21:17

## 2023-08-05 RX ADMIN — Medication 50 MILLILITER(S): at 23:53

## 2023-08-05 RX ADMIN — MIDODRINE HYDROCHLORIDE 15 MILLIGRAM(S): 2.5 TABLET ORAL at 21:17

## 2023-08-05 RX ADMIN — Medication 40 MILLIGRAM(S): at 15:15

## 2023-08-05 RX ADMIN — Medication 1300 MILLIGRAM(S): at 05:29

## 2023-08-05 RX ADMIN — Medication 100 MILLIGRAM(S): at 05:25

## 2023-08-05 RX ADMIN — Medication 25 MILLIGRAM(S): at 05:29

## 2023-08-05 RX ADMIN — Medication 1300 MILLIGRAM(S): at 21:16

## 2023-08-05 RX ADMIN — ENOXAPARIN SODIUM 40 MILLIGRAM(S): 100 INJECTION SUBCUTANEOUS at 18:38

## 2023-08-05 RX ADMIN — Medication 3 MILLILITER(S): at 23:05

## 2023-08-05 RX ADMIN — Medication 100 MILLIGRAM(S): at 21:17

## 2023-08-05 RX ADMIN — CHLORHEXIDINE GLUCONATE 1 APPLICATION(S): 213 SOLUTION TOPICAL at 13:23

## 2023-08-05 RX ADMIN — ENOXAPARIN SODIUM 40 MILLIGRAM(S): 100 INJECTION SUBCUTANEOUS at 05:29

## 2023-08-05 NOTE — CHART NOTE - NSCHARTNOTEFT_GEN_A_CORE
PA NOTE-MEDICINE    Called by RN due to Pt experiencing cough with clear sputum.   84 y/o female with PMH COPD on 2L home O2, HFpEF, CAD s/p PCI, HTN, HLD, pAF on rivaroxaban, diverticulosis, hemorrhoids presented with generalized weakness/dark stools admitted anemia, GIB without active bleed on ct, received 3U PRBC, and IVF however remained hypotensive requiring vasopressor therapy for hemorrhagic shock.  EGD on 7/27 and colonoscopy 7/28 without active bleed. Patient also with DANETTE and Afib with RVR, resumed back on amio and bb .  HR now better controlled. Plan was for ct enterography, but given DANETTE on CKD, it was cancelled. Patient downgraded to medicine 7/30/23. PA NOTE-MEDICINE    Called by RN due to Pt experiencing cough with clear sputum.     84 y/o female with PMH COPD on 2L home O2, HFpEF/Diastolic with severe Pulmonary HTN, CAD s/p PCI, HTN, HLD, pAF on rivaroxaban, diverticulosis, hemorrhoids presented with generalized weakness/dark stools admitted anemia, GIB without active bleed on ct, received 3U PRBC, and IVF however remained hypotensive requiring vasopressor therapy for hemorrhagic shock.  EGD on 7/27 and colonoscopy 7/28 without active bleed. Patient also with DANETTE and Afib with RVR, resumed back on amio and bb .  HR now better controlled. Plan was for ct enterography, but given DANETTE on CKD, it was cancelled. Patient downgraded to medicine 7/30/23.    Pt today with increase with B/L LE Edema  Nephro OK'd stat dose Lasix 40 mg IVP x 1 given this afternoon along with additional dose tomorrow AM.     Pt Denies: SOB CP Dizziness N/V     T(C): 36.6 (05 Aug 2023 22:47), Max: 36.8 (05 Aug 2023 11:02)  T(F): 97.8 (05 Aug 2023 22:47), Max: 98.2 (05 Aug 2023 11:02)  HR: 61 (05 Aug 2023 21:20) (56 - 65)  BP: 99/67 (05 Aug 2023 22:47) (97/57 - 106/72)  RR: 18 (05 Aug 2023 22:47) (18 - 18)  SpO2: 98% (05 Aug 2023 22:47) (96% - 98%) 2L     General : WDWN Female sitting up in Bed NAD No WOB able to speak in Full sentences easily  Occasional Raspy cough    Cardiac: S1S2 +  Lungs: + Occasional wheezing upper-Mid B/L  Decrease BS B/L Bases  No Rhonchi Rales Crackles B/L   Integument: No Pallor Warm/Dry  Ext : + Edema B/L Baseline     A/P Eval Pt due to Occasional Cough with Raspy sound and Clear sputum   CXR Urgent-? Slight increase in Edema Right base (Official Read-Pending)   Duoneb x 1 Stat   Pt's BP Baseline soft-on high dose of Midodrine ATC   Will consider small dose of lasix if BP increases s/p Albumin 25%/50 cc's ordered stat  Pt in NAD/Comfortable     Will reassess s/p Breathing Tx   Continue to Monitor Pt  Recall PA for any changes in Pt Status PA NOTE-MEDICINE    Called by RN due to Pt experiencing cough with clear sputum.     84 y/o female with PMH COPD on 2L home O2, HFpEF/Diastolic with severe Pulmonary HTN, CAD s/p PCI, HTN, HLD, pAF on rivaroxaban, diverticulosis, hemorrhoids presented with generalized weakness/dark stools admitted anemia, GIB without active bleed on ct, received 3U PRBC, and IVF however remained hypotensive requiring vasopressor therapy for hemorrhagic shock.  EGD on 7/27 and colonoscopy 7/28 without active bleed. Patient also with DANETTE and Afib with RVR, resumed back on amio and bb .  HR now better controlled. Plan was for ct enterography, but given DANETTE on CKD, it was cancelled. Patient downgraded to medicine 7/30/23.    Pt today with increase with B/L LE Edema  Nephro OK'd stat dose Lasix 40 mg IVP x 1 given this afternoon along with additional dose tomorrow AM.     Pt Denies: SOB CP Dizziness N/V     T(C): 36.6 (05 Aug 2023 22:47), Max: 36.8 (05 Aug 2023 11:02)  T(F): 97.8 (05 Aug 2023 22:47), Max: 98.2 (05 Aug 2023 11:02)  HR: 61 (05 Aug 2023 21:20) (56 - 65)  BP: 99/67 (05 Aug 2023 22:47) (97/57 - 106/72)  RR: 18 (05 Aug 2023 22:47) (18 - 18)  SpO2: 98% (05 Aug 2023 22:47) (96% - 98%) 2L     General : WDWN Female sitting up in Bed NAD No WOB able to speak in Full sentences easily  Occasional Raspy cough    Cardiac: S1S2 +  Lungs: + Occasional wheezing upper-Mid B/L  Decrease BS B/L Bases  No Rhonchi Rales Crackles B/L   Integument: No Pallor Warm/Dry  Ext : + Edema B/L Baseline     A/P Eval Pt due to Occasional Cough with Raspy sound and Clear sputum   CXR Urgent-? Slight increase in Edema Right base (Official Read-Pending)   Duoneb x 1 Stat   Pt's BP Baseline soft-on high dose of Midodrine ATC   Will consider small dose of lasix if BP increases s/p Albumin 25%/50 cc's ordered stat  Pt in NAD/Comfortable     Will reassess s/p Breathing Tx   Continue to Monitor Pt  Recall PA for any changes in Pt Status    Repeat BP s/p Albumin: 98/62  MAP: 74  Ordered Lasix 40 mg IVP x 1 Now   RN to Repeat BP 1 hour Post Lasix

## 2023-08-05 NOTE — PROGRESS NOTE ADULT - ASSESSMENT
82 y/o female with PMH COPD on 2L home O2, HFpEF, CAD s/p PCI, HTN, HLD, pAF on rivaroxaban, diverticulosis, hemorrhoids presented with generalized weakness/dark stools admitted anemia, GIB without active bleed on ct, received 3U PRBC, and IVF however remained hypotensive requiring vasopressor therapy for hemorrhagic shock.  EGD on 7/27 and colonoscopy 7/28 without active bleed. Patient also with DANETTE and Afib with RVR, resumed back on amio and bb .  HR now better controlled. Plan was for ct enterography, but given DANETTE on CKD, it was cancelled. Patient downgraded to medicine 7/30/23.     *HFpEF  Echo noted with Grade III diastolic dysfunction and sever sever pul HTN  Case discussed with Nephro today   Lasix 40mg stat today   Lasix 40mg IV tomorrow am, if crt stable, will need to order Lasix for tomorrow PM   bilateral lower ext ACE wrap     *acute GIB / acute blood loss anemia /  hemorrhagic shock   - hx of GIB in past   - cta neg without active bleed on ct on admission   - s/p EGD on 7/27 without active bleeding  - s/p colonoscopy 7/28 no active bleed   - now off pressors , started on midodrine, increased to 15mg TID   - c/w ppis   - ct enterography - cancelled given elevated creatinine and no further bleeding  - plan for capsule endoscopy as op   - gi following   - Diet as tolerated   - resumed aspirin as per GI  - tolerating Lovenox - can transition to XARELTO on discharge if hb stable, will recheck CBC today stable     > pafib   Hypotension, asymptomatic  - resumed on amio and bb  - adjust bb dose for better hr control as needed  - prn iv lopressor for hr > 110  - aspirin  - raised Midodrine dose for improved BP  - lowered metoprolol dose  - started lovenox 8/2/23, no bleeding noted    >DANETTE on CKD III/Metabolic acidosis  Hyponatremia  Renal hypoperfusion, episodic  - f/up renal recs  - S/p IVF, sodium bicarb increased to 1300mg TID as per renal recs  - s/p Lasix and Albumin 8/3 and 8/4 as per renal recs  - monitor BMP    >Leukocytosis  unclear etiology.  - UA negative; f/u CXR read.  - monitor CBC.    > HLD  - monitor CMP   - hold statin due to transaminitis    >Tranaminitis  - statin held  - LFT increasing   -discussed with cardio, cont amiodarone for now since pt has been on it for long time  -f/u abd US and acute hepatitis panel     >copd / not in acute exacerbation  - c/w inhalers   - c/w o2     > dvt ppx : trial of Lovenox  GI cleared for Xarelto resumption on discharge    Dispo: f/u crt function in the am, might need lasix tomorrow pm

## 2023-08-05 NOTE — PROGRESS NOTE ADULT - SUBJECTIVE AND OBJECTIVE BOX
HPI  84 y/o female with PMH COPD on 2L home O2, HFpEF, CAD s/p PCI, HTN, HLD, pAF on rivaroxaban, diverticulosis, hemorrhoids presented with generalized weakness/dark stools admitted with anemia, GIB without active bleed on ct, received 3U PRBC, and IVF however remained hypotensive requiring vasopressor therapy for hemorrhagic shock.  EGD on 7/27 and colonoscopy 7/28 without active bleed. Patient also with DANETTE and Afib with RVR, resumed back on amio and bb.  HR now better controlled. Plan for ct enterography. Patient downgraded to medicine 7/30/23.   Pt was seen and examined at bedside. worsening bilateral lower ext swelling noted. Crt noted.     Vital Signs Last 24 Hrs  T(C): 36.8 (05 Aug 2023 11:02), Max: 37.1 (04 Aug 2023 17:15)  T(F): 98.2 (05 Aug 2023 11:02), Max: 98.7 (04 Aug 2023 17:15)  HR: 56 (05 Aug 2023 11:02) (56 - 66)  BP: 106/72 (05 Aug 2023 11:02) (96/57 - 106/72)  BP(mean): --  RR: 18 (05 Aug 2023 11:02) (18 - 18)  SpO2: 98% (05 Aug 2023 11:02) (96% - 98%)    Parameters below as of 05 Aug 2023 11:02  Patient On (Oxygen Delivery Method): nasal cannula        I&O's Summary    04 Aug 2023 07:01  -  05 Aug 2023 07:00  --------------------------------------------------------  IN: 500 mL / OUT: 0 mL / NET: 500 mL        CAPILLARY BLOOD GLUCOSE          PHYSICAL EXAM:    Constitutional: NAD, awake and alert, well-developed  HEENT: PERR, EOMI, Normal Hearing, MMM  Neck: Soft and supple, No LAD, No JVD  Respiratory: Breath sounds are clear bilaterally, No wheezing, rales or rhonchi on 2L NC   Cardiovascular: S1 and S2,   Gastrointestinal: Bowel Sounds present, soft, nontender   Extremities: 2 blister noted each leg with +3 lower ext swelling   Vascular: 2+ peripheral pulses  Neurological: A/O x 3, no focal deficits  Musculoskeletal: 5/5 strength b/l upper and lower extremities    MEDICATIONS:  MEDICATIONS  (STANDING):  aMIOdarone    Tablet 200 milliGRAM(s) Oral daily  aspirin  chewable 81 milliGRAM(s) Oral daily  chlorhexidine 2% Cloths 1 Application(s) Topical daily  enoxaparin Injectable 40 milliGRAM(s) SubCutaneous every 12 hours  ferrous    sulfate 325 milliGRAM(s) Oral daily  furosemide   Injectable 40 milliGRAM(s) IV Push once  metoprolol tartrate 25 milliGRAM(s) Oral two times a day  midodrine 15 milliGRAM(s) Oral every 8 hours  multivitamin 1 Tablet(s) Oral daily  pantoprazole  Injectable 40 milliGRAM(s) IV Push daily  sodium bicarbonate 1300 milliGRAM(s) Oral three times a day  sodium chloride 0.9%. 1000 milliLiter(s) (75 mL/Hr) IV Continuous <Continuous>  sodium chloride 0.9%. 1000 milliLiter(s) (75 mL/Hr) IV Continuous <Continuous>  tiotropium 2.5 MICROgram(s)/olodaterol 2.5 MICROgram(s) (STIOLTO) Inhaler 2 Puff(s) Inhalation daily      LABS: All Labs Reviewed:                        9.1    13.86 )-----------( 390      ( 04 Aug 2023 14:48 )             29.3     08-05    133<L>  |  98  |  56.6<H>  ----------------------------<  99  5.1   |  17.0<L>  |  2.76<H>    Ca    7.8<L>      05 Aug 2023 05:08  Mg     2.5     08-04    TPro  5.8<L>  /  Alb  3.2<L>  /  TBili  0.8  /  DBili  x   /  AST  47<H>  /  ALT  99<H>  /  AlkPhos  555<H>  08-05          Blood Culture: 08-03 @ 08:52  Organism --  Gram Stain Blood -- Gram Stain --  Specimen Source Clean Catch Clean Catch (Midstream)  Culture-Blood --        RADIOLOGY/EKG:    DVT PPX:    ADVANCED DIRECTIVE:    DISPOSITION:

## 2023-08-05 NOTE — PROGRESS NOTE ADULT - ASSESSMENT
83F w/ COPD on 2L home O2, CHF, CAD s/p stents, HTN, HLD, pAF on AC, CKD3, diverticulosis, and grade/stage 3 hemorrhoids who presents to the ED c/o generalized weakness and dark stools, CT w/ diverticulitis. Nephrology consulted for renal optimization before another CTE planned.    1. Acute kidney injury on CKD3:  -DANETTE likely hemodynamically mediated in setting of hypotension  -Doubt PLACIDO (got CT IV contrast on 7/26)  -Baseline likely <1.5 mg/dl, SCr 2.75 mg/dl today   -UA bland.  -Renal imaging: KIDNEYS/URETERS: Focal scarring upper pole left kidney. Small hypodense lesions right kidney too small for characterization. No hydronephrosis.  -On midodrine to support BP to maintain SBP>90  -Scr high but seems to plateau  -High proBNP, dec Na. Will place on Lasix 40 mg IV BID and follow    2. Low serum bicarbonate: ? GI loss, c/w PO sodium bicarbonate to 1300 mg TID.    3. Anemia: GI loss, iron def. continue PO ferrous sulphate.    4. Hypotension: on high dose midodrine. leucocytosis ? re culture urine

## 2023-08-05 NOTE — PROGRESS NOTE ADULT - SUBJECTIVE AND OBJECTIVE BOX
Reason for visit: DANETTE    Subjective: No acute overnight event. Patient denied any cardiac or urinary complains. leg swelling. Breathing at baseline. Bp continues to be soft.     ROS: All systems were reviewed in detail pertinent positive and negative mentioned above, rest are negative.    PHYSICAL EXAM:  Gen: no acute distress  MS: alert, conversing normally  Eyes: EOMI, no icterus, pallor+  HENT: NCAT, MMM, NC+  CV: rhythm reg reg, rate normal, + LE edema (wrapped)  Chest: CTAB, no w/r/r,  Abd: soft, NT, ND  Neuro: moving all 4 limbs spontaneously, no tremor  Skin: dry, warm, no rash or jaundice    =======================================================  Vital Signs Last 24 Hrs  T(C): 36.6 (05 Aug 2023 21:20), Max: 36.8 (05 Aug 2023 11:02)  T(F): 97.8 (05 Aug 2023 21:20), Max: 98.2 (05 Aug 2023 11:02)  HR: 61 (05 Aug 2023 21:20) (56 - 65)  BP: 103/71 (05 Aug 2023 21:20) (97/57 - 106/72)  BP(mean): --  RR: 18 (05 Aug 2023 21:20) (18 - 18)  SpO2: 98% (05 Aug 2023 21:20) (96% - 98%)    Parameters below as of 05 Aug 2023 21:20  Patient On (Oxygen Delivery Method): nasal cannula  O2 Flow (L/min): 2    I&O's Summary    04 Aug 2023 07:01  -  05 Aug 2023 07:00  --------------------------------------------------------  IN: 500 mL / OUT: 0 mL / NET: 500 mL      =======================================================  Current Antibiotics:    Other medications:  aMIOdarone    Tablet 200 milliGRAM(s) Oral daily  aspirin  chewable 81 milliGRAM(s) Oral daily  chlorhexidine 2% Cloths 1 Application(s) Topical daily  enoxaparin Injectable 40 milliGRAM(s) SubCutaneous every 12 hours  ferrous    sulfate 325 milliGRAM(s) Oral daily  metoprolol tartrate 25 milliGRAM(s) Oral two times a day  midodrine 15 milliGRAM(s) Oral every 8 hours  multivitamin 1 Tablet(s) Oral daily  pantoprazole  Injectable 40 milliGRAM(s) IV Push daily  sodium bicarbonate 1300 milliGRAM(s) Oral three times a day  sodium chloride 0.9%. 1000 milliLiter(s) IV Continuous <Continuous>  sodium chloride 0.9%. 1000 milliLiter(s) IV Continuous <Continuous>  tiotropium 2.5 MICROgram(s)/olodaterol 2.5 MICROgram(s) (STIOLTO) Inhaler 2 Puff(s) Inhalation daily    =======================================================  08-05    133<L>  |  98  |  56.6<H>  ----------------------------<  99  5.1   |  17.0<L>  |  2.76<H>    Ca    7.8<L>      05 Aug 2023 05:08  Mg     2.5     08-04    TPro  5.8<L>  /  Alb  3.2<L>  /  TBili  0.8  /  DBili  x   /  AST  47<H>  /  ALT  99<H>  /  AlkPhos  555<H>  08-05    Creatinine: 2.76 mg/dL (08-05-23 @ 05:08)  Creatinine: 2.75 mg/dL (08-04-23 @ 05:25)  Creatinine: 2.36 mg/dL (08-03-23 @ 08:43)  Creatinine: 2.20 mg/dL (08-02-23 @ 08:30)  Creatinine: 1.70 mg/dL (08-01-23 @ 05:20)    Urinalysis Basic - ( 05 Aug 2023 05:08 )    Color: x / Appearance: x / SG: x / pH: x  Gluc: 99 mg/dL / Ketone: x  / Bili: x / Urobili: x   Blood: x / Protein: x / Nitrite: x   Leuk Esterase: x / RBC: x / WBC x   Sq Epi: x / Non Sq Epi: x / Bacteria: x      =======================================================

## 2023-08-06 LAB
ANION GAP SERPL CALC-SCNC: 16 MMOL/L — SIGNIFICANT CHANGE UP (ref 5–17)
BUN SERPL-MCNC: 60.9 MG/DL — HIGH (ref 8–20)
CALCIUM SERPL-MCNC: 7.7 MG/DL — LOW (ref 8.4–10.5)
CHLORIDE SERPL-SCNC: 94 MMOL/L — LOW (ref 96–108)
CO2 SERPL-SCNC: 16 MMOL/L — LOW (ref 22–29)
CREAT SERPL-MCNC: 2.85 MG/DL — HIGH (ref 0.5–1.3)
EGFR: 16 ML/MIN/1.73M2 — LOW
GLUCOSE SERPL-MCNC: 108 MG/DL — HIGH (ref 70–99)
HCT VFR BLD CALC: 27.5 % — LOW (ref 34.5–45)
HGB BLD-MCNC: 8.6 G/DL — LOW (ref 11.5–15.5)
MCHC RBC-ENTMCNC: 29 PG — SIGNIFICANT CHANGE UP (ref 27–34)
MCHC RBC-ENTMCNC: 31.3 GM/DL — LOW (ref 32–36)
MCV RBC AUTO: 92.6 FL — SIGNIFICANT CHANGE UP (ref 80–100)
PLATELET # BLD AUTO: 373 K/UL — SIGNIFICANT CHANGE UP (ref 150–400)
POTASSIUM SERPL-MCNC: 5 MMOL/L — SIGNIFICANT CHANGE UP (ref 3.5–5.3)
POTASSIUM SERPL-SCNC: 5 MMOL/L — SIGNIFICANT CHANGE UP (ref 3.5–5.3)
RBC # BLD: 2.97 M/UL — LOW (ref 3.8–5.2)
RBC # FLD: 17.2 % — HIGH (ref 10.3–14.5)
SODIUM SERPL-SCNC: 126 MMOL/L — LOW (ref 135–145)
WBC # BLD: 10.86 K/UL — HIGH (ref 3.8–10.5)
WBC # FLD AUTO: 10.86 K/UL — HIGH (ref 3.8–10.5)

## 2023-08-06 PROCEDURE — 99233 SBSQ HOSP IP/OBS HIGH 50: CPT

## 2023-08-06 RX ORDER — FUROSEMIDE 40 MG
40 TABLET ORAL ONCE
Refills: 0 | Status: COMPLETED | OUTPATIENT
Start: 2023-08-06 | End: 2023-08-06

## 2023-08-06 RX ORDER — BUDESONIDE AND FORMOTEROL FUMARATE DIHYDRATE 160; 4.5 UG/1; UG/1
2 AEROSOL RESPIRATORY (INHALATION)
Refills: 0 | Status: DISCONTINUED | OUTPATIENT
Start: 2023-08-06 | End: 2023-08-23

## 2023-08-06 RX ORDER — ONDANSETRON 8 MG/1
4 TABLET, FILM COATED ORAL ONCE
Refills: 0 | Status: COMPLETED | OUTPATIENT
Start: 2023-08-06 | End: 2023-08-06

## 2023-08-06 RX ADMIN — MIDODRINE HYDROCHLORIDE 15 MILLIGRAM(S): 2.5 TABLET ORAL at 13:12

## 2023-08-06 RX ADMIN — Medication 5 MILLIGRAM(S): at 23:27

## 2023-08-06 RX ADMIN — Medication 1300 MILLIGRAM(S): at 13:12

## 2023-08-06 RX ADMIN — Medication 1300 MILLIGRAM(S): at 22:33

## 2023-08-06 RX ADMIN — Medication 25 MILLIGRAM(S): at 18:00

## 2023-08-06 RX ADMIN — CHLORHEXIDINE GLUCONATE 1 APPLICATION(S): 213 SOLUTION TOPICAL at 13:10

## 2023-08-06 RX ADMIN — Medication 1300 MILLIGRAM(S): at 05:47

## 2023-08-06 RX ADMIN — Medication 1 TABLET(S): at 13:13

## 2023-08-06 RX ADMIN — Medication 40 MILLIGRAM(S): at 14:20

## 2023-08-06 RX ADMIN — BUDESONIDE AND FORMOTEROL FUMARATE DIHYDRATE 2 PUFF(S): 160; 4.5 AEROSOL RESPIRATORY (INHALATION) at 21:12

## 2023-08-06 RX ADMIN — ENOXAPARIN SODIUM 40 MILLIGRAM(S): 100 INJECTION SUBCUTANEOUS at 18:01

## 2023-08-06 RX ADMIN — AMIODARONE HYDROCHLORIDE 200 MILLIGRAM(S): 400 TABLET ORAL at 05:47

## 2023-08-06 RX ADMIN — MIDODRINE HYDROCHLORIDE 15 MILLIGRAM(S): 2.5 TABLET ORAL at 22:33

## 2023-08-06 RX ADMIN — Medication 100 MILLIGRAM(S): at 23:27

## 2023-08-06 RX ADMIN — Medication 81 MILLIGRAM(S): at 13:12

## 2023-08-06 RX ADMIN — Medication 325 MILLIGRAM(S): at 13:11

## 2023-08-06 RX ADMIN — PANTOPRAZOLE SODIUM 40 MILLIGRAM(S): 20 TABLET, DELAYED RELEASE ORAL at 13:13

## 2023-08-06 RX ADMIN — Medication 100 MILLIGRAM(S): at 05:47

## 2023-08-06 RX ADMIN — Medication 40 MILLIGRAM(S): at 01:20

## 2023-08-06 RX ADMIN — MIDODRINE HYDROCHLORIDE 15 MILLIGRAM(S): 2.5 TABLET ORAL at 05:48

## 2023-08-06 RX ADMIN — ENOXAPARIN SODIUM 40 MILLIGRAM(S): 100 INJECTION SUBCUTANEOUS at 05:48

## 2023-08-06 NOTE — PROGRESS NOTE ADULT - SUBJECTIVE AND OBJECTIVE BOX
SUBJECTIVE / OVERNIGHT EVENTS: Patient had episode of recurrent cough, treated with cough suppressant. Given Lasix. Patient endorses she feels better today but appears still in mild distress. on 2 L NC saturating 97%. endorses sob and cough. Patient denies chest pain, abd pain, N/V, fever, chills, dysuria or any other complaints. All remainder ROS negative.     MEDICATIONS  (STANDING):  aMIOdarone    Tablet 200 milliGRAM(s) Oral daily  aspirin  chewable 81 milliGRAM(s) Oral daily  budesonide  80 MICROgram(s)/formoterol 4.5 MICROgram(s) Inhaler 2 Puff(s) Inhalation two times a day  chlorhexidine 2% Cloths 1 Application(s) Topical daily  enoxaparin Injectable 40 milliGRAM(s) SubCutaneous every 12 hours  ferrous    sulfate 325 milliGRAM(s) Oral daily  metoprolol tartrate 25 milliGRAM(s) Oral two times a day  midodrine 15 milliGRAM(s) Oral every 8 hours  multivitamin 1 Tablet(s) Oral daily  ondansetron Injectable 4 milliGRAM(s) IV Push once  pantoprazole  Injectable 40 milliGRAM(s) IV Push daily  sodium bicarbonate 1300 milliGRAM(s) Oral three times a day  sodium chloride 0.9%. 1000 milliLiter(s) (75 mL/Hr) IV Continuous <Continuous>  sodium chloride 0.9%. 1000 milliLiter(s) (75 mL/Hr) IV Continuous <Continuous>  tiotropium 2.5 MICROgram(s)/olodaterol 2.5 MICROgram(s) (STIOLTO) Inhaler 2 Puff(s) Inhalation daily    MEDICATIONS  (PRN):  guaiFENesin Oral Liquid (Sugar-Free) 100 milliGRAM(s) Oral every 6 hours PRN Cough  melatonin 5 milliGRAM(s) Oral at bedtime PRN Sleep  metoprolol tartrate Injectable 2.5 milliGRAM(s) IV Push every 6 hours PRN for hR > 110        I&O's Summary      PHYSICAL EXAM:  Vital Signs Last 24 Hrs  T(C): 36.4 (06 Aug 2023 12:15), Max: 36.6 (05 Aug 2023 16:57)  T(F): 97.5 (06 Aug 2023 12:15), Max: 97.9 (06 Aug 2023 04:46)  HR: 56 (06 Aug 2023 12:15) (56 - 63)  BP: 102/66 (06 Aug 2023 12:15) (90/52 - 104/62)  BP(mean): --  RR: 18 (06 Aug 2023 12:15) (18 - 19)  SpO2: 99% (06 Aug 2023 12:15) (97% - 99%)    Parameters below as of 06 Aug 2023 12:15  Patient On (Oxygen Delivery Method): nasal cannula  O2 Flow (L/min): 2        Constitutional: NAD, awake and alert, well-developed, In mild distress  Neck: Soft and supple, No LAD, No JVD  Respiratory: Breath sounds are clear bilaterally, no rales or rhonchi, mild wheezing BL on 2L NC   Cardiovascular: S1 and S2,   Gastrointestinal: Bowel Sounds present, soft, nontender   Extremities: 2 blister noted each leg with +2 lower ext swelling   Vascular: 2+ peripheral pulses  Neurological: A/O x 3, no focal deficits  Musculoskeletal: 5/5 strength b/l upper and lower extremities    LABS:                        8.6    10.86 )-----------( 373      ( 06 Aug 2023 07:34 )             27.5     08-06    126<L>  |  94<L>  |  60.9<H>  ----------------------------<  108<H>  5.0   |  16.0<L>  |  2.85<H>    Ca    7.7<L>      06 Aug 2023 07:34    TPro  5.8<L>  /  Alb  3.2<L>  /  TBili  0.8  /  DBili  x   /  AST  47<H>  /  ALT  99<H>  /  AlkPhos  555<H>  08-05          Urinalysis Basic - ( 06 Aug 2023 07:34 )    Color: x / Appearance: x / SG: x / pH: x  Gluc: 108 mg/dL / Ketone: x  / Bili: x / Urobili: x   Blood: x / Protein: x / Nitrite: x   Leuk Esterase: x / RBC: x / WBC x   Sq Epi: x / Non Sq Epi: x / Bacteria: x        CAPILLARY BLOOD GLUCOSE            RADIOLOGY & ADDITIONAL TESTS:  Results Reviewed    Imaging Personally Reviewed    Electrocardiogram Personally Reviewed

## 2023-08-06 NOTE — PROGRESS NOTE ADULT - ASSESSMENT
82 y/o female with PMH COPD on 2L home O2, HFpEF, CAD s/p PCI, HTN, HLD, pAF on rivaroxaban, diverticulosis, hemorrhoids presented with generalized weakness/dark stools admitted anemia, GIB without active bleed on ct, received 3U PRBC, and IVF however remained hypotensive requiring vasopressor therapy for hemorrhagic shock.  EGD on 7/27 and colonoscopy 7/28 without active bleed. Patient also with DANETTE and Afib with RVR, resumed back on amio and bb .  HR now better controlled. Plan was for ct enterography, but given DANETTE on CKD, it was cancelled. Patient downgraded to medicine 7/30/23.     HFpEF  Echo noted with Grade III diastolic dysfunction and severe pulm HTN  Will need Lasix as still fluid overloaded  give 1 dose 40mg and follow up response  Renal follow up due to elevated creatinine  bilateral lower ext ACE wrap     copd  Mild wheezes noted  c/w inhalers  Add Symbicort BID   C/w tiotropium  C/w o2     DANETTE on CKD III/Metabolic acidosis  Hyponatremia  Renal hypoperfusion, episodic  - f/up renal recs  - S/p IVF, sodium bicarb increased to 1300mg TID as per renal recs  - s/p Lasix and Albumin 8/3 and 8/4 as per renal recs  - monitor BMP    acute GIB / acute blood loss anemia /  hemorrhagic shock (Resolved)  - hx of GIB in past   - cta neg without active bleed on ct on admission   - s/p EGD on 7/27 without active bleeding  - s/p colonoscopy 7/28 no active bleed   - now off pressors, started on midodrine, increased to 15mg TID   - c/w ppis   - ct enterography - cancelled given elevated creatinine and no further bleeding  - plan for capsule endoscopy as op   - gi following   - Diet as tolerated   - resumed aspirin as per GI  - tolerating Lovenox - can transition to XARELTO on discharge if hb stable, will recheck CBC today stable     Paroxysmal A-Fib   Hypotension, asymptomatic  - resumed on amio and bb  - adjust bb dose for better hr control as needed  - prn iv lopressor for hr > 110  - aspirin  - raised Midodrine dose for improved BP  - lowered metoprolol dose  - started lovenox 8/2/23, no bleeding noted    Leukocytosis (resolving)  unclear etiology.  - UA negative; f/u CXR read.  - monitor CBC.    HLD  - monitor CMP   - hold statin due to transaminitis    Transaminitis (resolving)  - statin held  - LFT decreasing   -discussed with cardio, cont amiodarone for now since pt has been on it for long time  - Abdo U/S shows cholelithiasis and small right pleural effusion and Hep panel negative       dvt ppx : trial of Lovenox  GI cleared for Xarelto resumption on discharge    Dispo: f/u crt function

## 2023-08-07 LAB
ALBUMIN SERPL ELPH-MCNC: 3.1 G/DL — LOW (ref 3.3–5.2)
ALP SERPL-CCNC: 546 U/L — HIGH (ref 40–120)
ALT FLD-CCNC: 68 U/L — HIGH
ANION GAP SERPL CALC-SCNC: 17 MMOL/L — SIGNIFICANT CHANGE UP (ref 5–17)
ANION GAP SERPL CALC-SCNC: 18 MMOL/L — HIGH (ref 5–17)
AST SERPL-CCNC: 46 U/L — HIGH
BASOPHILS # BLD AUTO: 0.03 K/UL — SIGNIFICANT CHANGE UP (ref 0–0.2)
BASOPHILS NFR BLD AUTO: 0.3 % — SIGNIFICANT CHANGE UP (ref 0–2)
BILIRUB SERPL-MCNC: 1 MG/DL — SIGNIFICANT CHANGE UP (ref 0.4–2)
BUN SERPL-MCNC: 62.5 MG/DL — HIGH (ref 8–20)
BUN SERPL-MCNC: 63.9 MG/DL — HIGH (ref 8–20)
CALCIUM SERPL-MCNC: 7.8 MG/DL — LOW (ref 8.4–10.5)
CALCIUM SERPL-MCNC: 7.8 MG/DL — LOW (ref 8.4–10.5)
CHLORIDE SERPL-SCNC: 93 MMOL/L — LOW (ref 96–108)
CHLORIDE SERPL-SCNC: 94 MMOL/L — LOW (ref 96–108)
CO2 SERPL-SCNC: 14 MMOL/L — LOW (ref 22–29)
CO2 SERPL-SCNC: 17 MMOL/L — LOW (ref 22–29)
CREAT SERPL-MCNC: 2.85 MG/DL — HIGH (ref 0.5–1.3)
CREAT SERPL-MCNC: 2.97 MG/DL — HIGH (ref 0.5–1.3)
EGFR: 15 ML/MIN/1.73M2 — LOW
EGFR: 16 ML/MIN/1.73M2 — LOW
EOSINOPHIL # BLD AUTO: 0 K/UL — SIGNIFICANT CHANGE UP (ref 0–0.5)
EOSINOPHIL NFR BLD AUTO: 0 % — SIGNIFICANT CHANGE UP (ref 0–6)
GLUCOSE SERPL-MCNC: 113 MG/DL — HIGH (ref 70–99)
GLUCOSE SERPL-MCNC: 91 MG/DL — SIGNIFICANT CHANGE UP (ref 70–99)
HBV CORE AB SER-ACNC: SIGNIFICANT CHANGE UP
HBV SURFACE AB SER-ACNC: <3 MIU/ML — LOW
HBV SURFACE AB SER-ACNC: SIGNIFICANT CHANGE UP
HBV SURFACE AG SER-ACNC: SIGNIFICANT CHANGE UP
HCT VFR BLD CALC: 29.5 % — LOW (ref 34.5–45)
HCV AB S/CO SERPL IA: 0.1 S/CO — SIGNIFICANT CHANGE UP (ref 0–0.99)
HCV AB SERPL-IMP: SIGNIFICANT CHANGE UP
HGB BLD-MCNC: 8.8 G/DL — LOW (ref 11.5–15.5)
IMM GRANULOCYTES NFR BLD AUTO: 0.7 % — SIGNIFICANT CHANGE UP (ref 0–0.9)
LYMPHOCYTES # BLD AUTO: 0.77 K/UL — LOW (ref 1–3.3)
LYMPHOCYTES # BLD AUTO: 7.7 % — LOW (ref 13–44)
MCHC RBC-ENTMCNC: 28.7 PG — SIGNIFICANT CHANGE UP (ref 27–34)
MCHC RBC-ENTMCNC: 29.8 GM/DL — LOW (ref 32–36)
MCV RBC AUTO: 96.1 FL — SIGNIFICANT CHANGE UP (ref 80–100)
MONOCYTES # BLD AUTO: 1.18 K/UL — HIGH (ref 0–0.9)
MONOCYTES NFR BLD AUTO: 11.8 % — SIGNIFICANT CHANGE UP (ref 2–14)
NEUTROPHILS # BLD AUTO: 7.98 K/UL — HIGH (ref 1.8–7.4)
NEUTROPHILS NFR BLD AUTO: 79.5 % — HIGH (ref 43–77)
NRBC # BLD: 1 /100 WBCS — HIGH (ref 0–0)
PLATELET # BLD AUTO: 206 K/UL — SIGNIFICANT CHANGE UP (ref 150–400)
POTASSIUM SERPL-MCNC: 5 MMOL/L — SIGNIFICANT CHANGE UP (ref 3.5–5.3)
POTASSIUM SERPL-MCNC: 5.5 MMOL/L — HIGH (ref 3.5–5.3)
POTASSIUM SERPL-SCNC: 5 MMOL/L — SIGNIFICANT CHANGE UP (ref 3.5–5.3)
POTASSIUM SERPL-SCNC: 5.5 MMOL/L — HIGH (ref 3.5–5.3)
PROT SERPL-MCNC: 6 G/DL — LOW (ref 6.6–8.7)
RBC # BLD: 3.07 M/UL — LOW (ref 3.8–5.2)
RBC # FLD: 17 % — HIGH (ref 10.3–14.5)
SODIUM SERPL-SCNC: 125 MMOL/L — LOW (ref 135–145)
SODIUM SERPL-SCNC: 128 MMOL/L — LOW (ref 135–145)
WBC # BLD: 10.03 K/UL — SIGNIFICANT CHANGE UP (ref 3.8–10.5)
WBC # FLD AUTO: 10.03 K/UL — SIGNIFICANT CHANGE UP (ref 3.8–10.5)

## 2023-08-07 PROCEDURE — 99233 SBSQ HOSP IP/OBS HIGH 50: CPT

## 2023-08-07 PROCEDURE — 71045 X-RAY EXAM CHEST 1 VIEW: CPT | Mod: 26

## 2023-08-07 RX ORDER — CHLORHEXIDINE GLUCONATE 213 G/1000ML
1 SOLUTION TOPICAL
Refills: 0 | Status: DISCONTINUED | OUTPATIENT
Start: 2023-08-07 | End: 2023-08-07

## 2023-08-07 RX ORDER — SODIUM ZIRCONIUM CYCLOSILICATE 10 G/10G
5 POWDER, FOR SUSPENSION ORAL
Refills: 0 | Status: DISCONTINUED | OUTPATIENT
Start: 2023-08-07 | End: 2023-08-07

## 2023-08-07 RX ORDER — CHLORHEXIDINE GLUCONATE 213 G/1000ML
1 SOLUTION TOPICAL
Refills: 0 | Status: DISCONTINUED | OUTPATIENT
Start: 2023-08-07 | End: 2023-08-23

## 2023-08-07 RX ORDER — SODIUM CHLORIDE 9 MG/ML
10 INJECTION INTRAMUSCULAR; INTRAVENOUS; SUBCUTANEOUS
Refills: 0 | Status: DISCONTINUED | OUTPATIENT
Start: 2023-08-07 | End: 2023-08-23

## 2023-08-07 RX ADMIN — Medication 1300 MILLIGRAM(S): at 14:11

## 2023-08-07 RX ADMIN — PANTOPRAZOLE SODIUM 40 MILLIGRAM(S): 20 TABLET, DELAYED RELEASE ORAL at 14:11

## 2023-08-07 RX ADMIN — MIDODRINE HYDROCHLORIDE 15 MILLIGRAM(S): 2.5 TABLET ORAL at 14:04

## 2023-08-07 RX ADMIN — Medication 81 MILLIGRAM(S): at 14:04

## 2023-08-07 RX ADMIN — Medication 1300 MILLIGRAM(S): at 22:13

## 2023-08-07 RX ADMIN — ENOXAPARIN SODIUM 40 MILLIGRAM(S): 100 INJECTION SUBCUTANEOUS at 05:35

## 2023-08-07 RX ADMIN — Medication 1300 MILLIGRAM(S): at 05:35

## 2023-08-07 RX ADMIN — BUDESONIDE AND FORMOTEROL FUMARATE DIHYDRATE 2 PUFF(S): 160; 4.5 AEROSOL RESPIRATORY (INHALATION) at 20:42

## 2023-08-07 RX ADMIN — Medication 1 TABLET(S): at 14:03

## 2023-08-07 RX ADMIN — TIOTROPIUM BROMIDE AND OLODATEROL 2 PUFF(S): 3.124; 2.736 SPRAY, METERED RESPIRATORY (INHALATION) at 08:03

## 2023-08-07 RX ADMIN — MIDODRINE HYDROCHLORIDE 15 MILLIGRAM(S): 2.5 TABLET ORAL at 22:13

## 2023-08-07 RX ADMIN — Medication 25 MILLIGRAM(S): at 05:45

## 2023-08-07 RX ADMIN — MIDODRINE HYDROCHLORIDE 15 MILLIGRAM(S): 2.5 TABLET ORAL at 05:36

## 2023-08-07 RX ADMIN — ENOXAPARIN SODIUM 40 MILLIGRAM(S): 100 INJECTION SUBCUTANEOUS at 22:14

## 2023-08-07 RX ADMIN — AMIODARONE HYDROCHLORIDE 200 MILLIGRAM(S): 400 TABLET ORAL at 05:35

## 2023-08-07 RX ADMIN — Medication 325 MILLIGRAM(S): at 14:04

## 2023-08-07 NOTE — CONSULT NOTE ADULT - SUBJECTIVE AND OBJECTIVE BOX
Vascular Attending:        HPI:  MARITZA DE LOS SANTOS  MRN-[id number]  Patient is a 83y old  Female who presents with a chief complaint of     HPI: 84 y/o female with PMHx of COPD on 2L home O2, CHF, CAD s/p stents, HTN, HLD, pAF previously on eliquis now xarelto (switched 1 week ago 2/2 cost), diverticulosis, and grade/stage 3 hemorrhoids who presents to the ED c/o generalized weakness and dark stools over the past few days. Hypotensive on ED arrival and hgb found to be 4.6 with +occult/melanotic stool. CT showed diverticulosis, no active bleed visualized. GI consulted and patient made NPO for endoscopy tomorrow. Protonix gtt started, patient given 3U PRBC and 1L IVF but persistently hypotensive. Started on levophed and admitted to MICU.     Last 24 hours: Started on levophed. s/p 3u prbc but hypotensive at 86/43. Repeat CBC ordered and pending. Protonix running. Cleared by cardiology for EGD in AM.    REVIEW OF SYSTEMS:  +dizziness, weakness, MCKAY, melena  -vomiting, abdominal pain, leg swelling      Physical Exam:  Vital Signs Last 24 Hrs  T(C): 36.4 (2023 13:20), Max: 36.6 (2023 12:15)  T(F): 97.5 (2023 13:20), Max: 97.9 (2023 12:15)  HR: 57 (2023 14:45) (57 - 87)  BP: 86/43 (2023 14:45) (72/31 - 100/48)  BP(mean): --  RR: 16 (2023 13:20) (16 - 19)  SpO2: 100% (2023 13:20) (99% - 100%)    Parameters below as of 2023 13:20  Patient On (Oxygen Delivery Method): nasal cannula  O2 Flow (L/min): 2      General: well appearing, NAD  Head: NC, AT  EENT: EOMI, no scleral icterus  Cardiac: RRR, no apparent murmurs, no lower extremity edema or calf TTP   Respiratory: CTABL, no respiratory distress   Abdomen: soft, ND, NT, nonperitonitic  MSK/Vascular: full ROM, soft compartments, warm extremities  Neuro: AAOx3, sensation to light touch intact  Psych: calm, cooperative      ============================I/O===========================   I&O's Detail    ============================ LABS =========================                        4.6    12.58 )-----------( 347      ( 2023 09:50 )             14.5         139  |  102  |  74.9<H>  ----------------------------<  146<H>  4.0   |  19.0<L>  |  1.78<H>    Ca    8.7      2023 09:50    TPro  5.4<L>  /  Alb  3.1<L>  /  TBili  0.4  /  DBili  x   /  AST  14  /  ALT  14  /  AlkPhos  231<H>      LIVER FUNCTIONS - ( 2023 09:50 )  Alb: 3.1 g/dL / Pro: 5.4 g/dL / ALK PHOS: 231 U/L / ALT: 14 U/L / AST: 14 U/L / GGT: x           PT/INR - ( 2023 09:50 )   PT: 22.2 sec;   INR: 2.04 ratio         PTT - ( 2023 09:50 )  PTT:46.0 sec    Urinalysis Basic - ( 2023 09:50 )    Color: x / Appearance: x / SG: x / pH: x  Gluc: 146 mg/dL / Ketone: x  / Bili: x / Urobili: x   Blood: x / Protein: x / Nitrite: x   Leuk Esterase: x / RBC: x / WBC x   Sq Epi: x / Non Sq Epi: x / Bacteria: x      ======================================================  PAST MEDICAL & SURGICAL HISTORY:  HTN (hypertension)      HLD (hyperlipidemia)      CAD (coronary artery disease)      COPD (chronic obstructive pulmonary disease)      S/P  section         (2023 15:00)      PAST MEDICAL & SURGICAL HISTORY:  HTN (hypertension)      HLD (hyperlipidemia)      CAD (coronary artery disease)      COPD (chronic obstructive pulmonary disease)      Acute CHF      S/P  section          REVIEW OF SYSTEMS      General:	    Skin/Breast:  	  Ophthalmologic:  	  ENMT:	    Respiratory and Thorax:  	  Cardiovascular:	    Gastrointestinal:	    Genitourinary:	    Musculoskeletal:	    Neurological:	    Psychiatric:	    Hematology/Lymphatics:	    Endocrine:	    Allergic/Immunologic:	    MEDICATIONS  (STANDING):  aMIOdarone    Tablet 200 milliGRAM(s) Oral daily  aspirin  chewable 81 milliGRAM(s) Oral daily  budesonide  80 MICROgram(s)/formoterol 4.5 MICROgram(s) Inhaler 2 Puff(s) Inhalation two times a day  chlorhexidine 2% Cloths 1 Application(s) Topical <User Schedule>  enoxaparin Injectable 40 milliGRAM(s) SubCutaneous every 12 hours  ferrous    sulfate 325 milliGRAM(s) Oral daily  metoprolol tartrate 25 milliGRAM(s) Oral two times a day  midodrine 15 milliGRAM(s) Oral every 8 hours  multivitamin 1 Tablet(s) Oral daily  ondansetron Injectable 4 milliGRAM(s) IV Push once  pantoprazole  Injectable 40 milliGRAM(s) IV Push daily  sodium bicarbonate 1300 milliGRAM(s) Oral three times a day  sodium chloride 0.9%. 1000 milliLiter(s) (75 mL/Hr) IV Continuous <Continuous>  sodium chloride 0.9%. 1000 milliLiter(s) (75 mL/Hr) IV Continuous <Continuous>  tiotropium 2.5 MICROgram(s)/olodaterol 2.5 MICROgram(s) (STIOLTO) Inhaler 2 Puff(s) Inhalation daily    MEDICATIONS  (PRN):  guaiFENesin Oral Liquid (Sugar-Free) 100 milliGRAM(s) Oral every 6 hours PRN Cough  melatonin 5 milliGRAM(s) Oral at bedtime PRN Sleep  metoprolol tartrate Injectable 2.5 milliGRAM(s) IV Push every 6 hours PRN for hR > 110      Allergies    No Known Allergies    Intolerances        SOCIAL HISTORY:      Vital Signs Last 24 Hrs  T(C): 36.6 (07 Aug 2023 08:00), Max: 36.6 (06 Aug 2023 20:43)  T(F): 97.9 (07 Aug 2023 08:00), Max: 97.9 (07 Aug 2023 08:00)  HR: 55 (07 Aug 2023 08:00) (55 - 63)  BP: 101/55 (07 Aug 2023 08:00) (90/55 - 110/70)  BP(mean): --  RR: 18 (07 Aug 2023 08:00) (18 - 20)  SpO2: 96% (07 Aug 2023 08:00) (94% - 99%)    Parameters below as of 07 Aug 2023 08:00  Patient On (Oxygen Delivery Method): nasal cannula  O2 Flow (L/min): 2      PHYSICAL EXAM:      Constitutional:    Eyes:    ENMT:    Neck:    Breasts:    Back:    Respiratory:    Cardiovascular:    Gastrointestinal:    Genitourinary:    Rectal:    Extremities:    Vascular:    Neurological:    Skin:    Lymph Nodes:    Musculoskeletal:    Psychiatric:      Pulses:   Right:                                                                          Left:  FEM [ ]2+ [ ]1+ [ ]doppler                                             FEM [ ]2+ [ ]1+ [ ]doppler    POP [ ]2+ [ ]1+ [ ]doppler                                             POP [ ]2+ [ ]1+ [ ]doppler    DP [ ]2+ [ ]1+ [ ]doppler                                                DP [ ]2+ [ ]1+ [ ]doppler  PT[ ]2+ [ ]1+ [ ]doppler                                                  PT [ ]2+ [ ]1+ [ ]doppler      LABS:                        8.8    10.03 )-----------( 206      ( 07 Aug 2023 05:30 )             29.5     08-07    128<L>  |  94<L>  |  63.9<H>  ----------------------------<  113<H>  5.0   |  17.0<L>  |  2.97<H>    Ca    7.8<L>      07 Aug 2023 09:12    TPro  6.0<L>  /  Alb  3.1<L>  /  TBili  1.0  /  DBili  x   /  AST  46<H>  /  ALT  68<H>  /  AlkPhos  546<H>  08      Urinalysis Basic - ( 07 Aug 2023 09:12 )    Color: x / Appearance: x / SG: x / pH: x  Gluc: 113 mg/dL / Ketone: x  / Bili: x / Urobili: x   Blood: x / Protein: x / Nitrite: x   Leuk Esterase: x / RBC: x / WBC x   Sq Epi: x / Non Sq Epi: x / Bacteria: x        RADIOLOGY & ADDITIONAL STUDIES    Impression and Plan: Vascular Attending:  Benjie    Patient is an 83yoF with PMH COPD (on home O2), CHF, CAD (s/p cardiac stents), HTN, pulm HTN, HLD, paroxysmal A-fib (on xarelto) who presented to the ED with weakness, melena and anemia, patient required blood transfusions for persistent hypotension. Patient has since undergone EGD/Colonoscopy, only positive for diverticulosis, no active bleeding noted. Patient initally planned for CT enterography, however noted to have increasing Cr, per GI now planning for outpatient capsule endoscopy. Nephrology evaluated patient, planning to initiate HD today, vascular surgery consulted for shiley placement.    HPI:  MARITZA DE LOS SANTOS  MRN-[id number]  Patient is a 83y old  Female who presents with a chief complaint of     HPI: 82 y/o female with PMHx of COPD on 2L home O2, CHF, CAD s/p stents, HTN, HLD, pAF previously on eliquis now xarelto (switched 1 week ago 2/2 cost), diverticulosis, and grade/stage 3 hemorrhoids who presents to the ED c/o generalized weakness and dark stools over the past few days. Hypotensive on ED arrival and hgb found to be 4.6 with +occult/melanotic stool. CT showed diverticulosis, no active bleed visualized. GI consulted and patient made NPO for endoscopy tomorrow. Protonix gtt started, patient given 3U PRBC and 1L IVF but persistently hypotensive. Started on levophed and admitted to MICU.     Last 24 hours: Started on levophed. s/p 3u prbc but hypotensive at 86/43. Repeat CBC ordered and pending. Protonix running. Cleared by cardiology for EGD in AM.    REVIEW OF SYSTEMS:  +dizziness, weakness, MCKAY, melena  -vomiting, abdominal pain, leg swelling      Physical Exam:  Vital Signs Last 24 Hrs  T(C): 36.4 (2023 13:20), Max: 36.6 (2023 12:15)  T(F): 97.5 (2023 13:20), Max: 97.9 (2023 12:15)  HR: 57 (2023 14:45) (57 - 87)  BP: 86/43 (2023 14:45) (72/31 - 100/48)  BP(mean): --  RR: 16 (2023 13:20) (16 - 19)  SpO2: 100% (2023 13:20) (99% - 100%)    Parameters below as of 2023 13:20  Patient On (Oxygen Delivery Method): nasal cannula  O2 Flow (L/min): 2      General: well appearing, NAD  Head: NC, AT  EENT: EOMI, no scleral icterus  Cardiac: RRR, no apparent murmurs, no lower extremity edema or calf TTP   Respiratory: CTABL, no respiratory distress   Abdomen: soft, ND, NT, nonperitonitic  MSK/Vascular: full ROM, soft compartments, warm extremities  Neuro: AAOx3, sensation to light touch intact  Psych: calm, cooperative      ============================I/O===========================   I&O's Detail    ============================ LABS =========================                        4.6    12.58 )-----------( 347      ( 2023 09:50 )             14.5         139  |  102  |  74.9<H>  ----------------------------<  146<H>  4.0   |  19.0<L>  |  1.78<H>    Ca    8.7      2023 09:50    TPro  5.4<L>  /  Alb  3.1<L>  /  TBili  0.4  /  DBili  x   /  AST  14  /  ALT  14  /  AlkPhos  231<H>      LIVER FUNCTIONS - ( 2023 09:50 )  Alb: 3.1 g/dL / Pro: 5.4 g/dL / ALK PHOS: 231 U/L / ALT: 14 U/L / AST: 14 U/L / GGT: x           PT/INR - ( 2023 09:50 )   PT: 22.2 sec;   INR: 2.04 ratio         PTT - ( 2023 09:50 )  PTT:46.0 sec    Urinalysis Basic - ( 2023 09:50 )    Color: x / Appearance: x / SG: x / pH: x  Gluc: 146 mg/dL / Ketone: x  / Bili: x / Urobili: x   Blood: x / Protein: x / Nitrite: x   Leuk Esterase: x / RBC: x / WBC x   Sq Epi: x / Non Sq Epi: x / Bacteria: x      ======================================================  PAST MEDICAL & SURGICAL HISTORY:  HTN (hypertension)      HLD (hyperlipidemia)      CAD (coronary artery disease)      COPD (chronic obstructive pulmonary disease)      S/P  section         (2023 15:00)      PAST MEDICAL & SURGICAL HISTORY:  HTN (hypertension)      HLD (hyperlipidemia)      CAD (coronary artery disease)      COPD (chronic obstructive pulmonary disease)      Acute CHF      S/P  section        MEDICATIONS  (STANDING):  aMIOdarone    Tablet 200 milliGRAM(s) Oral daily  aspirin  chewable 81 milliGRAM(s) Oral daily  budesonide  80 MICROgram(s)/formoterol 4.5 MICROgram(s) Inhaler 2 Puff(s) Inhalation two times a day  chlorhexidine 2% Cloths 1 Application(s) Topical <User Schedule>  enoxaparin Injectable 40 milliGRAM(s) SubCutaneous every 12 hours  ferrous    sulfate 325 milliGRAM(s) Oral daily  metoprolol tartrate 25 milliGRAM(s) Oral two times a day  midodrine 15 milliGRAM(s) Oral every 8 hours  multivitamin 1 Tablet(s) Oral daily  ondansetron Injectable 4 milliGRAM(s) IV Push once  pantoprazole  Injectable 40 milliGRAM(s) IV Push daily  sodium bicarbonate 1300 milliGRAM(s) Oral three times a day  sodium chloride 0.9%. 1000 milliLiter(s) (75 mL/Hr) IV Continuous <Continuous>  sodium chloride 0.9%. 1000 milliLiter(s) (75 mL/Hr) IV Continuous <Continuous>  tiotropium 2.5 MICROgram(s)/olodaterol 2.5 MICROgram(s) (STIOLTO) Inhaler 2 Puff(s) Inhalation daily    MEDICATIONS  (PRN):  guaiFENesin Oral Liquid (Sugar-Free) 100 milliGRAM(s) Oral every 6 hours PRN Cough  melatonin 5 milliGRAM(s) Oral at bedtime PRN Sleep  metoprolol tartrate Injectable 2.5 milliGRAM(s) IV Push every 6 hours PRN for hR > 110      Allergies    No Known Allergies    Intolerances        SOCIAL HISTORY:      Vital Signs Last 24 Hrs  T(C): 36.6 (07 Aug 2023 08:00), Max: 36.6 (06 Aug 2023 20:43)  T(F): 97.9 (07 Aug 2023 08:00), Max: 97.9 (07 Aug 2023 08:00)  HR: 55 (07 Aug 2023 08:00) (55 - 63)  BP: 101/55 (07 Aug 2023 08:00) (90/55 - 110/70)  BP(mean): --  RR: 18 (07 Aug 2023 08:00) (18 - 20)  SpO2: 96% (07 Aug 2023 08:00) (94% - 99%)    Parameters below as of 07 Aug 2023 08:00  Patient On (Oxygen Delivery Method): nasal cannula  O2 Flow (L/min): 2      PHYSICAL EXAM:    Gen: pt lying in bed, alert, in NAD  Resp: unlabored on 3L NC  CVS: RRR  Abd: soft, NT, ND  Ext: moving all extremities spontaneously, sensation intact    LABS:                        8.8    10.03 )-----------( 206      ( 07 Aug 2023 05:30 )             29.5     08-    128<L>  |  94<L>  |  63.9<H>  ----------------------------<  113<H>  5.0   |  17.0<L>  |  2.97<H>    Ca    7.8<L>      07 Aug 2023 09:12    TPro  6.0<L>  /  Alb  3.1<L>  /  TBili  1.0  /  DBili  x   /  AST  46<H>  /  ALT  68<H>  /  AlkPhos  546<H>  08-      Urinalysis Basic - ( 07 Aug 2023 09:12 )    Color: x / Appearance: x / SG: x / pH: x  Gluc: 113 mg/dL / Ketone: x  / Bili: x / Urobili: x   Blood: x / Protein: x / Nitrite: x   Leuk Esterase: x / RBC: x / WBC x   Sq Epi: x / Non Sq Epi: x / Bacteria: x        RADIOLOGY & ADDITIONAL STUDIES

## 2023-08-07 NOTE — PROGRESS NOTE ADULT - SUBJECTIVE AND OBJECTIVE BOX
CC: Anemia, abdominal pain (06 Aug 2023 13:08)    HPI:  82 y/o female with PMH COPD on 2L home O2, HFpEF, CAD s/p PCI, HTN, HLD, pAF on rivaroxaban, diverticulosis, hemorrhoids presented with generalized weakness/dark stools admitted with anemia, GIB without active bleed on ct, received 3U PRBC, and IVF however remained hypotensive requiring vasopressor therapy for hemorrhagic shock.  EGD on 7/27 and colonoscopy 7/28 without active bleed. Patient also with DANETTE and Afib with RVR, resumed back on amio and bb.  HR now better controlled. Plan was for ct enterography, held secondary to worsening DANETTE. Patient downgraded to medicine 7/30/23.     INTERVAL HPI/OVERNIGHT EVENTS:  Patient seen and examined sitting up in bed.  Patient denies any headache, dizziness, SOB, CP, abdominal pain, nausea, vomiting, dysuria.  Other ROS reviewed and are negative.    Vital Signs Last 24 Hrs  T(C): 36.6 (07 Aug 2023 08:00), Max: 36.6 (06 Aug 2023 20:43)  T(F): 97.9 (07 Aug 2023 08:00), Max: 97.9 (07 Aug 2023 08:00)  HR: 55 (07 Aug 2023 08:00) (55 - 63)  BP: 101/55 (07 Aug 2023 08:00) (90/55 - 110/70)  BP(mean): --  RR: 18 (07 Aug 2023 08:00) (18 - 20)  SpO2: 96% (07 Aug 2023 08:00) (94% - 98%)    Parameters below as of 07 Aug 2023 08:00  Patient On (Oxygen Delivery Method): nasal cannula  O2 Flow (L/min): 2    I&O's Detail      PHYSICAL EXAM:  GENERAL: NAD  HEAD:  Atraumatic, Normocephalic  NECK: Supple, No JVD, Normal thyroid  NERVOUS SYSTEM:  Alert & Oriented X3, Good concentration; Motor Strength 5/5 B/L upper and lower extremities  CHEST/LUNG: Clear to auscultation bilaterally  HEART: Regular rate and rhythm; No murmurs, rubs, or gallops  ABDOMEN: Soft, Nontender, Nondistended; Bowel sounds present  EXTREMITIES:  2+ Peripheral Pulses, bilateral LE edema  SKIN: No rashes or lesions                            8.8    10.03 )-----------( 206      ( 07 Aug 2023 05:30 )             29.5     07 Aug 2023 09:12    128    |  94     |  63.9   ----------------------------<  113    5.0     |  17.0   |  2.97     Ca    7.8        07 Aug 2023 09:12    TPro  6.0    /  Alb  3.1    /  TBili  1.0    /  DBili  x      /  AST  46     /  ALT  68     /  AlkPhos  546    07 Aug 2023 05:30        LIVER FUNCTIONS - ( 07 Aug 2023 05:30 )  Alb: 3.1 g/dL / Pro: 6.0 g/dL / ALK PHOS: 546 U/L / ALT: 68 U/L / AST: 46 U/L / GGT: x           Urinalysis Basic - ( 07 Aug 2023 09:12 )    Color: x / Appearance: x / SG: x / pH: x  Gluc: 113 mg/dL / Ketone: x  / Bili: x / Urobili: x   Blood: x / Protein: x / Nitrite: x   Leuk Esterase: x / RBC: x / WBC x   Sq Epi: x / Non Sq Epi: x / Bacteria: x        MEDICATIONS  (STANDING):  aMIOdarone    Tablet 200 milliGRAM(s) Oral daily  aspirin  chewable 81 milliGRAM(s) Oral daily  budesonide  80 MICROgram(s)/formoterol 4.5 MICROgram(s) Inhaler 2 Puff(s) Inhalation two times a day  chlorhexidine 2% Cloths 1 Application(s) Topical <User Schedule>  chlorhexidine 2% Cloths 1 Application(s) Topical <User Schedule>  chlorhexidine 4% Liquid 1 Application(s) Topical <User Schedule>  enoxaparin Injectable 40 milliGRAM(s) SubCutaneous every 12 hours  ferrous    sulfate 325 milliGRAM(s) Oral daily  metoprolol tartrate 25 milliGRAM(s) Oral two times a day  midodrine 15 milliGRAM(s) Oral every 8 hours  multivitamin 1 Tablet(s) Oral daily  ondansetron Injectable 4 milliGRAM(s) IV Push once  pantoprazole  Injectable 40 milliGRAM(s) IV Push daily  sodium bicarbonate 1300 milliGRAM(s) Oral three times a day  sodium chloride 0.9%. 1000 milliLiter(s) (75 mL/Hr) IV Continuous <Continuous>  sodium chloride 0.9%. 1000 milliLiter(s) (75 mL/Hr) IV Continuous <Continuous>  tiotropium 2.5 MICROgram(s)/olodaterol 2.5 MICROgram(s) (STIOLTO) Inhaler 2 Puff(s) Inhalation daily    MEDICATIONS  (PRN):  guaiFENesin Oral Liquid (Sugar-Free) 100 milliGRAM(s) Oral every 6 hours PRN Cough  melatonin 5 milliGRAM(s) Oral at bedtime PRN Sleep  metoprolol tartrate Injectable 2.5 milliGRAM(s) IV Push every 6 hours PRN for hR > 110  sodium chloride 0.9% lock flush 10 milliLiter(s) IV Push every 1 hour PRN Pre/post blood products, medications, blood draw, and to maintain line patency  sodium chloride 0.9% lock flush 10 milliLiter(s) IV Push every 1 hour PRN Pre/post blood products, medications, blood draw, and to maintain line patency

## 2023-08-07 NOTE — PROGRESS NOTE ADULT - SUBJECTIVE AND OBJECTIVE BOX
Has worsening renal function associated with hyperK on a.m. labs. Patient complained of anasarca. On nasal cannula. Two daughters were at bedside.    Vital Signs Last 24 Hrs  T(C): 36.6 (07 Aug 2023 08:00), Max: 36.6 (06 Aug 2023 20:43)  T(F): 97.9 (07 Aug 2023 08:00), Max: 97.9 (07 Aug 2023 08:00)  HR: 55 (07 Aug 2023 08:00) (55 - 63)  BP: 101/55 (07 Aug 2023 08:00) (90/55 - 110/70)  BP(mean): --  RR: 18 (07 Aug 2023 08:00) (18 - 20)  SpO2: 96% (07 Aug 2023 08:00) (94% - 98%)    Parameters below as of 07 Aug 2023 08:00  Patient On (Oxygen Delivery Method): nasal cannula  O2 Flow (L/min): 2    I&O's Summary    Physical Exam  General: Obese female in NAD, on nasal cannula  HEENT: Normocephalic  Cardiac: S1S2 RRR  Respiratory: Diminished breath sounds b/l  Abdomen: Soft, NT  Extremities: 2+ pitting edema of b/l upper and lower extremities  Neuro: AAOx3  Psych: Calm     08-07    128<L>  |  94<L>  |  63.9<H>  ----------------------------<  113<H>  5.0   |  17.0<L>  |  2.97<H>    Ca    7.8<L>      07 Aug 2023 09:12    TPro  6.0<L>  /  Alb  3.1<L>  /  TBili  1.0  /  DBili  x   /  AST  46<H>  /  ALT  68<H>  /  AlkPhos  546<H>  08-07                        8.8    10.03 )-----------( 206      ( 07 Aug 2023 05:30 )             29.5     MEDICATIONS  (STANDING):  aMIOdarone    Tablet 200 milliGRAM(s) Oral daily  aspirin  chewable 81 milliGRAM(s) Oral daily  budesonide  80 MICROgram(s)/formoterol 4.5 MICROgram(s) Inhaler 2 Puff(s) Inhalation two times a day  chlorhexidine 2% Cloths 1 Application(s) Topical <User Schedule>  chlorhexidine 2% Cloths 1 Application(s) Topical <User Schedule>  chlorhexidine 4% Liquid 1 Application(s) Topical <User Schedule>  enoxaparin Injectable 40 milliGRAM(s) SubCutaneous every 12 hours  ferrous    sulfate 325 milliGRAM(s) Oral daily  metoprolol tartrate 25 milliGRAM(s) Oral two times a day  midodrine 15 milliGRAM(s) Oral every 8 hours  multivitamin 1 Tablet(s) Oral daily  ondansetron Injectable 4 milliGRAM(s) IV Push once  pantoprazole  Injectable 40 milliGRAM(s) IV Push daily  sodium bicarbonate 1300 milliGRAM(s) Oral three times a day  sodium chloride 0.9%. 1000 milliLiter(s) (75 mL/Hr) IV Continuous <Continuous>  sodium chloride 0.9%. 1000 milliLiter(s) (75 mL/Hr) IV Continuous <Continuous>  tiotropium 2.5 MICROgram(s)/olodaterol 2.5 MICROgram(s) (STIOLTO) Inhaler 2 Puff(s) Inhalation daily    MEDICATIONS  (PRN):  guaiFENesin Oral Liquid (Sugar-Free) 100 milliGRAM(s) Oral every 6 hours PRN Cough  melatonin 5 milliGRAM(s) Oral at bedtime PRN Sleep  metoprolol tartrate Injectable 2.5 milliGRAM(s) IV Push every 6 hours PRN for hR > 110  sodium chloride 0.9% lock flush 10 milliLiter(s) IV Push every 1 hour PRN Pre/post blood products, medications, blood draw, and to maintain line patency  sodium chloride 0.9% lock flush 10 milliLiter(s) IV Push every 1 hour PRN Pre/post blood products, medications, blood draw, and to maintain line patency   Has worsening renal function. Patient complained of anasarca. On nasal cannula. Two daughters were at bedside.    Vital Signs Last 24 Hrs  T(C): 36.6 (07 Aug 2023 08:00), Max: 36.6 (06 Aug 2023 20:43)  T(F): 97.9 (07 Aug 2023 08:00), Max: 97.9 (07 Aug 2023 08:00)  HR: 55 (07 Aug 2023 08:00) (55 - 63)  BP: 101/55 (07 Aug 2023 08:00) (90/55 - 110/70)  BP(mean): --  RR: 18 (07 Aug 2023 08:00) (18 - 20)  SpO2: 96% (07 Aug 2023 08:00) (94% - 98%)    Parameters below as of 07 Aug 2023 08:00  Patient On (Oxygen Delivery Method): nasal cannula  O2 Flow (L/min): 2    I&O's Summary    Physical Exam  General: Obese female in NAD, on nasal cannula  HEENT: Normocephalic  Cardiac: S1S2 RRR  Respiratory: Diminished breath sounds b/l  Abdomen: Soft, NT  Extremities: 2+ pitting edema of b/l upper and lower extremities  Neuro: AAOx3  Psych: Calm     08-07    128<L>  |  94<L>  |  63.9<H>  ----------------------------<  113<H>  5.0   |  17.0<L>  |  2.97<H>    Ca    7.8<L>      07 Aug 2023 09:12    TPro  6.0<L>  /  Alb  3.1<L>  /  TBili  1.0  /  DBili  x   /  AST  46<H>  /  ALT  68<H>  /  AlkPhos  546<H>  08-07                        8.8    10.03 )-----------( 206      ( 07 Aug 2023 05:30 )             29.5     MEDICATIONS  (STANDING):  aMIOdarone    Tablet 200 milliGRAM(s) Oral daily  aspirin  chewable 81 milliGRAM(s) Oral daily  budesonide  80 MICROgram(s)/formoterol 4.5 MICROgram(s) Inhaler 2 Puff(s) Inhalation two times a day  chlorhexidine 2% Cloths 1 Application(s) Topical <User Schedule>  chlorhexidine 2% Cloths 1 Application(s) Topical <User Schedule>  chlorhexidine 4% Liquid 1 Application(s) Topical <User Schedule>  enoxaparin Injectable 40 milliGRAM(s) SubCutaneous every 12 hours  ferrous    sulfate 325 milliGRAM(s) Oral daily  metoprolol tartrate 25 milliGRAM(s) Oral two times a day  midodrine 15 milliGRAM(s) Oral every 8 hours  multivitamin 1 Tablet(s) Oral daily  ondansetron Injectable 4 milliGRAM(s) IV Push once  pantoprazole  Injectable 40 milliGRAM(s) IV Push daily  sodium bicarbonate 1300 milliGRAM(s) Oral three times a day  sodium chloride 0.9%. 1000 milliLiter(s) (75 mL/Hr) IV Continuous <Continuous>  sodium chloride 0.9%. 1000 milliLiter(s) (75 mL/Hr) IV Continuous <Continuous>  tiotropium 2.5 MICROgram(s)/olodaterol 2.5 MICROgram(s) (STIOLTO) Inhaler 2 Puff(s) Inhalation daily    MEDICATIONS  (PRN):  guaiFENesin Oral Liquid (Sugar-Free) 100 milliGRAM(s) Oral every 6 hours PRN Cough  melatonin 5 milliGRAM(s) Oral at bedtime PRN Sleep  metoprolol tartrate Injectable 2.5 milliGRAM(s) IV Push every 6 hours PRN for hR > 110  sodium chloride 0.9% lock flush 10 milliLiter(s) IV Push every 1 hour PRN Pre/post blood products, medications, blood draw, and to maintain line patency  sodium chloride 0.9% lock flush 10 milliLiter(s) IV Push every 1 hour PRN Pre/post blood products, medications, blood draw, and to maintain line patency

## 2023-08-07 NOTE — PROGRESS NOTE ADULT - ASSESSMENT
83 year old female with PMH of HTN, HLD, CAD s/p stents, Afib (on anticoagulation), CHF, CKD 3b, COPD (on 2L home O2), diverticulosis and stage 3 hemorrhoids presents with melanotic stool. Admitted for hemorrhagic shock s/p pRBC transfusions. EGD on 7/27 without active bleeding, colonoscopy 7/28 unrevealing. Hospital course notable for DANETTE on CKD.     -Baseline creatinine ~1.1-1.3mg/dL   -On admission, creatinine was ~1.8mg/dL (secondary to decrease effective circulating volume)   -Improved back to baseline on 07/29/2023   -However started to uptrend again on 08/01/2023 (suspecting from again decrease effective circulating volume - notable for hypotensive episodes)    -UA 30 protein, negative blood, 0-2 RBC, 6-10 WBC, moderate bacteria, moderate squamous cells   -Will check urine electrolytes, UPCR and UACR   -Abdominal ultrasound showed right kidney 10.5 cm + left kidney: 10.3 cm; no hydronephrosis or calculi  -Received lasix 40mg IV x  3 doses yesterday; I/O's not recorded  -However renal function continues to decline  -Also with transient episode of hyperK  -Given above, dialysis is warranted at this time - discussed with patient and two daughters whom were all in agreement with initiation of dialysis  -Consent for dialysis signed by patient, witnessed by staff and placed into chart   -Vascular was consulted for placement of non tunneled dialysis catheter  -First dialysis today followed by dialysis over the next two consecutive days (Tuesday and Wednesday)  -Will add I/O's to assess for renal recovery  -BP - remains dependent on midodrine TID at this time   -Afib - on metoprolol PO + amiodarone PO     Ericka Monzon DO  Nephrology      83 year old female with PMH of HTN, HLD, CAD s/p stents, Afib (on anticoagulation), CHF, CKD 3b, COPD (on 2L home O2), diverticulosis and stage 3 hemorrhoids presents with melanotic stool. Admitted for hemorrhagic shock s/p pRBC transfusions. EGD on 7/27 without active bleeding, colonoscopy 7/28 unrevealing. Hospital course notable for DANETTE on CKD.     -Baseline creatinine ~1.1-1.3mg/dL   -On admission, creatinine was ~1.8mg/dL (secondary to decrease effective circulating volume)   -Improved back to baseline on 07/29/2023   -However started to uptrend again on 08/01/2023 (suspecting from again decrease effective circulating volume - notable for hypotensive episodes)    -UA 30 protein, negative blood, 0-2 RBC, 6-10 WBC, moderate bacteria, moderate squamous cells   -Will check urine electrolytes, UPCR and UACR   -Abdominal ultrasound showed right kidney 10.5 cm + left kidney: 10.3 cm; no hydronephrosis or calculi  -Received lasix 40mg IV x  3 doses yesterday; I/O's not recorded  -However renal function continues to decline  -Given above, dialysis is warranted at this time - discussed with patient and two daughters whom were all in agreement with initiation of dialysis  -Consent for dialysis signed by patient, witnessed by staff and placed into chart   -Vascular was consulted for placement of non tunneled dialysis catheter  -First dialysis today followed by dialysis over the next two consecutive days (Tuesday and Wednesday)  -Will add I/O's to assess for renal recovery  -BP - remains dependent on midodrine TID at this time   -Metabolic acidosis - will d/c oral sodium bicarb tabs - will improve with HD   -Afib - on metoprolol PO + amiodarone PO     Ericka Monzon DO  Nephrology

## 2023-08-07 NOTE — PROGRESS NOTE ADULT - ASSESSMENT
84 y/o female with PMH COPD on 2L home O2, HFpEF, CAD s/p PCI, HTN, HLD, pAF on rivaroxaban, diverticulosis, hemorrhoids presented with generalized weakness/dark stools admitted anemia, GIB without active bleed on ct, received 3U PRBC, and IVF however remained hypotensive requiring vasopressor therapy for hemorrhagic shock.  EGD on 7/27 and colonoscopy 7/28 without active bleed. Patient also with DANETTE and Afib with RVR, resumed back on amio and bb .  HR now better controlled. Plan was for ct enterography, but given DANETTE on CKD, it was cancelled. Patient downgraded to medicine 7/30/23.     HFpEF  Echo noted with Grade III diastolic dysfunction and severe pulm HTN  S/p Lasix  Renal follow up due to elevated creatinine - will start HD today, vascular consulted for HD catheter  bilateral lower ext ACE wrap     copd  Mild wheezes noted  c/w inhalers  Added Symbicort BID   C/w tiotropium  C/w o2     DANETTE on CKD III/Metabolic acidosis  Hyponatremia  Renal hypoperfusion, episodic  - f/up renal recs - to start HD today, vascular consulted for HD catheter  - S/p IVF, sodium bicarb increased to 1300mg TID as per renal recs  - s/p Lasix and Albumin 8/3 and 8/4 as per renal recs  - monitor BMP    acute GIB / acute blood loss anemia /  hemorrhagic shock (Resolved)  - hx of GIB in past   - cta neg without active bleed on ct on admission   - s/p EGD on 7/27 without active bleeding  - s/p colonoscopy 7/28 no active bleed   - now off pressors, started on midodrine, increased to 15mg TID, monitoring BP closely   - c/w ppis   - ct enterography - cancelled given elevated creatinine and no further bleeding  - plan for capsule endoscopy as op   - gi following   - Diet as tolerated   - resumed aspirin as per GI  - tolerating Lovenox - can transition to XARELTO on discharge if hb stable, CBC stable    Paroxysmal A-Fib   Hypotension, asymptomatic  - resumed on amio and bb  - adjust bb dose for better hr control as needed  - prn iv lopressor for hr > 110  - aspirin  - raised Midodrine dose for improved BP  - lowered metoprolol dose  - started lovenox 8/2/23, no bleeding noted, will start Xarelto on dc    Leukocytosis (resolving)  unclear etiology.  - UA negative; CXR negative.  - monitor CBC.    HLD  - monitor CMP   - hold statin due to transaminitis    Transaminitis (resolving)  - statin held  - LFT decreasing   -discussed with cardio, cont amiodarone for now since pt has been on it for long time  - Abdo U/S shows cholelithiasis and small right pleural effusion and Hep panel negative       dvt ppx : trial of Lovenox  GI cleared for Xarelto resumption on discharge    Dispo: To start HD today, Monitoring DANETTE.

## 2023-08-07 NOTE — PROCEDURE NOTE - NSICDXPROCEDURE_GEN_ALL_CORE_FT
PROCEDURES:  Insertion, catheter, hemodialysis, non-tunneled, with US guidance 07-Aug-2023 12:02:48  Mike Lugo

## 2023-08-08 LAB
ALBUMIN SERPL ELPH-MCNC: 3.2 G/DL — LOW (ref 3.3–5.2)
ALP SERPL-CCNC: 535 U/L — HIGH (ref 40–120)
ALT FLD-CCNC: 56 U/L — HIGH
ANION GAP SERPL CALC-SCNC: 16 MMOL/L — SIGNIFICANT CHANGE UP (ref 5–17)
AST SERPL-CCNC: 29 U/L — SIGNIFICANT CHANGE UP
BASOPHILS # BLD AUTO: 0.02 K/UL — SIGNIFICANT CHANGE UP (ref 0–0.2)
BASOPHILS NFR BLD AUTO: 0.2 % — SIGNIFICANT CHANGE UP (ref 0–2)
BILIRUB SERPL-MCNC: 1 MG/DL — SIGNIFICANT CHANGE UP (ref 0.4–2)
BUN SERPL-MCNC: 51 MG/DL — HIGH (ref 8–20)
CALCIUM SERPL-MCNC: 7.9 MG/DL — LOW (ref 8.4–10.5)
CHLORIDE SERPL-SCNC: 94 MMOL/L — LOW (ref 96–108)
CO2 SERPL-SCNC: 20 MMOL/L — LOW (ref 22–29)
CREAT SERPL-MCNC: 2.36 MG/DL — HIGH (ref 0.5–1.3)
EGFR: 20 ML/MIN/1.73M2 — LOW
EOSINOPHIL # BLD AUTO: 0 K/UL — SIGNIFICANT CHANGE UP (ref 0–0.5)
EOSINOPHIL NFR BLD AUTO: 0 % — SIGNIFICANT CHANGE UP (ref 0–6)
GLUCOSE SERPL-MCNC: 113 MG/DL — HIGH (ref 70–99)
HCT VFR BLD CALC: 28.4 % — LOW (ref 34.5–45)
HGB BLD-MCNC: 8.6 G/DL — LOW (ref 11.5–15.5)
IMM GRANULOCYTES NFR BLD AUTO: 0.5 % — SIGNIFICANT CHANGE UP (ref 0–0.9)
LYMPHOCYTES # BLD AUTO: 0.73 K/UL — LOW (ref 1–3.3)
LYMPHOCYTES # BLD AUTO: 6.9 % — LOW (ref 13–44)
MCHC RBC-ENTMCNC: 27.9 PG — SIGNIFICANT CHANGE UP (ref 27–34)
MCHC RBC-ENTMCNC: 30.3 GM/DL — LOW (ref 32–36)
MCV RBC AUTO: 92.2 FL — SIGNIFICANT CHANGE UP (ref 80–100)
MONOCYTES # BLD AUTO: 1.12 K/UL — HIGH (ref 0–0.9)
MONOCYTES NFR BLD AUTO: 10.5 % — SIGNIFICANT CHANGE UP (ref 2–14)
NEUTROPHILS # BLD AUTO: 8.7 K/UL — HIGH (ref 1.8–7.4)
NEUTROPHILS NFR BLD AUTO: 81.9 % — HIGH (ref 43–77)
PLATELET # BLD AUTO: 293 K/UL — SIGNIFICANT CHANGE UP (ref 150–400)
POTASSIUM SERPL-MCNC: 4.2 MMOL/L — SIGNIFICANT CHANGE UP (ref 3.5–5.3)
POTASSIUM SERPL-SCNC: 4.2 MMOL/L — SIGNIFICANT CHANGE UP (ref 3.5–5.3)
PROT SERPL-MCNC: 5.6 G/DL — LOW (ref 6.6–8.7)
RBC # BLD: 3.08 M/UL — LOW (ref 3.8–5.2)
RBC # FLD: 17.2 % — HIGH (ref 10.3–14.5)
SODIUM SERPL-SCNC: 130 MMOL/L — LOW (ref 135–145)
WBC # BLD: 10.62 K/UL — HIGH (ref 3.8–10.5)
WBC # FLD AUTO: 10.62 K/UL — HIGH (ref 3.8–10.5)

## 2023-08-08 PROCEDURE — 99233 SBSQ HOSP IP/OBS HIGH 50: CPT

## 2023-08-08 RX ADMIN — Medication 1 TABLET(S): at 11:57

## 2023-08-08 RX ADMIN — MIDODRINE HYDROCHLORIDE 15 MILLIGRAM(S): 2.5 TABLET ORAL at 22:27

## 2023-08-08 RX ADMIN — BUDESONIDE AND FORMOTEROL FUMARATE DIHYDRATE 2 PUFF(S): 160; 4.5 AEROSOL RESPIRATORY (INHALATION) at 09:51

## 2023-08-08 RX ADMIN — MIDODRINE HYDROCHLORIDE 15 MILLIGRAM(S): 2.5 TABLET ORAL at 13:56

## 2023-08-08 RX ADMIN — Medication 81 MILLIGRAM(S): at 11:57

## 2023-08-08 RX ADMIN — TIOTROPIUM BROMIDE AND OLODATEROL 2 PUFF(S): 3.124; 2.736 SPRAY, METERED RESPIRATORY (INHALATION) at 09:51

## 2023-08-08 RX ADMIN — ENOXAPARIN SODIUM 40 MILLIGRAM(S): 100 INJECTION SUBCUTANEOUS at 05:49

## 2023-08-08 RX ADMIN — CHLORHEXIDINE GLUCONATE 1 APPLICATION(S): 213 SOLUTION TOPICAL at 05:57

## 2023-08-08 RX ADMIN — PANTOPRAZOLE SODIUM 40 MILLIGRAM(S): 20 TABLET, DELAYED RELEASE ORAL at 11:57

## 2023-08-08 RX ADMIN — Medication 25 MILLIGRAM(S): at 07:08

## 2023-08-08 RX ADMIN — AMIODARONE HYDROCHLORIDE 200 MILLIGRAM(S): 400 TABLET ORAL at 05:53

## 2023-08-08 RX ADMIN — Medication 1300 MILLIGRAM(S): at 11:57

## 2023-08-08 RX ADMIN — Medication 325 MILLIGRAM(S): at 11:56

## 2023-08-08 RX ADMIN — Medication 1300 MILLIGRAM(S): at 05:52

## 2023-08-08 RX ADMIN — CHLORHEXIDINE GLUCONATE 1 APPLICATION(S): 213 SOLUTION TOPICAL at 05:56

## 2023-08-08 RX ADMIN — MIDODRINE HYDROCHLORIDE 15 MILLIGRAM(S): 2.5 TABLET ORAL at 05:53

## 2023-08-08 RX ADMIN — ENOXAPARIN SODIUM 40 MILLIGRAM(S): 100 INJECTION SUBCUTANEOUS at 17:42

## 2023-08-08 NOTE — PROGRESS NOTE ADULT - ASSESSMENT
82 y/o female with PMH COPD on 2L home O2, HFpEF, CAD s/p PCI, HTN, HLD, pAF on rivaroxaban, diverticulosis, hemorrhoids presented with generalized weakness/dark stools admitted anemia, GIB without active bleed on ct, received 3U PRBC, and IVF however remained hypotensive requiring vasopressor therapy for hemorrhagic shock.  EGD on 7/27 and colonoscopy 7/28 without active bleed. Patient also with DANETTE and Afib with RVR, resumed back on amio and bb .  HR now better controlled. Plan was for ct enterography, but given DANETTE on CKD, it was cancelled. Patient downgraded to medicine 7/30/23.     HFpEF  Echo noted with Grade III diastolic dysfunction and severe pulm HTN  S/p Lasix  Renal follow up due to elevated creatinine - started HD 8/7/23, will continue HD today and 8/9/23  bilateral lower ext ACE wrap     copd  c/w inhalers  Added Symbicort BID   C/w tiotropium  C/w o2     DANETTE on CKD III/Metabolic acidosis  Hyponatremia  Renal hypoperfusion, episodic  - f/up renal recs - started HD 8/7/23, will continue today and 8/9/23  - S/p IVF, sodium bicarb discontinued as per renal recs  - s/p Lasix and Albumin 8/3 and 8/4 as per renal recs  - monitor BMP    acute GIB / acute blood loss anemia /  hemorrhagic shock (Resolved)  - hx of GIB in past   - cta neg without active bleed on ct on admission   - s/p EGD on 7/27 without active bleeding  - s/p colonoscopy 7/28 no active bleed   - now off pressors, started on midodrine, increased to 15mg TID, monitoring BP closely   - c/w ppis   - ct enterography - cancelled given elevated creatinine and no further bleeding  - plan for capsule endoscopy as op   - gi following   - Diet as tolerated   - resumed aspirin as per GI  - tolerating Lovenox - can transition to XARELTO on discharge if hb stable, CBC stable    Paroxysmal A-Fib   Hypotension, asymptomatic  - resumed on amio and bb  - adjust bb dose for better hr control as needed  - prn iv lopressor for hr > 110  - aspirin  - raised Midodrine dose for improved BP  - lowered metoprolol dose  - started lovenox 8/2/23, no bleeding noted, will start Xarelto on dc    Leukocytosis (resolving)  unclear etiology.  - UA negative; CXR negative.  - monitor CBC.    HLD  - monitor CMP   - hold statin due to transaminitis    Transaminitis (resolving)  - statin held  - LFT decreasing   -discussed with cardio, cont amiodarone for now since pt has been on it for long time  - Abdo U/S shows cholelithiasis and small right pleural effusion and Hep panel negative       dvt ppx : trial of Lovenox  GI cleared for Xarelto resumption on discharge    Dispo: started HD 8/7/23, will continue HD today and 8/9/23, Monitoring DANETTE for renal recovery.

## 2023-08-08 NOTE — PROGRESS NOTE ADULT - NS ATTEND AMEND GEN_ALL_CORE FT
No further melena.  Hemoglobin appears stable.  CT is pending.  Capsule endoscopy as soon as possible as an outpatient.
no active GI bleeding  tolerating AC  DANETTE on CKD III - renal following  leukocytosis without apparent infection - check UA and CXR  monitor clinically
82 yo f pmhx COPD on 2L home O2, HFpEF, CAD s/p PCI, HTN, HLD, pAF on rivaroxaban, diverticulosis, hemorrhoids presented with generalized weakness/dark stools admitted anemia, gib without active bleed on ct, received 5U PRBC, and briefly on pressors.  EGD on 7/27 without active bleeding, colonoscopy 7/28 unrevealing as well. Hemoglobin remain stable at 9.0 gm.   Plan  o/p VCE - will contact our  to expedite  no CTE needed  ok to resume A/C from GI perspective though ASA is renally cleared and Cr rising - cards to comment on whether Xarelto sufficient here
appreciate GI input  f/up CT enterography  poor candidate for AC due to recurrent severe bleeding  cardio f/up
82 y/o female with PMH COPD on 2L home O2, HFpEF, CAD s/p PCI, HTN, HLD, pAF on rivaroxaban, diverticulosis, hemorrhoids presented with generalized weakness/dark stools admitted anemia, GIB without active bleed on ct, received 3U PRBC, and IVF however remained hypotensive requiring vasopressor therapy for hemorrhagic shock.  EGD on 7/27 and colonoscopy 7/28 without active bleed. Patient also with DANETTE and Afib with RVR, resumed back on amio and bb .  HR now better controlled. Plan was for ct enterography, but given DANETTE on CKD, it was cancelled. Patient downgraded to medicine 7/30/23. Patient was started on HD due to DANETTE on CKD on 8/7/23 s/p 1 session HD yesterday via new R IJ non- tunneled dialysis catheter.    Subjective: patient seen and examined at bedside. more comfortable appearing. sitting on the chair. no complaints.     Gen : Non toxic appearing, comfortable, NAD  HEENT : NCAT, MMM, No cervical lymphadenopathy, no JVD  CVS : S1S2, regular rate and rhythm, no murmurs  Resp : Diminished BL breath sounds  Abd : Soft, non distended, non tender, BS +ve   MSK : BL LE wrapped, 2+ edema upto knees  Neuro : CN II-XII intact, no focal motor or sensory deficits   Psych : Pleasant, no anxiety, normal affect    LABS:                        8.6    10.62 )-----------( 293      ( 08 Aug 2023 06:32 )             28.4     08-08    130<L>  |  94<L>  |  51.0<H>  ----------------------------<  113<H>  4.2   |  20.0<L>  |  2.36<H>    Ca    7.9<L>      08 Aug 2023 06:32    TPro  5.6<L>  /  Alb  3.2<L>  /  TBili  1.0  /  DBili  x   /  AST  29  /  ALT  56<H>  /  AlkPhos  535<H>  08-08          Urinalysis Basic - ( 08 Aug 2023 06:32 )    Color: x / Appearance: x / SG: x / pH: x  Gluc: 113 mg/dL / Ketone: x  / Bili: x / Urobili: x   Blood: x / Protein: x / Nitrite: x   Leuk Esterase: x / RBC: x / WBC x   Sq Epi: x / Non Sq Epi: x / Bacteria: x          A/P:   82 y/o female with PMH COPD on 2L home O2, HFpEF, CAD s/p PCI, HTN, HLD, pAF on rivaroxaban, diverticulosis, hemorrhoids presented with generalized weakness/dark stools admitted anemia, GIB without active bleed on ct, received 3U PRBC, and IVF however remained hypotensive requiring vasopressor therapy for hemorrhagic shock.  EGD on 7/27 and colonoscopy 7/28 without active bleed. Patient also with DANETTE and Afib with RVR, resumed back on amio and bb .  HR now better controlled. Plan was for ct enterography, but given DANETTE on CKD, it was cancelled. Patient downgraded to medicine 7/30/23. started on HD for DANETTE on CKD worsening.    DANETTE on CKD- Continue with HD per Renal  HFpEF- Stable  Copd- c/w Inhalers and as needed O2  Anemia/Acute GI Bleed- stable, will need outpatient capsule enteroscopy  BP- c/w Midodrine for now  Afib- on Metoprolol PO and Amiodarone PO    DVT prophylaxis: C/w Lovenox for now, will switch to Xarelto prior to dc.
84 yo f pmhx COPD on 2L home O2, HFpEF, CAD s/p PCI, HTN, HLD, pAF on rivaroxaban (off now), diverticulosis, hemorrhoids presented with generalized weakness/dark stools admitted anemia, gib without active bleed on ct, received 5U PRBC, and IVF however remained hypotensive requiring vasopressor therapy.  EGD on 7/27 without active bleeding, colonoscopy 7/28 unrevealing as well. Hemoglobin remain stable at 9.0 gm.   2nd such episode of bleeding (prior one 12/22 sounded like a tic bleed), current one worse than prior.   Plan  might need to be evaluated for a  Watchman for AF - does not appear to tolerate A/C well  doubt CTE will be diagnostic but will f/u and adv diet s/p  does need semi-urgent outpatient VCE
I have personally seen and examined patient on the above date.  I discussed the case with NP Darline and I agree with findings and plan as detailed per note above, which I have amended where appropriate.      Pt is a 84yo F admitted to [facility] for GIB with Afib RVR.     Pt was seen and examined at bedside. HH decreasing noted this morning  STAT Repeat HH stable at 9.1  ok to restart on AC on discharge pending crt   pending Nephrology for follow up worsening crt

## 2023-08-08 NOTE — PROGRESS NOTE ADULT - SUBJECTIVE AND OBJECTIVE BOX
CC: Anemia, abdominal pain (06 Aug 2023 13:08)    HPI:  84 y/o female with PMH COPD on 2L home O2, HFpEF, CAD s/p PCI, HTN, HLD, pAF on rivaroxaban, diverticulosis, hemorrhoids presented with generalized weakness/dark stools admitted with anemia, GIB without active bleed on ct, received 3U PRBC, and IVF however remained hypotensive requiring vasopressor therapy for hemorrhagic shock.  EGD on 7/27 and colonoscopy 7/28 without active bleed. Patient also with DANETTE and Afib with RVR, resumed back on amio and bb.  HR now better controlled. Plan was for ct enterography, held secondary to worsening DANETTE. Patient downgraded to medicine 7/30/23.     INTERVAL HPI/OVERNIGHT EVENTS: Patient seen and examined sitting up in the chair.  Patient reports she feels a little better today, was able to sleep better last night.  Patient denies any headache, dizziness, SOB, CP, abdominal pain, nausea, vomiting, dysuria.  Other ROS reviewed and are negative.    Vital Signs Last 24 Hrs  T(C): 36.8 (08 Aug 2023 08:38), Max: 36.9 (07 Aug 2023 16:05)  T(F): 98.2 (08 Aug 2023 08:38), Max: 98.4 (07 Aug 2023 16:05)  HR: 60 (08 Aug 2023 08:38) (50 - 64)  BP: 109/64 (08 Aug 2023 08:38) (90/49 - 109/64)  BP(mean): --  RR: 18 (08 Aug 2023 08:38) (16 - 18)  SpO2: 94% (08 Aug 2023 08:38) (94% - 100%)    Parameters below as of 08 Aug 2023 08:38  Patient On (Oxygen Delivery Method): nasal cannula  O2 Flow (L/min): 2    I&O's Detail    07 Aug 2023 07:01  -  08 Aug 2023 07:00  --------------------------------------------------------  IN:  Total IN: 0 mL    OUT:    Other (mL): 0 mL  Total OUT: 0 mL    Total NET: 0 mL      PHYSICAL EXAM:  GENERAL: NAD  HEAD:  Atraumatic, Normocephalic  NECK: Supple, No JVD, Normal thyroid  NERVOUS SYSTEM:  Alert & Oriented X3, Good concentration; Motor Strength 5/5 B/L upper and lower extremities  CHEST/LUNG: Clear to auscultation bilaterally  HEART: Regular rate and rhythm; No murmurs, rubs, or gallops  ABDOMEN: Soft, Nontender, Nondistended; Bowel sounds present  EXTREMITIES:  2+ Peripheral Pulses, bilateral LE edema  SKIN: No rashes or lesions                                8.6    10.62 )-----------( 293      ( 08 Aug 2023 06:32 )             28.4     08 Aug 2023 06:32    130    |  94     |  51.0   ----------------------------<  113    4.2     |  20.0   |  2.36     Ca    7.9        08 Aug 2023 06:32    TPro  5.6    /  Alb  3.2    /  TBili  1.0    /  DBili  x      /  AST  29     /  ALT  56     /  AlkPhos  535    08 Aug 2023 06:32        LIVER FUNCTIONS - ( 08 Aug 2023 06:32 )  Alb: 3.2 g/dL / Pro: 5.6 g/dL / ALK PHOS: 535 U/L / ALT: 56 U/L / AST: 29 U/L / GGT: x           Urinalysis Basic - ( 08 Aug 2023 06:32 )    Color: x / Appearance: x / SG: x / pH: x  Gluc: 113 mg/dL / Ketone: x  / Bili: x / Urobili: x   Blood: x / Protein: x / Nitrite: x   Leuk Esterase: x / RBC: x / WBC x   Sq Epi: x / Non Sq Epi: x / Bacteria: x        MEDICATIONS  (STANDING):  aMIOdarone    Tablet 200 milliGRAM(s) Oral daily  aspirin  chewable 81 milliGRAM(s) Oral daily  budesonide  80 MICROgram(s)/formoterol 4.5 MICROgram(s) Inhaler 2 Puff(s) Inhalation two times a day  chlorhexidine 2% Cloths 1 Application(s) Topical <User Schedule>  chlorhexidine 4% Liquid 1 Application(s) Topical <User Schedule>  enoxaparin Injectable 40 milliGRAM(s) SubCutaneous every 12 hours  ferrous    sulfate 325 milliGRAM(s) Oral daily  metoprolol tartrate 25 milliGRAM(s) Oral two times a day  midodrine 15 milliGRAM(s) Oral every 8 hours  multivitamin 1 Tablet(s) Oral daily  ondansetron Injectable 4 milliGRAM(s) IV Push once  pantoprazole  Injectable 40 milliGRAM(s) IV Push daily  sodium bicarbonate 1300 milliGRAM(s) Oral three times a day  sodium chloride 0.9%. 1000 milliLiter(s) (75 mL/Hr) IV Continuous <Continuous>  sodium chloride 0.9%. 1000 milliLiter(s) (75 mL/Hr) IV Continuous <Continuous>  tiotropium 2.5 MICROgram(s)/olodaterol 2.5 MICROgram(s) (STIOLTO) Inhaler 2 Puff(s) Inhalation daily    MEDICATIONS  (PRN):  guaiFENesin Oral Liquid (Sugar-Free) 100 milliGRAM(s) Oral every 6 hours PRN Cough  melatonin 5 milliGRAM(s) Oral at bedtime PRN Sleep  metoprolol tartrate Injectable 2.5 milliGRAM(s) IV Push every 6 hours PRN for hR > 110  sodium chloride 0.9% lock flush 10 milliLiter(s) IV Push every 1 hour PRN Pre/post blood products, medications, blood draw, and to maintain line patency  sodium chloride 0.9% lock flush 10 milliLiter(s) IV Push every 1 hour PRN Pre/post blood products, medications, blood draw, and to maintain line patency      RADIOLOGY & ADDITIONAL TESTS:  < from: Xray Chest 1 View- PORTABLE-Urgent (Xray Chest 1 View- PORTABLE-Urgent .) (08.07.23 @ 13:24) >  ACC: [id number] EXAM:  XR CHEST PORTABLE URGENT 1V   ORDERED BY: BLANQUITA KIM     PROCEDURE DATE:  08/07/2023          INTERPRETATION:  AP chest on August 7, 2023 at 12:05 PM. Patient has a GI   hemorrhage and had dialysis catheter insertion.    Heart magnified by technique.    There is an increasing right perihilar infiltrate compared to August 5.   There is an increasing slight left base atelectasis as well.    A large caliber right jugular line has been inserted.    IMPRESSION: Large caliber right jugular line inserted. Increasing right   perihilar infiltrate and left base atelectasis.    --- End of Report ---            BHAVYA DESAI MD; Attending Radiologist  This document has been electronically signed. Aug  7 2023  2:03PM    < end of copied text >

## 2023-08-08 NOTE — PROGRESS NOTE ADULT - NS ATTEND BILL GEN_ALL_CORE
Attending to bill
Attending to bill
PA/NP to bill
Attending to bill

## 2023-08-08 NOTE — PROGRESS NOTE ADULT - ASSESSMENT
83 year old female with PMH of HTN, HLD, CAD s/p stents, Afib (on anticoagulation), CHF, CKD 3b, COPD (on 2L home O2), diverticulosis and stage 3 hemorrhoids presents with melanotic stool. Admitted for hemorrhagic shock s/p pRBC transfusions. EGD on 7/27 without active bleeding, colonoscopy 7/28 unrevealing. Hospital course notable for DANETTE on CKD requiring initiation of hemodialysis on 8/07/23.     -Baseline creatinine ~1.1-1.3mg/dL   -On admission, creatinine was ~1.8mg/dL (secondary to decrease effective circulating volume)   -Improved back to baseline on 07/29/2023   -However started to uptrend again on 08/01/2023 (suspecting from again decrease effective circulating volume - notable for hypotensive episodes)    -UA 30 protein, negative blood, 0-2 RBC, 6-10 WBC, moderate bacteria, moderate squamous cells   -Awaiting collection of urine electrolytes, UPCR and UACR   -Abdominal ultrasound showed right kidney 10.5 cm + left kidney: 10.3 cm; no hydronephrosis or calculi  -Ultimately started on dialysis on 08/07/23   -Access: R IJ non tunneled dialysis catheter   -Second dialysis session today; then next hemodialysis will be tomorrow (Wednesday)  -Will add I/O's to assess for renal recovery  -BP - remains dependent on midodrine TID at this time   -Afib - on metoprolol PO + amiodarone PO     Ericka Monzon DO  Nephrology

## 2023-08-08 NOTE — PROGRESS NOTE ADULT - SUBJECTIVE AND OBJECTIVE BOX
Had first dialysis treatment yesterday. States that she was able to sleep last night.     Vital Signs Last 24 Hrs  T(C): 36.8 (08 Aug 2023 08:38), Max: 36.9 (07 Aug 2023 16:05)  T(F): 98.2 (08 Aug 2023 08:38), Max: 98.4 (07 Aug 2023 16:05)  HR: 60 (08 Aug 2023 08:38) (50 - 64)  BP: 109/64 (08 Aug 2023 08:38) (90/49 - 109/64)  BP(mean): --  RR: 18 (08 Aug 2023 08:38) (16 - 18)  SpO2: 94% (08 Aug 2023 08:38) (94% - 100%)    Parameters below as of 08 Aug 2023 08:38  Patient On (Oxygen Delivery Method): nasal cannula  O2 Flow (L/min): 2    I&O's Summary    07 Aug 2023 07:01  -  08 Aug 2023 07:00  --------------------------------------------------------  IN: 0 mL / OUT: 0 mL / NET: 0 mL    Physical Exam  General: Obese female in NAD, on nasal cannula  HEENT: Normocephalic  Cardiac: S1S2 RRR  Respiratory: Diminished breath sounds b/l  Abdomen: Soft, NT  Extremities: 2+ pitting edema of b/l upper and lower extremities  Neuro: AAOx3  Psych: Calm     08-08    130<L>  |  94<L>  |  51.0<H>  ----------------------------<  113<H>  4.2   |  20.0<L>  |  2.36<H>    Ca    7.9<L>      08 Aug 2023 06:32    TPro  5.6<L>  /  Alb  3.2<L>  /  TBili  1.0  /  DBili  x   /  AST  29  /  ALT  56<H>  /  AlkPhos  535<H>  08-08                        8.6    10.62 )-----------( 293      ( 08 Aug 2023 06:32 )             28.4     MEDICATIONS  (STANDING):  aMIOdarone    Tablet 200 milliGRAM(s) Oral daily  aspirin  chewable 81 milliGRAM(s) Oral daily  budesonide  80 MICROgram(s)/formoterol 4.5 MICROgram(s) Inhaler 2 Puff(s) Inhalation two times a day  chlorhexidine 2% Cloths 1 Application(s) Topical <User Schedule>  chlorhexidine 4% Liquid 1 Application(s) Topical <User Schedule>  enoxaparin Injectable 40 milliGRAM(s) SubCutaneous every 12 hours  ferrous    sulfate 325 milliGRAM(s) Oral daily  metoprolol tartrate 25 milliGRAM(s) Oral two times a day  midodrine 15 milliGRAM(s) Oral every 8 hours  multivitamin 1 Tablet(s) Oral daily  ondansetron Injectable 4 milliGRAM(s) IV Push once  pantoprazole  Injectable 40 milliGRAM(s) IV Push daily  sodium bicarbonate 1300 milliGRAM(s) Oral three times a day  sodium chloride 0.9%. 1000 milliLiter(s) (75 mL/Hr) IV Continuous <Continuous>  sodium chloride 0.9%. 1000 milliLiter(s) (75 mL/Hr) IV Continuous <Continuous>  tiotropium 2.5 MICROgram(s)/olodaterol 2.5 MICROgram(s) (STIOLTO) Inhaler 2 Puff(s) Inhalation daily    MEDICATIONS  (PRN):  guaiFENesin Oral Liquid (Sugar-Free) 100 milliGRAM(s) Oral every 6 hours PRN Cough  melatonin 5 milliGRAM(s) Oral at bedtime PRN Sleep  metoprolol tartrate Injectable 2.5 milliGRAM(s) IV Push every 6 hours PRN for hR > 110  sodium chloride 0.9% lock flush 10 milliLiter(s) IV Push every 1 hour PRN Pre/post blood products, medications, blood draw, and to maintain line patency  sodium chloride 0.9% lock flush 10 milliLiter(s) IV Push every 1 hour PRN Pre/post blood products, medications, blood draw, and to maintain line patency

## 2023-08-08 NOTE — CHART NOTE - NSCHARTNOTEFT_GEN_A_CORE
Source: Patient [ ]  Family [ ]   other [x ] EMR, rounds    Current Diet: Dash    Patient reports [ ] nausea  [ ] vomiting [ ] diarrhea [ ] constipation  [ ]chewing problems [ ] swallowing issues  [ ] other:     PO intake:  < 50% [ x]   50-75%  [ ]   %  [ ]  other :    Source for PO intake [ ] Patient [ ] family [x ] chart [ ] staff [ ] other    Enteral /Parenteral Nutrition:     Current Weight: 169.7# 8/7  2+ generalized  1+ left foot and right foot edema    % Weight Change     Pertinent Medications: MEDICATIONS  (STANDING):  aMIOdarone    Tablet 200 milliGRAM(s) Oral daily  aspirin  chewable 81 milliGRAM(s) Oral daily  budesonide  80 MICROgram(s)/formoterol 4.5 MICROgram(s) Inhaler 2 Puff(s) Inhalation two times a day  chlorhexidine 2% Cloths 1 Application(s) Topical <User Schedule>  chlorhexidine 4% Liquid 1 Application(s) Topical <User Schedule>  enoxaparin Injectable 40 milliGRAM(s) SubCutaneous every 12 hours  ferrous    sulfate 325 milliGRAM(s) Oral daily  metoprolol tartrate 25 milliGRAM(s) Oral two times a day  midodrine 15 milliGRAM(s) Oral every 8 hours  multivitamin 1 Tablet(s) Oral daily  ondansetron Injectable 4 milliGRAM(s) IV Push once  pantoprazole  Injectable 40 milliGRAM(s) IV Push daily  sodium bicarbonate 1300 milliGRAM(s) Oral three times a day  sodium chloride 0.9%. 1000 milliLiter(s) (75 mL/Hr) IV Continuous <Continuous>  sodium chloride 0.9%. 1000 milliLiter(s) (75 mL/Hr) IV Continuous <Continuous>  tiotropium 2.5 MICROgram(s)/olodaterol 2.5 MICROgram(s) (STIOLTO) Inhaler 2 Puff(s) Inhalation daily    MEDICATIONS  (PRN):  guaiFENesin Oral Liquid (Sugar-Free) 100 milliGRAM(s) Oral every 6 hours PRN Cough  melatonin 5 milliGRAM(s) Oral at bedtime PRN Sleep  metoprolol tartrate Injectable 2.5 milliGRAM(s) IV Push every 6 hours PRN for hR > 110  sodium chloride 0.9% lock flush 10 milliLiter(s) IV Push every 1 hour PRN Pre/post blood products, medications, blood draw, and to maintain line patency  sodium chloride 0.9% lock flush 10 milliLiter(s) IV Push every 1 hour PRN Pre/post blood products, medications, blood draw, and to maintain line patency    Pertinent Labs: CBC Full  -  ( 08 Aug 2023 06:32 )  WBC Count : 10.62 K/uL  RBC Count : 3.08 M/uL  Hemoglobin : 8.6 g/dL  Hematocrit : 28.4 %  Platelet Count - Automated : 293 K/uL  Mean Cell Volume : 92.2 fl  Mean Cell Hemoglobin : 27.9 pg  Mean Cell Hemoglobin Concentration : 30.3 gm/dL  Auto Neutrophil # : 8.70 K/uL  Auto Lymphocyte # : 0.73 K/uL  Auto Monocyte # : 1.12 K/uL  Auto Eosinophil # : 0.00 K/uL  Auto Basophil # : 0.02 K/uL  Auto Neutrophil % : 81.9 %  Auto Lymphocyte % : 6.9 %  Auto Monocyte % : 10.5 %  Auto Eosinophil % : 0.0 %  Auto Basophil % : 0.2 %      08-08 Na130 mmol/L<L> Glu 113 mg/dL<H> K+ 4.2 mmol/L Cr  2.36 mg/dL<H> BUN 51.0 mg/dL<H> Phos n/a   Alb 3.2 g/dL<L> PAB n/a           Skin:     Nutrition focused physical exam conducted - found signs of malnutrition [ ]absent [x ]present    Subcutaneous fat loss: [x ] Orbital fat pads region, [ x]Buccal fat region, [ ]Triceps region,  [ ]Ribs region    Muscle wasting: [x ]Temples region, [x ]Clavicle region, [ x]Shoulder region, [ ]Scapula region, [ ]Interosseous region,  [ ]thigh region, [ ]Calf region    Estimated Needs:   [x ] no change since previous assessment  [ ] recalculated:     Current Nutrition Diagnosis: Malnutrition...  Moderate (acute)  Related to inadequate protein energy intake with altered GI function in setting of GIB, hemorrhagic shock, DANETTE, blood loss anemia  as evidenced by physical signs of mild muscle/fat loss, meeting < 75% est needs > 7 days. 2 L  O2, on HD.      Recommendations:   Ensure BID to optimize po intake and provide an additional 350 kcal, 20g protein per serving   Continue MVI, feso4  Rec Vit C    Monitoring and Evaluation:   [x ] PO intake [ x] Tolerance to diet prescription [X] Weights  [X] Follow up per protocol [X] Labs:

## 2023-08-09 LAB
ALBUMIN SERPL ELPH-MCNC: 3 G/DL — LOW (ref 3.3–5.2)
ALP SERPL-CCNC: 522 U/L — HIGH (ref 40–120)
ALT FLD-CCNC: 45 U/L — HIGH
ANION GAP SERPL CALC-SCNC: 15 MMOL/L — SIGNIFICANT CHANGE UP (ref 5–17)
AST SERPL-CCNC: 24 U/L — SIGNIFICANT CHANGE UP
BASOPHILS # BLD AUTO: 0.02 K/UL — SIGNIFICANT CHANGE UP (ref 0–0.2)
BASOPHILS NFR BLD AUTO: 0.2 % — SIGNIFICANT CHANGE UP (ref 0–2)
BILIRUB SERPL-MCNC: 1 MG/DL — SIGNIFICANT CHANGE UP (ref 0.4–2)
BUN SERPL-MCNC: 36.6 MG/DL — HIGH (ref 8–20)
CALCIUM SERPL-MCNC: 7.6 MG/DL — LOW (ref 8.4–10.5)
CHLORIDE SERPL-SCNC: 93 MMOL/L — LOW (ref 96–108)
CO2 SERPL-SCNC: 23 MMOL/L — SIGNIFICANT CHANGE UP (ref 22–29)
CREAT SERPL-MCNC: 2 MG/DL — HIGH (ref 0.5–1.3)
EGFR: 24 ML/MIN/1.73M2 — LOW
EOSINOPHIL # BLD AUTO: 0 K/UL — SIGNIFICANT CHANGE UP (ref 0–0.5)
EOSINOPHIL NFR BLD AUTO: 0 % — SIGNIFICANT CHANGE UP (ref 0–6)
GLUCOSE SERPL-MCNC: 89 MG/DL — SIGNIFICANT CHANGE UP (ref 70–99)
HCT VFR BLD CALC: 26.9 % — LOW (ref 34.5–45)
HGB BLD-MCNC: 8.3 G/DL — LOW (ref 11.5–15.5)
IMM GRANULOCYTES NFR BLD AUTO: 0.5 % — SIGNIFICANT CHANGE UP (ref 0–0.9)
LYMPHOCYTES # BLD AUTO: 0.6 K/UL — LOW (ref 1–3.3)
LYMPHOCYTES # BLD AUTO: 5.7 % — LOW (ref 13–44)
MCHC RBC-ENTMCNC: 28.5 PG — SIGNIFICANT CHANGE UP (ref 27–34)
MCHC RBC-ENTMCNC: 30.9 GM/DL — LOW (ref 32–36)
MCV RBC AUTO: 92.4 FL — SIGNIFICANT CHANGE UP (ref 80–100)
MONOCYTES # BLD AUTO: 1.27 K/UL — HIGH (ref 0–0.9)
MONOCYTES NFR BLD AUTO: 12 % — SIGNIFICANT CHANGE UP (ref 2–14)
NEUTROPHILS # BLD AUTO: 8.62 K/UL — HIGH (ref 1.8–7.4)
NEUTROPHILS NFR BLD AUTO: 81.6 % — HIGH (ref 43–77)
PLATELET # BLD AUTO: 232 K/UL — SIGNIFICANT CHANGE UP (ref 150–400)
POTASSIUM SERPL-MCNC: 4.1 MMOL/L — SIGNIFICANT CHANGE UP (ref 3.5–5.3)
POTASSIUM SERPL-SCNC: 4.1 MMOL/L — SIGNIFICANT CHANGE UP (ref 3.5–5.3)
PROT SERPL-MCNC: 5.6 G/DL — LOW (ref 6.6–8.7)
RBC # BLD: 2.91 M/UL — LOW (ref 3.8–5.2)
RBC # FLD: 16.9 % — HIGH (ref 10.3–14.5)
SODIUM SERPL-SCNC: 130 MMOL/L — LOW (ref 135–145)
WBC # BLD: 10.56 K/UL — HIGH (ref 3.8–10.5)
WBC # FLD AUTO: 10.56 K/UL — HIGH (ref 3.8–10.5)

## 2023-08-09 PROCEDURE — 99233 SBSQ HOSP IP/OBS HIGH 50: CPT

## 2023-08-09 RX ADMIN — PANTOPRAZOLE SODIUM 40 MILLIGRAM(S): 20 TABLET, DELAYED RELEASE ORAL at 18:37

## 2023-08-09 RX ADMIN — ENOXAPARIN SODIUM 40 MILLIGRAM(S): 100 INJECTION SUBCUTANEOUS at 18:37

## 2023-08-09 RX ADMIN — Medication 25 MILLIGRAM(S): at 06:18

## 2023-08-09 RX ADMIN — Medication 81 MILLIGRAM(S): at 18:35

## 2023-08-09 RX ADMIN — BUDESONIDE AND FORMOTEROL FUMARATE DIHYDRATE 2 PUFF(S): 160; 4.5 AEROSOL RESPIRATORY (INHALATION) at 23:27

## 2023-08-09 RX ADMIN — Medication 1300 MILLIGRAM(S): at 18:36

## 2023-08-09 RX ADMIN — MIDODRINE HYDROCHLORIDE 15 MILLIGRAM(S): 2.5 TABLET ORAL at 23:24

## 2023-08-09 RX ADMIN — ENOXAPARIN SODIUM 40 MILLIGRAM(S): 100 INJECTION SUBCUTANEOUS at 06:18

## 2023-08-09 RX ADMIN — Medication 1300 MILLIGRAM(S): at 23:24

## 2023-08-09 RX ADMIN — TIOTROPIUM BROMIDE AND OLODATEROL 2 PUFF(S): 3.124; 2.736 SPRAY, METERED RESPIRATORY (INHALATION) at 09:46

## 2023-08-09 RX ADMIN — BUDESONIDE AND FORMOTEROL FUMARATE DIHYDRATE 2 PUFF(S): 160; 4.5 AEROSOL RESPIRATORY (INHALATION) at 09:46

## 2023-08-09 RX ADMIN — AMIODARONE HYDROCHLORIDE 200 MILLIGRAM(S): 400 TABLET ORAL at 06:18

## 2023-08-09 RX ADMIN — Medication 1300 MILLIGRAM(S): at 06:18

## 2023-08-09 RX ADMIN — MIDODRINE HYDROCHLORIDE 15 MILLIGRAM(S): 2.5 TABLET ORAL at 06:18

## 2023-08-09 RX ADMIN — Medication 1300 MILLIGRAM(S): at 00:09

## 2023-08-09 RX ADMIN — Medication 325 MILLIGRAM(S): at 18:36

## 2023-08-09 RX ADMIN — MIDODRINE HYDROCHLORIDE 15 MILLIGRAM(S): 2.5 TABLET ORAL at 14:50

## 2023-08-09 RX ADMIN — Medication 1 TABLET(S): at 18:36

## 2023-08-09 NOTE — PROGRESS NOTE ADULT - ASSESSMENT
84 y/o female with PMH COPD on 2L home O2, HFpEF, CAD s/p PCI, HTN, HLD, pAF on rivaroxaban, diverticulosis, hemorrhoids presented with generalized weakness/dark stools admitted anemia, GIB without active bleed on ct, received 3U PRBC, and IVF however remained hypotensive requiring vasopressor therapy for hemorrhagic shock.  EGD on 7/27 and colonoscopy 7/28 without active bleed. Patient also with DANETTE and Afib with RVR, resumed back on amio and bb .  HR now better controlled. Plan was for ct enterography, but given DANETTE on CKD, it was cancelled. Patient downgraded to medicine 7/30/23.     HFpEF  Echo noted with Grade III diastolic dysfunction and severe pulm HTN  S/p Lasix  Renal follow up due to elevated creatinine - started HD 8/7/23, Had HD yesterday and will get it tomorrow  bilateral lower ext ACE wrap     copd  c/w inhalers  C/w Symbicort BID   C/w tiotropium  C/w o2     DANETTE on CKD III/Metabolic acidosis  Hyponatremia  Renal hypoperfusion, episodic  - f/up renal recs - started HD 8/7/23, will continue today and 8/9/23  - S/p IVF, sodium bicarb discontinued as per renal recs  - s/p Lasix and Albumin 8/3 and 8/4 as per renal recs  - monitor BMP    acute GIB / acute blood loss anemia /  hemorrhagic shock (Resolved)  - hx of GIB in past   - cta neg without active bleed on ct on admission   - s/p EGD on 7/27 without active bleeding  - s/p colonoscopy 7/28 no active bleed   - now off pressors, started on midodrine, increased to 15mg TID, monitoring BP closely   - c/w PPIs   - ct enterography - cancelled given elevated creatinine and no further bleeding  - plan for capsule endoscopy as op   - gi following   - Diet as tolerated   - resumed aspirin as per GI  - tolerating Lovenox - can transition to XARELTO on discharge if hb stable, CBC stable    Paroxysmal A-Fib   Hypotension, asymptomatic  - resumed on amio and bb  - adjust bb dose for better hr control as needed  - prn iv lopressor for hr > 110  - aspirin  - raised Midodrine dose for improved BP  - lowered metoprolol dose  - started lovenox 8/2/23, no bleeding noted, will start Xarelto on dc    HLD  - monitor CMP   - hold statin due to transaminitis    Transaminitis (resolving)  - statin held  - LFT decreasing   -discussed with cardio, cont amiodarone for now since pt has been on it for long time  - Abdo U/S shows cholelithiasis and small right pleural effusion and Hep panel negative       dvt ppx : trial of Lovenox  GI cleared for Xarelto resumption on discharge    Dispo: started HD 8/7/23, will continue HD for 8/10/23, Monitoring DANETTE for renal recovery.

## 2023-08-09 NOTE — PROGRESS NOTE ADULT - SUBJECTIVE AND OBJECTIVE BOX
CC: Anemia, abdominal pain (06 Aug 2023 13:08)    HPI:  82 y/o female with PMH COPD on 2L home O2, HFpEF, CAD s/p PCI, HTN, HLD, pAF on rivaroxaban, diverticulosis, hemorrhoids presented with generalized weakness/dark stools admitted with anemia, GIB without active bleed on ct, received 3U PRBC, and IVF however remained hypotensive requiring vasopressor therapy for hemorrhagic shock.  EGD on 7/27 and colonoscopy 7/28 without active bleed. Patient also with DANETTE and Afib with RVR, resumed back on amio and bb.  HR now better controlled. Plan was for ct enterography, held secondary to worsening DANETTE. Patient downgraded to medicine 7/30/23.     INTERVAL HPI/OVERNIGHT EVENTS: Patient seen and examined sitting up in bed.  Patient reports she feels a little better today, was able to sleep better last night.  Patient denies any headache, dizziness, SOB, CP, abdominal pain, nausea, vomiting, dysuria.  Other ROS reviewed and are negative.    Vital Signs Last 24 Hrs  T(C): 36.4 (09 Aug 2023 12:11), Max: 37 (08 Aug 2023 20:46)  T(F): 97.6 (09 Aug 2023 12:11), Max: 98.6 (08 Aug 2023 20:46)  HR: 84 (09 Aug 2023 12:11) (60 - 96)  BP: 110/69 (09 Aug 2023 12:11) (98/65 - 124/59)  BP(mean): --  RR: 18 (09 Aug 2023 12:11) (16 - 19)  SpO2: 98% (09 Aug 2023 12:11) (95% - 98%)    Parameters below as of 09 Aug 2023 12:11  Patient On (Oxygen Delivery Method): nasal cannula      07 Aug 2023 07:01  -  08 Aug 2023 07:00  --------------------------------------------------------  IN:  Total IN: 0 mL    OUT:    Other (mL): 0 mL  Total OUT: 0 mL    Total NET: 0 mL      PHYSICAL EXAM:  GENERAL: NAD  HEAD:  Atraumatic, Normocephalic  NECK: Supple, No JVD, Normal thyroid  NERVOUS SYSTEM:  Alert & Oriented X3, Good concentration; Motor Strength 5/5 B/L upper and lower extremities  CHEST/LUNG: Clear to auscultation bilaterally  HEART: Regular rate and rhythm; No murmurs, rubs, or gallops  ABDOMEN: Soft, Nontender, Nondistended; Bowel sounds present  EXTREMITIES:  2+ Peripheral Pulses, bilateral LE edema  SKIN: No rashes or lesions        MEDICATIONS  (STANDING):  aMIOdarone    Tablet 200 milliGRAM(s) Oral daily  aspirin  chewable 81 milliGRAM(s) Oral daily  budesonide  80 MICROgram(s)/formoterol 4.5 MICROgram(s) Inhaler 2 Puff(s) Inhalation two times a day  chlorhexidine 2% Cloths 1 Application(s) Topical <User Schedule>  chlorhexidine 4% Liquid 1 Application(s) Topical <User Schedule>  enoxaparin Injectable 40 milliGRAM(s) SubCutaneous every 12 hours  ferrous    sulfate 325 milliGRAM(s) Oral daily  metoprolol tartrate 25 milliGRAM(s) Oral two times a day  midodrine 15 milliGRAM(s) Oral every 8 hours  multivitamin 1 Tablet(s) Oral daily  ondansetron Injectable 4 milliGRAM(s) IV Push once  pantoprazole  Injectable 40 milliGRAM(s) IV Push daily  sodium bicarbonate 1300 milliGRAM(s) Oral three times a day  sodium chloride 0.9%. 1000 milliLiter(s) (75 mL/Hr) IV Continuous <Continuous>  sodium chloride 0.9%. 1000 milliLiter(s) (75 mL/Hr) IV Continuous <Continuous>  tiotropium 2.5 MICROgram(s)/olodaterol 2.5 MICROgram(s) (STIOLTO) Inhaler 2 Puff(s) Inhalation daily    MEDICATIONS  (PRN):  guaiFENesin Oral Liquid (Sugar-Free) 100 milliGRAM(s) Oral every 6 hours PRN Cough  melatonin 5 milliGRAM(s) Oral at bedtime PRN Sleep  metoprolol tartrate Injectable 2.5 milliGRAM(s) IV Push every 6 hours PRN for hR > 110  sodium chloride 0.9% lock flush 10 milliLiter(s) IV Push every 1 hour PRN Pre/post blood products, medications, blood draw, and to maintain line patency  sodium chloride 0.9% lock flush 10 milliLiter(s) IV Push every 1 hour PRN Pre/post blood products, medications, blood draw, and to maintain line patency      RADIOLOGY & ADDITIONAL TESTS:  < from: Xray Chest 1 View- PORTABLE-Urgent (Xray Chest 1 View- PORTABLE-Urgent .) (08.07.23 @ 13:24) >  ACC: [id number] EXAM:  XR CHEST PORTABLE URGENT 1V   ORDERED BY: BLANQUITA KIM     PROCEDURE DATE:  08/07/2023          INTERPRETATION:  AP chest on August 7, 2023 at 12:05 PM. Patient has a GI   hemorrhage and had dialysis catheter insertion.    Heart magnified by technique.    There is an increasing right perihilar infiltrate compared to August 5.   There is an increasing slight left base atelectasis as well.    A large caliber right jugular line has been inserted.    IMPRESSION: Large caliber right jugular line inserted. Increasing right   perihilar infiltrate and left base atelectasis.    --- End of Report ---            BHAVYA DESAI MD; Attending Radiologist  This document has been electronically signed. Aug  7 2023  2:03PM    < end of copied text >

## 2023-08-10 ENCOUNTER — APPOINTMENT (OUTPATIENT)
Dept: FAMILY MEDICINE | Facility: CLINIC | Age: 83
End: 2023-08-10

## 2023-08-10 LAB
ALBUMIN SERPL ELPH-MCNC: 2.9 G/DL — LOW (ref 3.3–5.2)
ALBUMIN, RANDOM URINE: 42.6 MG/DL — SIGNIFICANT CHANGE UP
ALBUMIN/CREATININE RATIO (ACR): 289 MG/G — HIGH (ref 0–30)
ALP SERPL-CCNC: 508 U/L — HIGH (ref 40–120)
ALT FLD-CCNC: 35 U/L — HIGH
ANION GAP SERPL CALC-SCNC: 11 MMOL/L — SIGNIFICANT CHANGE UP (ref 5–17)
AST SERPL-CCNC: 19 U/L — SIGNIFICANT CHANGE UP
BASOPHILS # BLD AUTO: 0.02 K/UL — SIGNIFICANT CHANGE UP (ref 0–0.2)
BASOPHILS NFR BLD AUTO: 0.3 % — SIGNIFICANT CHANGE UP (ref 0–2)
BILIRUB SERPL-MCNC: 0.8 MG/DL — SIGNIFICANT CHANGE UP (ref 0.4–2)
BUN SERPL-MCNC: 26 MG/DL — HIGH (ref 8–20)
CALCIUM SERPL-MCNC: 7.6 MG/DL — LOW (ref 8.4–10.5)
CHLORIDE SERPL-SCNC: 94 MMOL/L — LOW (ref 96–108)
CO2 SERPL-SCNC: 26 MMOL/L — SIGNIFICANT CHANGE UP (ref 22–29)
CREAT ?TM UR-MCNC: 145 MG/DL — SIGNIFICANT CHANGE UP
CREAT ?TM UR-MCNC: 147 MG/DL — SIGNIFICANT CHANGE UP
CREAT SERPL-MCNC: 1.88 MG/DL — HIGH (ref 0.5–1.3)
EGFR: 26 ML/MIN/1.73M2 — LOW
EOSINOPHIL # BLD AUTO: 0 K/UL — SIGNIFICANT CHANGE UP (ref 0–0.5)
EOSINOPHIL NFR BLD AUTO: 0 % — SIGNIFICANT CHANGE UP (ref 0–6)
GLUCOSE SERPL-MCNC: 96 MG/DL — SIGNIFICANT CHANGE UP (ref 70–99)
HCT VFR BLD CALC: 27.8 % — LOW (ref 34.5–45)
HGB BLD-MCNC: 8.5 G/DL — LOW (ref 11.5–15.5)
IMM GRANULOCYTES NFR BLD AUTO: 0.4 % — SIGNIFICANT CHANGE UP (ref 0–0.9)
LYMPHOCYTES # BLD AUTO: 0.61 K/UL — LOW (ref 1–3.3)
LYMPHOCYTES # BLD AUTO: 7.8 % — LOW (ref 13–44)
MCHC RBC-ENTMCNC: 28.3 PG — SIGNIFICANT CHANGE UP (ref 27–34)
MCHC RBC-ENTMCNC: 30.6 GM/DL — LOW (ref 32–36)
MCV RBC AUTO: 92.7 FL — SIGNIFICANT CHANGE UP (ref 80–100)
MONOCYTES # BLD AUTO: 1.06 K/UL — HIGH (ref 0–0.9)
MONOCYTES NFR BLD AUTO: 13.6 % — SIGNIFICANT CHANGE UP (ref 2–14)
NEUTROPHILS # BLD AUTO: 6.06 K/UL — SIGNIFICANT CHANGE UP (ref 1.8–7.4)
NEUTROPHILS NFR BLD AUTO: 77.9 % — HIGH (ref 43–77)
PLATELET # BLD AUTO: 220 K/UL — SIGNIFICANT CHANGE UP (ref 150–400)
POTASSIUM SERPL-MCNC: 4 MMOL/L — SIGNIFICANT CHANGE UP (ref 3.5–5.3)
POTASSIUM SERPL-SCNC: 4 MMOL/L — SIGNIFICANT CHANGE UP (ref 3.5–5.3)
PROT ?TM UR-MCNC: 146 MG/DL — HIGH (ref 0–12)
PROT SERPL-MCNC: 5.4 G/DL — LOW (ref 6.6–8.7)
PROT/CREAT UR-RTO: 1 RATIO — HIGH
RBC # BLD: 3 M/UL — LOW (ref 3.8–5.2)
RBC # FLD: 17 % — HIGH (ref 10.3–14.5)
SODIUM SERPL-SCNC: 131 MMOL/L — LOW (ref 135–145)
SODIUM UR-SCNC: <30 MMOL/L — SIGNIFICANT CHANGE UP
WBC # BLD: 7.78 K/UL — SIGNIFICANT CHANGE UP (ref 3.8–10.5)
WBC # FLD AUTO: 7.78 K/UL — SIGNIFICANT CHANGE UP (ref 3.8–10.5)

## 2023-08-10 PROCEDURE — 99233 SBSQ HOSP IP/OBS HIGH 50: CPT

## 2023-08-10 RX ORDER — HYDROXYZINE HCL 10 MG
25 TABLET ORAL DAILY
Refills: 0 | Status: DISCONTINUED | OUTPATIENT
Start: 2023-08-10 | End: 2023-08-23

## 2023-08-10 RX ORDER — SODIUM CHLORIDE 9 MG/ML
500 INJECTION INTRAMUSCULAR; INTRAVENOUS; SUBCUTANEOUS
Refills: 0 | Status: DISCONTINUED | OUTPATIENT
Start: 2023-08-10 | End: 2023-08-18

## 2023-08-10 RX ORDER — HYDROXYZINE HCL 10 MG
25 TABLET ORAL ONCE
Refills: 0 | Status: COMPLETED | OUTPATIENT
Start: 2023-08-10 | End: 2023-08-10

## 2023-08-10 RX ADMIN — Medication 1 TABLET(S): at 11:51

## 2023-08-10 RX ADMIN — ENOXAPARIN SODIUM 40 MILLIGRAM(S): 100 INJECTION SUBCUTANEOUS at 17:21

## 2023-08-10 RX ADMIN — Medication 1300 MILLIGRAM(S): at 22:34

## 2023-08-10 RX ADMIN — Medication 25 MILLIGRAM(S): at 22:35

## 2023-08-10 RX ADMIN — CHLORHEXIDINE GLUCONATE 1 APPLICATION(S): 213 SOLUTION TOPICAL at 05:53

## 2023-08-10 RX ADMIN — PANTOPRAZOLE SODIUM 40 MILLIGRAM(S): 20 TABLET, DELAYED RELEASE ORAL at 11:51

## 2023-08-10 RX ADMIN — TIOTROPIUM BROMIDE AND OLODATEROL 2 PUFF(S): 3.124; 2.736 SPRAY, METERED RESPIRATORY (INHALATION) at 11:51

## 2023-08-10 RX ADMIN — AMIODARONE HYDROCHLORIDE 200 MILLIGRAM(S): 400 TABLET ORAL at 05:47

## 2023-08-10 RX ADMIN — Medication 1300 MILLIGRAM(S): at 05:48

## 2023-08-10 RX ADMIN — MIDODRINE HYDROCHLORIDE 15 MILLIGRAM(S): 2.5 TABLET ORAL at 22:34

## 2023-08-10 RX ADMIN — MIDODRINE HYDROCHLORIDE 15 MILLIGRAM(S): 2.5 TABLET ORAL at 13:01

## 2023-08-10 RX ADMIN — SODIUM CHLORIDE 50 MILLILITER(S): 9 INJECTION INTRAMUSCULAR; INTRAVENOUS; SUBCUTANEOUS at 13:52

## 2023-08-10 RX ADMIN — Medication 325 MILLIGRAM(S): at 11:51

## 2023-08-10 RX ADMIN — MIDODRINE HYDROCHLORIDE 15 MILLIGRAM(S): 2.5 TABLET ORAL at 05:47

## 2023-08-10 RX ADMIN — Medication 1300 MILLIGRAM(S): at 11:51

## 2023-08-10 RX ADMIN — BUDESONIDE AND FORMOTEROL FUMARATE DIHYDRATE 2 PUFF(S): 160; 4.5 AEROSOL RESPIRATORY (INHALATION) at 11:52

## 2023-08-10 RX ADMIN — Medication 81 MILLIGRAM(S): at 11:51

## 2023-08-10 RX ADMIN — ENOXAPARIN SODIUM 40 MILLIGRAM(S): 100 INJECTION SUBCUTANEOUS at 05:47

## 2023-08-10 RX ADMIN — Medication 25 MILLIGRAM(S): at 02:03

## 2023-08-10 NOTE — PROGRESS NOTE ADULT - ASSESSMENT
83 year old female with PMH of HTN, HLD, CAD s/p stents, Afib (on anticoagulation), CHF, CKD 3b, COPD (on 2L home O2), diverticulosis and stage 3 hemorrhoids presents with melanotic stool. Admitted for hemorrhagic shock s/p pRBC transfusions. EGD on 7/27 without active bleeding, colonoscopy 7/28 unrevealing. Hospital course notable for DANETTE on CKD requiring initiation of hemodialysis on 8/07/23.     -Baseline creatinine ~1.1-1.3mg/dL   -On admission, creatinine was ~1.8mg/dL (secondary to decrease effective circulating volume)   -Improved back to baseline on 07/29/2023   -However started to uptrend again on 08/01/2023 (suspecting from again decrease effective circulating volume - notable for hypotensive episodes)    -UA 30 protein, negative blood, 0-2 RBC, 6-10 WBC, moderate bacteria, moderate squamous cells   -Urine sodium < 30   -UPCR 1g/g    -Abdominal ultrasound showed right kidney 10.5 cm + left kidney: 10.3 cm; no hydronephrosis or calculi  -Ultimately started on dialysis on 08/07/23   -Access: R IJ non tunneled dialysis catheter   -Completed third consecutive dialysis treatment yesterday  -Continue I/O's to assess for renal recovery  -Given low urine sodium, trial of IV fluids (NS at 50cc/hr x 10 hours)  -BP - remains dependent on midodrine TID at this time   -Afib - on metoprolol PO + amiodarone PO     Ericka Monzon DO  Nephrology

## 2023-08-10 NOTE — PROGRESS NOTE ADULT - SUBJECTIVE AND OBJECTIVE BOX
SUBJECTIVE / OVERNIGHT EVENTS:  No acute events. Patient seen and examined at bedside. She feels well today. She had her 3 HD yesterday. Patient denies chest pain, SOB, abd pain, N/V, fever, chills, dysuria or any other complaints. All remainder ROS negative.     MEDICATIONS  (STANDING):  aMIOdarone    Tablet 200 milliGRAM(s) Oral daily  aspirin  chewable 81 milliGRAM(s) Oral daily  budesonide  80 MICROgram(s)/formoterol 4.5 MICROgram(s) Inhaler 2 Puff(s) Inhalation two times a day  chlorhexidine 2% Cloths 1 Application(s) Topical <User Schedule>  chlorhexidine 4% Liquid 1 Application(s) Topical <User Schedule>  enoxaparin Injectable 40 milliGRAM(s) SubCutaneous every 12 hours  ferrous    sulfate 325 milliGRAM(s) Oral daily  metoprolol tartrate 25 milliGRAM(s) Oral two times a day  midodrine 15 milliGRAM(s) Oral every 8 hours  multivitamin 1 Tablet(s) Oral daily  ondansetron Injectable 4 milliGRAM(s) IV Push once  pantoprazole  Injectable 40 milliGRAM(s) IV Push daily  sodium bicarbonate 1300 milliGRAM(s) Oral three times a day  sodium chloride 0.9%. 500 milliLiter(s) (50 mL/Hr) IV Continuous <Continuous>  sodium chloride 0.9%. 1000 milliLiter(s) (75 mL/Hr) IV Continuous <Continuous>  sodium chloride 0.9%. 1000 milliLiter(s) (75 mL/Hr) IV Continuous <Continuous>  tiotropium 2.5 MICROgram(s)/olodaterol 2.5 MICROgram(s) (STIOLTO) Inhaler 2 Puff(s) Inhalation daily    MEDICATIONS  (PRN):  guaiFENesin Oral Liquid (Sugar-Free) 100 milliGRAM(s) Oral every 6 hours PRN Cough  melatonin 5 milliGRAM(s) Oral at bedtime PRN Sleep  metoprolol tartrate Injectable 2.5 milliGRAM(s) IV Push every 6 hours PRN for hR > 110  sodium chloride 0.9% lock flush 10 milliLiter(s) IV Push every 1 hour PRN Pre/post blood products, medications, blood draw, and to maintain line patency  sodium chloride 0.9% lock flush 10 milliLiter(s) IV Push every 1 hour PRN Pre/post blood products, medications, blood draw, and to maintain line patency        I&O's Summary    09 Aug 2023 07:01  -  10 Aug 2023 07:00  --------------------------------------------------------  IN: 0 mL / OUT: 0 mL / NET: 0 mL    10 Aug 2023 07:01  -  10 Aug 2023 15:34  --------------------------------------------------------  IN: 0 mL / OUT: 100 mL / NET: -100 mL        PHYSICAL EXAM:  Vital Signs Last 24 Hrs  T(C): 36.7 (10 Aug 2023 13:00), Max: 36.7 (10 Aug 2023 08:00)  T(F): 98 (10 Aug 2023 13:00), Max: 98 (10 Aug 2023 08:00)  HR: 91 (10 Aug 2023 13:00) (68 - 92)  BP: 96/68 (10 Aug 2023 13:00) (84/59 - 100/67)  BP(mean): --  RR: 18 (10 Aug 2023 13:00) (18 - 19)  SpO2: 96% (10 Aug 2023 13:00) (95% - 97%)    Parameters below as of 10 Aug 2023 13:00  Patient On (Oxygen Delivery Method): nasal cannula  O2 Flow (L/min): 2      GENERAL: NAD  HEAD:  Atraumatic, Normocephalic  NECK: Supple, No JVD, Normal thyroid  NERVOUS SYSTEM:  Alert & Oriented X3, Good concentration; Motor Strength 5/5 B/L upper and lower extremities  CHEST/LUNG: Clear to auscultation bilaterally  HEART: Regular rate and rhythm; No murmurs, rubs, or gallops  ABDOMEN: Soft, Nontender, Nondistended; Bowel sounds present  EXTREMITIES:  2+ Peripheral Pulses, bilateral LE edema  SKIN: No rashes or lesions    LABS:                        8.5    7.78  )-----------( 220      ( 10 Aug 2023 05:46 )             27.8     08-10    131<L>  |  94<L>  |  26.0<H>  ----------------------------<  96  4.0   |  26.0  |  1.88<H>    Ca    7.6<L>      10 Aug 2023 05:46    TPro  5.4<L>  /  Alb  2.9<L>  /  TBili  0.8  /  DBili  x   /  AST  19  /  ALT  35<H>  /  AlkPhos  508<H>  08-10          Urinalysis Basic - ( 10 Aug 2023 05:46 )    Color: x / Appearance: x / SG: x / pH: x  Gluc: 96 mg/dL / Ketone: x  / Bili: x / Urobili: x   Blood: x / Protein: x / Nitrite: x   Leuk Esterase: x / RBC: x / WBC x   Sq Epi: x / Non Sq Epi: x / Bacteria: x        CAPILLARY BLOOD GLUCOSE            RADIOLOGY & ADDITIONAL TESTS:  Results Reviewed:   Imaging Personally Reviewed:  Electrocardiogram Personally Reviewed:

## 2023-08-10 NOTE — PROGRESS NOTE ADULT - ASSESSMENT
82 y/o female with PMH COPD on 2L home O2, HFpEF, CAD s/p PCI, HTN, HLD, pAF on rivaroxaban, diverticulosis, hemorrhoids presented with generalized weakness/dark stools admitted anemia, GIB without active bleed on ct, received 3U PRBC, and IVF however remained hypotensive requiring vasopressor therapy for hemorrhagic shock.  EGD on 7/27 and colonoscopy 7/28 without active bleed. Patient also with DANETTE and Afib with RVR, resumed back on amio and bb .  HR now better controlled. Plan was for ct enterography, but given DANETTE on CKD, it was cancelled. Patient downgraded to medicine 7/30/23.     DANETTE on CKD III/Metabolic acidosis (Resolving)  Hyponatremia  Renal hypoperfusion, episodic  - f/up renal recs - started HD for 3 days 8/7,8,9  - S/p IVF, sodium bicarb discontinued as per renal recs  - s/p Lasix and Albumin 8/3 and 8/4 as per renal recs  - Spoke with nephrology- will monitor further improving in crea/GFR off HD for now  - Strict monitoring of I/Os to assess renal recovery  - monitor BMP      acute GIB / acute blood loss anemia /  hemorrhagic shock (Resolved)  - hx of GIB in past   - cta neg without active bleed on ct on admission   - s/p EGD on 7/27 without active bleeding  - s/p colonoscopy 7/28 no active bleed   - now off pressors, started on midodrine, increased to 15mg TID, monitoring BP closely   - c/w PPIs   - ct enterography - cancelled given elevated creatinine and no further bleeding  - plan for capsule endoscopy as op   - gi following   - Diet as tolerated   - resumed aspirin as per GI  - tolerating Lovenox - can transition to XARELTO on discharge if hb stable, CBC stable    HFpEF  Echo noted with Grade III diastolic dysfunction and severe pulm HTN  S/p Lasix  Renal follow up due to elevated creatinine - started HD 8/7/23, Had HD yesterday and will get it tomorrow  bilateral lower ext ACE wrap     copd  c/w inhalers  C/w Symbicort BID   C/w tiotropium  C/w o2     Paroxysmal A-Fib   Hypotension, asymptomatic  - resumed on amio and bb  - adjust bb dose for better hr control as needed  - prn iv lopressor for hr > 110  - aspirin  - raised Midodrine dose for improved BP  - lowered metoprolol dose  - started lovenox 8/2/23, no bleeding noted, will start Xarelto on dc    HLD  - monitor CMP   - hold statin due to transaminitis    Transaminitis (resolving)  - statin held  - LFT decreasing   -discussed with cardio, cont amiodarone for now since pt has been on it for long time  - Abdo U/S shows cholelithiasis and small right pleural effusion and Hep panel negative       dvt ppx : trial of Lovenox  GI cleared for Xarelto resumption on discharge    Dispo: Monitoring DANETTE for renal recovery.

## 2023-08-10 NOTE — PROGRESS NOTE ADULT - SUBJECTIVE AND OBJECTIVE BOX
Per patient, she feels "better" since starting dialysis. Per patient, she "isn't making much urine". Per RN, thus far, approximately ~100cc since this a.m. I/O's not recorded on EMR.      Vital Signs Last 24 Hrs  T(C): 36.7 (10 Aug 2023 08:00), Max: 36.7 (10 Aug 2023 08:00)  T(F): 98 (10 Aug 2023 08:00), Max: 98 (10 Aug 2023 08:00)  HR: 86 (10 Aug 2023 08:00) (68 - 103)  BP: 100/67 (10 Aug 2023 08:00) (84/59 - 118/69)  BP(mean): --  RR: 18 (10 Aug 2023 08:00) (18 - 19)  SpO2: 95% (10 Aug 2023 08:00) (95% - 99%)    Parameters below as of 10 Aug 2023 08:00  Patient On (Oxygen Delivery Method): nasal cannula  O2 Flow (L/min): 2    I&O's Summary    09 Aug 2023 07:01  -  10 Aug 2023 07:00  --------------------------------------------------------  IN: 0 mL / OUT: 0 mL / NET: 0 mL    Physical Exam  General: Obese female in NAD, on nasal cannula  HEENT: Normocephalic  Cardiac: S1S2 RRR  Respiratory: Diminished breath sounds b/l  Abdomen: Soft, NT  Extremities: 2+ pitting edema of b/l lower extremities  Neuro: AAOx3  Psych: Calm   Access: R IJ non tunneled dialysis catheter     08-10    131<L>  |  94<L>  |  26.0<H>  ----------------------------<  96  4.0   |  26.0  |  1.88<H>    Ca    7.6<L>      10 Aug 2023 05:46    TPro  5.4<L>  /  Alb  2.9<L>  /  TBili  0.8  /  DBili  x   /  AST  19  /  ALT  35<H>  /  AlkPhos  508<H>  08-10                        8.5    7.78  )-----------( 220      ( 10 Aug 2023 05:46 )             27.8     MEDICATIONS  (STANDING):  aMIOdarone    Tablet 200 milliGRAM(s) Oral daily  aspirin  chewable 81 milliGRAM(s) Oral daily  budesonide  80 MICROgram(s)/formoterol 4.5 MICROgram(s) Inhaler 2 Puff(s) Inhalation two times a day  chlorhexidine 2% Cloths 1 Application(s) Topical <User Schedule>  chlorhexidine 4% Liquid 1 Application(s) Topical <User Schedule>  enoxaparin Injectable 40 milliGRAM(s) SubCutaneous every 12 hours  ferrous    sulfate 325 milliGRAM(s) Oral daily  metoprolol tartrate 25 milliGRAM(s) Oral two times a day  midodrine 15 milliGRAM(s) Oral every 8 hours  multivitamin 1 Tablet(s) Oral daily  ondansetron Injectable 4 milliGRAM(s) IV Push once  pantoprazole  Injectable 40 milliGRAM(s) IV Push daily  sodium bicarbonate 1300 milliGRAM(s) Oral three times a day  sodium chloride 0.9%. 1000 milliLiter(s) (75 mL/Hr) IV Continuous <Continuous>  sodium chloride 0.9%. 1000 milliLiter(s) (75 mL/Hr) IV Continuous <Continuous>  tiotropium 2.5 MICROgram(s)/olodaterol 2.5 MICROgram(s) (STIOLTO) Inhaler 2 Puff(s) Inhalation daily    MEDICATIONS  (PRN):  guaiFENesin Oral Liquid (Sugar-Free) 100 milliGRAM(s) Oral every 6 hours PRN Cough  melatonin 5 milliGRAM(s) Oral at bedtime PRN Sleep  metoprolol tartrate Injectable 2.5 milliGRAM(s) IV Push every 6 hours PRN for hR > 110  sodium chloride 0.9% lock flush 10 milliLiter(s) IV Push every 1 hour PRN Pre/post blood products, medications, blood draw, and to maintain line patency  sodium chloride 0.9% lock flush 10 milliLiter(s) IV Push every 1 hour PRN Pre/post blood products, medications, blood draw, and to maintain line patency

## 2023-08-11 ENCOUNTER — TRANSCRIPTION ENCOUNTER (OUTPATIENT)
Age: 83
End: 2023-08-11

## 2023-08-11 LAB
ALBUMIN SERPL ELPH-MCNC: 3.1 G/DL — LOW (ref 3.3–5.2)
ALP SERPL-CCNC: 482 U/L — HIGH (ref 40–120)
ALT FLD-CCNC: 31 U/L — SIGNIFICANT CHANGE UP
ANION GAP SERPL CALC-SCNC: 14 MMOL/L — SIGNIFICANT CHANGE UP (ref 5–17)
AST SERPL-CCNC: 20 U/L — SIGNIFICANT CHANGE UP
BASOPHILS # BLD AUTO: 0.03 K/UL — SIGNIFICANT CHANGE UP (ref 0–0.2)
BASOPHILS NFR BLD AUTO: 0.3 % — SIGNIFICANT CHANGE UP (ref 0–2)
BILIRUB SERPL-MCNC: 0.9 MG/DL — SIGNIFICANT CHANGE UP (ref 0.4–2)
BUN SERPL-MCNC: 29.8 MG/DL — HIGH (ref 8–20)
CALCIUM SERPL-MCNC: 7.7 MG/DL — LOW (ref 8.4–10.5)
CHLORIDE SERPL-SCNC: 90 MMOL/L — LOW (ref 96–108)
CO2 SERPL-SCNC: 23 MMOL/L — SIGNIFICANT CHANGE UP (ref 22–29)
CREAT SERPL-MCNC: 1.83 MG/DL — HIGH (ref 0.5–1.3)
EGFR: 27 ML/MIN/1.73M2 — LOW
EOSINOPHIL # BLD AUTO: 0 K/UL — SIGNIFICANT CHANGE UP (ref 0–0.5)
EOSINOPHIL NFR BLD AUTO: 0 % — SIGNIFICANT CHANGE UP (ref 0–6)
GLUCOSE SERPL-MCNC: 100 MG/DL — HIGH (ref 70–99)
HCT VFR BLD CALC: 30.1 % — LOW (ref 34.5–45)
HGB BLD-MCNC: 8.9 G/DL — LOW (ref 11.5–15.5)
IMM GRANULOCYTES NFR BLD AUTO: 0.7 % — SIGNIFICANT CHANGE UP (ref 0–0.9)
LYMPHOCYTES # BLD AUTO: 0.71 K/UL — LOW (ref 1–3.3)
LYMPHOCYTES # BLD AUTO: 6.8 % — LOW (ref 13–44)
MCHC RBC-ENTMCNC: 27.7 PG — SIGNIFICANT CHANGE UP (ref 27–34)
MCHC RBC-ENTMCNC: 29.6 GM/DL — LOW (ref 32–36)
MCV RBC AUTO: 93.8 FL — SIGNIFICANT CHANGE UP (ref 80–100)
MONOCYTES # BLD AUTO: 1.02 K/UL — HIGH (ref 0–0.9)
MONOCYTES NFR BLD AUTO: 9.8 % — SIGNIFICANT CHANGE UP (ref 2–14)
NEUTROPHILS # BLD AUTO: 8.63 K/UL — HIGH (ref 1.8–7.4)
NEUTROPHILS NFR BLD AUTO: 82.4 % — HIGH (ref 43–77)
PLATELET # BLD AUTO: 258 K/UL — SIGNIFICANT CHANGE UP (ref 150–400)
POTASSIUM SERPL-MCNC: 4 MMOL/L — SIGNIFICANT CHANGE UP (ref 3.5–5.3)
POTASSIUM SERPL-SCNC: 4 MMOL/L — SIGNIFICANT CHANGE UP (ref 3.5–5.3)
PROT SERPL-MCNC: 5.5 G/DL — LOW (ref 6.6–8.7)
RBC # BLD: 3.21 M/UL — LOW (ref 3.8–5.2)
RBC # FLD: 16.9 % — HIGH (ref 10.3–14.5)
SODIUM SERPL-SCNC: 127 MMOL/L — LOW (ref 135–145)
WBC # BLD: 10.46 K/UL — SIGNIFICANT CHANGE UP (ref 3.8–10.5)
WBC # FLD AUTO: 10.46 K/UL — SIGNIFICANT CHANGE UP (ref 3.8–10.5)

## 2023-08-11 PROCEDURE — 71045 X-RAY EXAM CHEST 1 VIEW: CPT | Mod: 26

## 2023-08-11 PROCEDURE — 99233 SBSQ HOSP IP/OBS HIGH 50: CPT

## 2023-08-11 RX ORDER — ACETAMINOPHEN 500 MG
650 TABLET ORAL EVERY 6 HOURS
Refills: 0 | Status: DISCONTINUED | OUTPATIENT
Start: 2023-08-11 | End: 2023-08-23

## 2023-08-11 RX ORDER — MIDODRINE HYDROCHLORIDE 2.5 MG/1
10 TABLET ORAL THREE TIMES A DAY
Refills: 0 | Status: DISCONTINUED | OUTPATIENT
Start: 2023-08-11 | End: 2023-08-15

## 2023-08-11 RX ORDER — GUAIFENESIN/DEXTROMETHORPHAN 600MG-30MG
10 TABLET, EXTENDED RELEASE 12 HR ORAL ONCE
Refills: 0 | Status: COMPLETED | OUTPATIENT
Start: 2023-08-11 | End: 2023-08-11

## 2023-08-11 RX ADMIN — CHLORHEXIDINE GLUCONATE 1 APPLICATION(S): 213 SOLUTION TOPICAL at 06:36

## 2023-08-11 RX ADMIN — Medication 325 MILLIGRAM(S): at 13:30

## 2023-08-11 RX ADMIN — PANTOPRAZOLE SODIUM 40 MILLIGRAM(S): 20 TABLET, DELAYED RELEASE ORAL at 13:30

## 2023-08-11 RX ADMIN — Medication 1300 MILLIGRAM(S): at 13:29

## 2023-08-11 RX ADMIN — Medication 1300 MILLIGRAM(S): at 23:52

## 2023-08-11 RX ADMIN — MIDODRINE HYDROCHLORIDE 10 MILLIGRAM(S): 2.5 TABLET ORAL at 17:15

## 2023-08-11 RX ADMIN — Medication 25 MILLIGRAM(S): at 17:16

## 2023-08-11 RX ADMIN — Medication 25 MILLIGRAM(S): at 23:52

## 2023-08-11 RX ADMIN — Medication 25 MILLIGRAM(S): at 08:37

## 2023-08-11 RX ADMIN — AMIODARONE HYDROCHLORIDE 200 MILLIGRAM(S): 400 TABLET ORAL at 06:35

## 2023-08-11 RX ADMIN — ENOXAPARIN SODIUM 40 MILLIGRAM(S): 100 INJECTION SUBCUTANEOUS at 17:16

## 2023-08-11 RX ADMIN — Medication 81 MILLIGRAM(S): at 13:30

## 2023-08-11 RX ADMIN — Medication 10 MILLILITER(S): at 03:00

## 2023-08-11 RX ADMIN — Medication 100 MILLIGRAM(S): at 06:34

## 2023-08-11 RX ADMIN — Medication 1300 MILLIGRAM(S): at 06:34

## 2023-08-11 RX ADMIN — MIDODRINE HYDROCHLORIDE 15 MILLIGRAM(S): 2.5 TABLET ORAL at 06:35

## 2023-08-11 RX ADMIN — ENOXAPARIN SODIUM 40 MILLIGRAM(S): 100 INJECTION SUBCUTANEOUS at 06:34

## 2023-08-11 RX ADMIN — MIDODRINE HYDROCHLORIDE 15 MILLIGRAM(S): 2.5 TABLET ORAL at 13:29

## 2023-08-11 RX ADMIN — Medication 100 MILLIGRAM(S): at 23:52

## 2023-08-11 RX ADMIN — Medication 1 TABLET(S): at 13:30

## 2023-08-11 RX ADMIN — BUDESONIDE AND FORMOTEROL FUMARATE DIHYDRATE 2 PUFF(S): 160; 4.5 AEROSOL RESPIRATORY (INHALATION) at 08:39

## 2023-08-11 RX ADMIN — BUDESONIDE AND FORMOTEROL FUMARATE DIHYDRATE 2 PUFF(S): 160; 4.5 AEROSOL RESPIRATORY (INHALATION) at 01:47

## 2023-08-11 RX ADMIN — TIOTROPIUM BROMIDE AND OLODATEROL 2 PUFF(S): 3.124; 2.736 SPRAY, METERED RESPIRATORY (INHALATION) at 08:37

## 2023-08-11 NOTE — DISCHARGE NOTE PROVIDER - DETAILS OF MALNUTRITION DIAGNOSIS/DIAGNOSES
This patient has been assessed with a concern for Malnutrition and was treated during this hospitalization for the following Nutrition diagnosis/diagnoses:     -  07/28/2023: Moderate protein-calorie malnutrition

## 2023-08-11 NOTE — PROGRESS NOTE ADULT - SUBJECTIVE AND OBJECTIVE BOX
SUBJECTIVE / OVERNIGHT EVENTS: She complains of back pain, feels well overall. is happy she doesn't need HD today. Patient denies chest pain, SOB, abd pain, N/V, fever, chills, dysuria or any other complaints. All remainder ROS negative.     MEDICATIONS  (STANDING):  aMIOdarone    Tablet 200 milliGRAM(s) Oral daily  aspirin  chewable 81 milliGRAM(s) Oral daily  budesonide  80 MICROgram(s)/formoterol 4.5 MICROgram(s) Inhaler 2 Puff(s) Inhalation two times a day  chlorhexidine 2% Cloths 1 Application(s) Topical <User Schedule>  chlorhexidine 4% Liquid 1 Application(s) Topical <User Schedule>  enoxaparin Injectable 40 milliGRAM(s) SubCutaneous every 12 hours  ferrous    sulfate 325 milliGRAM(s) Oral daily  metoprolol tartrate 25 milliGRAM(s) Oral two times a day  midodrine. 10 milliGRAM(s) Oral three times a day  multivitamin 1 Tablet(s) Oral daily  ondansetron Injectable 4 milliGRAM(s) IV Push once  pantoprazole  Injectable 40 milliGRAM(s) IV Push daily  sodium bicarbonate 1300 milliGRAM(s) Oral three times a day  sodium chloride 0.9%. 1000 milliLiter(s) (75 mL/Hr) IV Continuous <Continuous>  sodium chloride 0.9%. 1000 milliLiter(s) (75 mL/Hr) IV Continuous <Continuous>  sodium chloride 0.9%. 500 milliLiter(s) (50 mL/Hr) IV Continuous <Continuous>  tiotropium 2.5 MICROgram(s)/olodaterol 2.5 MICROgram(s) (STIOLTO) Inhaler 2 Puff(s) Inhalation daily    MEDICATIONS  (PRN):  guaiFENesin Oral Liquid (Sugar-Free) 100 milliGRAM(s) Oral every 6 hours PRN Cough  hydrOXYzine hydrochloride 25 milliGRAM(s) Oral daily PRN Restlessness  melatonin 5 milliGRAM(s) Oral at bedtime PRN Sleep  metoprolol tartrate Injectable 2.5 milliGRAM(s) IV Push every 6 hours PRN for hR > 110  sodium chloride 0.9% lock flush 10 milliLiter(s) IV Push every 1 hour PRN Pre/post blood products, medications, blood draw, and to maintain line patency  sodium chloride 0.9% lock flush 10 milliLiter(s) IV Push every 1 hour PRN Pre/post blood products, medications, blood draw, and to maintain line patency        I&O's Summary    10 Aug 2023 07:01  -  11 Aug 2023 07:00  --------------------------------------------------------  IN: 0 mL / OUT: 100 mL / NET: -100 mL        PHYSICAL EXAM:  Vital Signs Last 24 Hrs  T(C): 36.3 (11 Aug 2023 13:00), Max: 36.7 (10 Aug 2023 22:36)  T(F): 97.4 (11 Aug 2023 13:00), Max: 98 (10 Aug 2023 22:36)  HR: 93 (11 Aug 2023 13:00) (80 - 93)  BP: 121/82 (11 Aug 2023 13:00) (93/68 - 140/91)  BP(mean): --  RR: 18 (11 Aug 2023 13:00) (18 - 18)  SpO2: 96% (11 Aug 2023 13:00) (96% - 98%)    Parameters below as of 11 Aug 2023 13:00  Patient On (Oxygen Delivery Method): nasal cannula  O2 Flow (L/min): 2        GENERAL: NAD  HEAD:  Atraumatic, Normocephalic  NECK: Supple, No JVD, Normal thyroid  NERVOUS SYSTEM:  Alert & Oriented X3, Good concentration; Motor Strength 5/5 B/L upper and lower extremities  CHEST/LUNG: Clear to auscultation bilaterally  HEART: Regular rate and rhythm; No murmurs, rubs, or gallops  ABDOMEN: Soft, Nontender, Nondistended; Bowel sounds present  EXTREMITIES:  2+ Peripheral Pulses, bilateral LE edema  SKIN: 1 fluid filled blister on left lower leg, 2 fluid filled blisters on right lower leg, no signs of infection.    LABS:                        8.9    10.46 )-----------( 258      ( 11 Aug 2023 06:01 )             30.1     08-11    127<L>  |  90<L>  |  29.8<H>  ----------------------------<  100<H>  4.0   |  23.0  |  1.83<H>    Ca    7.7<L>      11 Aug 2023 06:01    TPro  5.5<L>  /  Alb  3.1<L>  /  TBili  0.9  /  DBili  x   /  AST  20  /  ALT  31  /  AlkPhos  482<H>  08-11          Urinalysis Basic - ( 11 Aug 2023 06:01 )    Color: x / Appearance: x / SG: x / pH: x  Gluc: 100 mg/dL / Ketone: x  / Bili: x / Urobili: x   Blood: x / Protein: x / Nitrite: x   Leuk Esterase: x / RBC: x / WBC x   Sq Epi: x / Non Sq Epi: x / Bacteria: x        CAPILLARY BLOOD GLUCOSE            RADIOLOGY & ADDITIONAL TESTS:  Results Reviewed:   Imaging Personally Reviewed:  Electrocardiogram Personally Reviewed:

## 2023-08-11 NOTE — DISCHARGE NOTE PROVIDER - CARE PROVIDER_API CALL
Bindu Trotter  Nephrology  [address] [place] [id number]  Phone: [phone]  Fax: [phone]  Follow Up Time: 2 weeks

## 2023-08-11 NOTE — DISCHARGE NOTE PROVIDER - NSDCMRMEDTOKEN_GEN_ALL_CORE_FT
amiodarone 200 mg oral tablet: 1 tab(s) orally once a day  aspirin 81 mg oral delayed release tablet: 1 tab(s) orally once a day  atorvastatin 80 mg oral tablet: 1 tab(s) orally once a day  Eliquis 5 mg oral tablet: 1 tab(s) orally 2 times a day  metoprolol succinate 100 mg oral tablet, extended release: 1 tab(s) orally once a day  Multiple Vitamins oral tablet: 1 tab(s) orally once a day  potassium chloride 20 mEq oral tablet, extended release: 2 tab(s) orally once a day  Stiolto Respimat 10 ACT 2.5 mcg-2.5 mcg/inh inhalation aerosol: 2 inhaler(s) inhaled once a day  torsemide 100 mg oral tablet: 1 tab(s) orally every other day  Vitamin D2 50 mcg (2000 intl units) oral capsule: 1 cap(s) orally 3 times a week   amiodarone 200 mg oral tablet: 1 tab(s) orally once a day  apixaban 2.5 mg oral tablet: 1 tab(s) orally 2 times a day  aspirin 81 mg oral tablet, chewable: 1 tab(s) orally once a day  atorvastatin 80 mg oral tablet: 1 tab(s) orally once a day  budesonide-formoterol 80 mcg-4.5 mcg/inh inhalation aerosol: 2 puff(s) inhaled 2 times a day  ferrous sulfate 325 mg (65 mg elemental iron) oral tablet: 1 tab(s) orally once a day  hydrOXYzine hydrochloride 25 mg oral tablet: 1 tab(s) orally once a day As needed Restlessness  metoprolol succinate 100 mg oral tablet, extended release: 1 tab(s) orally once a day  midodrine 5 mg oral tablet: 1 tab(s) orally every 8 hours as needed for MAP &lt; 65 Also give 1 hour prior to HD  Multiple Vitamins oral tablet: 1 tab(s) orally once a day  oxycodone-acetaminophen 5 mg-325 mg oral tablet: 1 tab(s) orally every 6 hours As needed Moderate Pain (4 - 6)  Protonix 40 mg oral delayed release tablet: 1 tab(s) orally 2 times a day  Stiolto Respimat 10 ACT 2.5 mcg-2.5 mcg/inh inhalation aerosol: 2 inhaler(s) inhaled once a day  Vitamin D2 50 mcg (2000 intl units) oral capsule: 1 cap(s) orally 3 times a week

## 2023-08-11 NOTE — DISCHARGE NOTE PROVIDER - HOSPITAL COURSE
84 y/o female with PMH COPD on 2L home O2, HFpEF, CAD s/p PCI, HTN, HLD, pAF on rivaroxaban, diverticulosis, hemorrhoids presented with generalized weakness/dark stools admitted anemia, GIB without active bleed on ct, received 3U PRBC, and IVF however remained hypotensive requiring vasopressor therapy for hemorrhagic shock.  EGD on 7/27 and colonoscopy 7/28 without active bleed. Patient also with DANETTE and Afib with RVR, resumed back on amio and bb .  HR now better controlled. Plan was for ct enterography, but given DANETTE on CKD, it was cancelled. Patient will go for outpatient enteroscopy. Patient downgraded to medicine 7/30/23. S/p Intermittent HD for worsening kidney functions. 82 y/o female with PMH COPD on 2L home O2, HFpEF, CAD s/p PCI, HTN, HLD, pAF on rivaroxaban, diverticulosis, hemorrhoids presented with generalized weakness/dark stools admitted anemia, GIB without active bleed on ct, received 3U PRBC, and IVF however remained hypotensive requiring vasopressor therapy for hemorrhagic shock.  EGD on 7/27 and colonoscopy 7/28 without active bleed. Noted to also be in Afib with RVR, restarted on amio and bb. Was planned was for ct enterography, however with acute on ch renal failure ultimately requiring HD. Transferred to Medicine on high dose midodrine for persistent hypotension. Noted to have signifcant b/l LE cyanosis due to which midodrine was gradually weaned down. Arterial and venous studies done and evaluated by Vascular surgery. With liberation from midodrine has had marked improvement in LE pulses. At this time she is medically stable, planned for dsc to [facility] with outpt GI, Renal, Cardio and Vascular f/u

## 2023-08-11 NOTE — PROGRESS NOTE ADULT - ASSESSMENT
83 year old female with PMH of HTN, HLD, CAD s/p stents, Afib (on anticoagulation), CHF, CKD 3b, COPD (on 2L home O2), diverticulosis and stage 3 hemorrhoids presents with melanotic stool. Admitted for hemorrhagic shock s/p pRBC transfusions. EGD on 7/27 without active bleeding, colonoscopy 7/28 unrevealing. Hospital course notable for DANETTE on CKD requiring initiation of hemodialysis on 8/07/23.     -Baseline creatinine ~1.1-1.3mg/dL   -On admission, creatinine was ~1.8mg/dL (secondary to decrease effective circulating volume)   -Improved back to baseline on 07/29/2023   -However started to uptrend again on 08/01/2023 (suspecting from again decrease effective circulating volume - notable for hypotensive episodes)    -UA 30 protein, negative blood, 0-2 RBC, 6-10 WBC, moderate bacteria, moderate squamous cells   -Urine sodium < 30   -UPCR 1g/g    -Abdominal ultrasound showed right kidney 10.5 cm + left kidney: 10.3 cm; no hydronephrosis or calculi  -Ultimately started on dialysis on 08/07/23 through R IJ non tunneled dialysis catheter   -Last dialyzed on 08/09/2023 - creatinine over the past 2 days relatively stable at ~1.8mg/dL, eGFR ~27   -Suspecting partial renal recovery at this time - will hold off on dialysis today and assess need for HD based on a.m. labs   -BP, improved - will lower midodrine 15mg TID to 10mg TID (and further decrease as needed based on trend of blood pressures)  -Afib - on metoprolol PO + amiodarone PO     Ericka Monzon DO  Nephrology

## 2023-08-11 NOTE — PROGRESS NOTE ADULT - ASSESSMENT
84 y/o female with PMH COPD on 2L home O2, HFpEF, CAD s/p PCI, HTN, HLD, pAF on rivaroxaban, diverticulosis, hemorrhoids presented with generalized weakness/dark stools admitted anemia, GIB without active bleed on ct, received 3U PRBC, and IVF however remained hypotensive requiring vasopressor therapy for hemorrhagic shock.  EGD on 7/27 and colonoscopy 7/28 without active bleed. Patient also with DANETTE and Afib with RVR, resumed back on amio and bb .  HR now better controlled. Plan was for ct enterography, but given DANETTE on CKD, it was cancelled. Patient downgraded to medicine 7/30/23. S/p Intermittent HD for worsening kidney functions.     DANETTE on CKD III/Metabolic acidosis (Resolving)  Hyponatremia  Renal hypoperfusion, episodic  - f/up renal recs - started HD for 3 days 8/7,8,9  - S/p IVF, sodium bicarb discontinued as per renal recs  - s/p Lasix and Albumin 8/3 and 8/4 as per renal recs  - Spoke with nephrology- will monitor further improving in crea/GFR off HD for now  - Strict monitoring of I/Os to assess renal recovery  - monitor BMP      acute GIB / acute blood loss anemia /  hemorrhagic shock (Resolved)  - hx of GIB in past   - cta neg without active bleed on ct on admission   - s/p EGD on 7/27 without active bleeding  - s/p colonoscopy 7/28 no active bleed   - now off pressors, started on midodrine, increased to 15mg TID, monitoring BP closely   - c/w PPIs   - ct enterography - cancelled given elevated creatinine and no further bleeding  - plan for capsule endoscopy as op   - gi following   - Diet as tolerated   - resumed aspirin as per GI  - tolerating Lovenox - can transition to XARELTO on discharge if hb stable, CBC stable    HFpEF  Echo noted with Grade III diastolic dysfunction and severe pulm HTN  S/p Lasix  bilateral lower ext ACE wrap     copd  c/w inhalers  C/w Symbicort BID   C/w tiotropium  C/w o2     Paroxysmal A-Fib   Hypotension, asymptomatic  - resumed on amio and bb  - adjust bb dose for better hr control as needed  - prn iv lopressor for hr > 110  - aspirin  - raised Midodrine dose for improved BP  - lowered metoprolol dose  - started lovenox 8/2/23, no bleeding noted, will start Xarelto on dc    HLD  - monitor CMP   - hold statin due to transaminitis    Transaminitis (resolving)  - statin held  - LFT decreasing   -discussed with cardio, cont amiodarone for now since pt has been on it for long time  - Abdo U/S shows cholelithiasis and small right pleural effusion and Hep panel negative       dvt ppx : trial of Lovenox  GI cleared for Xarelto resumption on discharge    Dispo: Monitoring DANETTE for renal recovery.       Plan discussed with patient at bedside.

## 2023-08-11 NOTE — PROGRESS NOTE ADULT - SUBJECTIVE AND OBJECTIVE BOX
Complained of back pain. Denied SOB.     Vital Signs Last 24 Hrs  T(C): 36.3 (11 Aug 2023 13:00), Max: 36.7 (10 Aug 2023 22:36)  T(F): 97.4 (11 Aug 2023 13:00), Max: 98 (10 Aug 2023 22:36)  HR: 93 (11 Aug 2023 13:00) (80 - 93)  BP: 121/82 (11 Aug 2023 13:00) (93/68 - 140/91)  BP(mean): --  RR: 18 (11 Aug 2023 13:00) (18 - 18)  SpO2: 96% (11 Aug 2023 13:00) (96% - 98%)    Parameters below as of 11 Aug 2023 13:00  Patient On (Oxygen Delivery Method): nasal cannula  O2 Flow (L/min): 2    I&O's Summary    10 Aug 2023 07:01  -  11 Aug 2023 07:00  --------------------------------------------------------  IN: 0 mL / OUT: 100 mL / NET: -100 mL    Physical Exam  General: Obese female in NAD, on nasal cannula  HEENT: Normocephalic  Cardiac: S1S2 RRR  Respiratory: Diminished breath sounds b/l  Abdomen: Soft, NT  Extremities: 2+ pitting edema of b/l lower extremities, +bullae to b/l LE  Neuro: AAOx3  Psych: Calm   Access: R IJ non tunneled dialysis catheter     08-11    127<L>  |  90<L>  |  29.8<H>  ----------------------------<  100<H>  4.0   |  23.0  |  1.83<H>    Ca    7.7<L>      11 Aug 2023 06:01    TPro  5.5<L>  /  Alb  3.1<L>  /  TBili  0.9  /  DBili  x   /  AST  20  /  ALT  31  /  AlkPhos  482<H>  08-11                        8.9    10.46 )-----------( 258      ( 11 Aug 2023 06:01 )             30.1     MEDICATIONS  (STANDING):  aMIOdarone    Tablet 200 milliGRAM(s) Oral daily  aspirin  chewable 81 milliGRAM(s) Oral daily  budesonide  80 MICROgram(s)/formoterol 4.5 MICROgram(s) Inhaler 2 Puff(s) Inhalation two times a day  chlorhexidine 2% Cloths 1 Application(s) Topical <User Schedule>  chlorhexidine 4% Liquid 1 Application(s) Topical <User Schedule>  enoxaparin Injectable 40 milliGRAM(s) SubCutaneous every 12 hours  ferrous    sulfate 325 milliGRAM(s) Oral daily  metoprolol tartrate 25 milliGRAM(s) Oral two times a day  midodrine 15 milliGRAM(s) Oral every 8 hours  multivitamin 1 Tablet(s) Oral daily  ondansetron Injectable 4 milliGRAM(s) IV Push once  pantoprazole  Injectable 40 milliGRAM(s) IV Push daily  sodium bicarbonate 1300 milliGRAM(s) Oral three times a day  sodium chloride 0.9%. 500 milliLiter(s) (50 mL/Hr) IV Continuous <Continuous>  sodium chloride 0.9%. 1000 milliLiter(s) (75 mL/Hr) IV Continuous <Continuous>  sodium chloride 0.9%. 1000 milliLiter(s) (75 mL/Hr) IV Continuous <Continuous>  tiotropium 2.5 MICROgram(s)/olodaterol 2.5 MICROgram(s) (STIOLTO) Inhaler 2 Puff(s) Inhalation daily    MEDICATIONS  (PRN):  guaiFENesin Oral Liquid (Sugar-Free) 100 milliGRAM(s) Oral every 6 hours PRN Cough  hydrOXYzine hydrochloride 25 milliGRAM(s) Oral daily PRN Restlessness  melatonin 5 milliGRAM(s) Oral at bedtime PRN Sleep  metoprolol tartrate Injectable 2.5 milliGRAM(s) IV Push every 6 hours PRN for hR > 110  sodium chloride 0.9% lock flush 10 milliLiter(s) IV Push every 1 hour PRN Pre/post blood products, medications, blood draw, and to maintain line patency  sodium chloride 0.9% lock flush 10 milliLiter(s) IV Push every 1 hour PRN Pre/post blood products, medications, blood draw, and to maintain line patency

## 2023-08-12 LAB
ALBUMIN SERPL ELPH-MCNC: 3 G/DL — LOW (ref 3.3–5.2)
ALP SERPL-CCNC: 435 U/L — HIGH (ref 40–120)
ALT FLD-CCNC: 26 U/L — SIGNIFICANT CHANGE UP
ANION GAP SERPL CALC-SCNC: 15 MMOL/L — SIGNIFICANT CHANGE UP (ref 5–17)
AST SERPL-CCNC: 22 U/L — SIGNIFICANT CHANGE UP
BASOPHILS # BLD AUTO: 0.03 K/UL — SIGNIFICANT CHANGE UP (ref 0–0.2)
BASOPHILS NFR BLD AUTO: 0.3 % — SIGNIFICANT CHANGE UP (ref 0–2)
BILIRUB SERPL-MCNC: 0.8 MG/DL — SIGNIFICANT CHANGE UP (ref 0.4–2)
BUN SERPL-MCNC: 34.9 MG/DL — HIGH (ref 8–20)
CALCIUM SERPL-MCNC: 8.1 MG/DL — LOW (ref 8.4–10.5)
CHLORIDE SERPL-SCNC: 90 MMOL/L — LOW (ref 96–108)
CO2 SERPL-SCNC: 22 MMOL/L — SIGNIFICANT CHANGE UP (ref 22–29)
CREAT SERPL-MCNC: 1.98 MG/DL — HIGH (ref 0.5–1.3)
EGFR: 25 ML/MIN/1.73M2 — LOW
EOSINOPHIL # BLD AUTO: 0 K/UL — SIGNIFICANT CHANGE UP (ref 0–0.5)
EOSINOPHIL NFR BLD AUTO: 0 % — SIGNIFICANT CHANGE UP (ref 0–6)
GLUCOSE SERPL-MCNC: 82 MG/DL — SIGNIFICANT CHANGE UP (ref 70–99)
HCT VFR BLD CALC: 28.2 % — LOW (ref 34.5–45)
HGB BLD-MCNC: 8.6 G/DL — LOW (ref 11.5–15.5)
IMM GRANULOCYTES NFR BLD AUTO: 1.4 % — HIGH (ref 0–0.9)
LYMPHOCYTES # BLD AUTO: 0.7 K/UL — LOW (ref 1–3.3)
LYMPHOCYTES # BLD AUTO: 7.9 % — LOW (ref 13–44)
MCHC RBC-ENTMCNC: 28.2 PG — SIGNIFICANT CHANGE UP (ref 27–34)
MCHC RBC-ENTMCNC: 30.5 GM/DL — LOW (ref 32–36)
MCV RBC AUTO: 92.5 FL — SIGNIFICANT CHANGE UP (ref 80–100)
MONOCYTES # BLD AUTO: 0.87 K/UL — SIGNIFICANT CHANGE UP (ref 0–0.9)
MONOCYTES NFR BLD AUTO: 9.8 % — SIGNIFICANT CHANGE UP (ref 2–14)
NEUTROPHILS # BLD AUTO: 7.14 K/UL — SIGNIFICANT CHANGE UP (ref 1.8–7.4)
NEUTROPHILS NFR BLD AUTO: 80.6 % — HIGH (ref 43–77)
PLATELET # BLD AUTO: 252 K/UL — SIGNIFICANT CHANGE UP (ref 150–400)
POTASSIUM SERPL-MCNC: 4.2 MMOL/L — SIGNIFICANT CHANGE UP (ref 3.5–5.3)
POTASSIUM SERPL-SCNC: 4.2 MMOL/L — SIGNIFICANT CHANGE UP (ref 3.5–5.3)
PROT SERPL-MCNC: 5.5 G/DL — LOW (ref 6.6–8.7)
RBC # BLD: 3.05 M/UL — LOW (ref 3.8–5.2)
RBC # FLD: 16.8 % — HIGH (ref 10.3–14.5)
SODIUM SERPL-SCNC: 127 MMOL/L — LOW (ref 135–145)
WBC # BLD: 8.86 K/UL — SIGNIFICANT CHANGE UP (ref 3.8–10.5)
WBC # FLD AUTO: 8.86 K/UL — SIGNIFICANT CHANGE UP (ref 3.8–10.5)

## 2023-08-12 PROCEDURE — 99232 SBSQ HOSP IP/OBS MODERATE 35: CPT

## 2023-08-12 PROCEDURE — 99233 SBSQ HOSP IP/OBS HIGH 50: CPT

## 2023-08-12 RX ORDER — MORPHINE SULFATE 50 MG/1
1 CAPSULE, EXTENDED RELEASE ORAL ONCE
Refills: 0 | Status: DISCONTINUED | OUTPATIENT
Start: 2023-08-12 | End: 2023-08-12

## 2023-08-12 RX ADMIN — Medication 81 MILLIGRAM(S): at 15:05

## 2023-08-12 RX ADMIN — MIDODRINE HYDROCHLORIDE 10 MILLIGRAM(S): 2.5 TABLET ORAL at 18:47

## 2023-08-12 RX ADMIN — CHLORHEXIDINE GLUCONATE 1 APPLICATION(S): 213 SOLUTION TOPICAL at 06:08

## 2023-08-12 RX ADMIN — AMIODARONE HYDROCHLORIDE 200 MILLIGRAM(S): 400 TABLET ORAL at 06:08

## 2023-08-12 RX ADMIN — TIOTROPIUM BROMIDE AND OLODATEROL 2 PUFF(S): 3.124; 2.736 SPRAY, METERED RESPIRATORY (INHALATION) at 11:52

## 2023-08-12 RX ADMIN — MIDODRINE HYDROCHLORIDE 10 MILLIGRAM(S): 2.5 TABLET ORAL at 15:05

## 2023-08-12 RX ADMIN — Medication 1300 MILLIGRAM(S): at 06:08

## 2023-08-12 RX ADMIN — ENOXAPARIN SODIUM 40 MILLIGRAM(S): 100 INJECTION SUBCUTANEOUS at 06:08

## 2023-08-12 RX ADMIN — Medication 1 TABLET(S): at 15:06

## 2023-08-12 RX ADMIN — Medication 325 MILLIGRAM(S): at 15:05

## 2023-08-12 RX ADMIN — MIDODRINE HYDROCHLORIDE 10 MILLIGRAM(S): 2.5 TABLET ORAL at 06:08

## 2023-08-12 RX ADMIN — CHLORHEXIDINE GLUCONATE 1 APPLICATION(S): 213 SOLUTION TOPICAL at 10:51

## 2023-08-12 RX ADMIN — BUDESONIDE AND FORMOTEROL FUMARATE DIHYDRATE 2 PUFF(S): 160; 4.5 AEROSOL RESPIRATORY (INHALATION) at 11:52

## 2023-08-12 RX ADMIN — MORPHINE SULFATE 1 MILLIGRAM(S): 50 CAPSULE, EXTENDED RELEASE ORAL at 16:06

## 2023-08-12 RX ADMIN — MORPHINE SULFATE 1 MILLIGRAM(S): 50 CAPSULE, EXTENDED RELEASE ORAL at 17:33

## 2023-08-12 RX ADMIN — PANTOPRAZOLE SODIUM 40 MILLIGRAM(S): 20 TABLET, DELAYED RELEASE ORAL at 15:06

## 2023-08-12 RX ADMIN — Medication 1300 MILLIGRAM(S): at 15:05

## 2023-08-12 RX ADMIN — Medication 25 MILLIGRAM(S): at 18:47

## 2023-08-12 RX ADMIN — ENOXAPARIN SODIUM 40 MILLIGRAM(S): 100 INJECTION SUBCUTANEOUS at 18:46

## 2023-08-12 RX ADMIN — Medication 1300 MILLIGRAM(S): at 21:20

## 2023-08-12 NOTE — PROGRESS NOTE ADULT - ASSESSMENT
82 y/o female with PMH COPD on 2L home O2, HFpEF, CAD s/p PCI, HTN, HLD, pAF on rivaroxaban, diverticulosis, hemorrhoids presented with generalized weakness/dark stools admitted anemia, GIB without active bleed on ct, received 3U PRBC, and IVF however remained hypotensive requiring vasopressor therapy for hemorrhagic shock.  EGD on 7/27 and colonoscopy 7/28 without active bleed. Patient also with DANETTE and Afib with RVR, resumed back on amio and bb .  HR now better controlled. Plan was for ct enterography, but given DANETTE on CKD, it was cancelled. Patient downgraded to medicine 7/30/23. S/p Intermittent HD for worsening kidney functions. Nephrology continues to monitor, no HD this AM. plan for Capsule endoscopy as an outpatient on DC.     DANETTE on CKD III/Metabolic acidosis (Resolving)  Hyponatremia  Renal hypoperfusion, episodic  - f/up renal recs - started HD for 3 days 8/7,8,9  - S/p IVF, sodium bicarb discontinued as per renal recs  - s/p Lasix and Albumin 8/3 and 8/4 as per renal recs  - Spoke with nephrology- will monitor further improving in crea/GFR off HD for now  - Strict monitoring of I/Os to assess renal recovery  - monitor BMP    acute GIB / acute blood loss anemia /  hemorrhagic shock (Resolved)  - hx of GIB in past   - cta neg without active bleed on ct on admission   - s/p EGD on 7/27 without active bleeding  - s/p colonoscopy 7/28 no active bleed   - now off pressors, started on midodrine, increased to 15mg TID, monitoring BP closely   - c/w PPIs   - ct enterography - cancelled given elevated creatinine and no further bleeding  - plan for capsule endoscopy as op   - gi following   - Diet as tolerated   - resumed aspirin as per GI  - tolerating Lovenox - can transition to XARELTO on discharge if hb stable, CBC stable    HFpEF  Echo noted with Grade III diastolic dysfunction and severe pulm HTN  S/p Lasix  bilateral lower ext ACE wrap     copd  c/w inhalers  C/w Symbicort BID   C/w tiotropium  C/w o2     Paroxysmal A-Fib   Hypotension, asymptomatic  - resumed on amio and bb  - adjust bb dose for better hr control as needed  - prn iv lopressor for hr > 110  - aspirin  - raised Midodrine dose for improved BP  - lowered metoprolol dose  - started lovenox 8/2/23, no bleeding noted, will start Xarelto on dc    HLD  - monitor CMP   - hold statin due to transaminitis    Transaminitis (resolving)  - statin held  - LFT decreasing   -discussed with cardio, cont amiodarone for now since pt has been on it for long time  - Abdo U/S shows cholelithiasis and small right pleural effusion and Hep panel negative       dvt ppx : trial of Lovenox  GI cleared for Xarelto resumption on discharge  Dispo: Monitoring DANETTE for renal recovery.   Plan discussed with patient at bedside.

## 2023-08-12 NOTE — PROGRESS NOTE ADULT - SUBJECTIVE AND OBJECTIVE BOX
[facility] Division of Hospital Medicine    Chief Complaint:      SUBJECTIVE / OVERNIGHT EVENTS:    patient seen and examined at bedside, no acute events overnight, patient denies any new complaints. in afternoon patient had mild pain in LE's requiring 1 time dose of morphine, no plans for HD today, will continue to monitor renal function, RIJ remains in place.     MEDICATIONS  (STANDING):  aMIOdarone    Tablet 200 milliGRAM(s) Oral daily  aspirin  chewable 81 milliGRAM(s) Oral daily  budesonide  80 MICROgram(s)/formoterol 4.5 MICROgram(s) Inhaler 2 Puff(s) Inhalation two times a day  chlorhexidine 2% Cloths 1 Application(s) Topical <User Schedule>  chlorhexidine 4% Liquid 1 Application(s) Topical <User Schedule>  enoxaparin Injectable 40 milliGRAM(s) SubCutaneous every 12 hours  ferrous    sulfate 325 milliGRAM(s) Oral daily  metoprolol tartrate 25 milliGRAM(s) Oral two times a day  midodrine. 10 milliGRAM(s) Oral three times a day  multivitamin 1 Tablet(s) Oral daily  pantoprazole  Injectable 40 milliGRAM(s) IV Push daily  sodium bicarbonate 1300 milliGRAM(s) Oral three times a day  sodium chloride 0.9%. 500 milliLiter(s) (50 mL/Hr) IV Continuous <Continuous>  sodium chloride 0.9%. 1000 milliLiter(s) (75 mL/Hr) IV Continuous <Continuous>  sodium chloride 0.9%. 1000 milliLiter(s) (75 mL/Hr) IV Continuous <Continuous>  tiotropium 2.5 MICROgram(s)/olodaterol 2.5 MICROgram(s) (STIOLTO) Inhaler 2 Puff(s) Inhalation daily    MEDICATIONS  (PRN):  acetaminophen     Tablet .. 650 milliGRAM(s) Oral every 6 hours PRN Temp greater or equal to 38C (100.4F), Moderate Pain (4 - 6), Severe Pain (7 - 10)  guaiFENesin Oral Liquid (Sugar-Free) 100 milliGRAM(s) Oral every 6 hours PRN Cough  hydrOXYzine hydrochloride 25 milliGRAM(s) Oral daily PRN Restlessness  melatonin 5 milliGRAM(s) Oral at bedtime PRN Sleep  metoprolol tartrate Injectable 2.5 milliGRAM(s) IV Push every 6 hours PRN for hR > 110  sodium chloride 0.9% lock flush 10 milliLiter(s) IV Push every 1 hour PRN Pre/post blood products, medications, blood draw, and to maintain line patency  sodium chloride 0.9% lock flush 10 milliLiter(s) IV Push every 1 hour PRN Pre/post blood products, medications, blood draw, and to maintain line patency        I&O's Summary    12 Aug 2023 07:01  -  12 Aug 2023 20:56  --------------------------------------------------------  IN: 700 mL / OUT: 300 mL / NET: 400 mL        PHYSICAL EXAM:  Vital Signs Last 24 Hrs  T(C): 36.8 (12 Aug 2023 17:00), Max: 36.8 (12 Aug 2023 17:00)  T(F): 98.3 (12 Aug 2023 17:00), Max: 98.3 (12 Aug 2023 17:00)  HR: 97 (12 Aug 2023 17:00) (87 - 100)  BP: 95/65 (12 Aug 2023 17:00) (95/65 - 118/83)  BP(mean): --  RR: 18 (12 Aug 2023 17:00) (18 - 18)  SpO2: 96% (12 Aug 2023 17:00) (96% - 97%)    Parameters below as of 12 Aug 2023 17:00  Patient On (Oxygen Delivery Method): nasal cannula  O2 Flow (L/min): 2      GENERAL: NAD  HEAD:  Atraumatic, Normocephalic  NECK: Supple, No JVD, Normal thyroid  NERVOUS SYSTEM:  Alert & Oriented X3, Good concentration; Motor Strength 5/5 B/L upper and lower extremities  CHEST/LUNG: Clear to auscultation bilaterally  HEART: Regular rate and rhythm; No murmurs, rubs, or gallops  ABDOMEN: Soft, Nontender, Nondistended; Bowel sounds present  EXTREMITIES:  2+ Peripheral Pulses, bilateral LE edema  SKIN: 1 fluid filled blister on left lower leg, 2 fluid filled blisters on right lower leg, no signs of infection.      LABS:                        8.6    8.86  )-----------( 252      ( 12 Aug 2023 06:06 )             28.2     08-12    127<L>  |  90<L>  |  34.9<H>  ----------------------------<  82  4.2   |  22.0  |  1.98<H>    Ca    8.1<L>      12 Aug 2023 06:06    TPro  5.5<L>  /  Alb  3.0<L>  /  TBili  0.8  /  DBili  x   /  AST  22  /  ALT  26  /  AlkPhos  435<H>  08-12          Urinalysis Basic - ( 12 Aug 2023 06:06 )    Color: x / Appearance: x / SG: x / pH: x  Gluc: 82 mg/dL / Ketone: x  / Bili: x / Urobili: x   Blood: x / Protein: x / Nitrite: x   Leuk Esterase: x / RBC: x / WBC x   Sq Epi: x / Non Sq Epi: x / Bacteria: x        CAPILLARY BLOOD GLUCOSE            RADIOLOGY & ADDITIONAL TESTS:  Results Reviewed:   Imaging Personally Reviewed:  Electrocardiogram Personally Reviewed:

## 2023-08-12 NOTE — PROGRESS NOTE ADULT - ASSESSMENT
83 year old female with PMH of HTN, HLD, CAD s/p stents, Afib (on anticoagulation), CHF, CKD 3b, COPD (on 2L home O2), diverticulosis and stage 3 hemorrhoids presents with melanotic stool. Admitted for hemorrhagic shock s/p pRBC transfusions. EGD on 7/27 without active bleeding, colonoscopy 7/28 unrevealing. Hospital course notable for DANETTE on CKD requiring initiation of hemodialysis on 8/07/23.     -Baseline creatinine ~1.1-1.3mg/dL   -On admission, creatinine was ~1.8mg/dL (secondary to decrease effective circulating volume)   -Improved back to baseline on 07/29/2023   -However started to uptrend again on 08/01/2023 (suspecting from again decrease effective circulating volume - notable for hypotensive episodes)    -UA 30 protein, negative blood, 0-2 RBC, 6-10 WBC, moderate bacteria, moderate squamous cells   -Urine sodium < 30   -UPCR 1g/g    -Abdominal ultrasound showed right kidney 10.5 cm + left kidney: 10.3 cm; no hydronephrosis or calculi  -Required hemodialysis 8/07-8/09  -Suspecting partial renal recovery at this time - will hold off on dialysis today and re-assess need for HD based on a.m. labs  -Would maintain R IJ non tunneled dialysis catheter for time being    -BP - continue midodrine 10mg TID (to further decrease as needed based on trend of blood pressures)  -Afib - on metoprolol PO + amiodarone PO     Ericka Monzon DO  Nephrology

## 2023-08-12 NOTE — PROGRESS NOTE ADULT - SUBJECTIVE AND OBJECTIVE BOX
Complained of productive cough. Denied SOB.    Vital Signs Last 24 Hrs  T(C): 36.7 (12 Aug 2023 10:54), Max: 36.7 (12 Aug 2023 10:54)  T(F): 98 (12 Aug 2023 10:54), Max: 98 (12 Aug 2023 10:54)  HR: 87 (12 Aug 2023 10:54) (87 - 100)  BP: 118/83 (12 Aug 2023 10:54) (92/62 - 118/83)  BP(mean): --  RR: 18 (12 Aug 2023 05:00) (18 - 18)  SpO2: 96% (12 Aug 2023 10:54) (96% - 97%)    Parameters below as of 12 Aug 2023 10:54  Patient On (Oxygen Delivery Method): nasal cannula    I&O's Summary    Physical Exam  General: Obese female in NAD, on nasal cannula  HEENT: Normocephalic  Cardiac: S1S2 RRR  Respiratory: Diminished breath sounds b/l  Abdomen: Soft, NT  Extremities: 2+ pitting edema of b/l lower extremities, +bullae to b/l LE  Neuro: AAOx3  Psych: Calm   Access: R IJ non tunneled dialysis catheter     08-12    127<L>  |  90<L>  |  34.9<H>  ----------------------------<  82  4.2   |  22.0  |  1.98<H>    Ca    8.1<L>      12 Aug 2023 06:06    TPro  5.5<L>  /  Alb  3.0<L>  /  TBili  0.8  /  DBili  x   /  AST  22  /  ALT  26  /  AlkPhos  435<H>  08-12                        8.6    8.86  )-----------( 252      ( 12 Aug 2023 06:06 )             28.2     MEDICATIONS  (STANDING):  aMIOdarone    Tablet 200 milliGRAM(s) Oral daily  aspirin  chewable 81 milliGRAM(s) Oral daily  budesonide  80 MICROgram(s)/formoterol 4.5 MICROgram(s) Inhaler 2 Puff(s) Inhalation two times a day  chlorhexidine 2% Cloths 1 Application(s) Topical <User Schedule>  chlorhexidine 4% Liquid 1 Application(s) Topical <User Schedule>  enoxaparin Injectable 40 milliGRAM(s) SubCutaneous every 12 hours  ferrous    sulfate 325 milliGRAM(s) Oral daily  metoprolol tartrate 25 milliGRAM(s) Oral two times a day  midodrine. 10 milliGRAM(s) Oral three times a day  multivitamin 1 Tablet(s) Oral daily  pantoprazole  Injectable 40 milliGRAM(s) IV Push daily  sodium bicarbonate 1300 milliGRAM(s) Oral three times a day  sodium chloride 0.9%. 1000 milliLiter(s) (75 mL/Hr) IV Continuous <Continuous>  sodium chloride 0.9%. 1000 milliLiter(s) (75 mL/Hr) IV Continuous <Continuous>  sodium chloride 0.9%. 500 milliLiter(s) (50 mL/Hr) IV Continuous <Continuous>  tiotropium 2.5 MICROgram(s)/olodaterol 2.5 MICROgram(s) (STIOLTO) Inhaler 2 Puff(s) Inhalation daily    MEDICATIONS  (PRN):  acetaminophen     Tablet .. 650 milliGRAM(s) Oral every 6 hours PRN Temp greater or equal to 38C (100.4F), Moderate Pain (4 - 6), Severe Pain (7 - 10)  guaiFENesin Oral Liquid (Sugar-Free) 100 milliGRAM(s) Oral every 6 hours PRN Cough  hydrOXYzine hydrochloride 25 milliGRAM(s) Oral daily PRN Restlessness  melatonin 5 milliGRAM(s) Oral at bedtime PRN Sleep  metoprolol tartrate Injectable 2.5 milliGRAM(s) IV Push every 6 hours PRN for hR > 110  sodium chloride 0.9% lock flush 10 milliLiter(s) IV Push every 1 hour PRN Pre/post blood products, medications, blood draw, and to maintain line patency  sodium chloride 0.9% lock flush 10 milliLiter(s) IV Push every 1 hour PRN Pre/post blood products, medications, blood draw, and to maintain line patency

## 2023-08-13 LAB
ANION GAP SERPL CALC-SCNC: 15 MMOL/L — SIGNIFICANT CHANGE UP (ref 5–17)
BUN SERPL-MCNC: 40.2 MG/DL — HIGH (ref 8–20)
CALCIUM SERPL-MCNC: 8 MG/DL — LOW (ref 8.4–10.5)
CHLORIDE SERPL-SCNC: 88 MMOL/L — LOW (ref 96–108)
CO2 SERPL-SCNC: 24 MMOL/L — SIGNIFICANT CHANGE UP (ref 22–29)
CREAT SERPL-MCNC: 2.14 MG/DL — HIGH (ref 0.5–1.3)
EGFR: 22 ML/MIN/1.73M2 — LOW
GLUCOSE SERPL-MCNC: 98 MG/DL — SIGNIFICANT CHANGE UP (ref 70–99)
HCT VFR BLD CALC: 29.9 % — LOW (ref 34.5–45)
HGB BLD-MCNC: 9 G/DL — LOW (ref 11.5–15.5)
MCHC RBC-ENTMCNC: 28.3 PG — SIGNIFICANT CHANGE UP (ref 27–34)
MCHC RBC-ENTMCNC: 30.1 GM/DL — LOW (ref 32–36)
MCV RBC AUTO: 94 FL — SIGNIFICANT CHANGE UP (ref 80–100)
PLATELET # BLD AUTO: 268 K/UL — SIGNIFICANT CHANGE UP (ref 150–400)
POTASSIUM SERPL-MCNC: 4.1 MMOL/L — SIGNIFICANT CHANGE UP (ref 3.5–5.3)
POTASSIUM SERPL-SCNC: 4.1 MMOL/L — SIGNIFICANT CHANGE UP (ref 3.5–5.3)
RBC # BLD: 3.18 M/UL — LOW (ref 3.8–5.2)
RBC # FLD: 17 % — HIGH (ref 10.3–14.5)
SODIUM SERPL-SCNC: 127 MMOL/L — LOW (ref 135–145)
WBC # BLD: 7.97 K/UL — SIGNIFICANT CHANGE UP (ref 3.8–10.5)
WBC # FLD AUTO: 7.97 K/UL — SIGNIFICANT CHANGE UP (ref 3.8–10.5)

## 2023-08-13 PROCEDURE — 99233 SBSQ HOSP IP/OBS HIGH 50: CPT

## 2023-08-13 PROCEDURE — 99232 SBSQ HOSP IP/OBS MODERATE 35: CPT

## 2023-08-13 RX ADMIN — Medication 1 TABLET(S): at 13:04

## 2023-08-13 RX ADMIN — MIDODRINE HYDROCHLORIDE 10 MILLIGRAM(S): 2.5 TABLET ORAL at 06:27

## 2023-08-13 RX ADMIN — Medication 81 MILLIGRAM(S): at 13:03

## 2023-08-13 RX ADMIN — CHLORHEXIDINE GLUCONATE 1 APPLICATION(S): 213 SOLUTION TOPICAL at 06:28

## 2023-08-13 RX ADMIN — BUDESONIDE AND FORMOTEROL FUMARATE DIHYDRATE 2 PUFF(S): 160; 4.5 AEROSOL RESPIRATORY (INHALATION) at 09:08

## 2023-08-13 RX ADMIN — ENOXAPARIN SODIUM 40 MILLIGRAM(S): 100 INJECTION SUBCUTANEOUS at 06:27

## 2023-08-13 RX ADMIN — MIDODRINE HYDROCHLORIDE 10 MILLIGRAM(S): 2.5 TABLET ORAL at 13:04

## 2023-08-13 RX ADMIN — Medication 25 MILLIGRAM(S): at 19:29

## 2023-08-13 RX ADMIN — Medication 1300 MILLIGRAM(S): at 13:04

## 2023-08-13 RX ADMIN — Medication 25 MILLIGRAM(S): at 06:27

## 2023-08-13 RX ADMIN — Medication 1300 MILLIGRAM(S): at 21:47

## 2023-08-13 RX ADMIN — TIOTROPIUM BROMIDE AND OLODATEROL 2 PUFF(S): 3.124; 2.736 SPRAY, METERED RESPIRATORY (INHALATION) at 09:09

## 2023-08-13 RX ADMIN — MIDODRINE HYDROCHLORIDE 10 MILLIGRAM(S): 2.5 TABLET ORAL at 18:12

## 2023-08-13 RX ADMIN — BUDESONIDE AND FORMOTEROL FUMARATE DIHYDRATE 2 PUFF(S): 160; 4.5 AEROSOL RESPIRATORY (INHALATION) at 22:12

## 2023-08-13 RX ADMIN — Medication 1300 MILLIGRAM(S): at 06:28

## 2023-08-13 RX ADMIN — PANTOPRAZOLE SODIUM 40 MILLIGRAM(S): 20 TABLET, DELAYED RELEASE ORAL at 13:04

## 2023-08-13 RX ADMIN — Medication 5 MILLIGRAM(S): at 21:47

## 2023-08-13 RX ADMIN — Medication 325 MILLIGRAM(S): at 13:04

## 2023-08-13 RX ADMIN — CHLORHEXIDINE GLUCONATE 1 APPLICATION(S): 213 SOLUTION TOPICAL at 06:27

## 2023-08-13 RX ADMIN — Medication 25 MILLIGRAM(S): at 23:15

## 2023-08-13 RX ADMIN — ENOXAPARIN SODIUM 40 MILLIGRAM(S): 100 INJECTION SUBCUTANEOUS at 18:12

## 2023-08-13 RX ADMIN — AMIODARONE HYDROCHLORIDE 200 MILLIGRAM(S): 400 TABLET ORAL at 06:28

## 2023-08-13 NOTE — PROGRESS NOTE ADULT - ASSESSMENT
83 year old female with PMH of HTN, HLD, CAD s/p stents, Afib (on anticoagulation), CHF, CKD 3b, COPD (on 2L home O2), diverticulosis and stage 3 hemorrhoids presents with melanotic stool. Admitted for hemorrhagic shock s/p pRBC transfusions. EGD on 7/27 without active bleeding, colonoscopy 7/28 unrevealing. Hospital course notable for DANETTE on CKD requiring initiation of hemodialysis on 8/07/23.     -Baseline creatinine ~1.1-1.3mg/dL   -On admission, creatinine was ~1.8mg/dL (secondary to decrease effective circulating volume)   -Improved back to baseline on 07/29/2023   -However started to uptrend again on 08/01/2023 (suspecting from again decrease effective circulating volume - notable for hypotensive episodes)    -UA 30 protein, negative blood, 0-2 RBC, 6-10 WBC, moderate bacteria, moderate squamous cells   -Urine sodium < 30   -UPCR 1g/g    -Abdominal ultrasound showed right kidney 10.5 cm + left kidney: 10.3 cm; no hydronephrosis or calculi  -Required hemodialysis 8/07-8/09  -Improving urine output (told verbally by staff; not yet documented) - will continue to monitor off dialysis and reassess need with a.m. labs   -Would maintain R IJ non tunneled dialysis catheter for time being    -BP - continue midodrine 10mg TID   -Volume status: +peripheral edema - recommend conservative management with ACE wraps   -Afib - on metoprolol PO + amiodarone PO     Ericka Monzon DO  Nephrology

## 2023-08-13 NOTE — PROGRESS NOTE ADULT - ASSESSMENT
82 y/o female with PMH COPD on 2L home O2, HFpEF, CAD s/p PCI, HTN, HLD, pAF on rivaroxaban, diverticulosis, hemorrhoids presented with generalized weakness/dark stools admitted anemia, GIB without active bleed on ct, received 3U PRBC, and IVF however remained hypotensive requiring vasopressor therapy for hemorrhagic shock.  EGD on 7/27 and colonoscopy 7/28 without active bleed. Patient also with DANETTE and Afib with RVR, resumed back on amio and bb .  HR now better controlled. Plan was for ct enterography, but given DANETTE on CKD, it was cancelled. Patient downgraded to medicine 7/30/23. S/p Intermittent HD for worsening kidney functions. Nephrology continues to monitor, no HD this AM. plan for Capsule endoscopy as an outpatient on DC.     DANETTE on CKD III/Metabolic acidosis (Resolving)  Hyponatremia  Renal hypoperfusion, episodic  - f/up renal recs - started HD for 3 days 8/7,8,9  - S/p IVF, sodium bicarb discontinued as per renal recs  - s/p Lasix and Albumin 8/3 and 8/4 as per renal recs  - Spoke with nephrology- will monitor further improving in crea/GFR off HD for now, Cr mildly uptrended this AM however patient continues to have adequate UO, staff encouraged to continue Strict I/O monitoring   - Strict monitoring of I/Os to assess renal recovery  - monitor BMP    acute GIB / acute blood loss anemia /  hemorrhagic shock (Resolved)  - hx of GIB in past   - cta neg without active bleed on ct on admission   - s/p EGD on 7/27 without active bleeding  - s/p colonoscopy 7/28 no active bleed   - now off pressors, started on midodrine, increased to 15mg TID, monitoring BP closely   - c/w PPIs   - ct enterography - cancelled given elevated creatinine and no further bleeding  - plan for capsule endoscopy as op   - gi following   - Diet as tolerated   - resumed aspirin as per GI  - tolerating Lovenox - can transition to XARELTO on discharge if hb stable, CBC stable    HFpEF  Echo noted with Grade III diastolic dysfunction and severe pulm HTN  S/p Lasix  bilateral lower ext ACE wrap     copd  c/w inhalers  C/w Symbicort BID   C/w tiotropium  C/w o2     Paroxysmal A-Fib   Hypotension, asymptomatic  - resumed on amio and bb  - adjust bb dose for better hr control as needed  - prn iv lopressor for hr > 110  - aspirin  - raised Midodrine dose for improved BP currently 10 TID   - lowered metoprolol dose  - started lovenox 8/2/23, no bleeding noted, will start Xarelto on dc    HLD  - monitor CMP   - hold statin due to transaminitis    Transaminitis (resolving)  - statin held  - LFT decreasing   - discussed with cardio, cont amiodarone for now since pt has been on it for long time  - Abdo U/S shows cholelithiasis and small right pleural effusion and Hep panel negative       dvt ppx : trial of Lovenox  GI cleared for Xarelto resumption on discharge  Dispo: Monitoring DANETTE for renal recovery.   Plan discussed with patient at bedside.

## 2023-08-13 NOTE — PROGRESS NOTE ADULT - SUBJECTIVE AND OBJECTIVE BOX
[facility] Division of Hospital Medicine    Chief Complaint:      SUBJECTIVE / OVERNIGHT EVENTS:    Patient seen and examined at bedside, no acute events overnight, patient states her legs feel heavier today and are a bit numb, continues to mention low back pain chronic in nature. Reportedly has improvement in Urine, case discussed with Dr. Monzon, will continue to monitor off HD at this time due reported improvement in output.     MEDICATIONS  (STANDING):  aMIOdarone    Tablet 200 milliGRAM(s) Oral daily  aspirin  chewable 81 milliGRAM(s) Oral daily  budesonide  80 MICROgram(s)/formoterol 4.5 MICROgram(s) Inhaler 2 Puff(s) Inhalation two times a day  chlorhexidine 2% Cloths 1 Application(s) Topical <User Schedule>  chlorhexidine 4% Liquid 1 Application(s) Topical <User Schedule>  enoxaparin Injectable 40 milliGRAM(s) SubCutaneous every 12 hours  ferrous    sulfate 325 milliGRAM(s) Oral daily  metoprolol tartrate 25 milliGRAM(s) Oral two times a day  midodrine. 10 milliGRAM(s) Oral three times a day  multivitamin 1 Tablet(s) Oral daily  pantoprazole  Injectable 40 milliGRAM(s) IV Push daily  sodium bicarbonate 1300 milliGRAM(s) Oral three times a day  sodium chloride 0.9%. 500 milliLiter(s) (50 mL/Hr) IV Continuous <Continuous>  sodium chloride 0.9%. 1000 milliLiter(s) (75 mL/Hr) IV Continuous <Continuous>  sodium chloride 0.9%. 1000 milliLiter(s) (75 mL/Hr) IV Continuous <Continuous>  tiotropium 2.5 MICROgram(s)/olodaterol 2.5 MICROgram(s) (STIOLTO) Inhaler 2 Puff(s) Inhalation daily    MEDICATIONS  (PRN):  acetaminophen     Tablet .. 650 milliGRAM(s) Oral every 6 hours PRN Temp greater or equal to 38C (100.4F), Moderate Pain (4 - 6), Severe Pain (7 - 10)  guaiFENesin Oral Liquid (Sugar-Free) 100 milliGRAM(s) Oral every 6 hours PRN Cough  hydrOXYzine hydrochloride 25 milliGRAM(s) Oral daily PRN Restlessness  melatonin 5 milliGRAM(s) Oral at bedtime PRN Sleep  metoprolol tartrate Injectable 2.5 milliGRAM(s) IV Push every 6 hours PRN for hR > 110  sodium chloride 0.9% lock flush 10 milliLiter(s) IV Push every 1 hour PRN Pre/post blood products, medications, blood draw, and to maintain line patency  sodium chloride 0.9% lock flush 10 milliLiter(s) IV Push every 1 hour PRN Pre/post blood products, medications, blood draw, and to maintain line patency        I&O's Summary    12 Aug 2023 07:01  -  13 Aug 2023 07:00  --------------------------------------------------------  IN: 700 mL / OUT: 300 mL / NET: 400 mL        PHYSICAL EXAM:  Vital Signs Last 24 Hrs  T(C): 36.3 (13 Aug 2023 08:56), Max: 36.9 (13 Aug 2023 04:50)  T(F): 97.3 (13 Aug 2023 08:56), Max: 98.5 (13 Aug 2023 04:50)  HR: 93 (13 Aug 2023 08:56) (93 - 108)  BP: 99/64 (13 Aug 2023 08:56) (95/65 - 102/66)  BP(mean): 77 (12 Aug 2023 20:00) (77 - 77)  RR: 18 (13 Aug 2023 08:56) (18 - 18)  SpO2: 96% (13 Aug 2023 08:56) (94% - 96%)    Parameters below as of 13 Aug 2023 08:56  Patient On (Oxygen Delivery Method): nasal cannula  O2 Flow (L/min): 2    General: Obese female in NAD, on nasal cannula  HEENT: Normocephalic  Cardiac: S1S2 RRR  Respiratory: Diminished breath sounds b/l  Abdomen: Soft, NT  Extremities: 2+ pitting edema of b/l lower extremities, +bullae to b/l LE  Msk: patient with 3/5 strength in B/L LEs   Neuro: AAOx3, sensation intact in LE's, though patient states slightly more dull than UEs  Psych: Calm   Access: R IJ non tunneled dialysis catheter    LABS:                        9.0    7.97  )-----------( 268      ( 13 Aug 2023 07:59 )             29.9     08-13    127<L>  |  88<L>  |  40.2<H>  ----------------------------<  98  4.1   |  24.0  |  2.14<H>    Ca    8.0<L>      13 Aug 2023 07:59    TPro  5.5<L>  /  Alb  3.0<L>  /  TBili  0.8  /  DBili  x   /  AST  22  /  ALT  26  /  AlkPhos  435<H>  08-12          Urinalysis Basic - ( 13 Aug 2023 07:59 )    Color: x / Appearance: x / SG: x / pH: x  Gluc: 98 mg/dL / Ketone: x  / Bili: x / Urobili: x   Blood: x / Protein: x / Nitrite: x   Leuk Esterase: x / RBC: x / WBC x   Sq Epi: x / Non Sq Epi: x / Bacteria: x        CAPILLARY BLOOD GLUCOSE            RADIOLOGY & ADDITIONAL TESTS:  Results Reviewed:   Imaging Personally Reviewed:  Electrocardiogram Personally Reviewed:

## 2023-08-13 NOTE — PROGRESS NOTE ADULT - SUBJECTIVE AND OBJECTIVE BOX
Cr is up trending. However patient/staff reports improvement to urine output.     Vital Signs Last 24 Hrs  T(C): 36.3 (13 Aug 2023 08:56), Max: 36.9 (13 Aug 2023 04:50)  T(F): 97.3 (13 Aug 2023 08:56), Max: 98.5 (13 Aug 2023 04:50)  HR: 93 (13 Aug 2023 08:56) (93 - 108)  BP: 99/64 (13 Aug 2023 08:56) (95/65 - 102/66)  BP(mean): 77 (12 Aug 2023 20:00) (77 - 77)  RR: 18 (13 Aug 2023 08:56) (18 - 18)  SpO2: 96% (13 Aug 2023 08:56) (94% - 96%)    Parameters below as of 13 Aug 2023 08:56  Patient On (Oxygen Delivery Method): nasal cannula  O2 Flow (L/min): 2    Physical Exam  General: Obese female in NAD, on nasal cannula  HEENT: Normocephalic  Cardiac: S1S2 RRR  Respiratory: Diminished breath sounds b/l  Abdomen: Soft, NT  Extremities: 2+ pitting edema of b/l lower extremities, +bullae to b/l LE  Neuro: AAOx3  Psych: Calm   Access: R IJ non tunneled dialysis catheter    08-13    127<L>  |  88<L>  |  40.2<H>  ----------------------------<  98  4.1   |  24.0  |  2.14<H>    Ca    8.0<L>      13 Aug 2023 07:59    TPro  5.5<L>  /  Alb  3.0<L>  /  TBili  0.8  /  DBili  x   /  AST  22  /  ALT  26  /  AlkPhos  435<H>  08-12                        9.0    7.97  )-----------( 268      ( 13 Aug 2023 07:59 )             29.9     MEDICATIONS  (STANDING):  aMIOdarone    Tablet 200 milliGRAM(s) Oral daily  aspirin  chewable 81 milliGRAM(s) Oral daily  budesonide  80 MICROgram(s)/formoterol 4.5 MICROgram(s) Inhaler 2 Puff(s) Inhalation two times a day  chlorhexidine 2% Cloths 1 Application(s) Topical <User Schedule>  chlorhexidine 4% Liquid 1 Application(s) Topical <User Schedule>  enoxaparin Injectable 40 milliGRAM(s) SubCutaneous every 12 hours  ferrous    sulfate 325 milliGRAM(s) Oral daily  metoprolol tartrate 25 milliGRAM(s) Oral two times a day  midodrine. 10 milliGRAM(s) Oral three times a day  multivitamin 1 Tablet(s) Oral daily  pantoprazole  Injectable 40 milliGRAM(s) IV Push daily  sodium bicarbonate 1300 milliGRAM(s) Oral three times a day  sodium chloride 0.9%. 1000 milliLiter(s) (75 mL/Hr) IV Continuous <Continuous>  sodium chloride 0.9%. 1000 milliLiter(s) (75 mL/Hr) IV Continuous <Continuous>  sodium chloride 0.9%. 500 milliLiter(s) (50 mL/Hr) IV Continuous <Continuous>  tiotropium 2.5 MICROgram(s)/olodaterol 2.5 MICROgram(s) (STIOLTO) Inhaler 2 Puff(s) Inhalation daily    MEDICATIONS  (PRN):  acetaminophen     Tablet .. 650 milliGRAM(s) Oral every 6 hours PRN Temp greater or equal to 38C (100.4F), Moderate Pain (4 - 6), Severe Pain (7 - 10)  guaiFENesin Oral Liquid (Sugar-Free) 100 milliGRAM(s) Oral every 6 hours PRN Cough  hydrOXYzine hydrochloride 25 milliGRAM(s) Oral daily PRN Restlessness  melatonin 5 milliGRAM(s) Oral at bedtime PRN Sleep  metoprolol tartrate Injectable 2.5 milliGRAM(s) IV Push every 6 hours PRN for hR > 110  sodium chloride 0.9% lock flush 10 milliLiter(s) IV Push every 1 hour PRN Pre/post blood products, medications, blood draw, and to maintain line patency  sodium chloride 0.9% lock flush 10 milliLiter(s) IV Push every 1 hour PRN Pre/post blood products, medications, blood draw, and to maintain line patency

## 2023-08-14 LAB
ALBUMIN SERPL ELPH-MCNC: 3.1 G/DL — LOW (ref 3.3–5.2)
ALP SERPL-CCNC: 405 U/L — HIGH (ref 40–120)
ALT FLD-CCNC: 21 U/L — SIGNIFICANT CHANGE UP
ANION GAP SERPL CALC-SCNC: 15 MMOL/L — SIGNIFICANT CHANGE UP (ref 5–17)
AST SERPL-CCNC: 18 U/L — SIGNIFICANT CHANGE UP
BILIRUB SERPL-MCNC: 1.1 MG/DL — SIGNIFICANT CHANGE UP (ref 0.4–2)
BUN SERPL-MCNC: 47.4 MG/DL — HIGH (ref 8–20)
CALCIUM SERPL-MCNC: 8.4 MG/DL — SIGNIFICANT CHANGE UP (ref 8.4–10.5)
CHLORIDE SERPL-SCNC: 89 MMOL/L — LOW (ref 96–108)
CO2 SERPL-SCNC: 25 MMOL/L — SIGNIFICANT CHANGE UP (ref 22–29)
CREAT SERPL-MCNC: 2.69 MG/DL — HIGH (ref 0.5–1.3)
EGFR: 17 ML/MIN/1.73M2 — LOW
GLUCOSE SERPL-MCNC: 95 MG/DL — SIGNIFICANT CHANGE UP (ref 70–99)
HCT VFR BLD CALC: 31.2 % — LOW (ref 34.5–45)
HGB BLD-MCNC: 9.5 G/DL — LOW (ref 11.5–15.5)
MCHC RBC-ENTMCNC: 27.9 PG — SIGNIFICANT CHANGE UP (ref 27–34)
MCHC RBC-ENTMCNC: 30.4 GM/DL — LOW (ref 32–36)
MCV RBC AUTO: 91.5 FL — SIGNIFICANT CHANGE UP (ref 80–100)
PLATELET # BLD AUTO: 348 K/UL — SIGNIFICANT CHANGE UP (ref 150–400)
POTASSIUM SERPL-MCNC: 4.7 MMOL/L — SIGNIFICANT CHANGE UP (ref 3.5–5.3)
POTASSIUM SERPL-SCNC: 4.7 MMOL/L — SIGNIFICANT CHANGE UP (ref 3.5–5.3)
PROT SERPL-MCNC: 5.6 G/DL — LOW (ref 6.6–8.7)
RBC # BLD: 3.41 M/UL — LOW (ref 3.8–5.2)
RBC # FLD: 17.2 % — HIGH (ref 10.3–14.5)
SODIUM SERPL-SCNC: 129 MMOL/L — LOW (ref 135–145)
WBC # BLD: 9.33 K/UL — SIGNIFICANT CHANGE UP (ref 3.8–10.5)
WBC # FLD AUTO: 9.33 K/UL — SIGNIFICANT CHANGE UP (ref 3.8–10.5)

## 2023-08-14 PROCEDURE — 99233 SBSQ HOSP IP/OBS HIGH 50: CPT

## 2023-08-14 PROCEDURE — 99232 SBSQ HOSP IP/OBS MODERATE 35: CPT

## 2023-08-14 RX ADMIN — Medication 81 MILLIGRAM(S): at 17:38

## 2023-08-14 RX ADMIN — MIDODRINE HYDROCHLORIDE 10 MILLIGRAM(S): 2.5 TABLET ORAL at 05:38

## 2023-08-14 RX ADMIN — AMIODARONE HYDROCHLORIDE 200 MILLIGRAM(S): 400 TABLET ORAL at 05:39

## 2023-08-14 RX ADMIN — Medication 5 MILLIGRAM(S): at 21:43

## 2023-08-14 RX ADMIN — TIOTROPIUM BROMIDE AND OLODATEROL 2 PUFF(S): 3.124; 2.736 SPRAY, METERED RESPIRATORY (INHALATION) at 09:13

## 2023-08-14 RX ADMIN — Medication 1300 MILLIGRAM(S): at 13:18

## 2023-08-14 RX ADMIN — Medication 1300 MILLIGRAM(S): at 05:39

## 2023-08-14 RX ADMIN — BUDESONIDE AND FORMOTEROL FUMARATE DIHYDRATE 2 PUFF(S): 160; 4.5 AEROSOL RESPIRATORY (INHALATION) at 09:14

## 2023-08-14 RX ADMIN — CHLORHEXIDINE GLUCONATE 1 APPLICATION(S): 213 SOLUTION TOPICAL at 05:39

## 2023-08-14 RX ADMIN — Medication 25 MILLIGRAM(S): at 05:39

## 2023-08-14 RX ADMIN — Medication 1 TABLET(S): at 17:38

## 2023-08-14 RX ADMIN — PANTOPRAZOLE SODIUM 40 MILLIGRAM(S): 20 TABLET, DELAYED RELEASE ORAL at 17:38

## 2023-08-14 RX ADMIN — ENOXAPARIN SODIUM 40 MILLIGRAM(S): 100 INJECTION SUBCUTANEOUS at 05:38

## 2023-08-14 RX ADMIN — ENOXAPARIN SODIUM 40 MILLIGRAM(S): 100 INJECTION SUBCUTANEOUS at 17:38

## 2023-08-14 RX ADMIN — Medication 325 MILLIGRAM(S): at 17:37

## 2023-08-14 RX ADMIN — Medication 1300 MILLIGRAM(S): at 21:36

## 2023-08-14 RX ADMIN — Medication 25 MILLIGRAM(S): at 17:38

## 2023-08-14 RX ADMIN — MIDODRINE HYDROCHLORIDE 10 MILLIGRAM(S): 2.5 TABLET ORAL at 12:14

## 2023-08-14 RX ADMIN — MIDODRINE HYDROCHLORIDE 10 MILLIGRAM(S): 2.5 TABLET ORAL at 17:37

## 2023-08-14 NOTE — PROGRESS NOTE ADULT - SUBJECTIVE AND OBJECTIVE BOX
[facility] Division of Hospital Medicine    Chief Complaint:      SUBJECTIVE / OVERNIGHT EVENTS:    Patient seen and examined at bedside, no acute events overnight, patient denies any new complaints, continues to endorse mild numbness in LE's B/L, and persistent swelling improved with ACE wraps over B/L LE's. Cr increased this AM, UO unclear overnight, however will monitor closely today for possible need for repeat HD.     MEDICATIONS  (STANDING):  aMIOdarone    Tablet 200 milliGRAM(s) Oral daily  aspirin  chewable 81 milliGRAM(s) Oral daily  budesonide  80 MICROgram(s)/formoterol 4.5 MICROgram(s) Inhaler 2 Puff(s) Inhalation two times a day  chlorhexidine 2% Cloths 1 Application(s) Topical <User Schedule>  chlorhexidine 4% Liquid 1 Application(s) Topical <User Schedule>  enoxaparin Injectable 40 milliGRAM(s) SubCutaneous every 12 hours  ferrous    sulfate 325 milliGRAM(s) Oral daily  metoprolol tartrate 25 milliGRAM(s) Oral two times a day  midodrine. 10 milliGRAM(s) Oral three times a day  multivitamin 1 Tablet(s) Oral daily  pantoprazole  Injectable 40 milliGRAM(s) IV Push daily  sodium bicarbonate 1300 milliGRAM(s) Oral three times a day  sodium chloride 0.9%. 1000 milliLiter(s) (75 mL/Hr) IV Continuous <Continuous>  sodium chloride 0.9%. 500 milliLiter(s) (50 mL/Hr) IV Continuous <Continuous>  sodium chloride 0.9%. 1000 milliLiter(s) (75 mL/Hr) IV Continuous <Continuous>  tiotropium 2.5 MICROgram(s)/olodaterol 2.5 MICROgram(s) (STIOLTO) Inhaler 2 Puff(s) Inhalation daily    MEDICATIONS  (PRN):  acetaminophen     Tablet .. 650 milliGRAM(s) Oral every 6 hours PRN Temp greater or equal to 38C (100.4F), Moderate Pain (4 - 6), Severe Pain (7 - 10)  guaiFENesin Oral Liquid (Sugar-Free) 100 milliGRAM(s) Oral every 6 hours PRN Cough  hydrOXYzine hydrochloride 25 milliGRAM(s) Oral daily PRN Restlessness  melatonin 5 milliGRAM(s) Oral at bedtime PRN Sleep  metoprolol tartrate Injectable 2.5 milliGRAM(s) IV Push every 6 hours PRN for hR > 110  sodium chloride 0.9% lock flush 10 milliLiter(s) IV Push every 1 hour PRN Pre/post blood products, medications, blood draw, and to maintain line patency  sodium chloride 0.9% lock flush 10 milliLiter(s) IV Push every 1 hour PRN Pre/post blood products, medications, blood draw, and to maintain line patency        I&O's Summary    13 Aug 2023 07:01  -  14 Aug 2023 07:00  --------------------------------------------------------  IN: 637 mL / OUT: 450 mL / NET: 187 mL    14 Aug 2023 07:01  -  14 Aug 2023 14:47  --------------------------------------------------------  IN: 0 mL / OUT: 10 mL / NET: -10 mL        PHYSICAL EXAM:  Vital Signs Last 24 Hrs  T(C): 36.4 (14 Aug 2023 10:49), Max: 36.7 (13 Aug 2023 20:00)  T(F): 97.6 (14 Aug 2023 10:49), Max: 98.1 (13 Aug 2023 20:00)  HR: 98 (14 Aug 2023 10:49) (86 - 98)  BP: 96/65 (14 Aug 2023 10:49) (88/59 - 103/64)  BP(mean): --  RR: 18 (14 Aug 2023 10:49) (18 - 18)  SpO2: 98% (14 Aug 2023 10:49) (95% - 98%)    Parameters below as of 14 Aug 2023 10:49  Patient On (Oxygen Delivery Method): nasal cannula  O2 Flow (L/min): 2    General: Obese female in NAD, on nasal cannula  HEENT: Normocephalic  Cardiac: S1S2 RRR  Respiratory: Diminished breath sounds b/l  Abdomen: Soft, NT  Extremities: 2+ pitting edema of b/l lower extremities, +bullae to b/l LE, now ACE Wraps in place B/L   Msk: patient with 3/5 strength in B/L LEs   Neuro: AAOx3, sensation intact in LE's, though patient states slightly more dull than UEs  Psych: Calm   Access: R IJ non tunneled dialysis catheter    LABS:                        9.5    9.33  )-----------( 348      ( 14 Aug 2023 05:50 )             31.2     08-14    129<L>  |  89<L>  |  47.4<H>  ----------------------------<  95  4.7   |  25.0  |  2.69<H>    Ca    8.4      14 Aug 2023 05:50    TPro  5.6<L>  /  Alb  3.1<L>  /  TBili  1.1  /  DBili  x   /  AST  18  /  ALT  21  /  AlkPhos  405<H>  08-14          Urinalysis Basic - ( 14 Aug 2023 05:50 )    Color: x / Appearance: x / SG: x / pH: x  Gluc: 95 mg/dL / Ketone: x  / Bili: x / Urobili: x   Blood: x / Protein: x / Nitrite: x   Leuk Esterase: x / RBC: x / WBC x   Sq Epi: x / Non Sq Epi: x / Bacteria: x        CAPILLARY BLOOD GLUCOSE            RADIOLOGY & ADDITIONAL TESTS:  Results Reviewed:   Imaging Personally Reviewed:  Electrocardiogram Personally Reviewed:

## 2023-08-14 NOTE — PROGRESS NOTE ADULT - ASSESSMENT
83 year old female with PMH of HTN, HLD, CAD s/p stents, Afib (on anticoagulation), CHF, CKD 3b, COPD (on 2L home O2), diverticulosis and stage 3 hemorrhoids presents with melanotic stool. Admitted for hemorrhagic shock s/p pRBC transfusions. EGD on 7/27 without active bleeding, colonoscopy 7/28 unrevealing. Hospital course notable for STEPHEN on CKD requiring initiation of hemodialysis on 8/07/23.     Stephen on CKD stage III  Baseline creatinine ~1.1-1.3mg/dL   Hemodynamic ATN_ required HD on 08/07-08/09 via RIJ non tdc  Monitoring for renal recovery; pt is non oliguric, SCr however trending up  Will follow closely for need of HD- maintain R IJ non tunneled dialysis catheter for time being   Monitor strict I/o's     BP low - continue midodrine 10mg TID     Metabolic acidosis  Continue sodium bicarb    -Afib - on metoprolol PO + amiodarone PO

## 2023-08-14 NOTE — PROGRESS NOTE ADULT - ASSESSMENT
82 y/o female with PMH COPD on 2L home O2, HFpEF, CAD s/p PCI, HTN, HLD, pAF on rivaroxaban, diverticulosis, hemorrhoids presented with generalized weakness/dark stools admitted anemia, GIB without active bleed on ct, received 3U PRBC, and IVF however remained hypotensive requiring vasopressor therapy for hemorrhagic shock.  EGD on 7/27 and colonoscopy 7/28 without active bleed. Patient also with DANETTE and Afib with RVR, resumed back on amio and bb .  HR now better controlled. Plan was for ct enterography, but given DANETTE on CKD, it was cancelled. Patient downgraded to medicine 7/30/23. S/p Intermittent HD for worsening kidney functions. Nephrology continues to monitor, no HD this AM however if UO declines may need. plan for Capsule endoscopy as an outpatient on DC.     DANETTE on CKD III/Metabolic acidosis (Resolving)  Hyponatremia  Renal hypoperfusion, episodic  - f/up renal recs - started HD for 3 days 8/7,8,9  - S/p IVF, sodium bicarb discontinued as per renal recs  - s/p Lasix and Albumin 8/3 and 8/4 as per renal recs  - Spoke with nephrology- will monitor further improving in crea/GFR off HD for now, Cr continues to uptrend this AM however patient states she urinated well overnight, staff encouraged to continue Strict I/O monitoring, hat placed in commode for proper collection / documentation, 1 ep of incontinence in bed overnight.    - Strict monitoring of I/Os to assess renal recovery vs. need for HD   - monitor BMP    acute GIB / acute blood loss anemia /  hemorrhagic shock (Resolved)  - hx of GIB in past   - cta neg without active bleed on ct on admission   - s/p EGD on 7/27 without active bleeding  - s/p colonoscopy 7/28 no active bleed   - now off pressors, started on midodrine, increased to 15mg TID, monitoring BP closely   - c/w PPIs   - ct enterography - cancelled given elevated creatinine and no further bleeding  - plan for capsule endoscopy as op   - gi following   - Diet as tolerated   - resumed aspirin as per GI  - tolerating Lovenox - can transition to XARELTO on discharge if hb stable, CBC stable    HFpEF  Echo noted with Grade III diastolic dysfunction and severe pulm HTN  S/p Lasix  bilateral lower ext ACE wrap     copd  c/w inhalers  C/w Symbicort BID   C/w tiotropium  C/w o2     Paroxysmal A-Fib   Hypotension, asymptomatic  - resumed on amio and bb  - adjust bb dose for better hr control as needed  - prn iv lopressor for hr > 110  - aspirin  - raised Midodrine dose for improved BP currently 10 TID   - lowered metoprolol dose  - started lovenox 8/2/23, no bleeding noted, will start Xarelto on dc    HLD  - monitor CMP   - hold statin due to transaminitis    Transaminitis (resolving)  - statin held  - LFT now wnl, Alk Phos remains elevated   - discussed with cardio, cont amiodarone for now since pt has been on it for long time  - Abdo U/S shows cholelithiasis and small right pleural effusion and Hep panel negative       dvt ppx : trial of Lovenox  GI cleared for Xarelto resumption on discharge  Dispo: Monitoring DANETTE for renal recovery.   Plan discussed with patient at bedside.

## 2023-08-14 NOTE — PROGRESS NOTE ADULT - SUBJECTIVE AND OBJECTIVE BOX
[facility] DIVISION OF KIDNEY DISEASES AND HYPERTENSION -- FOLLOW UP NOTE  --------------------------------------------------------------------------------  Chief Complaint: Antonino    24 hour events/subjective:  no acute event noted  pt seen and examined; reports she has been urinating more        PAST HISTORY  --------------------------------------------------------------------------------  No significant changes to PMH, PSH, FHx, SHx, unless otherwise noted    ALLERGIES & MEDICATIONS  --------------------------------------------------------------------------------  Allergies  No Known Allergies    Standing Inpatient Medications  aMIOdarone    Tablet 200 milliGRAM(s) Oral daily  aspirin  chewable 81 milliGRAM(s) Oral daily  budesonide  80 MICROgram(s)/formoterol 4.5 MICROgram(s) Inhaler 2 Puff(s) Inhalation two times a day  chlorhexidine 2% Cloths 1 Application(s) Topical <User Schedule>  chlorhexidine 4% Liquid 1 Application(s) Topical <User Schedule>  enoxaparin Injectable 40 milliGRAM(s) SubCutaneous every 12 hours  ferrous    sulfate 325 milliGRAM(s) Oral daily  metoprolol tartrate 25 milliGRAM(s) Oral two times a day  midodrine. 10 milliGRAM(s) Oral three times a day  multivitamin 1 Tablet(s) Oral daily  pantoprazole  Injectable 40 milliGRAM(s) IV Push daily  sodium bicarbonate 1300 milliGRAM(s) Oral three times a day  sodium chloride 0.9%. 500 milliLiter(s) IV Continuous <Continuous>  tiotropium 2.5 MICROgram(s)/olodaterol 2.5 MICROgram(s) (STIOLTO) Inhaler 2 Puff(s) Inhalation daily    PRN Inpatient Medications  acetaminophen     Tablet .. 650 milliGRAM(s) Oral every 6 hours PRN  guaiFENesin Oral Liquid (Sugar-Free) 100 milliGRAM(s) Oral every 6 hours PRN  hydrOXYzine hydrochloride 25 milliGRAM(s) Oral daily PRN  melatonin 5 milliGRAM(s) Oral at bedtime PRN  metoprolol tartrate Injectable 2.5 milliGRAM(s) IV Push every 6 hours PRN  sodium chloride 0.9% lock flush 10 milliLiter(s) IV Push every 1 hour PRN  sodium chloride 0.9% lock flush 10 milliLiter(s) IV Push every 1 hour PRN      REVIEW OF SYSTEMS  --------------------------------------------------------------------------------  Gen: No weight changes, fatigue, fevers/chills, weakness  Skin: No rashes  Head/Eyes/Ears/Mouth: No headache; Normal hearing; Normal vision w/o blurriness; No sinus pain/discomfort, sore throat  Respiratory: No dyspnea, cough, wheezing, hemoptysis  CV: No chest pain, PND, orthopnea  GI: No abdominal pain, diarrhea, constipation, nausea, vomiting, melena, hematochezia  : No increased frequency, dysuria, hematuria, nocturia  MSK: No joint pain/swelling; no back pain; no edema  Neuro: No dizziness/lightheadedness, weakness, seizures, numbness, tingling  Heme: No easy bruising or bleeding  Endo: No heat/cold intolerance  Psych: No significant nervousness, anxiety, stress, depression    All other systems were reviewed and are negative, except as noted.    VITALS/PHYSICAL EXAM  --------------------------------------------------------------------------------  T(C): 36.4 (08-14-23 @ 17:15), Max: 36.7 (08-13-23 @ 20:00)  HR: 87 (08-14-23 @ 17:15) (87 - 98)  BP: 92/60 (08-14-23 @ 17:15) (92/60 - 103/64)  RR: 18 (08-14-23 @ 17:15) (18 - 18)  SpO2: 100% (08-14-23 @ 17:15) (95% - 100%)  Wt(kg): --        08-13-23 @ 07:01  -  08-14-23 @ 07:00  --------------------------------------------------------  IN: 637 mL / OUT: 450 mL / NET: 187 mL    08-14-23 @ 07:01  -  08-14-23 @ 18:49  --------------------------------------------------------  IN: 0 mL / OUT: 20 mL / NET: -20 mL      Physical Exam:  	Gen: NAD  	HEENT- on supplemental o2   	Pulm: CTA B/L  	CV: RRR, S1S2; no rub  	Abd: +BS, soft, nontender/nondistended  	: No suprapubic tenderness  	UE: Warm, ; no edema  	LE: Warm, edema+  	Neuro: No focal deficit  	Psych: Normal affect and mood  	Skin: Warm  	Vascular access: RI non [facility]    LABS/STUDIES  --------------------------------------------------------------------------------              9.5    9.33  >-----------<  348      [08-14-23 @ 05:50]              31.2     129  |  89  |  47.4  ----------------------------<  95      [08-14-23 @ 05:50]  4.7   |  25.0  |  2.69        Ca     8.4     [08-14-23 @ 05:50]    TPro  5.6  /  Alb  3.1  /  TBili  1.1  /  DBili  x   /  AST  18  /  ALT  21  /  AlkPhos  405  [08-14-23 @ 05:50]          Creatinine Trend:  SCr 2.69 [08-14 @ 05:50]  SCr 2.14 [08-13 @ 07:59]  SCr 1.98 [08-12 @ 06:06]  SCr 1.83 [08-11 @ 06:01]  SCr 1.88 [08-10 @ 05:46]    Urinalysis - [08-14-23 @ 05:50]      Color  / Appearance  / SG  / pH       Gluc 95 / Ketone   / Bili  / Urobili        Blood  / Protein  / Leuk Est  / Nitrite       RBC  / WBC  / Hyaline  / Gran  / Sq Epi  / Non Sq Epi  / Bacteria     Urine Creatinine 145      [08-10-23 @ 11:12]  Urine Protein 146.0      [08-10-23 @ 11:12]  Urine Sodium <30      [08-10-23 @ 11:12]    Iron 16, TIBC --, %sat --      [08-04-23 @ 05:25]  Ferritin 109      [08-04-23 @ 05:25]    HBsAb <3.0      [08-07-23 @ 10:36]  HBsAb Nonreact      [08-07-23 @ 10:36]  HBsAg Nonreact      [08-07-23 @ 10:36]  HBcAb Nonreact      [08-07-23 @ 10:36]  HCV 0.10, Nonreact      [08-07-23 @ 10:36]

## 2023-08-15 LAB
ALBUMIN SERPL ELPH-MCNC: 3 G/DL — LOW (ref 3.3–5.2)
ALP SERPL-CCNC: 416 U/L — HIGH (ref 40–120)
ALT FLD-CCNC: 20 U/L — SIGNIFICANT CHANGE UP
ANION GAP SERPL CALC-SCNC: 16 MMOL/L — SIGNIFICANT CHANGE UP (ref 5–17)
AST SERPL-CCNC: 18 U/L — SIGNIFICANT CHANGE UP
BILIRUB SERPL-MCNC: 1.1 MG/DL — SIGNIFICANT CHANGE UP (ref 0.4–2)
BUN SERPL-MCNC: 53.8 MG/DL — HIGH (ref 8–20)
CALCIUM SERPL-MCNC: 8.3 MG/DL — LOW (ref 8.4–10.5)
CHLORIDE SERPL-SCNC: 85 MMOL/L — LOW (ref 96–108)
CO2 SERPL-SCNC: 23 MMOL/L — SIGNIFICANT CHANGE UP (ref 22–29)
CREAT SERPL-MCNC: 3.12 MG/DL — HIGH (ref 0.5–1.3)
EGFR: 14 ML/MIN/1.73M2 — LOW
GLUCOSE SERPL-MCNC: 105 MG/DL — HIGH (ref 70–99)
HCT VFR BLD CALC: 31.8 % — LOW (ref 34.5–45)
HGB BLD-MCNC: 9.6 G/DL — LOW (ref 11.5–15.5)
MCHC RBC-ENTMCNC: 28.4 PG — SIGNIFICANT CHANGE UP (ref 27–34)
MCHC RBC-ENTMCNC: 30.2 GM/DL — LOW (ref 32–36)
MCV RBC AUTO: 94.1 FL — SIGNIFICANT CHANGE UP (ref 80–100)
PLATELET # BLD AUTO: 386 K/UL — SIGNIFICANT CHANGE UP (ref 150–400)
POTASSIUM SERPL-MCNC: 5 MMOL/L — SIGNIFICANT CHANGE UP (ref 3.5–5.3)
POTASSIUM SERPL-SCNC: 5 MMOL/L — SIGNIFICANT CHANGE UP (ref 3.5–5.3)
PROT SERPL-MCNC: 5.6 G/DL — LOW (ref 6.6–8.7)
RBC # BLD: 3.38 M/UL — LOW (ref 3.8–5.2)
RBC # FLD: 17.6 % — HIGH (ref 10.3–14.5)
SODIUM SERPL-SCNC: 124 MMOL/L — LOW (ref 135–145)
WBC # BLD: 12.41 K/UL — HIGH (ref 3.8–10.5)
WBC # FLD AUTO: 12.41 K/UL — HIGH (ref 3.8–10.5)

## 2023-08-15 PROCEDURE — 99221 1ST HOSP IP/OBS SF/LOW 40: CPT

## 2023-08-15 PROCEDURE — 99233 SBSQ HOSP IP/OBS HIGH 50: CPT

## 2023-08-15 RX ORDER — TUBERCULIN PURIFIED PROTEIN DERIVATIVE 5 [IU]/.1ML
5 INJECTION, SOLUTION INTRADERMAL ONCE
Refills: 0 | Status: COMPLETED | OUTPATIENT
Start: 2023-08-15 | End: 2023-08-16

## 2023-08-15 RX ORDER — METOPROLOL TARTRATE 50 MG
12.5 TABLET ORAL
Refills: 0 | Status: DISCONTINUED | OUTPATIENT
Start: 2023-08-15 | End: 2023-08-16

## 2023-08-15 RX ORDER — MIDODRINE HYDROCHLORIDE 2.5 MG/1
15 TABLET ORAL THREE TIMES A DAY
Refills: 0 | Status: DISCONTINUED | OUTPATIENT
Start: 2023-08-15 | End: 2023-08-19

## 2023-08-15 RX ORDER — ERYTHROPOIETIN 10000 [IU]/ML
5000 INJECTION, SOLUTION INTRAVENOUS; SUBCUTANEOUS
Refills: 0 | Status: DISCONTINUED | OUTPATIENT
Start: 2023-08-15 | End: 2023-08-23

## 2023-08-15 RX ADMIN — Medication 25 MILLIGRAM(S): at 23:06

## 2023-08-15 RX ADMIN — Medication 12.5 MILLIGRAM(S): at 17:20

## 2023-08-15 RX ADMIN — MIDODRINE HYDROCHLORIDE 10 MILLIGRAM(S): 2.5 TABLET ORAL at 05:41

## 2023-08-15 RX ADMIN — MIDODRINE HYDROCHLORIDE 10 MILLIGRAM(S): 2.5 TABLET ORAL at 10:53

## 2023-08-15 RX ADMIN — CHLORHEXIDINE GLUCONATE 1 APPLICATION(S): 213 SOLUTION TOPICAL at 05:43

## 2023-08-15 RX ADMIN — PANTOPRAZOLE SODIUM 40 MILLIGRAM(S): 20 TABLET, DELAYED RELEASE ORAL at 17:19

## 2023-08-15 RX ADMIN — Medication 25 MILLIGRAM(S): at 05:42

## 2023-08-15 RX ADMIN — Medication 81 MILLIGRAM(S): at 17:20

## 2023-08-15 RX ADMIN — MIDODRINE HYDROCHLORIDE 15 MILLIGRAM(S): 2.5 TABLET ORAL at 17:20

## 2023-08-15 RX ADMIN — Medication 1300 MILLIGRAM(S): at 06:23

## 2023-08-15 RX ADMIN — TIOTROPIUM BROMIDE AND OLODATEROL 2 PUFF(S): 3.124; 2.736 SPRAY, METERED RESPIRATORY (INHALATION) at 08:31

## 2023-08-15 RX ADMIN — BUDESONIDE AND FORMOTEROL FUMARATE DIHYDRATE 2 PUFF(S): 160; 4.5 AEROSOL RESPIRATORY (INHALATION) at 08:30

## 2023-08-15 RX ADMIN — ENOXAPARIN SODIUM 40 MILLIGRAM(S): 100 INJECTION SUBCUTANEOUS at 17:19

## 2023-08-15 RX ADMIN — ENOXAPARIN SODIUM 40 MILLIGRAM(S): 100 INJECTION SUBCUTANEOUS at 05:41

## 2023-08-15 RX ADMIN — AMIODARONE HYDROCHLORIDE 200 MILLIGRAM(S): 400 TABLET ORAL at 05:41

## 2023-08-15 RX ADMIN — Medication 325 MILLIGRAM(S): at 17:20

## 2023-08-15 RX ADMIN — Medication 1 TABLET(S): at 17:20

## 2023-08-15 RX ADMIN — ERYTHROPOIETIN 5000 UNIT(S): 10000 INJECTION, SOLUTION INTRAVENOUS; SUBCUTANEOUS at 14:10

## 2023-08-15 NOTE — CONSULT NOTE ADULT - SUBJECTIVE AND OBJECTIVE BOX
HPI: 82 y/o female with PMHx of COPD on 2L home O2, CHF, CAD s/p stents, HTN, HLD, pAF previously on eliquis now xarelto (switched 1 week ago 2/2 cost), diverticulosis, and grade/stage 3 hemorrhoids who presents to the ED c/o generalized weakness and dark stools over the past few days. Hypotensive on ED arrival and hgb found to be 4.6 with +occult/melanotic stool. CT showed diverticulosis, no active bleed visualized. GI consulted and patient made NPO for endoscopy tomorrow. Protonix gtt started, patient given 3U PRBC and 1L IVF but persistently hypotensive. Started on levophed and admitted to MICU.     Interval history: Hospital course c/b DANETTE on CKD. Nephrology following, and requesting Tunneled dialysis catheter.     ======================================================  PAST MEDICAL & SURGICAL HISTORY:  HTN (hypertension)      HLD (hyperlipidemia)      CAD (coronary artery disease)      COPD (chronic obstructive pulmonary disease)      S/P  section         (2023 15:00)      ============================================================================  Medications:  Home Medications:  amiodarone 200 mg oral tablet: 1 tab(s) orally once a day (2023 11:29)  aspirin 81 mg oral delayed release tablet: 1 tab(s) orally once a day (2023 14:39)  atorvastatin 80 mg oral tablet: 1 tab(s) orally once a day (2023 11:29)  Eliquis 5 mg oral tablet: 1 tab(s) orally 2 times a day (2023 11:29)  metoprolol succinate 100 mg oral tablet, extended release: 1 tab(s) orally once a day (2023 11:29)  Multiple Vitamins oral tablet: 1 tab(s) orally once a day (2023 14:39)  potassium chloride 20 mEq oral tablet, extended release: 2 tab(s) orally once a day (2023 14:39)  Stiolto Respimat 10 ACT 2.5 mcg-2.5 mcg/inh inhalation aerosol: 2 inhaler(s) inhaled once a day (2023 14:39)  torsemide 100 mg oral tablet: 1 tab(s) orally every other day (2023 14:39)  Vitamin D2 50 mcg (2000 intl units) oral capsule: 1 cap(s) orally 3 times a week (2023 14:39)    MEDICATIONS  (STANDING):  aMIOdarone    Tablet 200 milliGRAM(s) Oral daily  aspirin  chewable 81 milliGRAM(s) Oral daily  budesonide  80 MICROgram(s)/formoterol 4.5 MICROgram(s) Inhaler 2 Puff(s) Inhalation two times a day  chlorhexidine 2% Cloths 1 Application(s) Topical <User Schedule>  chlorhexidine 4% Liquid 1 Application(s) Topical <User Schedule>  enoxaparin Injectable 40 milliGRAM(s) SubCutaneous every 12 hours  epoetin mine-epbx (RETACRIT) Injectable 5000 Unit(s) IV Push <User Schedule>  ferrous    sulfate 325 milliGRAM(s) Oral daily  metoprolol tartrate 12.5 milliGRAM(s) Oral two times a day  midodrine. 15 milliGRAM(s) Oral three times a day  multivitamin 1 Tablet(s) Oral daily  pantoprazole  Injectable 40 milliGRAM(s) IV Push daily  PPD  5 Tuberculin Unit(s) Injectable 5 Unit(s) IntraDermal once  sodium chloride 0.9%. 500 milliLiter(s) (50 mL/Hr) IV Continuous <Continuous>  tiotropium 2.5 MICROgram(s)/olodaterol 2.5 MICROgram(s) (STIOLTO) Inhaler 2 Puff(s) Inhalation daily    MEDICATIONS  (PRN):  acetaminophen     Tablet .. 650 milliGRAM(s) Oral every 6 hours PRN Temp greater or equal to 38C (100.4F), Moderate Pain (4 - 6), Severe Pain (7 - 10)  guaiFENesin Oral Liquid (Sugar-Free) 100 milliGRAM(s) Oral every 6 hours PRN Cough  hydrOXYzine hydrochloride 25 milliGRAM(s) Oral daily PRN Restlessness  melatonin 5 milliGRAM(s) Oral at bedtime PRN Sleep  metoprolol tartrate Injectable 2.5 milliGRAM(s) IV Push every 6 hours PRN for hR > 110  sodium chloride 0.9% lock flush 10 milliLiter(s) IV Push every 1 hour PRN Pre/post blood products, medications, blood draw, and to maintain line patency  sodium chloride 0.9% lock flush 10 milliLiter(s) IV Push every 1 hour PRN Pre/post blood products, medications, blood draw, and to maintain line patency      Allergies:   No Known Allergies    ============================================================================  PAST MEDICAL & SURGICAL HISTORY:  HTN (hypertension)      HLD (hyperlipidemia)      CAD (coronary artery disease)      COPD (chronic obstructive pulmonary disease)      Acute CHF      S/P  section          FAMILY HISTORY:  FHx: heart disease (Sibling)        Social History:  Former smoker, quit in . No alcohol or other substance use. Lives with her son, daughter in law, and grandson. (2023 15:00)      ============================================================================  Vitals:  Vital Signs Last 24 Hrs  T(C): 36.7 (15 Aug 2023 16:00), Max: 36.7 (15 Aug 2023 16:00)  T(F): 98.1 (15 Aug 2023 16:00), Max: 98.1 (15 Aug 2023 16:00)  HR: 78 (15 Aug 2023 16:00) (60 - 98)  BP: 91/54 (15 Aug 2023 16:00) (83/57 - 105/73)  BP(mean): --  RR: 18 (15 Aug 2023 16:00) (17 - 18)  SpO2: 100% (15 Aug 2023 16:00) (97% - 100%)    Parameters below as of 15 Aug 2023 16:00  Patient On (Oxygen Delivery Method): nasal cannula  O2 Flow (L/min): 3    Labs:                        9.6    12.41 )-----------( 386      ( 15 Aug 2023 07:30 )             31.8     08-15    124<L>  |  85<L>  |  53.8<H>  ----------------------------<  105<H>  5.0   |  23.0  |  3.12<H>    Ca    8.3<L>      15 Aug 2023 07:30    TPro  5.6<L>  /  Alb  3.0<L>  /  TBili  1.1  /  DBili  x   /  AST  18  /  ALT  20  /  AlkPhos  416<H>  08-15    Imaging:   Pertinent Imaging Reviewed.    ============================================================================

## 2023-08-15 NOTE — PROGRESS NOTE ADULT - SUBJECTIVE AND OBJECTIVE BOX
[facility] Division of Hospital Medicine    Chief Complaint:      SUBJECTIVE / OVERNIGHT EVENTS:    Patient seen and examined at bedside, no acute events overnight, patient noted to have minimal urinary output throughout the day yesterday (~ 200 cc's), increasing Cr and K, will plan for HD today and IR placement of tunnelled cath tomorrow.     MEDICATIONS  (STANDING):  aMIOdarone    Tablet 200 milliGRAM(s) Oral daily  aspirin  chewable 81 milliGRAM(s) Oral daily  budesonide  80 MICROgram(s)/formoterol 4.5 MICROgram(s) Inhaler 2 Puff(s) Inhalation two times a day  chlorhexidine 2% Cloths 1 Application(s) Topical <User Schedule>  chlorhexidine 4% Liquid 1 Application(s) Topical <User Schedule>  enoxaparin Injectable 40 milliGRAM(s) SubCutaneous every 12 hours  epoetin mine-epbx (RETACRIT) Injectable 5000 Unit(s) IV Push <User Schedule>  ferrous    sulfate 325 milliGRAM(s) Oral daily  metoprolol tartrate 12.5 milliGRAM(s) Oral two times a day  midodrine. 15 milliGRAM(s) Oral three times a day  multivitamin 1 Tablet(s) Oral daily  pantoprazole  Injectable 40 milliGRAM(s) IV Push daily  PPD  5 Tuberculin Unit(s) Injectable 5 Unit(s) IntraDermal once  sodium chloride 0.9%. 500 milliLiter(s) (50 mL/Hr) IV Continuous <Continuous>  tiotropium 2.5 MICROgram(s)/olodaterol 2.5 MICROgram(s) (STIOLTO) Inhaler 2 Puff(s) Inhalation daily    MEDICATIONS  (PRN):  acetaminophen     Tablet .. 650 milliGRAM(s) Oral every 6 hours PRN Temp greater or equal to 38C (100.4F), Moderate Pain (4 - 6), Severe Pain (7 - 10)  guaiFENesin Oral Liquid (Sugar-Free) 100 milliGRAM(s) Oral every 6 hours PRN Cough  hydrOXYzine hydrochloride 25 milliGRAM(s) Oral daily PRN Restlessness  melatonin 5 milliGRAM(s) Oral at bedtime PRN Sleep  metoprolol tartrate Injectable 2.5 milliGRAM(s) IV Push every 6 hours PRN for hR > 110  sodium chloride 0.9% lock flush 10 milliLiter(s) IV Push every 1 hour PRN Pre/post blood products, medications, blood draw, and to maintain line patency  sodium chloride 0.9% lock flush 10 milliLiter(s) IV Push every 1 hour PRN Pre/post blood products, medications, blood draw, and to maintain line patency        I&O's Summary    14 Aug 2023 07:01  -  15 Aug 2023 07:00  --------------------------------------------------------  IN: 0 mL / OUT: 220 mL / NET: -220 mL    15 Aug 2023 07:01  -  15 Aug 2023 18:26  --------------------------------------------------------  IN: 118 mL / OUT: 10 mL / NET: 108 mL        PHYSICAL EXAM:  Vital Signs Last 24 Hrs  T(C): 36.7 (15 Aug 2023 16:00), Max: 36.7 (15 Aug 2023 16:00)  T(F): 98.1 (15 Aug 2023 16:00), Max: 98.1 (15 Aug 2023 16:00)  HR: 78 (15 Aug 2023 16:00) (60 - 98)  BP: 91/54 (15 Aug 2023 16:00) (83/57 - 105/73)  BP(mean): --  RR: 18 (15 Aug 2023 16:00) (17 - 18)  SpO2: 100% (15 Aug 2023 16:00) (97% - 100%)    Parameters below as of 15 Aug 2023 16:00  Patient On (Oxygen Delivery Method): nasal cannula  O2 Flow (L/min): 3    General: Obese female in NAD, on nasal cannula  HEENT: Normocephalic  Cardiac: S1S2 RRR  Respiratory: Diminished breath sounds b/l  Abdomen: Soft, NT  Extremities: 2+ pitting edema of b/l lower extremities, +bullae to b/l LE, now ACE Wraps in place B/L   Msk: patient with 3/5 strength in B/L LEs   Neuro: AAOx3, sensation intact in LE's, though patient states slightly more dull than UEs  Psych: Calm   Access: R IJ non tunneled dialysis catheter    LABS:                        9.6    12.41 )-----------( 386      ( 15 Aug 2023 07:30 )             31.8     08-15    124<L>  |  85<L>  |  53.8<H>  ----------------------------<  105<H>  5.0   |  23.0  |  3.12<H>    Ca    8.3<L>      15 Aug 2023 07:30    TPro  5.6<L>  /  Alb  3.0<L>  /  TBili  1.1  /  DBili  x   /  AST  18  /  ALT  20  /  AlkPhos  416<H>  08-15          Urinalysis Basic - ( 15 Aug 2023 07:30 )    Color: x / Appearance: x / SG: x / pH: x  Gluc: 105 mg/dL / Ketone: x  / Bili: x / Urobili: x   Blood: x / Protein: x / Nitrite: x   Leuk Esterase: x / RBC: x / WBC x   Sq Epi: x / Non Sq Epi: x / Bacteria: x        CAPILLARY BLOOD GLUCOSE            RADIOLOGY & ADDITIONAL TESTS:  Results Reviewed:   Imaging Personally Reviewed:  Electrocardiogram Personally Reviewed:

## 2023-08-15 NOTE — PROGRESS NOTE ADULT - ASSESSMENT
82 y/o female with PMH COPD on 2L home O2, HFpEF, CAD s/p PCI, HTN, HLD, pAF on rivaroxaban, diverticulosis, hemorrhoids presented with generalized weakness/dark stools admitted anemia, GIB without active bleed on ct, received 3U PRBC, and IVF however remained hypotensive requiring vasopressor therapy for hemorrhagic shock.  EGD on 7/27 and colonoscopy 7/28 without active bleed. Patient also with DANETTE and Afib with RVR, resumed back on amio and bb .  HR now better controlled. Plan was for ct enterography, but given DANETTE on CKD, it was cancelled. Patient downgraded to medicine 7/30/23. S/p Intermittent HD for worsening kidney functions. Nephrology continues to monitor, minimal urinary output noted from yesterday, increasing Cr and K plan for HD today, IR guided Tunnelled cath in AM.       DANETTE on CKD III/Metabolic acidosis (Resolving)  Hyponatremia  Renal hypoperfusion, episodic  - f/up renal recs - started HD for 3 days 8/7,8,9  - S/p IVF, sodium bicarb discontinued as per renal recs  - s/p Lasix and Albumin 8/3 and 8/4 as per renal recs  - Spoke with nephrology- minimal urination since yesterday 200 cc's, with increasing Cr and K, will plan for HD today, and IR consulted for HD Tunneled Cath placement, patient will need new Outpatient HD   - Hep B surface and Core Ab sent, PPD ordered     - monitor BMP    acute GIB / acute blood loss anemia /  hemorrhagic shock (Resolved)  - hx of GIB in past   - cta neg without active bleed on ct on admission   - s/p EGD on 7/27 without active bleeding  - s/p colonoscopy 7/28 no active bleed   - now off pressors, started on midodrine, increased to 15mg TID, monitoring BP closely   - c/w PPIs   - ct enterography - cancelled given elevated creatinine and no further bleeding  - plan for capsule endoscopy as op   - gi following   - Diet as tolerated   - resumed aspirin as per GI  - tolerating Lovenox - can transition to XARELTO on discharge if hb stable, CBC stable    HFpEF  Echo noted with Grade III diastolic dysfunction and severe pulm HTN  S/p Lasix  bilateral lower ext ACE wrap     copd  c/w inhalers  C/w Symbicort BID   C/w tiotropium  C/w o2     Paroxysmal A-Fib   Hypotension, asymptomatic  - resumed on amio and bb  - adjust bb dose for better hr control as needed  - prn iv lopressor for hr > 110  - aspirin  - raised Midodrine dose for improved BP currently 10 TID   - lowered metoprolol dose  - started lovenox 8/2/23, no bleeding noted, will start Xarelto on dc    HLD  - monitor CMP   - hold statin due to transaminitis    Transaminitis (resolving)  - statin held  - LFT now wnl, Alk Phos remains elevated   - discussed with cardio, cont amiodarone for now since pt has been on it for long time  - Abdo U/S shows cholelithiasis and small right pleural effusion and Hep panel negative       dvt ppx : trial of Lovenox  GI cleared for Xarelto resumption on discharge  Dispo: IR tunneled cath placement, then will plan for Rehab   Plan discussed with patient at bedside.

## 2023-08-15 NOTE — CONSULT NOTE ADULT - ASSESSMENT
83F w/ COPD on 2L home O2, CHF, CAD s/p stents, HTN, HLD, pAF on AC, CKD3, diverticulosis, and grade/stage 3 hemorrhoids who presents to the ED c/o generalized weakness and dark stools, CT w/ diverticulitis. Nephrology consulted for renal optimization before another CTE planned.    1. Acute kidney injury on CKD3:  -DANETTE likely hemodynamically mediated in setting of hypotension  -Doubt PLACIDO (got CT IV contrast on 7/26)  -Baseline likely <1.5 mg/dl, SCr 1.79 mg/dl today  -UA bland.  -Renal imaging: KIDNEYS/URETERS: Focal scarring upper pole left kidney. Small hypodense lesions right kidney too small for characterization. No hydronephrosis.  -might need to go up on dose of midodrine to support BP to maintain SBP>90  -Gentle IVF NS @ 75 cc/hr x 1 L, will follow.  -Recommend to avoid contrast again unless emergently needed.    2. Low serum bicarbonate: ? GI loss, agree w/ adding PO sodium bicarbonate.    3. Anemia: GI loss, check iron profile.    4. Hypotension: as above #1.
82 y/o F with PMHx HTN, HLD, CAD s/p stent, COPD (2L home O2), pAF presents to ED c/o worsening generalized weakness over the past few days, also noting black stools, found to have low Hgb 
Patient is an 83yoF with PMH COPD (on home O2), CHF, CAD (s/p cardiac stents), HTN, pulm HTN, HLD, paroxysmal A-fib (on xarelto) who presented to the ED with weakness, melena and anemia, patient required blood transfusions for persistent hypotension. Patient has since undergone EGD/Colonoscopy, only positive for diverticulosis, no active bleeding noted. Patient initally planned for CT enterography, however noted to have increasing Cr, per GI now planning for outpatient capsule endoscopy. Nephrology evaluated patient, planning to initiate HD today, vascular surgery consulted for shiley placement.    Plan:  -R IJ shiley placed  -XR to confirm placement  -HD per nephro - unlikely to need long term HD  -continue care per primary team  -discussed with attending surgeon
Patient is a 83y old  Female admitted for GIB. Seen in consultation for tunneled dialysis catheter placement.  Labs and imaging have been reviewed. ? leukocytosis may need to work up further prior to placement if continuing to increase.  Tentatively plan for TDC placement in AM. Please keep NPO after midnight and hold AC/ASA if possible.     Please call extension [phone] with any questions, concerns or issues regarding above.

## 2023-08-15 NOTE — PROGRESS NOTE ADULT - ASSESSMENT
83 year old female with PMH of HTN, HLD, CAD s/p stents, Afib (on anticoagulation), CHF, CKD 3b, COPD (on 2L home O2), diverticulosis and stage 3 hemorrhoids presents with melanotic stool. Admitted for hemorrhagic shock s/p pRBC transfusions. EGD on 7/27 without active bleeding, colonoscopy 7/28 unrevealing. Hospital course notable for DANETTE on CKD requiring initiation of hemodialysis on 8/07/23.     -DANETTE / ATN secondary to decrease effective circulating volume   -Baseline creatinine ~1.1-1.3mg/dL     -UA 30 protein, negative blood, 0-2 RBC, 6-10 WBC, moderate bacteria, moderate squamous cells   -Urine sodium < 30   -UPCR 1g/g    -Abdominal ultrasound showed right kidney 10.5 cm + left kidney: 10.3 cm; no hydronephrosis or calculi  +Urine output - unclear if volume is accurately documented (?); however no evidence of renal clearance  -Given such will resume hemodialysis today  -Will need placement of tunneled dialysis catheter as patient will need dialysis for foreseeable future   -Hypotension - currently on midodrine 10mg TID; notable for intradialytic hypotension today despite no ultrafiltration - will utilize sodium modeling and increase midodrine to 15mg TID   -Volume status: +peripheral edema - continue conservative management with ACE wraps; unable to ultrafiltrate in light of intradialytic hypotension    -Afib - on metoprolol PO + amiodarone PO     Ericka Monzon DO  Nephrology      83 year old female with PMH of HTN, HLD, CAD s/p stents, Afib (on anticoagulation), CHF, CKD 3b, COPD (on 2L home O2), diverticulosis and stage 3 hemorrhoids presents with melanotic stool. Admitted for hemorrhagic shock s/p pRBC transfusions. EGD on 7/27 without active bleeding, colonoscopy 7/28 unrevealing. Hospital course notable for DANETTE on CKD requiring initiation of hemodialysis on 8/07/23.     -DANETTE / ATN secondary to decrease effective circulating volume   -Baseline creatinine ~1.1-1.3mg/dL     -UA 30 protein, negative blood, 0-2 RBC, 6-10 WBC, moderate bacteria, moderate squamous cells   -Urine sodium < 30   -UPCR 1g/g    -Abdominal ultrasound showed right kidney 10.5 cm + left kidney: 10.3 cm; no hydronephrosis or calculi  +Urine output - unclear if volume is accurately documented (?); however no evidence of renal clearance  -Given such will resume hemodialysis today  -Will need placement of tunneled dialysis catheter as patient will need dialysis for foreseeable future   -Hypotension - currently on midodrine 10mg TID; notable for intradialytic hypotension today despite no ultrafiltration - will utilize sodium modeling and increase midodrine to 15mg TID   -Volume status: +peripheral edema - continue conservative management with ACE wraps; unable to ultrafiltrate in light of intradialytic hypotension    -Metabolic acidosis - will d/c oral sodium bicarb  -Anemia - will order GEORGE with HD  -Afib - on metoprolol PO + amiodarone PO     Ericka Monzon DO  Nephrology      83 year old female with PMH of HTN, HLD, CAD s/p stents, Afib (on anticoagulation), CHF, CKD 3b, COPD (on 2L home O2), diverticulosis and stage 3 hemorrhoids presents with melanotic stool. Admitted for hemorrhagic shock s/p pRBC transfusions. EGD on 7/27 without active bleeding, colonoscopy 7/28 unrevealing. Hospital course notable for DANETTE on CKD requiring initiation of hemodialysis on 8/07/23.     -DANETTE / ATN secondary to decrease effective circulating volume   -Baseline creatinine ~1.1-1.3mg/dL     -UA 30 protein, negative blood, 0-2 RBC, 6-10 WBC, moderate bacteria, moderate squamous cells   -Urine sodium < 30   -UPCR 1g/g    -Abdominal ultrasound showed right kidney 10.5 cm + left kidney: 10.3 cm; no hydronephrosis or calculi  +Urine output - unclear if volume is accurately documented (?); however no evidence of renal clearance  -Given such will resume hemodialysis today  -Will need placement of tunneled dialysis catheter as patient will need dialysis for foreseeable future   -Hypotension - currently on midodrine 10mg TID; notable for intradialytic hypotension today despite no ultrafiltration - will utilize sodium modeling and increase midodrine to 15mg TID   -Volume status: +peripheral edema - continue conservative management with ACE wraps; unable to ultrafiltrate in light of intradialytic hypotension; will add 1.2L fluid restriction to DASH diet    -Metabolic acidosis - will d/c oral sodium bicarb  -Anemia - will order GEORGE with HD  -Afib - on metoprolol PO + amiodarone PO     Ericka Monzon, DO  Nephrology

## 2023-08-15 NOTE — PROGRESS NOTE ADULT - SUBJECTIVE AND OBJECTIVE BOX
Seen and examined while on hemodialysis. Complained of heavy legs -> stated that she will be going to [facility] upon discharge.    Vital Signs Last 24 Hrs  T(C): 36.5 (15 Aug 2023 11:44), Max: 36.5 (15 Aug 2023 11:44)  T(F): 97.7 (15 Aug 2023 11:44), Max: 97.7 (15 Aug 2023 11:44)  HR: 60 (15 Aug 2023 11:44) (60 - 98)  BP: 95/57 (15 Aug 2023 11:44) (83/57 - 105/73)  BP(mean): --  RR: 18 (15 Aug 2023 11:44) (17 - 18)  SpO2: 100% (15 Aug 2023 11:44) (97% - 100%)    Parameters below as of 15 Aug 2023 11:44  Patient On (Oxygen Delivery Method): nasal cannula    I&O's Summary    14 Aug 2023 07:01  -  15 Aug 2023 07:00  --------------------------------------------------------  IN: 0 mL / OUT: 220 mL / NET: -220 mL    15 Aug 2023 07:01  -  15 Aug 2023 13:15  --------------------------------------------------------  IN: 118 mL / OUT: 10 mL / NET: 108 mL    Physical Exam  General: Obese female in NAD, on nasal cannula  HEENT: Normocephalic  Cardiac: S1S2 RRR  Respiratory: Diminished breath sounds b/l  Abdomen: Soft, NT  Extremities: ACE on b/l lower extremities   Neuro: AAOx3  Psych: Calm   Access: R IJ non tunneled dialysis catheter    08-15    124<L>  |  85<L>  |  53.8<H>  ----------------------------<  105<H>  5.0   |  23.0  |  3.12<H>    Ca    8.3<L>      15 Aug 2023 07:30    TPro  5.6<L>  /  Alb  3.0<L>  /  TBili  1.1  /  DBili  x   /  AST  18  /  ALT  20  /  AlkPhos  416<H>  08-15                    9.6    12.41 )-----------( 386      ( 15 Aug 2023 07:30 )             31.8     MEDICATIONS  (STANDING):  aMIOdarone    Tablet 200 milliGRAM(s) Oral daily  aspirin  chewable 81 milliGRAM(s) Oral daily  budesonide  80 MICROgram(s)/formoterol 4.5 MICROgram(s) Inhaler 2 Puff(s) Inhalation two times a day  chlorhexidine 2% Cloths 1 Application(s) Topical <User Schedule>  chlorhexidine 4% Liquid 1 Application(s) Topical <User Schedule>  enoxaparin Injectable 40 milliGRAM(s) SubCutaneous every 12 hours  epoetin mine-epbx (RETACRIT) Injectable 5000 Unit(s) IV Push <User Schedule>  ferrous    sulfate 325 milliGRAM(s) Oral daily  metoprolol tartrate 12.5 milliGRAM(s) Oral two times a day  midodrine. 10 milliGRAM(s) Oral three times a day  multivitamin 1 Tablet(s) Oral daily  pantoprazole  Injectable 40 milliGRAM(s) IV Push daily  sodium bicarbonate 1300 milliGRAM(s) Oral three times a day  sodium chloride 0.9%. 500 milliLiter(s) (50 mL/Hr) IV Continuous <Continuous>  tiotropium 2.5 MICROgram(s)/olodaterol 2.5 MICROgram(s) (STIOLTO) Inhaler 2 Puff(s) Inhalation daily    MEDICATIONS  (PRN):  acetaminophen     Tablet .. 650 milliGRAM(s) Oral every 6 hours PRN Temp greater or equal to 38C (100.4F), Moderate Pain (4 - 6), Severe Pain (7 - 10)  guaiFENesin Oral Liquid (Sugar-Free) 100 milliGRAM(s) Oral every 6 hours PRN Cough  hydrOXYzine hydrochloride 25 milliGRAM(s) Oral daily PRN Restlessness  melatonin 5 milliGRAM(s) Oral at bedtime PRN Sleep  metoprolol tartrate Injectable 2.5 milliGRAM(s) IV Push every 6 hours PRN for hR > 110  sodium chloride 0.9% lock flush 10 milliLiter(s) IV Push every 1 hour PRN Pre/post blood products, medications, blood draw, and to maintain line patency  sodium chloride 0.9% lock flush 10 milliLiter(s) IV Push every 1 hour PRN Pre/post blood products, medications, blood draw, and to maintain line patency   Seen and examined while on hemodialysis. Notable for intradialytic hypotension despite lowering to net even ultrafiltration. Complained of heavy legs -> stated that she will be going to [facility] upon discharge.    Vital Signs Last 24 Hrs  T(C): 36.5 (15 Aug 2023 11:44), Max: 36.5 (15 Aug 2023 11:44)  T(F): 97.7 (15 Aug 2023 11:44), Max: 97.7 (15 Aug 2023 11:44)  HR: 60 (15 Aug 2023 11:44) (60 - 98)  BP: 95/57 (15 Aug 2023 11:44) (83/57 - 105/73)  BP(mean): --  RR: 18 (15 Aug 2023 11:44) (17 - 18)  SpO2: 100% (15 Aug 2023 11:44) (97% - 100%)    Parameters below as of 15 Aug 2023 11:44  Patient On (Oxygen Delivery Method): nasal cannula    I&O's Summary    14 Aug 2023 07:01  -  15 Aug 2023 07:00  --------------------------------------------------------  IN: 0 mL / OUT: 220 mL / NET: -220 mL    15 Aug 2023 07:01  -  15 Aug 2023 13:15  --------------------------------------------------------  IN: 118 mL / OUT: 10 mL / NET: 108 mL    Physical Exam  General: Obese female in NAD, on nasal cannula  HEENT: Normocephalic  Cardiac: S1S2 RRR  Respiratory: Diminished breath sounds b/l  Abdomen: Soft, NT  Extremities: ACE on b/l lower extremities   Neuro: AAOx3  Psych: Calm   Access: R IJ non tunneled dialysis catheter    08-15    124<L>  |  85<L>  |  53.8<H>  ----------------------------<  105<H>  5.0   |  23.0  |  3.12<H>    Ca    8.3<L>      15 Aug 2023 07:30    TPro  5.6<L>  /  Alb  3.0<L>  /  TBili  1.1  /  DBili  x   /  AST  18  /  ALT  20  /  AlkPhos  416<H>  08-15                    9.6    12.41 )-----------( 386      ( 15 Aug 2023 07:30 )             31.8     MEDICATIONS  (STANDING):  aMIOdarone    Tablet 200 milliGRAM(s) Oral daily  aspirin  chewable 81 milliGRAM(s) Oral daily  budesonide  80 MICROgram(s)/formoterol 4.5 MICROgram(s) Inhaler 2 Puff(s) Inhalation two times a day  chlorhexidine 2% Cloths 1 Application(s) Topical <User Schedule>  chlorhexidine 4% Liquid 1 Application(s) Topical <User Schedule>  enoxaparin Injectable 40 milliGRAM(s) SubCutaneous every 12 hours  epoetin mine-epbx (RETACRIT) Injectable 5000 Unit(s) IV Push <User Schedule>  ferrous    sulfate 325 milliGRAM(s) Oral daily  metoprolol tartrate 12.5 milliGRAM(s) Oral two times a day  midodrine. 10 milliGRAM(s) Oral three times a day  multivitamin 1 Tablet(s) Oral daily  pantoprazole  Injectable 40 milliGRAM(s) IV Push daily  sodium bicarbonate 1300 milliGRAM(s) Oral three times a day  sodium chloride 0.9%. 500 milliLiter(s) (50 mL/Hr) IV Continuous <Continuous>  tiotropium 2.5 MICROgram(s)/olodaterol 2.5 MICROgram(s) (STIOLTO) Inhaler 2 Puff(s) Inhalation daily    MEDICATIONS  (PRN):  acetaminophen     Tablet .. 650 milliGRAM(s) Oral every 6 hours PRN Temp greater or equal to 38C (100.4F), Moderate Pain (4 - 6), Severe Pain (7 - 10)  guaiFENesin Oral Liquid (Sugar-Free) 100 milliGRAM(s) Oral every 6 hours PRN Cough  hydrOXYzine hydrochloride 25 milliGRAM(s) Oral daily PRN Restlessness  melatonin 5 milliGRAM(s) Oral at bedtime PRN Sleep  metoprolol tartrate Injectable 2.5 milliGRAM(s) IV Push every 6 hours PRN for hR > 110  sodium chloride 0.9% lock flush 10 milliLiter(s) IV Push every 1 hour PRN Pre/post blood products, medications, blood draw, and to maintain line patency  sodium chloride 0.9% lock flush 10 milliLiter(s) IV Push every 1 hour PRN Pre/post blood products, medications, blood draw, and to maintain line patency   Seen and examined while on hemodialysis. Notable for intradialytic hypotension despite lowering to zero ultrafiltration. Complained of heavy legs -> stated that she will be going to [facility] upon discharge.    Vital Signs Last 24 Hrs  T(C): 36.5 (15 Aug 2023 11:44), Max: 36.5 (15 Aug 2023 11:44)  T(F): 97.7 (15 Aug 2023 11:44), Max: 97.7 (15 Aug 2023 11:44)  HR: 60 (15 Aug 2023 11:44) (60 - 98)  BP: 95/57 (15 Aug 2023 11:44) (83/57 - 105/73)  BP(mean): --  RR: 18 (15 Aug 2023 11:44) (17 - 18)  SpO2: 100% (15 Aug 2023 11:44) (97% - 100%)    Parameters below as of 15 Aug 2023 11:44  Patient On (Oxygen Delivery Method): nasal cannula    I&O's Summary    14 Aug 2023 07:01  -  15 Aug 2023 07:00  --------------------------------------------------------  IN: 0 mL / OUT: 220 mL / NET: -220 mL    15 Aug 2023 07:01  -  15 Aug 2023 13:15  --------------------------------------------------------  IN: 118 mL / OUT: 10 mL / NET: 108 mL    Physical Exam  General: Obese female in NAD, on nasal cannula  HEENT: Normocephalic  Cardiac: S1S2 RRR  Respiratory: Diminished breath sounds b/l  Abdomen: Soft, NT  Extremities: ACE on b/l lower extremities   Neuro: AAOx3  Psych: Calm   Access: R IJ non tunneled dialysis catheter    08-15    124<L>  |  85<L>  |  53.8<H>  ----------------------------<  105<H>  5.0   |  23.0  |  3.12<H>    Ca    8.3<L>      15 Aug 2023 07:30    TPro  5.6<L>  /  Alb  3.0<L>  /  TBili  1.1  /  DBili  x   /  AST  18  /  ALT  20  /  AlkPhos  416<H>  08-15                    9.6    12.41 )-----------( 386      ( 15 Aug 2023 07:30 )             31.8     MEDICATIONS  (STANDING):  aMIOdarone    Tablet 200 milliGRAM(s) Oral daily  aspirin  chewable 81 milliGRAM(s) Oral daily  budesonide  80 MICROgram(s)/formoterol 4.5 MICROgram(s) Inhaler 2 Puff(s) Inhalation two times a day  chlorhexidine 2% Cloths 1 Application(s) Topical <User Schedule>  chlorhexidine 4% Liquid 1 Application(s) Topical <User Schedule>  enoxaparin Injectable 40 milliGRAM(s) SubCutaneous every 12 hours  epoetin mine-epbx (RETACRIT) Injectable 5000 Unit(s) IV Push <User Schedule>  ferrous    sulfate 325 milliGRAM(s) Oral daily  metoprolol tartrate 12.5 milliGRAM(s) Oral two times a day  midodrine. 10 milliGRAM(s) Oral three times a day  multivitamin 1 Tablet(s) Oral daily  pantoprazole  Injectable 40 milliGRAM(s) IV Push daily  sodium bicarbonate 1300 milliGRAM(s) Oral three times a day  sodium chloride 0.9%. 500 milliLiter(s) (50 mL/Hr) IV Continuous <Continuous>  tiotropium 2.5 MICROgram(s)/olodaterol 2.5 MICROgram(s) (STIOLTO) Inhaler 2 Puff(s) Inhalation daily    MEDICATIONS  (PRN):  acetaminophen     Tablet .. 650 milliGRAM(s) Oral every 6 hours PRN Temp greater or equal to 38C (100.4F), Moderate Pain (4 - 6), Severe Pain (7 - 10)  guaiFENesin Oral Liquid (Sugar-Free) 100 milliGRAM(s) Oral every 6 hours PRN Cough  hydrOXYzine hydrochloride 25 milliGRAM(s) Oral daily PRN Restlessness  melatonin 5 milliGRAM(s) Oral at bedtime PRN Sleep  metoprolol tartrate Injectable 2.5 milliGRAM(s) IV Push every 6 hours PRN for hR > 110  sodium chloride 0.9% lock flush 10 milliLiter(s) IV Push every 1 hour PRN Pre/post blood products, medications, blood draw, and to maintain line patency  sodium chloride 0.9% lock flush 10 milliLiter(s) IV Push every 1 hour PRN Pre/post blood products, medications, blood draw, and to maintain line patency

## 2023-08-16 LAB
ALBUMIN SERPL ELPH-MCNC: 3 G/DL — LOW (ref 3.3–5.2)
ALP SERPL-CCNC: 404 U/L — HIGH (ref 40–120)
ALT FLD-CCNC: 18 U/L — SIGNIFICANT CHANGE UP
ANION GAP SERPL CALC-SCNC: 12 MMOL/L — SIGNIFICANT CHANGE UP (ref 5–17)
ANION GAP SERPL CALC-SCNC: 16 MMOL/L — SIGNIFICANT CHANGE UP (ref 5–17)
APTT BLD: 40.2 SEC — HIGH (ref 24.5–35.6)
AST SERPL-CCNC: 19 U/L — SIGNIFICANT CHANGE UP
BASOPHILS # BLD AUTO: 0.02 K/UL — SIGNIFICANT CHANGE UP (ref 0–0.2)
BASOPHILS NFR BLD AUTO: 0.2 % — SIGNIFICANT CHANGE UP (ref 0–2)
BILIRUB SERPL-MCNC: 1 MG/DL — SIGNIFICANT CHANGE UP (ref 0.4–2)
BUN SERPL-MCNC: 36.9 MG/DL — HIGH (ref 8–20)
BUN SERPL-MCNC: 38.8 MG/DL — HIGH (ref 8–20)
CALCIUM SERPL-MCNC: 8.1 MG/DL — LOW (ref 8.4–10.5)
CALCIUM SERPL-MCNC: 8.2 MG/DL — LOW (ref 8.4–10.5)
CHLORIDE SERPL-SCNC: 87 MMOL/L — LOW (ref 96–108)
CHLORIDE SERPL-SCNC: 88 MMOL/L — LOW (ref 96–108)
CO2 SERPL-SCNC: 22 MMOL/L — SIGNIFICANT CHANGE UP (ref 22–29)
CO2 SERPL-SCNC: 26 MMOL/L — SIGNIFICANT CHANGE UP (ref 22–29)
CREAT SERPL-MCNC: 2.72 MG/DL — HIGH (ref 0.5–1.3)
CREAT SERPL-MCNC: 2.81 MG/DL — HIGH (ref 0.5–1.3)
EGFR: 16 ML/MIN/1.73M2 — LOW
EGFR: 17 ML/MIN/1.73M2 — LOW
EOSINOPHIL # BLD AUTO: 0 K/UL — SIGNIFICANT CHANGE UP (ref 0–0.5)
EOSINOPHIL NFR BLD AUTO: 0 % — SIGNIFICANT CHANGE UP (ref 0–6)
GLUCOSE SERPL-MCNC: 94 MG/DL — SIGNIFICANT CHANGE UP (ref 70–99)
GLUCOSE SERPL-MCNC: 96 MG/DL — SIGNIFICANT CHANGE UP (ref 70–99)
HCT VFR BLD CALC: 28.8 % — LOW (ref 34.5–45)
HCT VFR BLD CALC: 29.8 % — LOW (ref 34.5–45)
HGB BLD-MCNC: 8.9 G/DL — LOW (ref 11.5–15.5)
HGB BLD-MCNC: 9.3 G/DL — LOW (ref 11.5–15.5)
IMM GRANULOCYTES NFR BLD AUTO: 1 % — HIGH (ref 0–0.9)
INR BLD: 1.22 RATIO — HIGH (ref 0.85–1.18)
LACTATE SERPL-SCNC: 1.5 MMOL/L — SIGNIFICANT CHANGE UP (ref 0.5–2)
LYMPHOCYTES # BLD AUTO: 0.66 K/UL — LOW (ref 1–3.3)
LYMPHOCYTES # BLD AUTO: 6 % — LOW (ref 13–44)
MAGNESIUM SERPL-MCNC: 2.2 MG/DL — SIGNIFICANT CHANGE UP (ref 1.6–2.6)
MCHC RBC-ENTMCNC: 28.8 PG — SIGNIFICANT CHANGE UP (ref 27–34)
MCHC RBC-ENTMCNC: 28.9 PG — SIGNIFICANT CHANGE UP (ref 27–34)
MCHC RBC-ENTMCNC: 30.9 GM/DL — LOW (ref 32–36)
MCHC RBC-ENTMCNC: 31.2 GM/DL — LOW (ref 32–36)
MCV RBC AUTO: 92.5 FL — SIGNIFICANT CHANGE UP (ref 80–100)
MCV RBC AUTO: 93.2 FL — SIGNIFICANT CHANGE UP (ref 80–100)
MONOCYTES # BLD AUTO: 1.06 K/UL — HIGH (ref 0–0.9)
MONOCYTES NFR BLD AUTO: 9.6 % — SIGNIFICANT CHANGE UP (ref 2–14)
NEUTROPHILS # BLD AUTO: 9.21 K/UL — HIGH (ref 1.8–7.4)
NEUTROPHILS NFR BLD AUTO: 83.2 % — HIGH (ref 43–77)
PHOSPHATE SERPL-MCNC: 4 MG/DL — SIGNIFICANT CHANGE UP (ref 2.4–4.7)
PLATELET # BLD AUTO: 230 K/UL — SIGNIFICANT CHANGE UP (ref 150–400)
PLATELET # BLD AUTO: 319 K/UL — SIGNIFICANT CHANGE UP (ref 150–400)
POTASSIUM SERPL-MCNC: 4.1 MMOL/L — SIGNIFICANT CHANGE UP (ref 3.5–5.3)
POTASSIUM SERPL-MCNC: 4.3 MMOL/L — SIGNIFICANT CHANGE UP (ref 3.5–5.3)
POTASSIUM SERPL-SCNC: 4.1 MMOL/L — SIGNIFICANT CHANGE UP (ref 3.5–5.3)
POTASSIUM SERPL-SCNC: 4.3 MMOL/L — SIGNIFICANT CHANGE UP (ref 3.5–5.3)
PROT SERPL-MCNC: 5.3 G/DL — LOW (ref 6.6–8.7)
PROTHROM AB SERPL-ACNC: 13.5 SEC — HIGH (ref 9.5–13)
RAPID RVP RESULT: SIGNIFICANT CHANGE UP
RBC # BLD: 3.09 M/UL — LOW (ref 3.8–5.2)
RBC # BLD: 3.22 M/UL — LOW (ref 3.8–5.2)
RBC # FLD: 17.9 % — HIGH (ref 10.3–14.5)
RBC # FLD: 18.2 % — HIGH (ref 10.3–14.5)
SARS-COV-2 RNA SPEC QL NAA+PROBE: SIGNIFICANT CHANGE UP
SODIUM SERPL-SCNC: 125 MMOL/L — LOW (ref 135–145)
SODIUM SERPL-SCNC: 126 MMOL/L — LOW (ref 135–145)
WBC # BLD: 11.06 K/UL — HIGH (ref 3.8–10.5)
WBC # BLD: 9.07 K/UL — SIGNIFICANT CHANGE UP (ref 3.8–10.5)
WBC # FLD AUTO: 11.06 K/UL — HIGH (ref 3.8–10.5)
WBC # FLD AUTO: 9.07 K/UL — SIGNIFICANT CHANGE UP (ref 3.8–10.5)

## 2023-08-16 PROCEDURE — 71045 X-RAY EXAM CHEST 1 VIEW: CPT | Mod: 26

## 2023-08-16 PROCEDURE — 99233 SBSQ HOSP IP/OBS HIGH 50: CPT

## 2023-08-16 RX ORDER — ALBUMIN HUMAN 25 %
100 VIAL (ML) INTRAVENOUS ONCE
Refills: 0 | Status: COMPLETED | OUTPATIENT
Start: 2023-08-16 | End: 2023-08-16

## 2023-08-16 RX ORDER — SODIUM CHLORIDE 9 MG/ML
500 INJECTION, SOLUTION INTRAVENOUS ONCE
Refills: 0 | Status: DISCONTINUED | OUTPATIENT
Start: 2023-08-16 | End: 2023-08-16

## 2023-08-16 RX ORDER — PIPERACILLIN AND TAZOBACTAM 4; .5 G/20ML; G/20ML
3.38 INJECTION, POWDER, LYOPHILIZED, FOR SOLUTION INTRAVENOUS EVERY 12 HOURS
Refills: 0 | Status: DISCONTINUED | OUTPATIENT
Start: 2023-08-16 | End: 2023-08-18

## 2023-08-16 RX ORDER — SODIUM CHLORIDE 9 MG/ML
1 INJECTION INTRAMUSCULAR; INTRAVENOUS; SUBCUTANEOUS
Refills: 0 | Status: DISCONTINUED | OUTPATIENT
Start: 2023-08-16 | End: 2023-08-16

## 2023-08-16 RX ORDER — PIPERACILLIN AND TAZOBACTAM 4; .5 G/20ML; G/20ML
3.38 INJECTION, POWDER, LYOPHILIZED, FOR SOLUTION INTRAVENOUS ONCE
Refills: 0 | Status: COMPLETED | OUTPATIENT
Start: 2023-08-16 | End: 2023-08-16

## 2023-08-16 RX ORDER — SODIUM CHLORIDE 9 MG/ML
1000 INJECTION INTRAMUSCULAR; INTRAVENOUS; SUBCUTANEOUS
Refills: 0 | Status: DISCONTINUED | OUTPATIENT
Start: 2023-08-16 | End: 2023-08-16

## 2023-08-16 RX ORDER — SODIUM CHLORIDE 9 MG/ML
250 INJECTION, SOLUTION INTRAVENOUS ONCE
Refills: 0 | Status: COMPLETED | OUTPATIENT
Start: 2023-08-16 | End: 2023-08-16

## 2023-08-16 RX ORDER — FLUTICASONE PROPIONATE 50 MCG
1 SPRAY, SUSPENSION NASAL ONCE
Refills: 0 | Status: COMPLETED | OUTPATIENT
Start: 2023-08-16 | End: 2023-08-16

## 2023-08-16 RX ADMIN — CHLORHEXIDINE GLUCONATE 1 APPLICATION(S): 213 SOLUTION TOPICAL at 07:31

## 2023-08-16 RX ADMIN — Medication 1 TABLET(S): at 17:44

## 2023-08-16 RX ADMIN — ENOXAPARIN SODIUM 40 MILLIGRAM(S): 100 INJECTION SUBCUTANEOUS at 07:28

## 2023-08-16 RX ADMIN — TIOTROPIUM BROMIDE AND OLODATEROL 2 PUFF(S): 3.124; 2.736 SPRAY, METERED RESPIRATORY (INHALATION) at 08:14

## 2023-08-16 RX ADMIN — Medication 325 MILLIGRAM(S): at 17:45

## 2023-08-16 RX ADMIN — ENOXAPARIN SODIUM 40 MILLIGRAM(S): 100 INJECTION SUBCUTANEOUS at 17:45

## 2023-08-16 RX ADMIN — SODIUM CHLORIDE 75 MILLILITER(S): 9 INJECTION INTRAMUSCULAR; INTRAVENOUS; SUBCUTANEOUS at 12:28

## 2023-08-16 RX ADMIN — PIPERACILLIN AND TAZOBACTAM 200 GRAM(S): 4; .5 INJECTION, POWDER, LYOPHILIZED, FOR SOLUTION INTRAVENOUS at 01:54

## 2023-08-16 RX ADMIN — Medication 81 MILLIGRAM(S): at 17:44

## 2023-08-16 RX ADMIN — Medication 1 SPRAY(S): at 17:44

## 2023-08-16 RX ADMIN — MIDODRINE HYDROCHLORIDE 15 MILLIGRAM(S): 2.5 TABLET ORAL at 07:29

## 2023-08-16 RX ADMIN — Medication 50 MILLILITER(S): at 01:34

## 2023-08-16 RX ADMIN — PIPERACILLIN AND TAZOBACTAM 25 GRAM(S): 4; .5 INJECTION, POWDER, LYOPHILIZED, FOR SOLUTION INTRAVENOUS at 09:58

## 2023-08-16 RX ADMIN — TUBERCULIN PURIFIED PROTEIN DERIVATIVE 5 UNIT(S): 5 INJECTION, SOLUTION INTRADERMAL at 09:58

## 2023-08-16 RX ADMIN — MIDODRINE HYDROCHLORIDE 15 MILLIGRAM(S): 2.5 TABLET ORAL at 12:28

## 2023-08-16 RX ADMIN — Medication 25 MILLIGRAM(S): at 22:44

## 2023-08-16 RX ADMIN — PIPERACILLIN AND TAZOBACTAM 25 GRAM(S): 4; .5 INJECTION, POWDER, LYOPHILIZED, FOR SOLUTION INTRAVENOUS at 22:45

## 2023-08-16 RX ADMIN — BUDESONIDE AND FORMOTEROL FUMARATE DIHYDRATE 2 PUFF(S): 160; 4.5 AEROSOL RESPIRATORY (INHALATION) at 08:14

## 2023-08-16 RX ADMIN — AMIODARONE HYDROCHLORIDE 200 MILLIGRAM(S): 400 TABLET ORAL at 07:28

## 2023-08-16 RX ADMIN — SODIUM CHLORIDE 500 MILLILITER(S): 9 INJECTION, SOLUTION INTRAVENOUS at 00:41

## 2023-08-16 RX ADMIN — PANTOPRAZOLE SODIUM 40 MILLIGRAM(S): 20 TABLET, DELAYED RELEASE ORAL at 17:44

## 2023-08-16 RX ADMIN — MIDODRINE HYDROCHLORIDE 15 MILLIGRAM(S): 2.5 TABLET ORAL at 17:45

## 2023-08-16 NOTE — PROGRESS NOTE ADULT - SUBJECTIVE AND OBJECTIVE BOX
[facility] DIVISION OF KIDNEY DISEASES AND HYPERTENSION -- HEMODIALYSIS NOTE  --------------------------------------------------------------------------------  Chief ComplaintAki/Ongoing hemodialysis requirement    24 hour events/subjective:  s/p HD yesterday  pt seen and examined; feels well        PAST HISTORY  --------------------------------------------------------------------------------  No significant changes to PMH, PSH, FHx, SHx, unless otherwise noted    ALLERGIES & MEDICATIONS  --------------------------------------------------------------------------------  Allergies    No Known Allergies    Intolerances      Standing Inpatient Medications  aMIOdarone    Tablet 200 milliGRAM(s) Oral daily  aspirin  chewable 81 milliGRAM(s) Oral daily  budesonide  80 MICROgram(s)/formoterol 4.5 MICROgram(s) Inhaler 2 Puff(s) Inhalation two times a day  chlorhexidine 2% Cloths 1 Application(s) Topical <User Schedule>  chlorhexidine 4% Liquid 1 Application(s) Topical <User Schedule>  enoxaparin Injectable 40 milliGRAM(s) SubCutaneous every 12 hours  epoetin mine-epbx (RETACRIT) Injectable 5000 Unit(s) IV Push <User Schedule>  ferrous    sulfate 325 milliGRAM(s) Oral daily  fluticasone propionate 50 MICROgram(s)/spray Nasal Spray 1 Spray(s) Both Nostrils once  midodrine. 15 milliGRAM(s) Oral three times a day  multivitamin 1 Tablet(s) Oral daily  pantoprazole  Injectable 40 milliGRAM(s) IV Push daily  piperacillin/tazobactam IVPB.. 3.375 Gram(s) IV Intermittent every 12 hours  sodium chloride 1 Gram(s) Oral two times a day  sodium chloride 0.9%. 500 milliLiter(s) IV Continuous <Continuous>  sodium chloride 0.9%. 1000 milliLiter(s) IV Continuous <Continuous>  tiotropium 2.5 MICROgram(s)/olodaterol 2.5 MICROgram(s) (STIOLTO) Inhaler 2 Puff(s) Inhalation daily    PRN Inpatient Medications  acetaminophen     Tablet .. 650 milliGRAM(s) Oral every 6 hours PRN  guaiFENesin Oral Liquid (Sugar-Free) 100 milliGRAM(s) Oral every 6 hours PRN  hydrOXYzine hydrochloride 25 milliGRAM(s) Oral daily PRN  melatonin 5 milliGRAM(s) Oral at bedtime PRN  metoprolol tartrate Injectable 2.5 milliGRAM(s) IV Push every 6 hours PRN  sodium chloride 0.9% lock flush 10 milliLiter(s) IV Push every 1 hour PRN  sodium chloride 0.9% lock flush 10 milliLiter(s) IV Push every 1 hour PRN      REVIEW OF SYSTEMS  --------------------------------------------------------------------------------  Gen: No weight changes, fatigue, fevers/chills, weakness  Skin: No rashes  Head/Eyes/Ears/Mouth: No headache  Respiratory: No dyspnea, cough,  CV: No chest pain, orthopnea  GI: No abdominal pain, diarrhea, constipation, nausea, vomiting,  MSK: No joint pain  Neuro: No dizziness/lightheadedness, weakness  Heme: No bleeding  Psych: No significant nervousness, anxiety, stress, depression    All other systems were reviewed and are negative, except as noted.    VITALS/PHYSICAL EXAM  --------------------------------------------------------------------------------  T(C): 36.4 (08-16-23 @ 11:00), Max: 36.4 (08-16-23 @ 11:00)  HR: 90 (08-16-23 @ 11:00) (90 - 96)  BP: 90/61 (08-16-23 @ 10:10) (80/50 - 90/61)  RR: 18 (08-16-23 @ 10:00) (18 - 18)  SpO2: 97% (08-16-23 @ 11:00) (97% - 100%)  Wt(kg): --        08-15-23 @ 07:01  -  08-16-23 @ 07:00  --------------------------------------------------------  IN: 118 mL / OUT: 10 mL / NET: 108 mL    08-16-23 @ 07:01  -  08-16-23 @ 16:01  --------------------------------------------------------  IN: 0 mL / OUT: 100 mL / NET: -100 mL      Physical Exam:  	Gen: NAD,  	HEENT: PERRL, supple neck,  	Pulm: CTA B/L  	CV: RRR, S1S2; no rub  	Abd: +BS, soft, nontender  	UE: Warm,  	LE: Warm, + edema  	Neuro: No focal deficits  	Psych: Normal affect and mood  	Skin: Warm, without rashes  	Vascular access: ri non [facility]    LABS/STUDIES  --------------------------------------------------------------------------------              8.9    9.07  >-----------<  230      [08-16-23 @ 05:47]              28.8     126  |  88  |  38.8  ----------------------------<  94      [08-16-23 @ 05:47]  4.3   |  22.0  |  2.81        Ca     8.2     [08-16-23 @ 05:47]      Mg     2.2     [08-16-23 @ 00:30]      Phos  4.0     [08-16-23 @ 00:30]    TPro  5.3  /  Alb  3.0  /  TBili  1.0  /  DBili  x   /  AST  19  /  ALT  18  /  AlkPhos  404  [08-16-23 @ 00:30]    PT/INR: PT 13.5 , INR 1.22       [08-16-23 @ 00:30]  PTT: 40.2       [08-16-23 @ 00:30]      Iron 16, TIBC --, %sat --      [08-04-23 @ 05:25]  Ferritin 109      [08-04-23 @ 05:25]    HBsAb <3.0      [08-07-23 @ 10:36]  HBsAb Nonreact      [08-07-23 @ 10:36]  HBsAg Nonreact      [08-07-23 @ 10:36]  HBcAb Nonreact      [08-07-23 @ 10:36]  HCV 0.10, Nonreact      [08-07-23 @ 10:36]

## 2023-08-16 NOTE — CHART NOTE - NSCHARTNOTEFT_GEN_A_CORE
84 y/o female with PMH COPD on 2L home O2, HFpEF, CAD s/p PCI, HTN, HLD, pAF on rivaroxaban, diverticulosis, hemorrhoids presented with generalized weakness/dark stools admitted anemia, GIB without active bleed on ct, received 3U PRBC, and IVF however remained hypotensive requiring vasopressor therapy for hemorrhagic shock.  EGD on 7/27 and colonoscopy 7/28 without active bleed. Patient also with DANETTE and Afib with RVR, resumed back on amio and bb .  HR now better controlled. Plan was for ct enterography, but given DANETTE on CKD, it was cancelled. Patient downgraded to medicine 7/30/23. S/p Intermittent HD for worsening kidney functions. Nephrology continues to monitor, minimal urinary output noted from yesterday, increasing Cr and K plan for HD today, IR guided Tunnelled cath in AM.     T(C): 36.7 (08-15-23 @ 16:00), Max: 36.7 (08-15-23 @ 16:00)  HR: 78 (08-15-23 @ 16:00) (60 - 92)  BP: 91/54 (08-15-23 @ 16:00) (83/57 - 105/73)  RR: 18 (08-15-23 @ 16:00) (17 - 18)  SpO2: 100% (08-15-23 @ 16:00) (97% - 100%)    CONSTITUTIONAL: Well groomed, laying in bed   EYES: PERRLA and symmetric, EOMI, No conjunctival or scleral injection, non-icteric  ENMT: Oral mucosa with moist membranes  RESP: No respiratory distress, no use of accessory muscles; rhonchi throughout lung fields   CV: RRR, +S1S2, no MRG; no JVD; +3 pitting edema of the LE   GI: Soft, NT, ND, no rebound, no guarding  SKIN: No rashes or ulcers noted  NEURO: CN II-XII intact; normal reflexes in upper and lower extremities, sensation intact in upper and lower extremities b/l to light touch   PSYCH: Appropriate insight/judgment; A+O x 3, mood and affect appropriate, recent/remote memory intact    Assessment:   84 y/o female with PMH COPD on 2L home O2, HFpEF, CAD s/p PCI, HTN, HLD, pAF on rivaroxaban, diverticulosis, hemorrhoids admitted for GIB without active bleed w/ hemorrhagic shock noted to have leukocytosis on 8/15 during AM labs w/ hypotension.     1. Hypotension  -Pt currently on midodrine 15 mg TID  -noted to have leukocytosis w/ AM labs on 8/15  - noted to have soft BP during hospital course and taking midodrine 15 mg TID (recently increased) and decrease in BB dose   - 250 cc bolus of LR (pt currently overloaded, last HD yesterday), albumin 25 % x 1 given  - leukocytosis noted during AM yesterday, need for repeat labs- trend WBC, pt afebrile   - pt may fit SIRS criteria if leukocytosis noted on current labs   - STAT labs, w/ lactate, BC x2 ordered   - trend WBC, monitor for  temp   - UA pending   - STAT CXR   - Discussed w/ RN     2. DANETTE on CKD III/Metabolic acidosis (Resolving)  - f/u renal recs - started HD for 3 days 8/7,8,9  - S/p IVF, sodium bicarb discontinued as per renal recs  - IR consulted for HD Tunneled Cath placement, patient will need new Outpatient HD   - monitor BMP    - resumed aspirin as per GI  - tolerating Lovenox - can transition to XARELTO on discharge if hb stable, CBC stable 82 y/o female with PMH COPD on 2L home O2, HFpEF, CAD s/p PCI, HTN, HLD, pAF on rivaroxaban, diverticulosis, hemorrhoids presented with generalized weakness/dark stools admitted anemia, GIB without active bleed on ct, received 3U PRBC, and IVF however remained hypotensive requiring vasopressor therapy for hemorrhagic shock.  EGD on 7/27 and colonoscopy 7/28 without active bleed. Patient also with DANETTE and Afib with RVR, resumed back on amio and bb .  HR now better controlled. Plan was for ct enterography, but given DANETTE on CKD, it was cancelled. Patient downgraded to medicine 7/30/23. S/p Intermittent HD for worsening kidney functions. Nephrology continues to monitor, minimal urinary output noted from yesterday, increasing Cr and K plan for HD today, IR guided Tunnelled cath in AM. pt evaluated for hypotension, pt noted to have soft BP during hospital course. Leukocytosis noted on 8/15. Denies CP, palpitations, SOB, dyspnea, N/V/D, abdominal pain, dizziness or other symptoms at this time.     T(C): 36.7 (08-15-23 @ 16:00), Max: 36.7 (08-15-23 @ 16:00)  HR: 78 (08-15-23 @ 16:00) (60 - 92)  BP: 91/54 (08-15-23 @ 16:00) (83/57 - 105/73)  RR: 18 (08-15-23 @ 16:00) (17 - 18)  SpO2: 100% (08-15-23 @ 16:00) (97% - 100%)    CONSTITUTIONAL: Well groomed, laying in bed   EYES: PERRLA and symmetric, EOMI, No conjunctival or scleral injection, non-icteric  ENMT: Oral mucosa with moist membranes  RESP: No respiratory distress, no use of accessory muscles; rhonchi throughout lung fields   CV: RRR, +S1S2, no MRG; no JVD; +3 pitting edema of the LE   GI: Soft, NT, ND, no rebound, no guarding  SKIN: No rashes or ulcers noted  NEURO: CN II-XII intact; normal reflexes in upper and lower extremities, sensation intact in upper and lower extremities b/l to light touch   PSYCH: Appropriate insight/judgment; A+O x 3, mood and affect appropriate, recent/remote memory intact    Assessment:   82 y/o female with PMH COPD on 2L home O2, HFpEF, CAD s/p PCI, HTN, HLD, pAF on rivaroxaban, diverticulosis, hemorrhoids admitted for GIB without active bleed w/ hemorrhagic shock noted to have leukocytosis on 8/15 during AM labs w/ hypotension.     1. Hypotension  -Pt currently on midodrine 15 mg TID  -noted to have leukocytosis w/ AM labs on 8/15  - noted to have soft BP during hospital course and taking midodrine 15 mg TID (recently increased) and decrease in BB dose   - 250 cc bolus of LR (pt currently overloaded, last HD yesterday), albumin 25 % x 1 given  - leukocytosis noted during AM yesterday, need for repeat labs- trend WBC, pt afebrile   - pt may fit SIRS criteria if leukocytosis noted on current labs   - STAT labs, w/ lactate, BC x2 ordered   - trend WBC, monitor for  temp   - UA pending   - STAT CXR  - Discussed w/ RN     2. DANETTE on CKD III/Metabolic acidosis (Resolving)  - f/u renal recs - started HD for 3 days 8/7,8,9  - S/p IVF, sodium bicarb discontinued as per renal recs  - IR consulted for HD Tunneled Cath placement, patient will need new Outpatient HD   - monitor BMP    Addednum:   pt does not meet SIRs criteria at this time but noted to have mild leukocytosis, lactate stable at 1.5, 8/11 CXR  noted to have RT lower lobe infiltrate, which is noted on CXR today. Final read pending. pt on empirical zosyn. Trending WBC. AM labs ordered pt asymptomatic at this time. 82 y/o female with PMH COPD on 2L home O2, HFpEF, CAD s/p PCI, HTN, HLD, pAF on rivaroxaban, diverticulosis, hemorrhoids presented with generalized weakness/dark stools admitted anemia, GIB without active bleed on ct, received 3U PRBC, and IVF however remained hypotensive requiring vasopressor therapy for hemorrhagic shock.  EGD on 7/27 and colonoscopy 7/28 without active bleed. Patient also with DANETTE and Afib with RVR, resumed back on amio and bb .  HR now better controlled. Plan was for ct enterography, but given DANETTE on CKD, it was cancelled. Patient downgraded to medicine 7/30/23. S/p Intermittent HD for worsening kidney functions. Nephrology continues to monitor, minimal urinary output noted from yesterday, increasing Cr and K plan for HD today, IR guided Tunnelled cath in AM. pt evaluated for hypotension, pt noted to have soft BP during hospital course. Leukocytosis noted on 8/15. Denies CP, palpitations, SOB, dyspnea, N/V/D, abdominal pain, dizziness or other symptoms at this time. Pt does admit to rhinorrhea today.     T(C): 36.7 (08-15-23 @ 16:00), Max: 36.7 (08-15-23 @ 16:00)  HR: 78 (08-15-23 @ 16:00) (60 - 92)  BP: 91/54 (08-15-23 @ 16:00) (83/57 - 105/73)  RR: 18 (08-15-23 @ 16:00) (17 - 18)  SpO2: 100% (08-15-23 @ 16:00) (97% - 100%)    CONSTITUTIONAL: Well groomed, laying in bed   EYES: PERRLA and symmetric, EOMI, No conjunctival or scleral injection, non-icteric  ENMT: Oral mucosa with moist membranes  RESP: No respiratory distress, no use of accessory muscles; rhonchi throughout lung fields   CV: RRR, +S1S2, no MRG; no JVD; +3 pitting edema of the LE   GI: Soft, NT, ND, no rebound, no guarding  SKIN: No rashes or ulcers noted  NEURO: CN II-XII intact; normal reflexes in upper and lower extremities, sensation intact in upper and lower extremities b/l to light touch   PSYCH: Appropriate insight/judgment; A+O x 3, mood and affect appropriate, recent/remote memory intact    Assessment:   82 y/o female with PMH COPD on 2L home O2, HFpEF, CAD s/p PCI, HTN, HLD, pAF on rivaroxaban, diverticulosis, hemorrhoids admitted for GIB without active bleed w/ hemorrhagic shock noted to have leukocytosis on 8/15 during AM labs w/ hypotension.     1. Hypotension  -Pt currently on midodrine 15 mg TID  -noted to have leukocytosis w/ AM labs on 8/15  - noted to have soft BP during hospital course and taking midodrine 15 mg TID (recently increased) and decrease in BB dose   - 250 cc bolus of LR (pt currently overloaded, last HD yesterday), albumin 25 % x 1 given  - leukocytosis noted during AM yesterday, need for repeat labs- trend WBC, pt afebrile   - pt may fit SIRS criteria if leukocytosis noted on current labs   - STAT labs, w/ lactate, BC x2 ordered   - trend WBC, monitor for  temp   - UA pending   - STAT CXR  -Rhinorrhea- flonase ordered, check RVP  - Discussed w/ RN     2. DANETTE on CKD III/Metabolic acidosis (Resolving)  - f/u renal recs - started HD for 3 days 8/7,8,9  - S/p IVF, sodium bicarb discontinued as per renal recs  - IR consulted for HD Tunneled Cath placement, patient will need new Outpatient HD   - monitor BMP    Addednum:   pt does not meet SIRs criteria at this time but noted to have mild leukocytosis, lactate stable at 1.5, 8/11 CXR  noted to have RT lower lobe infiltrate, which is noted on CXR today. Final read pending. pt on empirical zosyn. Trending WBC. AM labs ordered pt asymptomatic at this time.

## 2023-08-16 NOTE — PROGRESS NOTE ADULT - ASSESSMENT
83 year old female with PMH of HTN, HLD, CAD s/p stents, Afib (on anticoagulation), CHF, CKD 3b, COPD (on 2L home O2), diverticulosis and stage 3 hemorrhoids presents with melanotic stool. Admitted for hemorrhagic shock s/p pRBC transfusions. EGD on 7/27 without active bleeding, colonoscopy 7/28 unrevealing. Hospital course notable for DANETTE on CKD requiring initiation of hemodialysis on 8/07/23.     DANETTE -->  ATN  Baseline creatinine ~1.1-1.3mg/dL     Remains dialysis dependant  tunneled dialysis catheter placement was cancelled today due to hyponatremia  UF with HD should improve sodium levels  Salt tabs less likely of any utility given oliguria    Hypotension - continue midodrine to 15mg TID     Metabolic acidosis - improved with HD    Anemia -continue  GEORGE with HD    Afib - on metoprolol PO + amiodarone PO       Nephrology

## 2023-08-16 NOTE — PROGRESS NOTE ADULT - SUBJECTIVE AND OBJECTIVE BOX
[facility] Division of Hospital Medicine    Chief Complaint:      SUBJECTIVE / OVERNIGHT EVENTS:    Patient seen and examined at bedside, overnight patient noted to be persistently hypotensive, CXR shows B/L R > L airspace dz possible effusion. Given Zosyn, WBC improved, patient has been afebrile, doubt infectious source however will continue Abx at this time. Unable to do Tunnelled cath this AM due to serum Na, will plan to Fix with HD after discussion with nephrology, given small volume fluids this AM for pressure/Na, will hope for Tunnelled cath after HD.     MEDICATIONS  (STANDING):  aMIOdarone    Tablet 200 milliGRAM(s) Oral daily  aspirin  chewable 81 milliGRAM(s) Oral daily  budesonide  80 MICROgram(s)/formoterol 4.5 MICROgram(s) Inhaler 2 Puff(s) Inhalation two times a day  chlorhexidine 2% Cloths 1 Application(s) Topical <User Schedule>  chlorhexidine 4% Liquid 1 Application(s) Topical <User Schedule>  enoxaparin Injectable 40 milliGRAM(s) SubCutaneous every 12 hours  epoetin mine-epbx (RETACRIT) Injectable 5000 Unit(s) IV Push <User Schedule>  ferrous    sulfate 325 milliGRAM(s) Oral daily  fluticasone propionate 50 MICROgram(s)/spray Nasal Spray 1 Spray(s) Both Nostrils once  midodrine. 15 milliGRAM(s) Oral three times a day  multivitamin 1 Tablet(s) Oral daily  pantoprazole  Injectable 40 milliGRAM(s) IV Push daily  piperacillin/tazobactam IVPB.. 3.375 Gram(s) IV Intermittent every 12 hours  sodium chloride 0.9%. 500 milliLiter(s) (50 mL/Hr) IV Continuous <Continuous>  tiotropium 2.5 MICROgram(s)/olodaterol 2.5 MICROgram(s) (STIOLTO) Inhaler 2 Puff(s) Inhalation daily    MEDICATIONS  (PRN):  acetaminophen     Tablet .. 650 milliGRAM(s) Oral every 6 hours PRN Temp greater or equal to 38C (100.4F), Moderate Pain (4 - 6), Severe Pain (7 - 10)  guaiFENesin Oral Liquid (Sugar-Free) 100 milliGRAM(s) Oral every 6 hours PRN Cough  hydrOXYzine hydrochloride 25 milliGRAM(s) Oral daily PRN Restlessness  melatonin 5 milliGRAM(s) Oral at bedtime PRN Sleep  metoprolol tartrate Injectable 2.5 milliGRAM(s) IV Push every 6 hours PRN for hR > 110  sodium chloride 0.9% lock flush 10 milliLiter(s) IV Push every 1 hour PRN Pre/post blood products, medications, blood draw, and to maintain line patency  sodium chloride 0.9% lock flush 10 milliLiter(s) IV Push every 1 hour PRN Pre/post blood products, medications, blood draw, and to maintain line patency        I&O's Summary    15 Aug 2023 07:01  -  16 Aug 2023 07:00  --------------------------------------------------------  IN: 118 mL / OUT: 10 mL / NET: 108 mL    16 Aug 2023 07:01  -  16 Aug 2023 16:59  --------------------------------------------------------  IN: 0 mL / OUT: 100 mL / NET: -100 mL        PHYSICAL EXAM:  Vital Signs Last 24 Hrs  T(C): 36.3 (16 Aug 2023 16:16), Max: 36.4 (16 Aug 2023 11:00)  T(F): 97.4 (16 Aug 2023 16:16), Max: 97.5 (16 Aug 2023 11:00)  HR: 92 (16 Aug 2023 16:16) (90 - 96)  BP: 102/70 (16 Aug 2023 16:16) (80/50 - 102/70)  BP(mean): --  RR: 18 (16 Aug 2023 16:16) (18 - 18)  SpO2: 96% (16 Aug 2023 16:16) (96% - 100%)    Parameters below as of 16 Aug 2023 16:16  Patient On (Oxygen Delivery Method): nasal cannula  O2 Flow (L/min): 3      General: Obese female in NAD, on nasal cannula  HEENT: Normocephalic  Cardiac: S1S2 RRR  Respiratory: Diminished breath sounds b/l  Abdomen: Soft, NT  Extremities: 2+ pitting edema of b/l lower extremities, +bullae to b/l LE, now ACE Wraps in place B/L   Msk: patient with 3/5 strength in B/L LEs   Neuro: AAOx3, sensation intact in LE's, though patient states slightly more dull than UEs  Psych: Calm   Access: R IJ non tunneled dialysis catheter    LABS:                        8.9    9.07  )-----------( 230      ( 16 Aug 2023 05:47 )             28.8     08-16    126<L>  |  88<L>  |  38.8<H>  ----------------------------<  94  4.3   |  22.0  |  2.81<H>    Ca    8.2<L>      16 Aug 2023 05:47  Phos  4.0     08-16  Mg     2.2     08-16    TPro  5.3<L>  /  Alb  3.0<L>  /  TBili  1.0  /  DBili  x   /  AST  19  /  ALT  18  /  AlkPhos  404<H>  08-16    PT/INR - ( 16 Aug 2023 00:30 )   PT: 13.5 sec;   INR: 1.22 ratio         PTT - ( 16 Aug 2023 00:30 )  PTT:40.2 sec      Urinalysis Basic - ( 16 Aug 2023 05:47 )    Color: x / Appearance: x / SG: x / pH: x  Gluc: 94 mg/dL / Ketone: x  / Bili: x / Urobili: x   Blood: x / Protein: x / Nitrite: x   Leuk Esterase: x / RBC: x / WBC x   Sq Epi: x / Non Sq Epi: x / Bacteria: x        CAPILLARY BLOOD GLUCOSE            RADIOLOGY & ADDITIONAL TESTS:  Results Reviewed:   Imaging Personally Reviewed:  Electrocardiogram Personally Reviewed:

## 2023-08-16 NOTE — PROGRESS NOTE ADULT - ASSESSMENT
84 y/o female with PMH COPD on 2L home O2, HFpEF, CAD s/p PCI, HTN, HLD, pAF on rivaroxaban, diverticulosis, hemorrhoids presented with generalized weakness/dark stools admitted anemia, GIB without active bleed on ct, received 3U PRBC, and IVF however remained hypotensive requiring vasopressor therapy for hemorrhagic shock.  EGD on 7/27 and colonoscopy 7/28 without active bleed. Patient also with DANETTE and Afib with RVR, resumed back on amio and bb .  HR now better controlled. Plan was for ct enterography, but given DANETTE on CKD, it was cancelled. Patient downgraded to medicine 7/30/23. S/p Intermittent HD for worsening kidney functions. Nephrology continues to monitor, minimal urinary output noted from yesterday, increasing Cr and K plan for HD today, IR guided Tunnelled cath for tomorrow in light of Hyponatremia        DANETTE on CKD III/Metabolic acidosis (Resolving)  Hyponatremia  Renal hypoperfusion, episodic  - f/up renal recs - started HD for 3 days 8/7,8,9  - S/p IVF, sodium bicarb discontinued as per renal recs  - s/p Lasix and Albumin 8/3 and 8/4 as per renal recs  - Spoke with nephrology- continues on HD in AM, plan for Tunneled Cath placement, patient will need new Outpatient HD   - Hep B surface and Core Ab sent, PPD ordered     - monitor BMP    Hyponatremia   Na 126 this AM, trending down   Urine Na < 30, patient continues to make minimum urine   - possibly in setting of overload, will attempt to fix with HD per nephrology     acute GIB / acute blood loss anemia /  hemorrhagic shock (Resolved)  - hx of GIB in past   - cta neg without active bleed on ct on admission   - s/p EGD on 7/27 without active bleeding  - s/p colonoscopy 7/28 no active bleed   - now off pressors, started on midodrine, increased to 15mg TID, monitoring BP closely   - c/w PPIs   - ct enterography - cancelled given elevated creatinine and no further bleeding  - plan for capsule endoscopy as op   - gi following   - Diet as tolerated   - resumed aspirin as per GI  - tolerating Lovenox - can transition to XARELTO on discharge if hb stable, CBC stable    HFpEF  Echo noted with Grade III diastolic dysfunction and severe pulm HTN  S/p Lasix  bilateral lower ext ACE wrap     copd  c/w inhalers  C/w Symbicort BID   C/w tiotropium  C/w o2     Paroxysmal A-Fib   Hypotension, asymptomatic  - resumed on amio   - BB dc'd given persistent hypotension   - prn iv lopressor for hr > 110  - aspirin  - raised Midodrine dose for improved BP currently 15 TID   - started lovenox 8/2/23, no bleeding noted, will start Xarelto on dc    HLD  - monitor CMP   - hold statin due to transaminitis    Transaminitis (resolving)  - statin held  - LFT now wnl, Alk Phos remains elevated   - discussed with cardio, cont amiodarone for now since pt has been on it for long time  - Abdo U/S shows cholelithiasis and small right pleural effusion and Hep panel negative       dvt ppx : trial of Lovenox  GI cleared for Xarelto resumption on discharge  Dispo: IR tunneled cath placement, then will plan for Rehab   Plan discussed with patient at bedside.

## 2023-08-17 LAB
ANION GAP SERPL CALC-SCNC: 15 MMOL/L — SIGNIFICANT CHANGE UP (ref 5–17)
ANION GAP SERPL CALC-SCNC: 16 MMOL/L — SIGNIFICANT CHANGE UP (ref 5–17)
BUN SERPL-MCNC: 14 MG/DL — SIGNIFICANT CHANGE UP (ref 8–20)
BUN SERPL-MCNC: 43.5 MG/DL — HIGH (ref 8–20)
CALCIUM SERPL-MCNC: 7.7 MG/DL — LOW (ref 8.4–10.5)
CALCIUM SERPL-MCNC: 8.1 MG/DL — LOW (ref 8.4–10.5)
CHLORIDE SERPL-SCNC: 87 MMOL/L — LOW (ref 96–108)
CHLORIDE SERPL-SCNC: 92 MMOL/L — LOW (ref 96–108)
CO2 SERPL-SCNC: 22 MMOL/L — SIGNIFICANT CHANGE UP (ref 22–29)
CO2 SERPL-SCNC: 27 MMOL/L — SIGNIFICANT CHANGE UP (ref 22–29)
CREAT SERPL-MCNC: 1.19 MG/DL — SIGNIFICANT CHANGE UP (ref 0.5–1.3)
CREAT SERPL-MCNC: 3.51 MG/DL — HIGH (ref 0.5–1.3)
EGFR: 12 ML/MIN/1.73M2 — LOW
EGFR: 45 ML/MIN/1.73M2 — LOW
GLUCOSE SERPL-MCNC: 81 MG/DL — SIGNIFICANT CHANGE UP (ref 70–99)
GLUCOSE SERPL-MCNC: 94 MG/DL — SIGNIFICANT CHANGE UP (ref 70–99)
HCT VFR BLD CALC: 32.5 % — LOW (ref 34.5–45)
HGB BLD-MCNC: 9.8 G/DL — LOW (ref 11.5–15.5)
MCHC RBC-ENTMCNC: 28.3 PG — SIGNIFICANT CHANGE UP (ref 27–34)
MCHC RBC-ENTMCNC: 30.2 GM/DL — LOW (ref 32–36)
MCV RBC AUTO: 93.9 FL — SIGNIFICANT CHANGE UP (ref 80–100)
PLATELET # BLD AUTO: 304 K/UL — SIGNIFICANT CHANGE UP (ref 150–400)
POTASSIUM SERPL-MCNC: 3 MMOL/L — LOW (ref 3.5–5.3)
POTASSIUM SERPL-MCNC: 4.7 MMOL/L — SIGNIFICANT CHANGE UP (ref 3.5–5.3)
POTASSIUM SERPL-SCNC: 3 MMOL/L — LOW (ref 3.5–5.3)
POTASSIUM SERPL-SCNC: 4.7 MMOL/L — SIGNIFICANT CHANGE UP (ref 3.5–5.3)
RBC # BLD: 3.46 M/UL — LOW (ref 3.8–5.2)
RBC # FLD: 18.7 % — HIGH (ref 10.3–14.5)
SODIUM SERPL-SCNC: 125 MMOL/L — LOW (ref 135–145)
SODIUM SERPL-SCNC: 133 MMOL/L — LOW (ref 135–145)
WBC # BLD: 9.91 K/UL — SIGNIFICANT CHANGE UP (ref 3.8–10.5)
WBC # FLD AUTO: 9.91 K/UL — SIGNIFICANT CHANGE UP (ref 3.8–10.5)

## 2023-08-17 PROCEDURE — 90937 HEMODIALYSIS REPEATED EVAL: CPT

## 2023-08-17 PROCEDURE — 99232 SBSQ HOSP IP/OBS MODERATE 35: CPT

## 2023-08-17 RX ORDER — ALBUMIN HUMAN 25 %
100 VIAL (ML) INTRAVENOUS ONCE
Refills: 0 | Status: COMPLETED | OUTPATIENT
Start: 2023-08-17 | End: 2023-08-17

## 2023-08-17 RX ORDER — MAGNESIUM HYDROXIDE 400 MG/1
30 TABLET, CHEWABLE ORAL ONCE
Refills: 0 | Status: COMPLETED | OUTPATIENT
Start: 2023-08-17 | End: 2023-08-17

## 2023-08-17 RX ADMIN — Medication 50 MILLILITER(S): at 09:57

## 2023-08-17 RX ADMIN — MIDODRINE HYDROCHLORIDE 15 MILLIGRAM(S): 2.5 TABLET ORAL at 17:53

## 2023-08-17 RX ADMIN — CHLORHEXIDINE GLUCONATE 1 APPLICATION(S): 213 SOLUTION TOPICAL at 05:28

## 2023-08-17 RX ADMIN — ENOXAPARIN SODIUM 40 MILLIGRAM(S): 100 INJECTION SUBCUTANEOUS at 05:27

## 2023-08-17 RX ADMIN — Medication 325 MILLIGRAM(S): at 17:52

## 2023-08-17 RX ADMIN — PIPERACILLIN AND TAZOBACTAM 25 GRAM(S): 4; .5 INJECTION, POWDER, LYOPHILIZED, FOR SOLUTION INTRAVENOUS at 21:23

## 2023-08-17 RX ADMIN — BUDESONIDE AND FORMOTEROL FUMARATE DIHYDRATE 2 PUFF(S): 160; 4.5 AEROSOL RESPIRATORY (INHALATION) at 21:24

## 2023-08-17 RX ADMIN — ENOXAPARIN SODIUM 40 MILLIGRAM(S): 100 INJECTION SUBCUTANEOUS at 17:53

## 2023-08-17 RX ADMIN — Medication 1 TABLET(S): at 17:52

## 2023-08-17 RX ADMIN — Medication 5 MILLIGRAM(S): at 02:30

## 2023-08-17 RX ADMIN — ERYTHROPOIETIN 5000 UNIT(S): 10000 INJECTION, SOLUTION INTRAVENOUS; SUBCUTANEOUS at 10:04

## 2023-08-17 RX ADMIN — PANTOPRAZOLE SODIUM 40 MILLIGRAM(S): 20 TABLET, DELAYED RELEASE ORAL at 17:52

## 2023-08-17 RX ADMIN — Medication 81 MILLIGRAM(S): at 17:52

## 2023-08-17 RX ADMIN — MAGNESIUM HYDROXIDE 30 MILLILITER(S): 400 TABLET, CHEWABLE ORAL at 01:00

## 2023-08-17 RX ADMIN — AMIODARONE HYDROCHLORIDE 200 MILLIGRAM(S): 400 TABLET ORAL at 05:26

## 2023-08-17 NOTE — PROGRESS NOTE ADULT - SUBJECTIVE AND OBJECTIVE BOX
[facility] DIVISION OF KIDNEY DISEASES AND HYPERTENSION -- HEMODIALYSIS NOTE  --------------------------------------------------------------------------------  Chief Complaint: Antonino/Ongoing hemodialysis requirement    24 hour events/subjective:  pt seen and examined; re-evaluated on HD      PAST HISTORY  --------------------------------------------------------------------------------  No significant changes to PMH, PSH, FHx, SHx, unless otherwise noted    ALLERGIES & MEDICATIONS  --------------------------------------------------------------------------------  Allergies    No Known Allergies          Standing Inpatient Medications  aMIOdarone    Tablet 200 milliGRAM(s) Oral daily  aspirin  chewable 81 milliGRAM(s) Oral daily  budesonide  80 MICROgram(s)/formoterol 4.5 MICROgram(s) Inhaler 2 Puff(s) Inhalation two times a day  chlorhexidine 2% Cloths 1 Application(s) Topical <User Schedule>  chlorhexidine 4% Liquid 1 Application(s) Topical <User Schedule>  enoxaparin Injectable 40 milliGRAM(s) SubCutaneous every 12 hours  epoetin mine-epbx (RETACRIT) Injectable 5000 Unit(s) IV Push <User Schedule>  ferrous    sulfate 325 milliGRAM(s) Oral daily  midodrine. 15 milliGRAM(s) Oral three times a day  multivitamin 1 Tablet(s) Oral daily  pantoprazole  Injectable 40 milliGRAM(s) IV Push daily  piperacillin/tazobactam IVPB.. 3.375 Gram(s) IV Intermittent every 12 hours  sodium chloride 0.9%. 500 milliLiter(s) IV Continuous <Continuous>  tiotropium 2.5 MICROgram(s)/olodaterol 2.5 MICROgram(s) (STIOLTO) Inhaler 2 Puff(s) Inhalation daily    PRN Inpatient Medications  acetaminophen     Tablet .. 650 milliGRAM(s) Oral every 6 hours PRN  guaiFENesin Oral Liquid (Sugar-Free) 100 milliGRAM(s) Oral every 6 hours PRN  hydrOXYzine hydrochloride 25 milliGRAM(s) Oral daily PRN  melatonin 5 milliGRAM(s) Oral at bedtime PRN  metoprolol tartrate Injectable 2.5 milliGRAM(s) IV Push every 6 hours PRN  sodium chloride 0.9% lock flush 10 milliLiter(s) IV Push every 1 hour PRN  sodium chloride 0.9% lock flush 10 milliLiter(s) IV Push every 1 hour PRN      REVIEW OF SYSTEMS  --------------------------------------------------------------------------------  Gen: No weight changes, fatigue, fevers/chills, weakness  Skin: No rashes  Head/Eyes/Ears/Mouth: No headache  Respiratory: No dyspnea, cough,  CV: No chest pain, orthopnea  GI: No abdominal pain, diarrhea, constipation, nausea, vomiting,  MSK: No joint pain  Neuro: No dizziness/lightheadedness, weakness  Heme: No bleeding  Psych: No significant nervousness, anxiety, stress, depression    All other systems were reviewed and are negative, except as noted.    VITALS/PHYSICAL EXAM  --------------------------------------------------------------------------------  T(C): 36.4 (08-17-23 @ 08:32), Max: 36.8 (08-16-23 @ 19:00)  HR: 95 (08-17-23 @ 08:32) (89 - 101)  BP: 121/52 (08-17-23 @ 08:32) (93/66 - 121/52)  RR: 18 (08-17-23 @ 08:32) (18 - 18)  SpO2: 99% (08-17-23 @ 08:32) (95% - 99%)  Wt(kg): --        08-16-23 @ 07:01  -  08-17-23 @ 07:00  --------------------------------------------------------  IN: 0 mL / OUT: 100 mL / NET: -100 mL      Physical Exam:  	Gen: NAD  	HEENT: on 2 L NC  	Pulm: CTA B/L  	CV: RRR, S1S2; no rub  	Abd: +BS, soft, nontender  	UE: Warm,   	LE: Warm, + edema  	Neuro: No focal deficits  	Psych: Normal affect and mood  	Skin: Warm,  	Vascular access: RIJ non [facility]    LABS/STUDIES  --------------------------------------------------------------------------------              9.8    9.91  >-----------<  304      [08-17-23 @ 05:31]              32.5     125  |  87  |  43.5  ----------------------------<  94      [08-17-23 @ 05:31]  4.7   |  22.0  |  3.51        Ca     7.7     [08-17-23 @ 05:31]      Mg     2.2     [08-16-23 @ 00:30]      Phos  4.0     [08-16-23 @ 00:30]    TPro  5.3  /  Alb  3.0  /  TBili  1.0  /  DBili  x   /  AST  19  /  ALT  18  /  AlkPhos  404  [08-16-23 @ 00:30]    PT/INR: PT 13.5 , INR 1.22       [08-16-23 @ 00:30]  PTT: 40.2       [08-16-23 @ 00:30]      Iron 16, TIBC --, %sat --      [08-04-23 @ 05:25]  Ferritin 109      [08-04-23 @ 05:25]    HBsAb <3.0      [08-07-23 @ 10:36]  HBsAb Nonreact      [08-07-23 @ 10:36]  HBsAg Nonreact      [08-07-23 @ 10:36]  HBcAb Nonreact      [08-07-23 @ 10:36]  HCV 0.10, Nonreact      [08-07-23 @ 10:36]

## 2023-08-17 NOTE — PROGRESS NOTE ADULT - ASSESSMENT
83 year old female with PMH of HTN, HLD, CAD s/p stents, Afib (on anticoagulation), CHF, CKD 3b, COPD (on 2L home O2), diverticulosis and stage 3 hemorrhoids presents with melanotic stool. Admitted for hemorrhagic shock s/p pRBC transfusions. EGD on 7/27 without active bleeding, colonoscopy 7/28 unrevealing. Hospital course notable for ANTONINO on CKD requiring initiation of hemodialysis on 8/07/23.     Antonino/ Oliguric ATN  Baseline creatinine ~1.1-1.3mg/dL     Remains dialysis dependant at this time  Plan for tunneled dialysis catheter placement today    Patient was seen and re-evaluated on dialysis.   Bp low- given SPA  T(C): 36.4 (08-17-23 @ 08:32), Max: 36.8 (08-16-23 @ 19:00)  HR: 95 (08-17-23 @ 08:32) (89 - 101)  BP: 121/52 (08-17-23 @ 08:32) (93/66 - 121/52)  Continue dialysis:   Dialyzer:  revaclear 300 QB:  400 ml QD: 500ml.,  Goal UF 1 kg over 3Hours     Hypotension - continue midodrine to 15mg TID     Metabolic acidosis - improved with HD    Anemia -continue GEORGE with HD    Afib - on metoprolol PO + amiodarone PO

## 2023-08-17 NOTE — PROGRESS NOTE ADULT - SUBJECTIVE AND OBJECTIVE BOX
[facility] Division of Hospital Medicine    Chief Complaint:      SUBJECTIVE / OVERNIGHT EVENTS:    Patient seen and examined at bedside, taken for HD this AM with improvement in Na to 133, contacted IR again in regards to Tunnelled cath placement, unable to place until tomorrow. Once placed will plan for DC to [facility].      MEDICATIONS  (STANDING):  aMIOdarone    Tablet 200 milliGRAM(s) Oral daily  aspirin  chewable 81 milliGRAM(s) Oral daily  budesonide  80 MICROgram(s)/formoterol 4.5 MICROgram(s) Inhaler 2 Puff(s) Inhalation two times a day  chlorhexidine 2% Cloths 1 Application(s) Topical <User Schedule>  chlorhexidine 4% Liquid 1 Application(s) Topical <User Schedule>  enoxaparin Injectable 40 milliGRAM(s) SubCutaneous every 12 hours  epoetin mine-epbx (RETACRIT) Injectable 5000 Unit(s) IV Push <User Schedule>  ferrous    sulfate 325 milliGRAM(s) Oral daily  midodrine. 15 milliGRAM(s) Oral three times a day  multivitamin 1 Tablet(s) Oral daily  pantoprazole  Injectable 40 milliGRAM(s) IV Push daily  piperacillin/tazobactam IVPB.. 3.375 Gram(s) IV Intermittent every 12 hours  sodium chloride 0.9%. 500 milliLiter(s) (50 mL/Hr) IV Continuous <Continuous>  tiotropium 2.5 MICROgram(s)/olodaterol 2.5 MICROgram(s) (STIOLTO) Inhaler 2 Puff(s) Inhalation daily    MEDICATIONS  (PRN):  acetaminophen     Tablet .. 650 milliGRAM(s) Oral every 6 hours PRN Temp greater or equal to 38C (100.4F), Moderate Pain (4 - 6), Severe Pain (7 - 10)  guaiFENesin Oral Liquid (Sugar-Free) 100 milliGRAM(s) Oral every 6 hours PRN Cough  hydrOXYzine hydrochloride 25 milliGRAM(s) Oral daily PRN Restlessness  melatonin 5 milliGRAM(s) Oral at bedtime PRN Sleep  metoprolol tartrate Injectable 2.5 milliGRAM(s) IV Push every 6 hours PRN for hR > 110  sodium chloride 0.9% lock flush 10 milliLiter(s) IV Push every 1 hour PRN Pre/post blood products, medications, blood draw, and to maintain line patency  sodium chloride 0.9% lock flush 10 milliLiter(s) IV Push every 1 hour PRN Pre/post blood products, medications, blood draw, and to maintain line patency        I&O's Summary    16 Aug 2023 07:01  -  17 Aug 2023 07:00  --------------------------------------------------------  IN: 0 mL / OUT: 100 mL / NET: -100 mL    17 Aug 2023 07:01  -  17 Aug 2023 16:39  --------------------------------------------------------  IN: 800 mL / OUT: 1800 mL / NET: -1000 mL        PHYSICAL EXAM:  Vital Signs Last 24 Hrs  T(C): 36.4 (17 Aug 2023 11:36), Max: 36.8 (16 Aug 2023 19:00)  T(F): 97.5 (17 Aug 2023 11:36), Max: 98.2 (16 Aug 2023 19:00)  HR: 111 (17 Aug 2023 11:36) (89 - 111)  BP: 96/64 (17 Aug 2023 11:36) (93/66 - 121/52)  BP(mean): --  RR: 18 (17 Aug 2023 11:36) (18 - 18)  SpO2: 94% (17 Aug 2023 11:36) (94% - 99%)    Parameters below as of 17 Aug 2023 08:32  Patient On (Oxygen Delivery Method): nasal cannula    General: Obese female in NAD, on nasal cannula  HEENT: Normocephalic  Cardiac: S1S2 RRR  Respiratory: Diminished breath sounds b/l  Abdomen: Soft, NT  Extremities: 2+ pitting edema of b/l lower extremities, +bullae to b/l LE, now ACE Wraps in place B/L   Msk: patient with 3/5 strength in B/L LEs   Neuro: AAOx3, sensation intact in LE's, though patient states slightly more dull than UEs  Psych: Calm   Access: R IJ non tunneled dialysis catheter    LABS:                        9.8    9.91  )-----------( 304      ( 17 Aug 2023 05:31 )             32.5     08-17    133<L>  |  92<L>  |  14.0  ----------------------------<  81  3.0<L>   |  27.0  |  1.19    Ca    8.1<L>      17 Aug 2023 11:40  Phos  4.0     08-16  Mg     2.2     08-16    TPro  5.3<L>  /  Alb  3.0<L>  /  TBili  1.0  /  DBili  x   /  AST  19  /  ALT  18  /  AlkPhos  404<H>  08-16    PT/INR - ( 16 Aug 2023 00:30 )   PT: 13.5 sec;   INR: 1.22 ratio         PTT - ( 16 Aug 2023 00:30 )  PTT:40.2 sec      Urinalysis Basic - ( 17 Aug 2023 11:40 )    Color: x / Appearance: x / SG: x / pH: x  Gluc: 81 mg/dL / Ketone: x  / Bili: x / Urobili: x   Blood: x / Protein: x / Nitrite: x   Leuk Esterase: x / RBC: x / WBC x   Sq Epi: x / Non Sq Epi: x / Bacteria: x        Culture - Blood (collected 16 Aug 2023 00:35)  Source: .Blood Blood-Peripheral  Preliminary Report (17 Aug 2023 07:02):    No growth at 24 hours    Culture - Blood (collected 16 Aug 2023 00:30)  Source: .Blood Blood-Peripheral  Preliminary Report (17 Aug 2023 07:02):    No growth at 24 hours      CAPILLARY BLOOD GLUCOSE            RADIOLOGY & ADDITIONAL TESTS:  Results Reviewed:   Imaging Personally Reviewed:  Electrocardiogram Personally Reviewed:

## 2023-08-17 NOTE — CHART NOTE - NSCHARTNOTEFT_GEN_A_CORE
Source: Patient [ ]  Family [ ]   other [x ]EMR    Current Diet: renal replacement    Patient reports [ ] nausea  [ ] vomiting [ ] diarrhea [ ] constipation  [ ]chewing problems [ ] swallowing issues  [ ] other:     PO intake:  < 50% [ ]   50-75%  [ x]   %  [ ]  other :    Source for PO intake [ ] Patient [ ] family [x ] chart [ ] staff [ ] other    Enteral /Parenteral Nutrition:     Current Weight: 169.7# 8/15  generalized 2+, 3+ left and right leg, ankle, and foot    % Weight Change     Pertinent Medications: MEDICATIONS  (STANDING):  aMIOdarone    Tablet 200 milliGRAM(s) Oral daily  aspirin  chewable 81 milliGRAM(s) Oral daily  budesonide  80 MICROgram(s)/formoterol 4.5 MICROgram(s) Inhaler 2 Puff(s) Inhalation two times a day  chlorhexidine 2% Cloths 1 Application(s) Topical <User Schedule>  chlorhexidine 4% Liquid 1 Application(s) Topical <User Schedule>  enoxaparin Injectable 40 milliGRAM(s) SubCutaneous every 12 hours  epoetin mine-epbx (RETACRIT) Injectable 5000 Unit(s) IV Push <User Schedule>  ferrous    sulfate 325 milliGRAM(s) Oral daily  midodrine. 15 milliGRAM(s) Oral three times a day  multivitamin 1 Tablet(s) Oral daily  pantoprazole  Injectable 40 milliGRAM(s) IV Push daily  piperacillin/tazobactam IVPB.. 3.375 Gram(s) IV Intermittent every 12 hours  sodium chloride 0.9%. 500 milliLiter(s) (50 mL/Hr) IV Continuous <Continuous>  tiotropium 2.5 MICROgram(s)/olodaterol 2.5 MICROgram(s) (STIOLTO) Inhaler 2 Puff(s) Inhalation daily    MEDICATIONS  (PRN):  acetaminophen     Tablet .. 650 milliGRAM(s) Oral every 6 hours PRN Temp greater or equal to 38C (100.4F), Moderate Pain (4 - 6), Severe Pain (7 - 10)  guaiFENesin Oral Liquid (Sugar-Free) 100 milliGRAM(s) Oral every 6 hours PRN Cough  hydrOXYzine hydrochloride 25 milliGRAM(s) Oral daily PRN Restlessness  melatonin 5 milliGRAM(s) Oral at bedtime PRN Sleep  metoprolol tartrate Injectable 2.5 milliGRAM(s) IV Push every 6 hours PRN for hR > 110  sodium chloride 0.9% lock flush 10 milliLiter(s) IV Push every 1 hour PRN Pre/post blood products, medications, blood draw, and to maintain line patency  sodium chloride 0.9% lock flush 10 milliLiter(s) IV Push every 1 hour PRN Pre/post blood products, medications, blood draw, and to maintain line patency    Pertinent Labs: CBC Full  -  ( 17 Aug 2023 05:31 )  WBC Count : 9.91 K/uL  RBC Count : 3.46 M/uL  Hemoglobin : 9.8 g/dL  Hematocrit : 32.5 %  Platelet Count - Automated : 304 K/uL  Mean Cell Volume : 93.9 fl  Mean Cell Hemoglobin : 28.3 pg  Mean Cell Hemoglobin Concentration : 30.2 gm/dL  Auto Neutrophil # : x  Auto Lymphocyte # : x  Auto Monocyte # : x  Auto Eosinophil # : x  Auto Basophil # : x  Auto Neutrophil % : x  Auto Lymphocyte % : x  Auto Monocyte % : x  Auto Eosinophil % : x  Auto Basophil % : x      08-17 Na125 mmol/L<L> Glu 94 mg/dL K+ 4.7 mmol/L Cr  3.51 mg/dL<H> BUN 43.5 mg/dL<H> Phos n/a   Alb n/a   PAB n/a           Skin:     Nutrition focused physical exam conducted - found signs of malnutrition [ ]absent [x ]present    Subcutaneous fat loss: [x ] Orbital fat pads region, [x]Buccal fat region, [ ]Triceps region,  [ ]Ribs region    Muscle wasting: [ x]Temples region, [ x]Clavicle region, [x ]Shoulder region, [ ]Scapula region, [ ]Interosseous region,  [ ]thigh region, [ ]Calf region    Estimated Needs:   [x ] no change since previous assessment  [ ] recalculated:     Current Nutrition Diagnosis: Malnutrition...  Moderate (acute)  Related to inadequate protein energy intake with altered GI function in setting of GIB, hemorrhagic shock, DANETTE, blood loss anemia  as evidenced by physical signs of mild muscle/fat loss, meeting < 75% est needs > 7 days. 2 L  O2, on HD.      Recommendations:   Rec Nepro BID to optimize po intake and provide an additional 425 kcal, 19.1g protein per serving   Rec Nephrovite, d/c MVI      Monitoring and Evaluation:   [x ] PO intake [x ] Tolerance to diet prescription [X] Weights  [X] Follow up per protocol [X] Labs:

## 2023-08-17 NOTE — PROGRESS NOTE ADULT - NS ATTEST RISK PROBLEM GEN_ALL_CORE FT
GIB with shock now resolved though persistently hypotensive, DANETTE requiring HD and persistent Cr elevation now requiring  full HD, discontinued BB due to persistent hypotension will monitor for arrhythmia / palpitations. Hyponatremia resolved s/p HD, perma cath to be placed tomorrow.
GIB with shock now resolved, DANETTE requiring HD and persistent Cr elevation requiring strict monitoring of I/O's. slightly worsening Cr and increase in swelling of LE, may need diuresis in setting of low blood pressure / DANETTE
GIB with shock now resolved, DANETTE requiring HD and persistent Cr elevation now requiring  full HD, perm cath to be placed.
GIB with shock now resolved, DANETTE requiring HD and persistent Cr elevation requiring strict monitoring of I/O's. slightly worsening Cr and increase in swelling of LE, may need repeat HD vs. diuresis in setting of low blood pressure / DANETTE.
GIB with shock now resolved, DNAETTE requiring HD and persistent Cr elevation requiring strict monitoring of I/O's
GIB with shock now resolved though persistently hypotensive, DANETTE requiring HD and persistent Cr elevation now requiring  full HD, discontinuing BB due to persistent hypotension will monitor for arrhythmia / palpitations. perma cath to be placed tomorrow.
gib   shock

## 2023-08-17 NOTE — PROGRESS NOTE ADULT - ASSESSMENT
82 y/o female with PMH COPD on 2L home O2, HFpEF, CAD s/p PCI, HTN, HLD, pAF on rivaroxaban, diverticulosis, hemorrhoids presented with generalized weakness/dark stools admitted anemia, GIB without active bleed on ct, received 3U PRBC, and IVF however remained hypotensive requiring vasopressor therapy for hemorrhagic shock.  EGD on 7/27 and colonoscopy 7/28 without active bleed. Patient also with DANETTE and Afib with RVR, resumed back on amio and bb .  HR now better controlled. Plan was for ct enterography, but given DANETTE on CKD, it was cancelled. Patient downgraded to medicine 7/30/23. S/p Intermittent HD for worsening kidney functions. Nephrology continues to monitor, minimal urinary output noted from yesterday, increasing Cr and K plan for HD today, IR guided Tunnelled cath for tomorrow in light of Hyponatremia fixed with HD this morning (now 133)        DANETTE on CKD III/Metabolic acidosis (Resolving)  Hyponatremia  Renal hypoperfusion, episodic  - f/up renal recs - started HD for 3 days 8/7,8,9  - S/p IVF, sodium bicarb discontinued as per renal recs  - s/p Lasix and Albumin 8/3 and 8/4 as per renal recs  - Spoke with nephrology- continues on HD, plan for Tunneled Cath placement, patient will need new Outpatient HD   - Na improved to 133 with HD, will continue to monitor, Tunnelled cath placement planned for tomorrow   - Hep B surface and Core Ab sent, PPD ordered     - monitor BMP    Hyponatremia   Na 125 this AM improved to 133 after HD   Urine Na < 30, patient continues to make minimum urine   - possibly in setting of overload    acute GIB / acute blood loss anemia /  hemorrhagic shock (Resolved)  - hx of GIB in past   - cta neg without active bleed on ct on admission   - s/p EGD on 7/27 without active bleeding  - s/p colonoscopy 7/28 no active bleed   - now off pressors, started on midodrine, increased to 15mg TID, monitoring BP closely   - c/w PPIs   - ct enterography - cancelled given elevated creatinine and no further bleeding  - plan for capsule endoscopy as op   - gi following   - Diet as tolerated   - resumed aspirin as per GI  - tolerating Lovenox - can transition to XARELTO on discharge if hb stable, CBC stable    HFpEF  Echo noted with Grade III diastolic dysfunction and severe pulm HTN  S/p Lasix  bilateral lower ext ACE wrap     copd  c/w inhalers  C/w Symbicort BID   C/w tiotropium  C/w o2     Paroxysmal A-Fib   Hypotension, asymptomatic  - resumed on amio   - BB dc'd given persistent hypotension   - prn iv lopressor for hr > 110  - aspirin  - raised Midodrine dose for improved BP currently 15 TID   - started lovenox 8/2/23, no bleeding noted, will start Xarelto on dc    HLD  - monitor CMP   - hold statin due to transaminitis    Transaminitis (resolving)  - statin held  - LFT now wnl, Alk Phos remains elevated   - discussed with cardio, cont amiodarone for now since pt has been on it for long time  - Abdo U/S shows cholelithiasis and small right pleural effusion and Hep panel negative       dvt ppx : trial of Lovenox  GI cleared for Xarelto resumption on discharge  Dispo: IR tunneled cath placement, then will plan for Rehab   Plan discussed with patient at bedside.

## 2023-08-18 LAB
ANION GAP SERPL CALC-SCNC: 13 MMOL/L — SIGNIFICANT CHANGE UP (ref 5–17)
ANION GAP SERPL CALC-SCNC: 15 MMOL/L — SIGNIFICANT CHANGE UP (ref 5–17)
BUN SERPL-MCNC: 32.7 MG/DL — HIGH (ref 8–20)
BUN SERPL-MCNC: 33.6 MG/DL — HIGH (ref 8–20)
CALCIUM SERPL-MCNC: 7.8 MG/DL — LOW (ref 8.4–10.5)
CALCIUM SERPL-MCNC: 8 MG/DL — LOW (ref 8.4–10.5)
CHLORIDE SERPL-SCNC: 89 MMOL/L — LOW (ref 96–108)
CHLORIDE SERPL-SCNC: 91 MMOL/L — LOW (ref 96–108)
CO2 SERPL-SCNC: 20 MMOL/L — LOW (ref 22–29)
CO2 SERPL-SCNC: 25 MMOL/L — SIGNIFICANT CHANGE UP (ref 22–29)
CREAT SERPL-MCNC: 2.91 MG/DL — HIGH (ref 0.5–1.3)
CREAT SERPL-MCNC: 3.01 MG/DL — HIGH (ref 0.5–1.3)
EGFR: 15 ML/MIN/1.73M2 — LOW
EGFR: 16 ML/MIN/1.73M2 — LOW
GLUCOSE SERPL-MCNC: 92 MG/DL — SIGNIFICANT CHANGE UP (ref 70–99)
GLUCOSE SERPL-MCNC: 98 MG/DL — SIGNIFICANT CHANGE UP (ref 70–99)
HCT VFR BLD CALC: 29.1 % — LOW (ref 34.5–45)
HGB BLD-MCNC: 8.7 G/DL — LOW (ref 11.5–15.5)
MCHC RBC-ENTMCNC: 28.6 PG — SIGNIFICANT CHANGE UP (ref 27–34)
MCHC RBC-ENTMCNC: 29.9 GM/DL — LOW (ref 32–36)
MCV RBC AUTO: 95.7 FL — SIGNIFICANT CHANGE UP (ref 80–100)
PLATELET # BLD AUTO: 162 K/UL — SIGNIFICANT CHANGE UP (ref 150–400)
POTASSIUM SERPL-MCNC: 4.8 MMOL/L — SIGNIFICANT CHANGE UP (ref 3.5–5.3)
POTASSIUM SERPL-MCNC: 6 MMOL/L — HIGH (ref 3.5–5.3)
POTASSIUM SERPL-SCNC: 4.8 MMOL/L — SIGNIFICANT CHANGE UP (ref 3.5–5.3)
POTASSIUM SERPL-SCNC: 6 MMOL/L — HIGH (ref 3.5–5.3)
RBC # BLD: 3.04 M/UL — LOW (ref 3.8–5.2)
RBC # FLD: 19.2 % — HIGH (ref 10.3–14.5)
SODIUM SERPL-SCNC: 124 MMOL/L — LOW (ref 135–145)
SODIUM SERPL-SCNC: 129 MMOL/L — LOW (ref 135–145)
WBC # BLD: 7.91 K/UL — SIGNIFICANT CHANGE UP (ref 3.8–10.5)
WBC # FLD AUTO: 7.91 K/UL — SIGNIFICANT CHANGE UP (ref 3.8–10.5)

## 2023-08-18 PROCEDURE — 77001 FLUOROGUIDE FOR VEIN DEVICE: CPT | Mod: 26

## 2023-08-18 PROCEDURE — 99233 SBSQ HOSP IP/OBS HIGH 50: CPT

## 2023-08-18 PROCEDURE — 36558 INSERT TUNNELED CV CATH: CPT | Mod: RT

## 2023-08-18 RX ADMIN — Medication 81 MILLIGRAM(S): at 12:00

## 2023-08-18 RX ADMIN — CHLORHEXIDINE GLUCONATE 1 APPLICATION(S): 213 SOLUTION TOPICAL at 06:06

## 2023-08-18 RX ADMIN — MIDODRINE HYDROCHLORIDE 15 MILLIGRAM(S): 2.5 TABLET ORAL at 12:00

## 2023-08-18 RX ADMIN — PANTOPRAZOLE SODIUM 40 MILLIGRAM(S): 20 TABLET, DELAYED RELEASE ORAL at 12:00

## 2023-08-18 RX ADMIN — Medication 325 MILLIGRAM(S): at 12:00

## 2023-08-18 RX ADMIN — ENOXAPARIN SODIUM 40 MILLIGRAM(S): 100 INJECTION SUBCUTANEOUS at 21:16

## 2023-08-18 RX ADMIN — AMIODARONE HYDROCHLORIDE 200 MILLIGRAM(S): 400 TABLET ORAL at 06:06

## 2023-08-18 RX ADMIN — PIPERACILLIN AND TAZOBACTAM 25 GRAM(S): 4; .5 INJECTION, POWDER, LYOPHILIZED, FOR SOLUTION INTRAVENOUS at 12:01

## 2023-08-18 RX ADMIN — Medication 5 MILLIGRAM(S): at 01:30

## 2023-08-18 RX ADMIN — Medication 1 TABLET(S): at 12:00

## 2023-08-18 NOTE — PROGRESS NOTE ADULT - SUBJECTIVE AND OBJECTIVE BOX
[facility] Division of Hospital Medicine    Chief Complaint:      SUBJECTIVE / OVERNIGHT EVENTS:    Patient seen and examined at bedside, no acute events overnight, patient endorses frustration over repeated HD catheter insertion delays. This AM labs showed Na 124, with K of 6, however this seemed unlikely, repeat showed  Na 129 / K of 4.8. Patient was still denied for procedure by Anesthesia. However, IR performed procedure without sedation, giving IV pain medications with successful placement of Tunneled Cath this afternoon. CM working on HD chair for patient while in [facility].     MEDICATIONS  (STANDING):  aMIOdarone    Tablet 200 milliGRAM(s) Oral daily  aspirin  chewable 81 milliGRAM(s) Oral daily  budesonide  80 MICROgram(s)/formoterol 4.5 MICROgram(s) Inhaler 2 Puff(s) Inhalation two times a day  chlorhexidine 2% Cloths 1 Application(s) Topical <User Schedule>  chlorhexidine 4% Liquid 1 Application(s) Topical <User Schedule>  enoxaparin Injectable 40 milliGRAM(s) SubCutaneous every 12 hours  epoetin mine-epbx (RETACRIT) Injectable 5000 Unit(s) IV Push <User Schedule>  ferrous    sulfate 325 milliGRAM(s) Oral daily  midodrine. 15 milliGRAM(s) Oral three times a day  multivitamin 1 Tablet(s) Oral daily  pantoprazole  Injectable 40 milliGRAM(s) IV Push daily  tiotropium 2.5 MICROgram(s)/olodaterol 2.5 MICROgram(s) (STIOLTO) Inhaler 2 Puff(s) Inhalation daily    MEDICATIONS  (PRN):  acetaminophen     Tablet .. 650 milliGRAM(s) Oral every 6 hours PRN Temp greater or equal to 38C (100.4F), Moderate Pain (4 - 6), Severe Pain (7 - 10)  guaiFENesin Oral Liquid (Sugar-Free) 100 milliGRAM(s) Oral every 6 hours PRN Cough  hydrOXYzine hydrochloride 25 milliGRAM(s) Oral daily PRN Restlessness  melatonin 5 milliGRAM(s) Oral at bedtime PRN Sleep  metoprolol tartrate Injectable 2.5 milliGRAM(s) IV Push every 6 hours PRN for hR > 110  oxycodone    5 mG/acetaminophen 325 mG 1 Tablet(s) Oral every 6 hours PRN Moderate Pain (4 - 6)  sodium chloride 0.9% lock flush 10 milliLiter(s) IV Push every 1 hour PRN Pre/post blood products, medications, blood draw, and to maintain line patency  sodium chloride 0.9% lock flush 10 milliLiter(s) IV Push every 1 hour PRN Pre/post blood products, medications, blood draw, and to maintain line patency        I&O's Summary    17 Aug 2023 07:01  -  18 Aug 2023 07:00  --------------------------------------------------------  IN: 800 mL / OUT: 1800 mL / NET: -1000 mL        PHYSICAL EXAM:  Vital Signs Last 24 Hrs  T(C): 37.1 (18 Aug 2023 11:25), Max: 37.1 (18 Aug 2023 11:25)  T(F): 98.8 (18 Aug 2023 11:25), Max: 98.8 (18 Aug 2023 11:25)  HR: 95 (18 Aug 2023 11:25) (95 - 100)  BP: 101/60 (18 Aug 2023 11:25) (101/60 - 130/83)  BP(mean): 101 (18 Aug 2023 11:25) (101 - 101)  RR: 19 (18 Aug 2023 11:25) (18 - 19)  SpO2: 97% (18 Aug 2023 11:25) (97% - 97%)    Parameters below as of 18 Aug 2023 11:25  Patient On (Oxygen Delivery Method): nasal cannula  O2 Flow (L/min): 2      General: Obese female in NAD, on nasal cannula  HEENT: Normocephalic  Cardiac: S1S2 RRR  Respiratory: Diminished breath sounds b/l  Abdomen: Soft, NT  Extremities: 2+ pitting edema of b/l lower extremities, now ACE Wraps in place B/L   Msk: patient with 3/5 strength in B/L LEs   Neuro: AAOx3, sensation intact in LE's, though patient states slightly more dull than UEs  Psych: Calm   Access: s/p Tunneled Cath placement with clean dressing in place     LABS:                        8.7    7.91  )-----------( 162      ( 18 Aug 2023 05:20 )             29.1     08-18    129<L>  |  91<L>  |  33.6<H>  ----------------------------<  98  4.8   |  25.0  |  2.91<H>    Ca    8.0<L>      18 Aug 2023 08:40            Urinalysis Basic - ( 18 Aug 2023 08:40 )    Color: x / Appearance: x / SG: x / pH: x  Gluc: 98 mg/dL / Ketone: x  / Bili: x / Urobili: x   Blood: x / Protein: x / Nitrite: x   Leuk Esterase: x / RBC: x / WBC x   Sq Epi: x / Non Sq Epi: x / Bacteria: x        Culture - Blood (collected 16 Aug 2023 00:35)  Source: .Blood Blood-Peripheral  Preliminary Report (18 Aug 2023 07:01):    No growth at 48 Hours    Culture - Blood (collected 16 Aug 2023 00:30)  Source: .Blood Blood-Peripheral  Preliminary Report (18 Aug 2023 07:01):    No growth at 48 Hours      CAPILLARY BLOOD GLUCOSE            RADIOLOGY & ADDITIONAL TESTS:  Results Reviewed:   Imaging Personally Reviewed:  Electrocardiogram Personally Reviewed:

## 2023-08-18 NOTE — PHARMACOTHERAPY INTERVENTION NOTE - COMMENTS
Patient afebrile with negative cultures, spoke with doctor Sommers regarding deescalation of antibiotic therapy.

## 2023-08-18 NOTE — PROGRESS NOTE ADULT - ASSESSMENT
84 y/o female with PMH COPD on 2L home O2, HFpEF, CAD s/p PCI, HTN, HLD, pAF on rivaroxaban, diverticulosis, hemorrhoids presented with generalized weakness/dark stools admitted anemia, GIB without active bleed on ct, received 3U PRBC, and IVF however remained hypotensive requiring vasopressor therapy for hemorrhagic shock.  EGD on 7/27 and colonoscopy 7/28 without active bleed. Patient also with DANETTE and Afib with RVR, resumed back on amio and bb .  HR now better controlled. Plan was for ct enterography, but given DANETTE on CKD, it was cancelled. Patient downgraded to medicine 7/30/23. S/p Intermittent HD for worsening kidney functions. Nephrology continues to monitor, minimal urinary output noted from yesterday, increasing Cr and K plan for HD today, IR guided Tunnelled cath successfully inserted 8/18. Continues on HD, CM working on HD chair while patient is in PANK[facility].        DANETTE on CKD III/Metabolic acidosis (Resolving)  Hyponatremia  Renal hypoperfusion, episodic  - f/up renal recs - started intermittent HD for 3 days 8/7,8,9  - S/p IVF, sodium bicarb discontinued as per renal recs  - s/p Lasix and Albumin 8/3 and 8/4 as per renal recs  - Spoke with nephrology- continues on HD, now s/p Tunnelled Cath placement 8/18, patient will need new Outpatient HD CM working on it    - Na improves with HD, will continue to monitor   - Hep B surface and Core Ab sent, PPD ordered     - monitor BMP    Hyponatremia   Na 124 however likely lab error (as K also listed as 6), as repeat showed Na 129 and K of 4.8    Urine Na < 30, patient continues to make minimum urine   - possibly in setting of overload, corrects with HD / UF     acute GIB / acute blood loss anemia /  hemorrhagic shock (Resolved)  - hx of GIB in past   - cta neg without active bleed on ct on admission   - s/p EGD on 7/27 without active bleeding  - s/p colonoscopy 7/28 no active bleed   - now off pressors, started on midodrine, increased to 15mg TID, monitoring BP closely   - c/w PPIs   - ct enterography - cancelled given elevated creatinine and no further bleeding  - plan for capsule endoscopy as op   - Diet as tolerated with renal restrictions    - resumed aspirin as per GI  - tolerating Lovenox - can transition to XARELTO on discharge if hb stable, CBC stable    HFpEF  Echo noted with Grade III diastolic dysfunction and severe pulm HTN  S/p Lasix  bilateral lower ext ACE wrap     copd  c/w inhalers  C/w Symbicort BID   C/w tiotropium  C/w o2     Paroxysmal A-Fib   Hypotension, asymptomatic  - resumed on amio   - BB dc'd given persistent hypotension   - prn iv lopressor for hr > 110  - aspirin  - raised Midodrine dose for improved BP currently 15 TID   - started lovenox 8/2/23, no bleeding noted, will start Xarelto on dc    HLD  - monitor CMP   - hold statin due to transaminitis    Transaminitis (resolving)  - statin held  - LFT now wnl, Alk Phos remains elevated   - discussed with cardio, cont amiodarone for now since pt has been on it for long time  - Abdo U/S shows cholelithiasis and small right pleural effusion and Hep panel negative     Moderate protein calorie malnutrition   - Renal restrictions, no protein restriction / no Concentrated K /no phos / low sodium     dvt ppx : trial of Lovenox  GI cleared for Xarelto resumption on discharge  Dispo: S/P tunnelled Cath 8/18, awaiting PANKAJ and HD chair   Plan discussed with patient at bedside.

## 2023-08-18 NOTE — BRIEF OPERATIVE NOTE - OPERATION/FINDINGS
Right chest 19 cm x 14.5 fr glidepath permacath inserted via right IJ vein. Tip in SVC. Temp dialysis catheter removed.

## 2023-08-18 NOTE — CHART NOTE - NSCHARTNOTEFT_GEN_A_CORE
Vascular & Interventional Radiology Pre-Procedure Note    Procedure Name: permacath insertion    HPI: 83y Female with renal failure    83y Female with PMH COPD on 2L home O2, HFpEF, CAD s/p PCI, HTN, HLD, pAF on rivaroxaban, diverticulosis, hemorrhoids presented with generalized weakness/dark stools admitted anemia, GIB without active bleed on ct, received 3U PRBC, and IVF however remained hypotensive requiring vasopressor therapy for hemorrhagic shock.  EGD on  and colonoscopy  without active bleed. Patient also with ADNETTE and Afib with RVR, resumed back on amio and bb .  HR now better controlled. Plan was for ct enterography, but given DANETTE on CKD, it was cancelled. Patient downgraded to medicine 23. S/p Intermittent HD for worsening kidney functions. Nephrology continues to monitor, minimal urinary output noted from yesterday, increasing Cr and K. Now for permcath insertion    PMH/PSH  Gastrointestinal hemorrhage  HTN (hypertension)  HLD (hyperlipidemia)  CAD (coronary artery disease)  COPD (chronic obstructive pulmonary disease)  Acute CHF (HFpEF)  Acute blood loss anemia  DANETTE  paroxysmal A-Fib    Insertion, catheter, hemodialysis, non-tunneled, with US guidance  S/P  section      Allergies: No Known Allergies      Medications (Abx/Cardiac/Anticoagulation/Blood Products)  acetaminophen     Tablet .. 650 milliGRAM(s) Oral every 6 hours PRN  aMIOdarone    Tablet 200 milliGRAM(s) Oral daily  aspirin  chewable 81 milliGRAM(s) Oral daily  budesonide  80 MICROgram(s)/formoterol 4.5 MICROgram(s) Inhaler 2 Puff(s) Inhalation two times a day  chlorhexidine 2% Cloths 1 Application(s) Topical <User Schedule>  chlorhexidine 4% Liquid 1 Application(s) Topical <User Schedule>  enoxaparin Injectable 40 milliGRAM(s) SubCutaneous every 12 hours  epoetin mine-epbx (RETACRIT) Injectable 5000 Unit(s) IV Push <User Schedule>  ferrous    sulfate 325 milliGRAM(s) Oral daily  guaiFENesin Oral Liquid (Sugar-Free) 100 milliGRAM(s) Oral every 6 hours PRN  hydrOXYzine hydrochloride 25 milliGRAM(s) Oral daily PRN  melatonin 5 milliGRAM(s) Oral at bedtime PRN  metoprolol tartrate Injectable 2.5 milliGRAM(s) IV Push every 6 hours PRN  midodrine. 15 milliGRAM(s) Oral three times a day  multivitamin 1 Tablet(s) Oral daily  oxycodone    5 mG/acetaminophen 325 mG 1 Tablet(s) Oral every 6 hours PRN  pantoprazole  Injectable 40 milliGRAM(s) IV Push daily  piperacillin/tazobactam IVPB.. 3.375 Gram(s) IV Intermittent every 12 hours  sodium chloride 0.9% lock flush 10 milliLiter(s) IV Push every 1 hour PRN  sodium chloride 0.9% lock flush 10 milliLiter(s) IV Push every 1 hour PRN  tiotropium 2.5 MICROgram(s)/olodaterol 2.5 MICROgram(s) (STIOLTO) Inhaler 2 Puff(s) Inhalation daily      Data:      T(C): 37.1  HR: 95  BP: 101/60  RR: 19  SpO2: 97%        Lab  -WBC 7.91 / HgB 8.7 / Hct 29.1 / Plt 162  -Na 129 / Cl 91 / BUN 33.6 / Glucose 98  -K 4.8 / CO2 25.0 / Cr 2.91  -ALT -- / Alk Phos -- / T.Bili --  -INR1.22      Imaging: na    Plan:   -83y Female presents for permacath insertion

## 2023-08-18 NOTE — BRIEF OPERATIVE NOTE - NSICDXBRIEFPROCEDURE_GEN_ALL_CORE_FT
PROCEDURES:  Percutaneous insertion of tunneled hemodialysis catheter 18-Aug-2023 19:06:06  Davis Garza

## 2023-08-19 LAB
ANION GAP SERPL CALC-SCNC: 14 MMOL/L — SIGNIFICANT CHANGE UP (ref 5–17)
BUN SERPL-MCNC: 36.8 MG/DL — HIGH (ref 8–20)
CALCIUM SERPL-MCNC: 8.1 MG/DL — LOW (ref 8.4–10.5)
CHLORIDE SERPL-SCNC: 89 MMOL/L — LOW (ref 96–108)
CO2 SERPL-SCNC: 24 MMOL/L — SIGNIFICANT CHANGE UP (ref 22–29)
CREAT SERPL-MCNC: 3.31 MG/DL — HIGH (ref 0.5–1.3)
EGFR: 13 ML/MIN/1.73M2 — LOW
GLUCOSE SERPL-MCNC: 99 MG/DL — SIGNIFICANT CHANGE UP (ref 70–99)
HCT VFR BLD CALC: 26.8 % — LOW (ref 34.5–45)
HGB BLD-MCNC: 8 G/DL — LOW (ref 11.5–15.5)
MCHC RBC-ENTMCNC: 29.1 PG — SIGNIFICANT CHANGE UP (ref 27–34)
MCHC RBC-ENTMCNC: 29.9 GM/DL — LOW (ref 32–36)
MCV RBC AUTO: 97.5 FL — SIGNIFICANT CHANGE UP (ref 80–100)
PLATELET # BLD AUTO: 220 K/UL — SIGNIFICANT CHANGE UP (ref 150–400)
POTASSIUM SERPL-MCNC: 4.5 MMOL/L — SIGNIFICANT CHANGE UP (ref 3.5–5.3)
POTASSIUM SERPL-SCNC: 4.5 MMOL/L — SIGNIFICANT CHANGE UP (ref 3.5–5.3)
RBC # BLD: 2.75 M/UL — LOW (ref 3.8–5.2)
RBC # FLD: 19.7 % — HIGH (ref 10.3–14.5)
SODIUM SERPL-SCNC: 127 MMOL/L — LOW (ref 135–145)
WBC # BLD: 9.26 K/UL — SIGNIFICANT CHANGE UP (ref 3.8–10.5)
WBC # FLD AUTO: 9.26 K/UL — SIGNIFICANT CHANGE UP (ref 3.8–10.5)

## 2023-08-19 PROCEDURE — 90937 HEMODIALYSIS REPEATED EVAL: CPT

## 2023-08-19 PROCEDURE — 99233 SBSQ HOSP IP/OBS HIGH 50: CPT

## 2023-08-19 RX ORDER — MIDODRINE HYDROCHLORIDE 2.5 MG/1
5 TABLET ORAL EVERY 8 HOURS
Refills: 0 | Status: DISCONTINUED | OUTPATIENT
Start: 2023-08-19 | End: 2023-08-21

## 2023-08-19 RX ADMIN — TIOTROPIUM BROMIDE AND OLODATEROL 2 PUFF(S): 3.124; 2.736 SPRAY, METERED RESPIRATORY (INHALATION) at 12:43

## 2023-08-19 RX ADMIN — AMIODARONE HYDROCHLORIDE 200 MILLIGRAM(S): 400 TABLET ORAL at 05:35

## 2023-08-19 RX ADMIN — CHLORHEXIDINE GLUCONATE 1 APPLICATION(S): 213 SOLUTION TOPICAL at 06:19

## 2023-08-19 RX ADMIN — MIDODRINE HYDROCHLORIDE 15 MILLIGRAM(S): 2.5 TABLET ORAL at 12:43

## 2023-08-19 RX ADMIN — ERYTHROPOIETIN 5000 UNIT(S): 10000 INJECTION, SOLUTION INTRAVENOUS; SUBCUTANEOUS at 11:33

## 2023-08-19 RX ADMIN — Medication 1 TABLET(S): at 12:44

## 2023-08-19 RX ADMIN — ENOXAPARIN SODIUM 40 MILLIGRAM(S): 100 INJECTION SUBCUTANEOUS at 21:03

## 2023-08-19 RX ADMIN — MIDODRINE HYDROCHLORIDE 15 MILLIGRAM(S): 2.5 TABLET ORAL at 17:41

## 2023-08-19 RX ADMIN — Medication 81 MILLIGRAM(S): at 12:44

## 2023-08-19 RX ADMIN — MIDODRINE HYDROCHLORIDE 5 MILLIGRAM(S): 2.5 TABLET ORAL at 21:03

## 2023-08-19 RX ADMIN — PANTOPRAZOLE SODIUM 40 MILLIGRAM(S): 20 TABLET, DELAYED RELEASE ORAL at 12:44

## 2023-08-19 RX ADMIN — MIDODRINE HYDROCHLORIDE 15 MILLIGRAM(S): 2.5 TABLET ORAL at 05:35

## 2023-08-19 RX ADMIN — Medication 5 MILLIGRAM(S): at 00:15

## 2023-08-19 RX ADMIN — Medication 325 MILLIGRAM(S): at 12:44

## 2023-08-19 NOTE — PROGRESS NOTE ADULT - SUBJECTIVE AND OBJECTIVE BOX
[facility] Division of Hospital Medicine    Chief Complaint:  Shock     SUBJECTIVE / OVERNIGHT EVENTS:  Pt examined lying in bed  Relieved at having permacath placed yesterday  ROSALBA archer d/w pt. She reports that her feet have been having bluish discoloration along with swelling intermittently for past3  months   Denies CP, SOB, abd pain, N/V,     MEDICATIONS  (STANDING):  aMIOdarone    Tablet 200 milliGRAM(s) Oral daily  aspirin  chewable 81 milliGRAM(s) Oral daily  budesonide  80 MICROgram(s)/formoterol 4.5 MICROgram(s) Inhaler 2 Puff(s) Inhalation two times a day  chlorhexidine 2% Cloths 1 Application(s) Topical <User Schedule>  chlorhexidine 4% Liquid 1 Application(s) Topical <User Schedule>  enoxaparin Injectable 40 milliGRAM(s) SubCutaneous every 12 hours  epoetin mine-epbx (RETACRIT) Injectable 5000 Unit(s) IV Push <User Schedule>  ferrous    sulfate 325 milliGRAM(s) Oral daily  midodrine. 15 milliGRAM(s) Oral three times a day  multivitamin 1 Tablet(s) Oral daily  pantoprazole  Injectable 40 milliGRAM(s) IV Push daily  tiotropium 2.5 MICROgram(s)/olodaterol 2.5 MICROgram(s) (STIOLTO) Inhaler 2 Puff(s) Inhalation daily    MEDICATIONS  (PRN):  acetaminophen     Tablet .. 650 milliGRAM(s) Oral every 6 hours PRN Temp greater or equal to 38C (100.4F), Moderate Pain (4 - 6), Severe Pain (7 - 10)  guaiFENesin Oral Liquid (Sugar-Free) 100 milliGRAM(s) Oral every 6 hours PRN Cough  hydrOXYzine hydrochloride 25 milliGRAM(s) Oral daily PRN Restlessness  melatonin 5 milliGRAM(s) Oral at bedtime PRN Sleep  metoprolol tartrate Injectable 2.5 milliGRAM(s) IV Push every 6 hours PRN for hR > 110  oxycodone    5 mG/acetaminophen 325 mG 1 Tablet(s) Oral every 6 hours PRN Moderate Pain (4 - 6)  sodium chloride 0.9% lock flush 10 milliLiter(s) IV Push every 1 hour PRN Pre/post blood products, medications, blood draw, and to maintain line patency  sodium chloride 0.9% lock flush 10 milliLiter(s) IV Push every 1 hour PRN Pre/post blood products, medications, blood draw, and to maintain line patency          PHYSICAL EXAM:  Vital Signs Last 24 Hrs  T(C): 36.4 (19 Aug 2023 12:29), Max: 37 (19 Aug 2023 00:00)  T(F): 97.6 (19 Aug 2023 12:29), Max: 98.6 (19 Aug 2023 00:00)  HR: 117 (19 Aug 2023 12:29) (84 - 117)  BP: 103/71 (19 Aug 2023 17:49) (103/67 - 138/62)  BP(mean): --  RR: 18 (19 Aug 2023 12:29) (18 - 18)  SpO2: 99% (19 Aug 2023 12:29) (96% - 99%)    Parameters below as of 19 Aug 2023 12:29  Patient On (Oxygen Delivery Method): nasal cannula  O2 Flow (L/min): 2      General: Obese female in NAD, on nasal cannula  HEENT: Normocephalic  Cardiac: S1S2 RRR  Respiratory: Diminished breath sounds b/l  Abdomen: Soft, NT  Extremities: 2+ pitting edema of b/l lower extremities with mottling of feet extending to 1/2 inch above ankles b/l. Left hand also with distal cyanotic changes   Msk: patient with 3/5 strength in B/L LEs   Neuro: AAOx3, sensation intact in LE's, though patient states slightly more dull than UEs  Psych: Calm   Access: s/p Tunneled Cath placement Rt ant chestwall    LABS:                        8.0    9.26  )-----------( 220      ( 19 Aug 2023 05:35 )             26.8   08-19    127<L>  |  89<L>  |  36.8<H>  ----------------------------<  99  4.5   |  24.0  |  3.31<H>    Ca    8.1<L>      19 Aug 2023 05:35              Urinalysis Basic - ( 18 Aug 2023 08:40 )    Color: x / Appearance: x / SG: x / pH: x  Gluc: 98 mg/dL / Ketone: x  / Bili: x / Urobili: x   Blood: x / Protein: x / Nitrite: x   Leuk Esterase: x / RBC: x / WBC x   Sq Epi: x / Non Sq Epi: x / Bacteria: x        Culture - Blood (collected 16 Aug 2023 00:35)  Source: .Blood Blood-Peripheral  Preliminary Report (18 Aug 2023 07:01):    No growth at 48 Hours    Culture - Blood (collected 16 Aug 2023 00:30)  Source: .Blood Blood-Peripheral  Preliminary Report (18 Aug 2023 07:01):    No growth at 48 Hours      CAPILLARY BLOOD GLUCOSE            RADIOLOGY & ADDITIONAL TESTS:  Results Reviewed:   Imaging Personally Reviewed:  Electrocardiogram Personally Reviewed:

## 2023-08-19 NOTE — PROGRESS NOTE ADULT - ASSESSMENT
83 year old female with PMH of HTN, HLD, CAD s/p stents, Afib (on anticoagulation), CHF, CKD 3b, COPD (on 2L home O2), diverticulosis and stage 3 hemorrhoids presents with melanotic stool. Admitted for hemorrhagic shock s/p pRBC transfusions. EGD on 7/27 without active bleeding, colonoscopy 7/28 unrevealing. Hospital course notable for ANTONINO on CKD requiring initiation of hemodialysis on 8/07/23.     Antonino/ Oliguric ATN  Baseline creatinine ~1.1-1.3mg/dL     Remains dialysis dependant at this time  Plan for tunneled dialysis catheter placement pending improvement in sodium levels  HD today- currently on MWF schedule    Hypotension - continue midodrine to 15mg TID     Metabolic acidosis - improved with HD    Anemia -continue GEORGE with HD    Afib - on metoprolol PO + amiodarone PO      83 year old female with PMH of HTN, HLD, CAD s/p stents, Afib (on anticoagulation), CHF, CKD 3b, COPD (on 2L home O2), diverticulosis and stage 3 hemorrhoids presents with melanotic stool. Admitted for hemorrhagic shock s/p pRBC transfusions. EGD on 7/27 without active bleeding, colonoscopy 7/28 unrevealing. Hospital course notable for ANTONINO on CKD requiring initiation of hemodialysis on 8/07/23.     Antonino/ Oliguric ATN  Baseline creatinine ~1.1-1.3mg/dL     Remains dialysis dependant at this time  s/p RIJ tunneled dialysis catheter placement on 08/18  HD today- currently on TTS schedule    Hypotension - continue midodrine to 15mg TID     Metabolic acidosis - improved with HD    Anemia -continue GEORGE with HD    Afib - on metoprolol PO + amiodarone PO

## 2023-08-19 NOTE — PROGRESS NOTE ADULT - ASSESSMENT
82 y/o female with PMH COPD on 2L home O2, HFpEF, CAD s/p PCI, HTN, HLD, pAF on rivaroxaban, diverticulosis, hemorrhoids presented with generalized weakness/dark stools admitted anemia, GIB without active bleed on ct, received 3U PRBC, and IVF however remained hypotensive requiring vasopressor therapy for hemorrhagic shock.  EGD on 7/27 and colonoscopy 7/28 without active bleed. Patient also with DANETTE and Afib with RVR, resumed back on amio and bb .  HR now better controlled. Plan was for ct enterography, but given DANETTE on CKD, it was cancelled. Patient downgraded to medicine 7/30/23. S/p Intermittent HD for worsening renal function. Now s/p permacath as no signifcant improvement. Of note has been found to have distal cyanotic changes of feet b/l       DANETTE on CKD III/Metabolic acidosis   Suspect degree of PLACIDO   Started on HD for failure of renal function to improve  Spoke with nephrology- continues on HD, now s/p Tunnelled Cath placement 8/18, patient will need new Outpatient HD CM working on it    monitor BMP  PANKAJ with HD pending     Hyponatremia   Possibly 2/2 hypervolemic hyponatremia  Fluid restriction with UF/HD    Acute GIB / acute blood loss anemia /  hemorrhagic shock (Resolved)  hx of GIB in past   cta neg without active bleed on ct on admission   s/p EGD on 7/27 without active bleeding  s/p colonoscopy 7/28 no active bleed   Was on pressors on admission, weaned off to midodrine. dc 15 mg tid and change to 5 mg Q8 standing with 5 mg Q8 PRN for MAP < 60 due to peripheral vasoconstrictor effects  c/w PPIs   ct enterography - cancelled given elevated creatinine and no further bleeding  plan for capsule endoscopy as op   Diet as tolerated with renal restrictions    resumed aspirin as per GI  tolerating Lovenox - can transition to XARELTO on discharge if hb stable, CBC stable    HFpEF  Echo noted with Grade III diastolic dysfunction and severe pulm HTN  S/p Lasix  bilateral lower ext ACE wrap   BPs limiting use of optimal GDMT     Distal cyanosis   Per pt has been intermittent for past 3 months  Remains on midodrine. change as above to 5 mg Q8 = 5mg Q8 PRN for MAP <60  Upper / Lower arterial duplex ordered      copd  c/w inhalers  C/w Symbicort BID   C/w tiotropium  C/w o2     Paroxysmal A-Fib   Hypotension, asymptomatic  resumed on amio   BB dc'd given persistent hypotension   prn iv lopressor for hr > 110  aspirin  started lovenox 8/2/23, no bleeding noted, will start Xarelto on dc    HLD  - monitor CMP   - hold statin due to transaminitis    Transaminitis (resolving)  - statin held  - LFT now wnl, Alk Phos remains elevated   - discussed with cardio, cont amiodarone for now since pt has been on it for long time  - Abdo U/S shows cholelithiasis and small right pleural effusion and Hep panel negative     Moderate protein calorie malnutrition   - Renal restrictions, no protein restriction / no Concentrated K /no phos / low sodium     dvt ppx : trial of Lovenox  GI cleared for Xarelto resumption on discharge  Dispo: S/P tunnelled Cath 8/18, awaiting PANKAJ and HD chair   Plan discussed with patient at bedside.

## 2023-08-19 NOTE — PROGRESS NOTE ADULT - SUBJECTIVE AND OBJECTIVE BOX
[facility] DIVISION OF KIDNEY DISEASES AND HYPERTENSION -- HEMODIALYSIS NOTE  --------------------------------------------------------------------------------  Chief Complaint: Antonino/Ongoing hemodialysis requirement    24 hour events/subjective:  no acute event noted  pt seen and examined; has no acute complaint      PAST HISTORY  --------------------------------------------------------------------------------  No significant changes to PMH, PSH, FHx, SHx, unless otherwise noted    ALLERGIES & MEDICATIONS  --------------------------------------------------------------------------------  Allergies    No Known Allergies    Intolerances      Standing Inpatient Medications  aMIOdarone    Tablet 200 milliGRAM(s) Oral daily  aspirin  chewable 81 milliGRAM(s) Oral daily  budesonide  80 MICROgram(s)/formoterol 4.5 MICROgram(s) Inhaler 2 Puff(s) Inhalation two times a day  chlorhexidine 2% Cloths 1 Application(s) Topical <User Schedule>  chlorhexidine 4% Liquid 1 Application(s) Topical <User Schedule>  enoxaparin Injectable 40 milliGRAM(s) SubCutaneous every 12 hours  epoetin mine-epbx (RETACRIT) Injectable 5000 Unit(s) IV Push <User Schedule>  ferrous    sulfate 325 milliGRAM(s) Oral daily  midodrine. 15 milliGRAM(s) Oral three times a day  multivitamin 1 Tablet(s) Oral daily  pantoprazole  Injectable 40 milliGRAM(s) IV Push daily  tiotropium 2.5 MICROgram(s)/olodaterol 2.5 MICROgram(s) (STIOLTO) Inhaler 2 Puff(s) Inhalation daily    PRN Inpatient Medications  acetaminophen     Tablet .. 650 milliGRAM(s) Oral every 6 hours PRN  guaiFENesin Oral Liquid (Sugar-Free) 100 milliGRAM(s) Oral every 6 hours PRN  hydrOXYzine hydrochloride 25 milliGRAM(s) Oral daily PRN  melatonin 5 milliGRAM(s) Oral at bedtime PRN  metoprolol tartrate Injectable 2.5 milliGRAM(s) IV Push every 6 hours PRN  oxycodone    5 mG/acetaminophen 325 mG 1 Tablet(s) Oral every 6 hours PRN  sodium chloride 0.9% lock flush 10 milliLiter(s) IV Push every 1 hour PRN  sodium chloride 0.9% lock flush 10 milliLiter(s) IV Push every 1 hour PRN      REVIEW OF SYSTEMS  --------------------------------------------------------------------------------  Gen: No weight changes, fatigue, fevers/chills, weakness  Skin: No rashes  Head/Eyes/Ears/Mouth: No headache  Respiratory: No dyspnea, cough,  CV: No chest pain, orthopnea  GI: No abdominal pain, diarrhea, constipation, nausea, vomiting,  MSK: No joint pain  Neuro: No dizziness/lightheadedness, weakness  Heme: No bleeding  Psych: No significant nervousness, anxiety, stress, depression    All other systems were reviewed and are negative, except as noted.    VITALS/PHYSICAL EXAM  --------------------------------------------------------------------------------  T(C): 36.4 (08-19-23 @ 12:29), Max: 37 (08-19-23 @ 00:00)  HR: 117 (08-19-23 @ 12:29) (84 - 117)  BP: 103/71 (08-19-23 @ 17:49) (103/67 - 138/62)  RR: 18 (08-19-23 @ 12:29) (18 - 18)  SpO2: 99% (08-19-23 @ 12:29) (96% - 99%)  Wt(kg): --        08-19-23 @ 07:01  -  08-19-23 @ 18:48  --------------------------------------------------------  IN: 0 mL / OUT: 1000 mL / NET: -1000 mL      Physical Exam:  	Gen: NAD  	HEENT: supple neck  	Pulm: CTA B/L  	CV: RRR, S1S2; no rub  	Abd: +BS, soft, nontender  	UE: Warm  	LE: Warm, + edema  	Neuro: No focal deficits  	Psych: Normal affect and mood  	Skin: Warm  	Vascular access: IJ non tdc    LABS/STUDIES  --------------------------------------------------------------------------------              8.0    9.26  >-----------<  220      [08-19-23 @ 05:35]              26.8     127  |  89  |  36.8  ----------------------------<  99      [08-19-23 @ 05:35]  4.5   |  24.0  |  3.31        Ca     8.1     [08-19-23 @ 05:35]            Iron 16, TIBC --, %sat --      [08-04-23 @ 05:25]  Ferritin 109      [08-04-23 @ 05:25]    HBsAb <3.0      [08-07-23 @ 10:36]  HBsAb Nonreact      [08-07-23 @ 10:36]  HBsAg Nonreact      [08-07-23 @ 10:36]  HBcAb Nonreact      [08-07-23 @ 10:36]  HCV 0.10, Nonreact      [08-07-23 @ 10:36]

## 2023-08-20 LAB
ANION GAP SERPL CALC-SCNC: 15 MMOL/L — SIGNIFICANT CHANGE UP (ref 5–17)
BUN SERPL-MCNC: 27.4 MG/DL — HIGH (ref 8–20)
CALCIUM SERPL-MCNC: 8.3 MG/DL — LOW (ref 8.4–10.5)
CHLORIDE SERPL-SCNC: 94 MMOL/L — LOW (ref 96–108)
CO2 SERPL-SCNC: 23 MMOL/L — SIGNIFICANT CHANGE UP (ref 22–29)
CREAT SERPL-MCNC: 2.79 MG/DL — HIGH (ref 0.5–1.3)
EGFR: 16 ML/MIN/1.73M2 — LOW
GLUCOSE SERPL-MCNC: 101 MG/DL — HIGH (ref 70–99)
HCT VFR BLD CALC: 32.4 % — LOW (ref 34.5–45)
HGB BLD-MCNC: 9.4 G/DL — LOW (ref 11.5–15.5)
MCHC RBC-ENTMCNC: 28.3 PG — SIGNIFICANT CHANGE UP (ref 27–34)
MCHC RBC-ENTMCNC: 29 GM/DL — LOW (ref 32–36)
MCV RBC AUTO: 97.6 FL — SIGNIFICANT CHANGE UP (ref 80–100)
PLATELET # BLD AUTO: 206 K/UL — SIGNIFICANT CHANGE UP (ref 150–400)
POTASSIUM SERPL-MCNC: 4.2 MMOL/L — SIGNIFICANT CHANGE UP (ref 3.5–5.3)
POTASSIUM SERPL-SCNC: 4.2 MMOL/L — SIGNIFICANT CHANGE UP (ref 3.5–5.3)
RBC # BLD: 3.32 M/UL — LOW (ref 3.8–5.2)
RBC # FLD: 20.8 % — HIGH (ref 10.3–14.5)
SODIUM SERPL-SCNC: 132 MMOL/L — LOW (ref 135–145)
WBC # BLD: 9.65 K/UL — SIGNIFICANT CHANGE UP (ref 3.8–10.5)
WBC # FLD AUTO: 9.65 K/UL — SIGNIFICANT CHANGE UP (ref 3.8–10.5)

## 2023-08-20 PROCEDURE — 93923 UPR/LXTR ART STDY 3+ LVLS: CPT | Mod: 26

## 2023-08-20 PROCEDURE — 99233 SBSQ HOSP IP/OBS HIGH 50: CPT

## 2023-08-20 PROCEDURE — 93930 UPPER EXTREMITY STUDY: CPT | Mod: 26

## 2023-08-20 RX ORDER — LANOLIN ALCOHOL/MO/W.PET/CERES
3 CREAM (GRAM) TOPICAL ONCE
Refills: 0 | Status: COMPLETED | OUTPATIENT
Start: 2023-08-20 | End: 2023-08-20

## 2023-08-20 RX ADMIN — ENOXAPARIN SODIUM 40 MILLIGRAM(S): 100 INJECTION SUBCUTANEOUS at 22:14

## 2023-08-20 RX ADMIN — Medication 5 MILLIGRAM(S): at 22:16

## 2023-08-20 RX ADMIN — Medication 81 MILLIGRAM(S): at 13:46

## 2023-08-20 RX ADMIN — TIOTROPIUM BROMIDE AND OLODATEROL 2 PUFF(S): 3.124; 2.736 SPRAY, METERED RESPIRATORY (INHALATION) at 13:45

## 2023-08-20 RX ADMIN — ENOXAPARIN SODIUM 40 MILLIGRAM(S): 100 INJECTION SUBCUTANEOUS at 09:00

## 2023-08-20 RX ADMIN — MIDODRINE HYDROCHLORIDE 5 MILLIGRAM(S): 2.5 TABLET ORAL at 22:14

## 2023-08-20 RX ADMIN — MIDODRINE HYDROCHLORIDE 5 MILLIGRAM(S): 2.5 TABLET ORAL at 05:21

## 2023-08-20 RX ADMIN — Medication 3 MILLIGRAM(S): at 23:00

## 2023-08-20 RX ADMIN — AMIODARONE HYDROCHLORIDE 200 MILLIGRAM(S): 400 TABLET ORAL at 05:20

## 2023-08-20 RX ADMIN — BUDESONIDE AND FORMOTEROL FUMARATE DIHYDRATE 2 PUFF(S): 160; 4.5 AEROSOL RESPIRATORY (INHALATION) at 09:01

## 2023-08-20 RX ADMIN — Medication 1 TABLET(S): at 13:46

## 2023-08-20 RX ADMIN — MIDODRINE HYDROCHLORIDE 5 MILLIGRAM(S): 2.5 TABLET ORAL at 13:46

## 2023-08-20 RX ADMIN — PANTOPRAZOLE SODIUM 40 MILLIGRAM(S): 20 TABLET, DELAYED RELEASE ORAL at 13:46

## 2023-08-20 RX ADMIN — CHLORHEXIDINE GLUCONATE 1 APPLICATION(S): 213 SOLUTION TOPICAL at 05:21

## 2023-08-20 RX ADMIN — Medication 325 MILLIGRAM(S): at 13:46

## 2023-08-20 RX ADMIN — BUDESONIDE AND FORMOTEROL FUMARATE DIHYDRATE 2 PUFF(S): 160; 4.5 AEROSOL RESPIRATORY (INHALATION) at 22:15

## 2023-08-20 NOTE — PROGRESS NOTE ADULT - ASSESSMENT
84 y/o female with PMH COPD on 2L home O2, HFpEF, CAD s/p PCI, HTN, HLD, pAF on rivaroxaban, diverticulosis, hemorrhoids presented with generalized weakness/dark stools admitted anemia, GIB without active bleed on ct, received 3U PRBC, and IVF however remained hypotensive requiring vasopressor therapy for hemorrhagic shock.  EGD on 7/27 and colonoscopy 7/28 without active bleed. Patient also with DANETTE and Afib with RVR, resumed back on amio and bb .  HR now better controlled. Plan was for ct enterography, but given DANETTE on CKD, it was cancelled. Patient downgraded to medicine 7/30/23. S/p Intermittent HD for worsening renal function. Now s/p permacath as no signifcant improvement. Of note has been found to have distal cyanotic changes of feet b/l       DANETTE on CKD III/Metabolic acidosis   Suspect degree of PLACIDO   Started on HD for failure of renal function to improve  s/p permacath for ongoing HD  PANKAJ with HD pending     Hyponatremia   Possibly 2/2 hypervolemic hyponatremia  Fluid restriction with UF/HD    Acute GIB / acute blood loss anemia /  hemorrhagic shock (Resolved)  hx of GIB in past   cta neg without active bleed on ct on admission   s/p EGD on 7/27 without active bleeding  s/p colonoscopy 7/28 no active bleed   Was on pressors on admission, weaned off to midodrine.  Due to suspected pressor induced (or contributing to)_ peripheral vascular insufficiency midodrine decreased to 5 mg Q8  c/w PPIs   ct enterography - cancelled given elevated creatinine and no further bleeding  plan for capsule endoscopy as op   Diet as tolerated with renal restrictions    resumed aspirin as per GI  tolerating Lovenox - can transition to XARELTO on discharge if hb stable, CBC stable    HFpEF  Echo noted with Grade III diastolic dysfunction and severe pulm HTN  S/p Lasix  bilateral lower ext ACE wrap   BPs limiting use of optimal GDMT     Distal cyanosis   Per pt has been intermittent for past 3 months  Remains on midodrine. change as above to 5 mg Q8 = 5mg Q8 PRN for MAP <60  Upper / Lower arterial duplex ordered  Appreciate vascular recs      copd  c/w inhalers  C/w Symbicort BID   C/w tiotropium  C/w o2     Paroxysmal A-Fib   Hypotension, asymptomatic  resumed on amio   BB dc'd given persistent hypotension   prn iv lopressor for hr > 110  aspirin  started lovenox 8/2/23, no bleeding noted, will start Xarelto on dc    HLD  - monitor CMP   - hold statin due to transaminitis    Transaminitis (resolving)  - statin held  - LFT now wnl, Alk Phos remains elevated   - discussed with cardio, cont amiodarone for now since pt has been on it for long time  - Abdo U/S shows cholelithiasis and small right pleural effusion and Hep panel negative     Moderate protein calorie malnutrition   - Renal restrictions, no protein restriction / no Concentrated K /no phos / low sodium     dvt ppx : trial of Lovenox  GI cleared for Xarelto resumption on discharge  Dispo: S/P tunnelled Cath 8/18, awaiting PANKAJ and HD chair   Plan discussed with patient at bedside.

## 2023-08-20 NOTE — CONSULT NOTE ADULT - CONSULT REQUESTED DATE/TIME
01-Aug-2023
20-Aug-2023 16:46
07-Aug-2023 12:00
26-Jul-2023 13:06
15-Aug-2023 17:02
26-Jul-2023 13:57

## 2023-08-20 NOTE — CONSULT NOTE ADULT - SUBJECTIVE AND OBJECTIVE BOX
VASCULAR SURGERY CONSULT     HPI: 83y Female previously seen for  DANETTE with temporary dialysis catheter placement, also admitted for gi bleed, now consulted for cyanosis of bilateral feet. patient endorses this for 3 months with edema, denies claudication symptoms, rest pain, or tissue loss, endorses 40 py history, denies diabetes.      ROS: 10-system review is otherwise negative except HPI above.      PAST MEDICAL & SURGICAL HISTORY:  HTN (hypertension)      HLD (hyperlipidemia)      CAD (coronary artery disease)      COPD (chronic obstructive pulmonary disease)      Acute CHF      S/P  section        FAMILY HISTORY:  FHx: heart disease (Sibling)      Family history not pertinent as reviewed with the patient.    SOCIAL HISTORY:  Denies any toxic habits    ALLERGIES: NKA No Known Allergies      HOME MEDICATIONS: ***  Home Medications:  amiodarone 200 mg oral tablet: 1 tab(s) orally once a day (2023 11:29)  aspirin 81 mg oral delayed release tablet: 1 tab(s) orally once a day (2023 14:39)  atorvastatin 80 mg oral tablet: 1 tab(s) orally once a day (2023 11:29)  Eliquis 5 mg oral tablet: 1 tab(s) orally 2 times a day (2023 11:29)  metoprolol succinate 100 mg oral tablet, extended release: 1 tab(s) orally once a day (2023 11:29)  Multiple Vitamins oral tablet: 1 tab(s) orally once a day (2023 14:39)  potassium chloride 20 mEq oral tablet, extended release: 2 tab(s) orally once a day (2023 14:39)  Stiolto Respimat 10 ACT 2.5 mcg-2.5 mcg/inh inhalation aerosol: 2 inhaler(s) inhaled once a day (2023 14:39)  torsemide 100 mg oral tablet: 1 tab(s) orally every other day (2023 14:39)  Vitamin D2 50 mcg (2000 intl units) oral capsule: 1 cap(s) orally 3 times a week (2023 14:39)      --------------------------------------------------------------------------------------------    PHYSICAL EXAM:   General: NAD, Lying in bed comfortably  Neuro: A+Ox3  HEENT: EOMI, SNEHA, MMM  Cardio: well perfused  Resp: Non labored breathing on 2L NC  GI/Abd: Soft, NT/ND, no rebound/guarding, no masses palpated  Vascular: All 4 extremities warm and well perfused. edema pitting of lower extremities given edema great signals of DP.PT bilateral, cap refill <2 sec  Pelvis: stable  Musculoskeletal: All 4 extremities moving spontaneously, no limitations, no spinal tenderness.  --------------------------------------------------------------------------------------------    LABS                 9.4    9.65   )----------(  206       ( 20 Aug 2023 07:18 )               32.4      132    |  94     |  27.4   ----------------------------<  101        ( 20 Aug 2023 07:18 )  4.2     |  23.0   |  2.79     Ca    8.3        ( 20 Aug 2023 07:18 )            CAPILLARY BLOOD GLUCOSE        Urinalysis Basic - ( 20 Aug 2023 07:18 )    Color: x / Appearance: x / SG: x / pH: x  Gluc: 101 mg/dL / Ketone: x  / Bili: x / Urobili: x   Blood: x / Protein: x / Nitrite: x   Leuk Esterase: x / RBC: x / WBC x   Sq Epi: x / Non Sq Epi: x / Bacteria: x          --------------------------------------------------------------------------------------------  IMAGING  seen abis,    ASSESSMENT: Patient is a 83y old female with edema cyanosis of feet, cyanosis also could be due to venous reflux, chf, recommend duplex veins to rule out dvt    PLAN:    - f/u art venous duplex  - recommend wrapping lower extremities starting at feet  - no current indicaiton for invasive vawscular studies at this moment

## 2023-08-20 NOTE — PROGRESS NOTE ADULT - SUBJECTIVE AND OBJECTIVE BOX
[facility] Division of Hospital Medicine    Chief Complaint:  Shock     SUBJECTIVE / OVERNIGHT EVENTS:  Pt examined lying in bed  Overnight with soft BPs. Still borderline BP with decrease in midodrine however she reports feeling no significant subjective change  Denies CP, SOB, abd pain, N/V,     MEDICATIONS  (STANDING):  aMIOdarone    Tablet 200 milliGRAM(s) Oral daily  aspirin  chewable 81 milliGRAM(s) Oral daily  budesonide  80 MICROgram(s)/formoterol 4.5 MICROgram(s) Inhaler 2 Puff(s) Inhalation two times a day  chlorhexidine 2% Cloths 1 Application(s) Topical <User Schedule>  chlorhexidine 4% Liquid 1 Application(s) Topical <User Schedule>  enoxaparin Injectable 40 milliGRAM(s) SubCutaneous every 12 hours  epoetin mine-epbx (RETACRIT) Injectable 5000 Unit(s) IV Push <User Schedule>  ferrous    sulfate 325 milliGRAM(s) Oral daily  midodrine. 5 milliGRAM(s) Oral every 8 hours  multivitamin 1 Tablet(s) Oral daily  pantoprazole  Injectable 40 milliGRAM(s) IV Push daily  tiotropium 2.5 MICROgram(s)/olodaterol 2.5 MICROgram(s) (STIOLTO) Inhaler 2 Puff(s) Inhalation daily    MEDICATIONS  (PRN):  acetaminophen     Tablet .. 650 milliGRAM(s) Oral every 6 hours PRN Temp greater or equal to 38C (100.4F), Moderate Pain (4 - 6), Severe Pain (7 - 10)  guaiFENesin Oral Liquid (Sugar-Free) 100 milliGRAM(s) Oral every 6 hours PRN Cough  hydrOXYzine hydrochloride 25 milliGRAM(s) Oral daily PRN Restlessness  melatonin 5 milliGRAM(s) Oral at bedtime PRN Sleep  metoprolol tartrate Injectable 2.5 milliGRAM(s) IV Push every 6 hours PRN for hR > 110  midodrine. 5 milliGRAM(s) Oral every 8 hours PRN MAP < 60  oxycodone    5 mG/acetaminophen 325 mG 1 Tablet(s) Oral every 6 hours PRN Moderate Pain (4 - 6)  sodium chloride 0.9% lock flush 10 milliLiter(s) IV Push every 1 hour PRN Pre/post blood products, medications, blood draw, and to maintain line patency  sodium chloride 0.9% lock flush 10 milliLiter(s) IV Push every 1 hour PRN Pre/post blood products, medications, blood draw, and to maintain line patency      PHYSICAL EXAM:  Vital Signs Last 24 Hrs  T(C): 36.3 (20 Aug 2023 11:07), Max: 36.6 (19 Aug 2023 20:32)  T(F): 97.4 (20 Aug 2023 11:07), Max: 97.8 (19 Aug 2023 20:32)  HR: 89 (20 Aug 2023 11:07) (89 - 111)  BP: 93/64 (20 Aug 2023 11:07) (79/50 - 105/71)  BP(mean): --  RR: 18 (20 Aug 2023 11:07) (18 - 18)  SpO2: 95% (20 Aug 2023 11:07) (95% - 96%)    Parameters below as of 20 Aug 2023 11:07  Patient On (Oxygen Delivery Method): nasal cannula  O2 Flow (L/min): 2      General: Obese female in NAD, on nasal cannula  HEENT: Normocephalic  Cardiac: S1S2 RRR  Respiratory: Diminished breath sounds b/l  Abdomen: Soft, NT  Extremities: 2+ pitting edema of b/l lower extremities with mottling of feet improving  Msk: patient with 3/5 strength in B/L LEs   Neuro: AAOx3, sensation intact in LE's, though patient states slightly more dull than UEs  Psych: Calm   Access: s/p Tunneled Cath placement Rt ant chestwall    LABS:                                   9.4    9.65  )-----------( 206      ( 20 Aug 2023 07:18 )             32.4   08-20    132<L>  |  94<L>  |  27.4<H>  ----------------------------<  101<H>  4.2   |  23.0  |  2.79<H>    Ca    8.3<L>      20 Aug 2023 07:18            Urinalysis Basic - ( 18 Aug 2023 08:40 )    Color: x / Appearance: x / SG: x / pH: x  Gluc: 98 mg/dL / Ketone: x  / Bili: x / Urobili: x   Blood: x / Protein: x / Nitrite: x   Leuk Esterase: x / RBC: x / WBC x   Sq Epi: x / Non Sq Epi: x / Bacteria: x        Culture - Blood (collected 16 Aug 2023 00:35)  Source: .Blood Blood-Peripheral  Preliminary Report (18 Aug 2023 07:01):    No growth at 48 Hours    Culture - Blood (collected 16 Aug 2023 00:30)  Source: .Blood Blood-Peripheral  Preliminary Report (18 Aug 2023 07:01):    No growth at 48 Hours      CAPILLARY BLOOD GLUCOSE            RADIOLOGY & ADDITIONAL TESTS:  Results Reviewed:   Imaging Personally Reviewed:  Electrocardiogram Personally Reviewed:

## 2023-08-21 LAB
ANION GAP SERPL CALC-SCNC: 15 MMOL/L — SIGNIFICANT CHANGE UP (ref 5–17)
BUN SERPL-MCNC: 32.8 MG/DL — HIGH (ref 8–20)
CALCIUM SERPL-MCNC: 8.6 MG/DL — SIGNIFICANT CHANGE UP (ref 8.4–10.5)
CHLORIDE SERPL-SCNC: 92 MMOL/L — LOW (ref 96–108)
CO2 SERPL-SCNC: 24 MMOL/L — SIGNIFICANT CHANGE UP (ref 22–29)
CREAT SERPL-MCNC: 3.49 MG/DL — HIGH (ref 0.5–1.3)
CULTURE RESULTS: SIGNIFICANT CHANGE UP
CULTURE RESULTS: SIGNIFICANT CHANGE UP
EGFR: 12 ML/MIN/1.73M2 — LOW
GLUCOSE SERPL-MCNC: 98 MG/DL — SIGNIFICANT CHANGE UP (ref 70–99)
HCT VFR BLD CALC: 30.2 % — LOW (ref 34.5–45)
HGB BLD-MCNC: 9 G/DL — LOW (ref 11.5–15.5)
MCHC RBC-ENTMCNC: 28.8 PG — SIGNIFICANT CHANGE UP (ref 27–34)
MCHC RBC-ENTMCNC: 29.8 GM/DL — LOW (ref 32–36)
MCV RBC AUTO: 96.8 FL — SIGNIFICANT CHANGE UP (ref 80–100)
PLATELET # BLD AUTO: 219 K/UL — SIGNIFICANT CHANGE UP (ref 150–400)
POTASSIUM SERPL-MCNC: 4.3 MMOL/L — SIGNIFICANT CHANGE UP (ref 3.5–5.3)
POTASSIUM SERPL-SCNC: 4.3 MMOL/L — SIGNIFICANT CHANGE UP (ref 3.5–5.3)
RBC # BLD: 3.12 M/UL — LOW (ref 3.8–5.2)
RBC # FLD: 20.9 % — HIGH (ref 10.3–14.5)
SODIUM SERPL-SCNC: 131 MMOL/L — LOW (ref 135–145)
SPECIMEN SOURCE: SIGNIFICANT CHANGE UP
SPECIMEN SOURCE: SIGNIFICANT CHANGE UP
WBC # BLD: 9.65 K/UL — SIGNIFICANT CHANGE UP (ref 3.8–10.5)
WBC # FLD AUTO: 9.65 K/UL — SIGNIFICANT CHANGE UP (ref 3.8–10.5)

## 2023-08-21 PROCEDURE — 93970 EXTREMITY STUDY: CPT | Mod: 26

## 2023-08-21 PROCEDURE — 99232 SBSQ HOSP IP/OBS MODERATE 35: CPT

## 2023-08-21 RX ORDER — MIDODRINE HYDROCHLORIDE 2.5 MG/1
5 TABLET ORAL EVERY 8 HOURS
Refills: 0 | Status: DISCONTINUED | OUTPATIENT
Start: 2023-08-21 | End: 2023-08-23

## 2023-08-21 RX ORDER — APIXABAN 2.5 MG/1
2.5 TABLET, FILM COATED ORAL
Refills: 0 | Status: DISCONTINUED | OUTPATIENT
Start: 2023-08-21 | End: 2023-08-23

## 2023-08-21 RX ADMIN — MIDODRINE HYDROCHLORIDE 5 MILLIGRAM(S): 2.5 TABLET ORAL at 14:05

## 2023-08-21 RX ADMIN — Medication 81 MILLIGRAM(S): at 11:26

## 2023-08-21 RX ADMIN — MIDODRINE HYDROCHLORIDE 5 MILLIGRAM(S): 2.5 TABLET ORAL at 05:04

## 2023-08-21 RX ADMIN — Medication 5 MILLIGRAM(S): at 23:29

## 2023-08-21 RX ADMIN — Medication 325 MILLIGRAM(S): at 11:27

## 2023-08-21 RX ADMIN — PANTOPRAZOLE SODIUM 40 MILLIGRAM(S): 20 TABLET, DELAYED RELEASE ORAL at 11:26

## 2023-08-21 RX ADMIN — CHLORHEXIDINE GLUCONATE 1 APPLICATION(S): 213 SOLUTION TOPICAL at 05:05

## 2023-08-21 RX ADMIN — AMIODARONE HYDROCHLORIDE 200 MILLIGRAM(S): 400 TABLET ORAL at 05:04

## 2023-08-21 RX ADMIN — ENOXAPARIN SODIUM 40 MILLIGRAM(S): 100 INJECTION SUBCUTANEOUS at 09:16

## 2023-08-21 RX ADMIN — CHLORHEXIDINE GLUCONATE 1 APPLICATION(S): 213 SOLUTION TOPICAL at 05:13

## 2023-08-21 RX ADMIN — Medication 1 TABLET(S): at 11:27

## 2023-08-21 NOTE — PROGRESS NOTE ADULT - ASSESSMENT
83y old female with PMHx A fib, CAD, HTN, HLD, Pulm CHF, COPD, Pulm HTN was reconsulted for bilateral lower extremity pain was afebrile and hemodynamically stable this morning. On exam bilateral lower extremity pitting edema with palpable pulse. Continue compressive dressing for edema. May be secondary to CHF. Will follow up on Duplex study. No surgical intervention needed at this time.     PLAN  -pain control  -strict Is/Os  -continue home meds  -trend labs, replete electrolytes as needed  -encourage OOB  -incentive spirometry  -DVT ppx: SCDs, Lovenox 40 daily, ASA  -f/u duplex lower extremities  -continue compressive dressing for edema.      83y old female with PMHx A fib, CAD, HTN, HLD, Pulm CHF, COPD, Pulm HTN was reconsulted for bilateral lower extremity pain was afebrile and hemodynamically stable this morning. On exam bilateral lower extremity pitting edema with palpable pulse. Continue compressive dressing for edema. May be secondary to CHF. Recommend duplex of lower extremities No surgical intervention needed at this time.     PLAN  -pain control  -strict Is/Os  -continue home meds  -trend labs, replete electrolytes as needed  -encourage OOB  -incentive spirometry  -DVT ppx: SCDs, Lovenox 40 daily, ASA  -Recommend Duplex bilateral lower extremities  -continue compressive dressing for edema.      83y old female with PMHx A fib, CAD, HTN, HLD, Pulm CHF, COPD, Pulm HTN was reconsulted for bilateral lower extremity pain was afebrile and hemodynamically stable this morning. On exam bilateral lower extremity pitting edema with palpable pulse. Continue compressive dressing for edema. May be secondary to CHF. Recommend duplex of lower extremities No surgical intervention needed at this time.     PLAN  -pain control  -strict Is/Os  -continue home meds  -trend labs, replete electrolytes as needed  -encourage OOB  -incentive spirometry  -DVT ppx: SCDs, Lovenox 40 daily, ASA  -Recommend Venous Duplex bilateral lower extremities  -continue compressive dressing for edema.

## 2023-08-21 NOTE — PROGRESS NOTE ADULT - ASSESSMENT
84 y/o female with PMH COPD on 2L home O2, HFpEF, CAD s/p PCI, HTN, HLD, pAF on rivaroxaban, diverticulosis, hemorrhoids presented with generalized weakness/dark stools admitted anemia, GIB without active bleed on ct, received 3U PRBC, and IVF however remained hypotensive requiring vasopressor therapy for hemorrhagic shock.  EGD on 7/27 and colonoscopy 7/28 without active bleed. Patient also with DANETTE and Afib with RVR, resumed back on amio and bb .  HR now better controlled. Plan was for ct enterography, but given DANETTE on CKD, it was cancelled. Patient downgraded to medicine 7/30/23. S/p Intermittent HD for worsening renal function. Now s/p permacath as no signifcant improvement. Of note has been found to have distal cyanotic changes of feet b/l due to which midodrine has been gradually reduced.     DANETTE on CKD III/Metabolic acidosis   Suspect degree of PLACIDO   Started on HD for failure of renal function to improve  s/p permacath for ongoing HD  PANKAJ with HD pending     Hyponatremia   Possibly 2/2 hypervolemic hyponatremia  Fluid restriction with UF/HD    Acute GIB / acute blood loss anemia /  hemorrhagic shock (Resolved)  hx of GIB in past   cta neg without active bleed on ct on admission   s/p EGD on 7/27 without active bleeding  s/p colonoscopy 7/28 no active bleed   Was on pressors on admission, weaned off to midodrine.  Due to suspected pressor induced vasoconstriction potentiated by midodrine dosing decreased  c/w PPIs   ct enterography - cancelled given elevated creatinine and no further bleeding  plan for capsule endoscopy as op   Diet as tolerated with renal restrictions    resumed aspirin as per GI  tolerating Lovenox - can transition to XARELTO on discharge if hb stable, CBC stable    HFpEF  Echo noted with Grade III diastolic dysfunction and severe pulm HTN  S/p Lasix  bilateral lower ext ACE wrap   BPs limiting use of optimal GDMT     Distal cyanosis   Per pt has been intermittent for past 3 months  With improving BPs midodrine decreased to 5 mg Q8 PRN only  LE ABIs normal. Areas concerning of possible focal segmental disease  Vascular on board. Appreciate recs  LE dopplers recommended to r/o DVTs. Ordered    COPD  c/w inhalers  C/w Symbicort BID   C/w tiotropium  C/w o2     Paroxysmal A-Fib   Hypotension, asymptomatic  resumed on amio   BB dc'd given persistent hypotension   prn iv lopressor for hr > 110  aspirin  started lovenox 8/2/23, no bleeding noted, will start Xarelto on dc    HLD  - monitor CMP   - hold statin due to transaminitis    Transaminitis (resolving)  - statin held  - LFT now wnl, Alk Phos remains elevated   - discussed with cardio, cont amiodarone for now since pt has been on it for long time  - Abdo U/S shows cholelithiasis and small right pleural effusion and Hep panel negative     Moderate protein calorie malnutrition   - Renal restrictions, no protein restriction / no Concentrated K /no phos / low sodium     dvt ppx : trial of Lovenox. Remains stable. Will start Xeralto in am   Dispo: S/P tunnelled Cath 8/18, awaiting PANKAJ and HD chair   Plan discussed with patient at bedside.   84 y/o female with PMH COPD on 2L home O2, HFpEF, CAD s/p PCI, HTN, HLD, pAF on rivaroxaban, diverticulosis, hemorrhoids presented with generalized weakness/dark stools admitted anemia, GIB without active bleed on ct, received 3U PRBC, and IVF however remained hypotensive requiring vasopressor therapy for hemorrhagic shock.  EGD on 7/27 and colonoscopy 7/28 without active bleed. Patient also with DANETTE and Afib with RVR, resumed back on amio and bb .  HR now better controlled. Plan was for ct enterography, but given DANETTE on CKD, it was cancelled. Patient downgraded to medicine 7/30/23. S/p Intermittent HD for worsening renal function. Now s/p permacath as no signifcant improvement. Of note has been found to have distal cyanotic changes of feet b/l due to which midodrine has been gradually reduced.     DANETTE on CKD III/Metabolic acidosis   Suspect degree of PLACIDO   Started on HD for failure of renal function to improve  s/p permacath for ongoing HD  PANKAJ with HD pending     Hyponatremia   Possibly 2/2 hypervolemic hyponatremia  Fluid restriction with UF/HD    Acute GIB / acute blood loss anemia /  hemorrhagic shock (Resolved)  hx of GIB in past   cta neg without active bleed on ct on admission   s/p EGD on 7/27 without active bleeding  s/p colonoscopy 7/28 no active bleed   Was on pressors on admission, weaned off to midodrine.  Due to suspected pressor induced vasoconstriction potentiated by midodrine dosing decreased  c/w PPIs   ct enterography - cancelled given elevated creatinine and no further bleeding  plan for capsule endoscopy as op   Diet as tolerated with renal restrictions    resumed aspirin as per GI  tolerating Lovenox - can transition to XARELTO on discharge if hb stable, CBC stable    HFpEF  Echo noted with Grade III diastolic dysfunction and severe pulm HTN  S/p Lasix  bilateral lower ext ACE wrap   BPs limiting use of optimal GDMT     Distal cyanosis   Per pt has been intermittent for past 3 months  With improving BPs midodrine decreased to 5 mg Q8 PRN only  LE ABIs normal. Areas concerning of possible focal segmental disease  Vascular on board. Appreciate recs  LE dopplers recommended to r/o DVTs. Ordered    COPD  c/w inhalers  C/w Symbicort BID   C/w tiotropium  C/w o2     Paroxysmal A-Fib   resumed on amio   BB dc'd given persistent hypotension   prn iv lopressor for hr > 110  aspirin  started lovenox 8/2/23, no bleeding noted, will start Eliquis bid    HLD  - monitor CMP   - hold statin due to transaminitis    Transaminitis (resolving)  - statin held  - LFT now wnl, Alk Phos remains elevated   - discussed with cardio, cont amiodarone for now since pt has been on it for long time  - Abdo U/S shows cholelithiasis and small right pleural effusion and Hep panel negative     Moderate protein calorie malnutrition   - Renal restrictions, no protein restriction / no Concentrated K /no phos / low sodium     dvt ppx : trial of Lovenox. Remains stable. Will start Eliquis  Dispo: S/P tunnelled Cath 8/18, awaiting PANKAJ and HD chair   Plan discussed with patient at bedside.

## 2023-08-21 NOTE — PROGRESS NOTE ADULT - SUBJECTIVE AND OBJECTIVE BOX
Patient seen and examined at bedside. Patient c/o intermittent toe pain. Associated with swelling. Denies weakness, tingling, and numbness of lower extremities.     Vitals:  Vital Signs Last 24 Hrs  T(C): 36.6 (21 Aug 2023 05:28), Max: 36.7 (20 Aug 2023 18:00)  T(F): 97.8 (21 Aug 2023 05:28), Max: 98.1 (20 Aug 2023 18:00)  HR: 98 (21 Aug 2023 05:28) (89 - 98)  BP: 109/72 (21 Aug 2023 05:28) (92/60 - 109/72)  BP(mean): --  RR: 18 (21 Aug 2023 05:28) (18 - 18)  SpO2: 97% (21 Aug 2023 05:28) (95% - 97%)    Parameters below as of 21 Aug 2023 05:28  Patient On (Oxygen Delivery Method): nasal cannula  O2 Flow (L/min): 2    Labs:  08-20    132<L>  |  94<L>  |  27.4<H>  ----------------------------<  101<H>  4.2   |  23.0  |  2.79<H>    Ca    8.3<L>      20 Aug 2023 07:18                 9.4    9.65  )-----------( 206      ( 20 Aug 2023 07:18 )             32.4     Exam:  Gen: pt lying in bed, alert, in NAD  Resp: unlabored  CVS: RRR  Abd: soft, NT, ND  Ext: moving all extremities spontaneously, sensation intact, palpable DP pulse, pitting edema bilateral lower extremities extending to knee. Ace bandage on bilateral lower extremities in place for compression.

## 2023-08-21 NOTE — PROGRESS NOTE ADULT - SUBJECTIVE AND OBJECTIVE BOX
[facility] Division of Hospital Medicine    Chief Complaint:  Shock     SUBJECTIVE / OVERNIGHT EVENTS:  Pt examined lying in bed  BPs improving  ABIs normal with areas concerning for focal segmental disease   Denies CP, SOB, abd pain, N/V,     MEDICATIONS  (STANDING):  aMIOdarone    Tablet 200 milliGRAM(s) Oral daily  aspirin  chewable 81 milliGRAM(s) Oral daily  budesonide  80 MICROgram(s)/formoterol 4.5 MICROgram(s) Inhaler 2 Puff(s) Inhalation two times a day  chlorhexidine 2% Cloths 1 Application(s) Topical <User Schedule>  chlorhexidine 4% Liquid 1 Application(s) Topical <User Schedule>  enoxaparin Injectable 40 milliGRAM(s) SubCutaneous every 12 hours  epoetin mine-epbx (RETACRIT) Injectable 5000 Unit(s) IV Push <User Schedule>  ferrous    sulfate 325 milliGRAM(s) Oral daily  midodrine. 5 milliGRAM(s) Oral every 8 hours  multivitamin 1 Tablet(s) Oral daily  pantoprazole  Injectable 40 milliGRAM(s) IV Push daily  tiotropium 2.5 MICROgram(s)/olodaterol 2.5 MICROgram(s) (STIOLTO) Inhaler 2 Puff(s) Inhalation daily    MEDICATIONS  (PRN):  acetaminophen     Tablet .. 650 milliGRAM(s) Oral every 6 hours PRN Temp greater or equal to 38C (100.4F), Moderate Pain (4 - 6), Severe Pain (7 - 10)  guaiFENesin Oral Liquid (Sugar-Free) 100 milliGRAM(s) Oral every 6 hours PRN Cough  hydrOXYzine hydrochloride 25 milliGRAM(s) Oral daily PRN Restlessness  melatonin 5 milliGRAM(s) Oral at bedtime PRN Sleep  metoprolol tartrate Injectable 2.5 milliGRAM(s) IV Push every 6 hours PRN for hR > 110  midodrine. 5 milliGRAM(s) Oral every 8 hours PRN MAP < 60  oxycodone    5 mG/acetaminophen 325 mG 1 Tablet(s) Oral every 6 hours PRN Moderate Pain (4 - 6)  sodium chloride 0.9% lock flush 10 milliLiter(s) IV Push every 1 hour PRN Pre/post blood products, medications, blood draw, and to maintain line patency  sodium chloride 0.9% lock flush 10 milliLiter(s) IV Push every 1 hour PRN Pre/post blood products, medications, blood draw, and to maintain line patency      PHYSICAL EXAM:  Vital Signs Last 24 Hrs  T(C): 36.7 (21 Aug 2023 10:00), Max: 36.7 (21 Aug 2023 00:00)  T(F): 98.1 (21 Aug 2023 10:00), Max: 98.1 (21 Aug 2023 00:00)  HR: 100 (21 Aug 2023 10:00) (92 - 100)  BP: 109/76 (21 Aug 2023 14:07) (95/57 - 109/76)  BP(mean): --  RR: 18 (21 Aug 2023 10:00) (18 - 18)  SpO2: 98% (21 Aug 2023 10:00) (96% - 98%)    Parameters below as of 21 Aug 2023 10:00  Patient On (Oxygen Delivery Method): nasal cannula  O2 Flow (L/min): 2      General: Obese female in NAD, on nasal cannula  HEENT: Normocephalic  Cardiac: S1S2 RRR  Respiratory: Diminished breath sounds b/l  Abdomen: Soft, NT  Extremities: 2+ pitting edema of b/l lower extremities with mottling of feet improving  Msk: patient with 3/5 strength in B/L LEs   Neuro: AAOx3, sensation intact in LE's, though patient states slightly more dull than UEs  Psych: Calm   Access: s/p Tunneled Cath placement Rt ant chestwall    LABS:                                   9.0    9.65  )-----------( 219      ( 21 Aug 2023 07:50 )             30.2   08-21    131<L>  |  92<L>  |  32.8<H>  ----------------------------<  98  4.3   |  24.0  |  3.49<H>    Ca    8.6      21 Aug 2023 07:50              Urinalysis Basic - ( 18 Aug 2023 08:40 )    Color: x / Appearance: x / SG: x / pH: x  Gluc: 98 mg/dL / Ketone: x  / Bili: x / Urobili: x   Blood: x / Protein: x / Nitrite: x   Leuk Esterase: x / RBC: x / WBC x   Sq Epi: x / Non Sq Epi: x / Bacteria: x        Culture - Blood (collected 16 Aug 2023 00:35)  Source: .Blood Blood-Peripheral  Preliminary Report (18 Aug 2023 07:01):    No growth at 48 Hours    Culture - Blood (collected 16 Aug 2023 00:30)  Source: .Blood Blood-Peripheral  Preliminary Report (18 Aug 2023 07:01):    No growth at 48 Hours      CAPILLARY BLOOD GLUCOSE            RADIOLOGY & ADDITIONAL TESTS:  Results Reviewed:   Imaging Personally Reviewed:  Electrocardiogram Personally Reviewed:

## 2023-08-22 ENCOUNTER — TRANSCRIPTION ENCOUNTER (OUTPATIENT)
Age: 83
End: 2023-08-22

## 2023-08-22 LAB
ANION GAP SERPL CALC-SCNC: 12 MMOL/L — SIGNIFICANT CHANGE UP (ref 5–17)
BUN SERPL-MCNC: 37.2 MG/DL — HIGH (ref 8–20)
CALCIUM SERPL-MCNC: 8.3 MG/DL — LOW (ref 8.4–10.5)
CHLORIDE SERPL-SCNC: 93 MMOL/L — LOW (ref 96–108)
CO2 SERPL-SCNC: 23 MMOL/L — SIGNIFICANT CHANGE UP (ref 22–29)
CREAT SERPL-MCNC: 3.68 MG/DL — HIGH (ref 0.5–1.3)
EGFR: 12 ML/MIN/1.73M2 — LOW
GLUCOSE SERPL-MCNC: 101 MG/DL — HIGH (ref 70–99)
HCT VFR BLD CALC: 32.1 % — LOW (ref 34.5–45)
HGB BLD-MCNC: 9.5 G/DL — LOW (ref 11.5–15.5)
MAGNESIUM SERPL-MCNC: 2.4 MG/DL — SIGNIFICANT CHANGE UP (ref 1.8–2.6)
MCHC RBC-ENTMCNC: 28.8 PG — SIGNIFICANT CHANGE UP (ref 27–34)
MCHC RBC-ENTMCNC: 29.6 GM/DL — LOW (ref 32–36)
MCV RBC AUTO: 97.3 FL — SIGNIFICANT CHANGE UP (ref 80–100)
PLATELET # BLD AUTO: 217 K/UL — SIGNIFICANT CHANGE UP (ref 150–400)
POTASSIUM SERPL-MCNC: 4.3 MMOL/L — SIGNIFICANT CHANGE UP (ref 3.5–5.3)
POTASSIUM SERPL-SCNC: 4.3 MMOL/L — SIGNIFICANT CHANGE UP (ref 3.5–5.3)
RBC # BLD: 3.3 M/UL — LOW (ref 3.8–5.2)
RBC # FLD: 21.1 % — HIGH (ref 10.3–14.5)
SODIUM SERPL-SCNC: 128 MMOL/L — LOW (ref 135–145)
WBC # BLD: 8.25 K/UL — SIGNIFICANT CHANGE UP (ref 3.8–10.5)
WBC # FLD AUTO: 8.25 K/UL — SIGNIFICANT CHANGE UP (ref 3.8–10.5)

## 2023-08-22 PROCEDURE — 99232 SBSQ HOSP IP/OBS MODERATE 35: CPT

## 2023-08-22 RX ORDER — POTASSIUM CHLORIDE 20 MEQ
2 PACKET (EA) ORAL
Refills: 0 | DISCHARGE

## 2023-08-22 RX ORDER — AMIODARONE HYDROCHLORIDE 400 MG/1
1 TABLET ORAL
Qty: 0 | Refills: 0 | DISCHARGE
Start: 2023-08-22

## 2023-08-22 RX ORDER — ASPIRIN/CALCIUM CARB/MAGNESIUM 324 MG
1 TABLET ORAL
Refills: 0 | DISCHARGE

## 2023-08-22 RX ORDER — PANTOPRAZOLE SODIUM 20 MG/1
1 TABLET, DELAYED RELEASE ORAL
Qty: 60 | Refills: 0
Start: 2023-08-22 | End: 2023-09-20

## 2023-08-22 RX ORDER — AMIODARONE HYDROCHLORIDE 400 MG/1
1 TABLET ORAL
Qty: 0 | Refills: 0 | DISCHARGE

## 2023-08-22 RX ORDER — APIXABAN 2.5 MG/1
1 TABLET, FILM COATED ORAL
Qty: 0 | Refills: 0 | DISCHARGE

## 2023-08-22 RX ORDER — MIDODRINE HYDROCHLORIDE 2.5 MG/1
1 TABLET ORAL
Qty: 0 | Refills: 0 | DISCHARGE
Start: 2023-08-22

## 2023-08-22 RX ORDER — ASPIRIN/CALCIUM CARB/MAGNESIUM 324 MG
1 TABLET ORAL
Qty: 0 | Refills: 0 | DISCHARGE
Start: 2023-08-22

## 2023-08-22 RX ORDER — FERROUS SULFATE 325(65) MG
1 TABLET ORAL
Qty: 0 | Refills: 0 | DISCHARGE
Start: 2023-08-22

## 2023-08-22 RX ORDER — APIXABAN 2.5 MG/1
1 TABLET, FILM COATED ORAL
Qty: 0 | Refills: 0 | DISCHARGE
Start: 2023-08-22

## 2023-08-22 RX ORDER — HYDROXYZINE HCL 10 MG
1 TABLET ORAL
Qty: 0 | Refills: 0 | DISCHARGE
Start: 2023-08-22

## 2023-08-22 RX ORDER — OXYCODONE AND ACETAMINOPHEN 5; 325 MG/1; MG/1
1 TABLET ORAL
Qty: 0 | Refills: 0 | DISCHARGE
Start: 2023-08-22

## 2023-08-22 RX ORDER — BUDESONIDE AND FORMOTEROL FUMARATE DIHYDRATE 160; 4.5 UG/1; UG/1
2 AEROSOL RESPIRATORY (INHALATION)
Qty: 0 | Refills: 0 | DISCHARGE
Start: 2023-08-22

## 2023-08-22 RX ADMIN — CHLORHEXIDINE GLUCONATE 1 APPLICATION(S): 213 SOLUTION TOPICAL at 05:17

## 2023-08-22 RX ADMIN — Medication 81 MILLIGRAM(S): at 11:34

## 2023-08-22 RX ADMIN — Medication 1 TABLET(S): at 11:34

## 2023-08-22 RX ADMIN — AMIODARONE HYDROCHLORIDE 200 MILLIGRAM(S): 400 TABLET ORAL at 05:17

## 2023-08-22 RX ADMIN — Medication 25 MILLIGRAM(S): at 22:54

## 2023-08-22 RX ADMIN — Medication 5 MILLIGRAM(S): at 20:29

## 2023-08-22 RX ADMIN — APIXABAN 2.5 MILLIGRAM(S): 2.5 TABLET, FILM COATED ORAL at 05:17

## 2023-08-22 RX ADMIN — ERYTHROPOIETIN 5000 UNIT(S): 10000 INJECTION, SOLUTION INTRAVENOUS; SUBCUTANEOUS at 17:26

## 2023-08-22 RX ADMIN — PANTOPRAZOLE SODIUM 40 MILLIGRAM(S): 20 TABLET, DELAYED RELEASE ORAL at 11:34

## 2023-08-22 RX ADMIN — Medication 325 MILLIGRAM(S): at 11:34

## 2023-08-22 RX ADMIN — APIXABAN 2.5 MILLIGRAM(S): 2.5 TABLET, FILM COATED ORAL at 20:29

## 2023-08-22 RX ADMIN — Medication 2.5 MILLIGRAM(S): at 00:23

## 2023-08-22 RX ADMIN — MIDODRINE HYDROCHLORIDE 5 MILLIGRAM(S): 2.5 TABLET ORAL at 17:32

## 2023-08-22 NOTE — DISCHARGE NOTE NURSING/CASE MANAGEMENT/SOCIAL WORK - PATIENT PORTAL LINK FT
You can access the FollowMyHealth Patient Portal offered by [facility] by registering at the following website: [email or web address]. By joining [facility]’s FollowMyHealth portal, you will also be able to view your health information using other applications (apps) compatible with our system.

## 2023-08-22 NOTE — PROGRESS NOTE ADULT - ASSESSMENT
82 y/o female with PMH COPD on 2L home O2, HFpEF, CAD s/p PCI, HTN, HLD, pAF on rivaroxaban, diverticulosis, hemorrhoids presented with generalized weakness/dark stools admitted anemia, GIB without active bleed on ct, received 3U PRBC, and IVF however remained hypotensive requiring vasopressor therapy for hemorrhagic shock.  EGD on 7/27 and colonoscopy 7/28 without active bleed. Patient also with DANETTE and Afib with RVR, resumed back on amio and bb .  HR now better controlled. Plan was for ct enterography, but given DANETTE on CKD, it was cancelled. Patient downgraded to medicine 7/30/23. S/p Intermittent HD for worsening renal function. Now s/p permacath as no signifcant improvement. Of note has been found to have distal cyanotic changes of feet b/l due to which midodrine has been gradually reduced.     DANETTE on CKD III/Metabolic acidosis   Suspect degree of PLACIDO   Started on HD for failure of renal function to improve  s/p permacath for ongoing HD  PANKAJ with HD pending     Hyponatremia   Possibly 2/2 hypervolemic hyponatremia  Fluid restriction with UF/HD    Acute GIB / acute blood loss anemia /  hemorrhagic shock (Resolved)  hx of GIB in past   cta neg without active bleed on ct on admission   s/p EGD on 7/27 without active bleeding  s/p colonoscopy 7/28 no active bleed   Was on pressors on admission, weaned off to midodrine.  Due to suspected pressor induced vasoconstriction potentiated by midodrine dosing decreased  c/w PPIs   ct enterography - cancelled given elevated creatinine and no further bleeding  plan for capsule endoscopy as op   Diet as tolerated with renal restrictions    resumed aspirin as per GI  tolerating Lovenox - can transition to XARELTO on discharge if hb stable, CBC stable    HFpEF  Echo noted with Grade III diastolic dysfunction and severe pulm HTN  S/p Lasix  bilateral lower ext ACE wrap   BPs limiting use of optimal GDMT     Distal cyanosis   Per pt has been intermittent for past 3 months  With improving BPs midodrine decreased to 5 mg Q8 PRN only  LE ABIs normal. Areas concerning of possible focal segmental disease  Vascular on board. Appreciate recs  LE dopplers recommended to r/o DVTs. Ordered    COPD  c/w inhalers  C/w Symbicort BID   C/w tiotropium  C/w o2     Paroxysmal A-Fib   resumed on amio   BB dc'd given persistent hypotension   prn iv lopressor for hr > 110  aspirin  started lovenox 8/2/23, no bleeding noted, will start Eliquis bid    HLD  - monitor CMP   - hold statin due to transaminitis    Transaminitis (resolving)  - statin held  - LFT now wnl, Alk Phos remains elevated   - discussed with cardio, cont amiodarone for now since pt has been on it for long time  - Abdo U/S shows cholelithiasis and small right pleural effusion and Hep panel negative     Moderate protein calorie malnutrition   - Renal restrictions, no protein restriction / no Concentrated K /no phos / low sodium     dvt ppx : trial of Lovenox. Remains stable. Will start Eliquis  Dispo: S/P tunnelled Cath 8/18, awaiting PANKAJ and HD chair   Plan discussed with patient at bedside.

## 2023-08-22 NOTE — DISCHARGE NOTE NURSING/CASE MANAGEMENT/SOCIAL WORK - NSDCPEFALRISK_GEN_ALL_CORE
For information on Fall & Injury Prevention, visit: [email or web address] OR  [email or web address] OR  https://www.cdc.gov/steadi/patient.html Ambulatory

## 2023-08-22 NOTE — PROGRESS NOTE ADULT - PROVIDER SPECIALTY LIST ADULT
Cardiology
Critical Care
Gastroenterology
Gastroenterology
Hospitalist
Internal Medicine
Nephrology
Cardiology
Critical Care
Gastroenterology
Hospitalist
Nephrology
Critical Care
Critical Care
Gastroenterology
Hospitalist
Internal Medicine
Nephrology
Hospitalist
Vascular Surgery
Hospitalist
Internal Medicine
Hospitalist
Internal Medicine

## 2023-08-22 NOTE — DISCHARGE NOTE NURSING/CASE MANAGEMENT/SOCIAL WORK - NSDCCRNAME_GEN_ALL_CORE_FT
[facility] [address] [id number]     [facility]  [address] [id number]

## 2023-08-22 NOTE — PROGRESS NOTE ADULT - SUBJECTIVE AND OBJECTIVE BOX
[facility] Division of Hospital Medicine    Chief Complaint:  Shock     SUBJECTIVE / OVERNIGHT EVENTS:  Pt examined lying in bed  Unable to get HD till this evening. Plan for dsc to [facility] in am   Denies CP, SOB, abd pain, N/V,     MEDICATIONS  (STANDING):  aMIOdarone    Tablet 200 milliGRAM(s) Oral daily  aspirin  chewable 81 milliGRAM(s) Oral daily  budesonide  80 MICROgram(s)/formoterol 4.5 MICROgram(s) Inhaler 2 Puff(s) Inhalation two times a day  chlorhexidine 2% Cloths 1 Application(s) Topical <User Schedule>  chlorhexidine 4% Liquid 1 Application(s) Topical <User Schedule>  enoxaparin Injectable 40 milliGRAM(s) SubCutaneous every 12 hours  epoetin mine-epbx (RETACRIT) Injectable 5000 Unit(s) IV Push <User Schedule>  ferrous    sulfate 325 milliGRAM(s) Oral daily  midodrine. 5 milliGRAM(s) Oral every 8 hours  multivitamin 1 Tablet(s) Oral daily  pantoprazole  Injectable 40 milliGRAM(s) IV Push daily  tiotropium 2.5 MICROgram(s)/olodaterol 2.5 MICROgram(s) (STIOLTO) Inhaler 2 Puff(s) Inhalation daily    MEDICATIONS  (PRN):  acetaminophen     Tablet .. 650 milliGRAM(s) Oral every 6 hours PRN Temp greater or equal to 38C (100.4F), Moderate Pain (4 - 6), Severe Pain (7 - 10)  guaiFENesin Oral Liquid (Sugar-Free) 100 milliGRAM(s) Oral every 6 hours PRN Cough  hydrOXYzine hydrochloride 25 milliGRAM(s) Oral daily PRN Restlessness  melatonin 5 milliGRAM(s) Oral at bedtime PRN Sleep  metoprolol tartrate Injectable 2.5 milliGRAM(s) IV Push every 6 hours PRN for hR > 110  midodrine. 5 milliGRAM(s) Oral every 8 hours PRN MAP < 60  oxycodone    5 mG/acetaminophen 325 mG 1 Tablet(s) Oral every 6 hours PRN Moderate Pain (4 - 6)  sodium chloride 0.9% lock flush 10 milliLiter(s) IV Push every 1 hour PRN Pre/post blood products, medications, blood draw, and to maintain line patency  sodium chloride 0.9% lock flush 10 milliLiter(s) IV Push every 1 hour PRN Pre/post blood products, medications, blood draw, and to maintain line patency      PHYSICAL EXAM:  Vital Signs Last 24 Hrs  T(C): 36.5 (22 Aug 2023 19:20), Max: 36.5 (22 Aug 2023 16:04)  T(F): 97.7 (22 Aug 2023 19:20), Max: 97.7 (22 Aug 2023 16:04)  HR: 117 (22 Aug 2023 19:20) (107 - 117)  BP: 109/50 (22 Aug 2023 19:20) (98/56 - 113/75)  BP(mean): --  RR: 18 (22 Aug 2023 19:20) (16 - 18)  SpO2: 94% (22 Aug 2023 19:20) (92% - 97%)    Parameters below as of 22 Aug 2023 19:20  Patient On (Oxygen Delivery Method): nasal cannula  O2 Flow (L/min): 2      General: Obese female in NAD, on nasal cannula  HEENT: Normocephalic  Cardiac: S1S2 RRR  Respiratory: Diminished breath sounds b/l  Abdomen: Soft, NT  Extremities: 2+ pitting edema of b/l lower extremities with mottling of feet improving  Msk: patient with 3/5 strength in B/L LEs   Neuro: AAOx3, sensation intact in LE's, though patient states slightly more dull than UEs  Psych: Calm   Access: s/p Tunneled Cath placement Rt ant chestwall    LABS:                                   9.0    9.65  )-----------( 219      ( 21 Aug 2023 07:50 )             30.2   08-21    131<L>  |  92<L>  |  32.8<H>  ----------------------------<  98  4.3   |  24.0  |  3.49<H>    Ca    8.6      21 Aug 2023 07:50              Urinalysis Basic - ( 18 Aug 2023 08:40 )    Color: x / Appearance: x / SG: x / pH: x  Gluc: 98 mg/dL / Ketone: x  / Bili: x / Urobili: x   Blood: x / Protein: x / Nitrite: x   Leuk Esterase: x / RBC: x / WBC x   Sq Epi: x / Non Sq Epi: x / Bacteria: x        Culture - Blood (collected 16 Aug 2023 00:35)  Source: .Blood Blood-Peripheral  Preliminary Report (18 Aug 2023 07:01):    No growth at 48 Hours    Culture - Blood (collected 16 Aug 2023 00:30)  Source: .Blood Blood-Peripheral  Preliminary Report (18 Aug 2023 07:01):    No growth at 48 Hours      CAPILLARY BLOOD GLUCOSE            RADIOLOGY & ADDITIONAL TESTS:  Results Reviewed:   Imaging Personally Reviewed:  Electrocardiogram Personally Reviewed:

## 2023-08-22 NOTE — PROGRESS NOTE ADULT - NUTRITIONAL ASSESSMENT
This patient has been assessed with a concern for Malnutrition and has been determined to have a diagnosis/diagnoses of Moderate protein-calorie malnutrition.    This patient is being managed with:   Diet DASH/TLC-  Sodium & Cholesterol Restricted  Entered: Jul 28 2023  5:07PM  
This patient has been assessed with a concern for Malnutrition and has been determined to have a diagnosis/diagnoses of Moderate protein-calorie malnutrition.    This patient is being managed with:   Diet NPO after Midnight-     NPO Start Date: 15-Aug-2023   NPO Start Time: 23:59  Entered: Aug 15 2023  5:02PM    Diet DASH/TLC-  Sodium & Cholesterol Restricted  1200mL Fluid Restriction ([id number])  Entered: Aug 15 2023  1:29PM  
This patient has been assessed with a concern for Malnutrition and has been determined to have a diagnosis/diagnoses of Moderate protein-calorie malnutrition.    This patient is being managed with:   Diet Renal Restrictions-  For patients receiving Renal Replacement - No Protein Restr No Conc K No Conc Phos Low Sodium  Entered: Aug 16 2023 10:09AM    Diet NPO after Midnight-     NPO Start Date: 15-Aug-2023   NPO Start Time: 23:59  Entered: Aug 15 2023  8:12PM    Diet NPO after Midnight-     NPO Start Date: 15-Aug-2023   NPO Start Time: 23:59  Entered: Aug 15 2023  5:02PM  
This patient has been assessed with a concern for Malnutrition and has been determined to have a diagnosis/diagnoses of Moderate protein-calorie malnutrition.    This patient is being managed with:   Diet DASH/TLC-  Sodium & Cholesterol Restricted  Entered: Jul 28 2023  5:07PM  
This patient has been assessed with a concern for Malnutrition and has been determined to have a diagnosis/diagnoses of Moderate protein-calorie malnutrition.    This patient is being managed with:   Diet Renal Restrictions-  For patients receiving Renal Replacement - No Protein Restr No Conc K No Conc Phos Low Sodium  Entered: Aug 18 2023  7:04PM  
This patient has been assessed with a concern for Malnutrition and has been determined to have a diagnosis/diagnoses of Moderate protein-calorie malnutrition.    This patient is being managed with:   Diet DASH/TLC-  Sodium & Cholesterol Restricted  Entered: Jul 28 2023  5:07PM  
This patient has been assessed with a concern for Malnutrition and has been determined to have a diagnosis/diagnoses of Moderate protein-calorie malnutrition.    This patient is being managed with:   Diet Renal Restrictions-  For patients receiving Renal Replacement - No Protein Restr No Conc K No Conc Phos Low Sodium  Entered: Aug 18 2023  7:04PM  
This patient has been assessed with a concern for Malnutrition and has been determined to have a diagnosis/diagnoses of Moderate protein-calorie malnutrition.    This patient is being managed with:   Diet DASH/TLC-  Sodium & Cholesterol Restricted  Entered: Jul 28 2023  5:07PM  
This patient has been assessed with a concern for Malnutrition and has been determined to have a diagnosis/diagnoses of Moderate protein-calorie malnutrition.    This patient is being managed with:   Diet DASH/TLC-  Sodium & Cholesterol Restricted  Entered: Jul 28 2023  5:07PM  
This patient has been assessed with a concern for Malnutrition and has been determined to have a diagnosis/diagnoses of Moderate protein-calorie malnutrition.    This patient is being managed with:   Diet Renal Restrictions-  For patients receiving Renal Replacement - No Protein Restr No Conc K No Conc Phos Low Sodium  Entered: Aug 18 2023  7:04PM  
This patient has been assessed with a concern for Malnutrition and has been determined to have a diagnosis/diagnoses of Moderate protein-calorie malnutrition.    This patient is being managed with:   Diet Renal Restrictions-  For patients receiving Renal Replacement - No Protein Restr No Conc K No Conc Phos Low Sodium  Entered: Aug 16 2023 10:09AM    Diet NPO after Midnight-     NPO Start Date: 15-Aug-2023   NPO Start Time: 23:59  Entered: Aug 15 2023  8:12PM    Diet NPO after Midnight-     NPO Start Date: 15-Aug-2023   NPO Start Time: 23:59  Entered: Aug 15 2023  5:02PM  
This patient has been assessed with a concern for Malnutrition and has been determined to have a diagnosis/diagnoses of Moderate protein-calorie malnutrition.    This patient is being managed with:   Diet DASH/TLC-  Sodium & Cholesterol Restricted  Entered: Jul 28 2023  5:07PM  
This patient has been assessed with a concern for Malnutrition and has been determined to have a diagnosis/diagnoses of Moderate protein-calorie malnutrition.    This patient is being managed with:   Diet Renal Restrictions-  For patients receiving Renal Replacement - No Protein Restr No Conc K No Conc Phos Low Sodium  Entered: Aug 18 2023  7:04PM

## 2023-08-22 NOTE — PROGRESS NOTE ADULT - TIME BILLING
Interpretation of labs requiring redraw, discussion with IR due to delay of case with plan of no longer using general sedation. Plan discussed with patient, and CM
Coordination of care

## 2023-08-23 VITALS
DIASTOLIC BLOOD PRESSURE: 67 MMHG | HEART RATE: 106 BPM | RESPIRATION RATE: 18 BRPM | TEMPERATURE: 98 F | SYSTOLIC BLOOD PRESSURE: 103 MMHG | OXYGEN SATURATION: 98 %

## 2023-08-23 PROCEDURE — 83540 ASSAY OF IRON: CPT

## 2023-08-23 PROCEDURE — 80202 ASSAY OF VANCOMYCIN: CPT

## 2023-08-23 PROCEDURE — 93923 UPR/LXTR ART STDY 3+ LVLS: CPT

## 2023-08-23 PROCEDURE — 94760 N-INVAS EAR/PLS OXIMETRY 1: CPT

## 2023-08-23 PROCEDURE — C1750: CPT

## 2023-08-23 PROCEDURE — 85730 THROMBOPLASTIN TIME PARTIAL: CPT

## 2023-08-23 PROCEDURE — 82043 UR ALBUMIN QUANTITATIVE: CPT

## 2023-08-23 PROCEDURE — 86803 HEPATITIS C AB TEST: CPT

## 2023-08-23 PROCEDURE — 82330 ASSAY OF CALCIUM: CPT

## 2023-08-23 PROCEDURE — 82947 ASSAY GLUCOSE BLOOD QUANT: CPT

## 2023-08-23 PROCEDURE — 0225U NFCT DS DNA&RNA 21 SARSCOV2: CPT

## 2023-08-23 PROCEDURE — 84100 ASSAY OF PHOSPHORUS: CPT

## 2023-08-23 PROCEDURE — 36430 TRANSFUSION BLD/BLD COMPNT: CPT

## 2023-08-23 PROCEDURE — 83735 ASSAY OF MAGNESIUM: CPT

## 2023-08-23 PROCEDURE — 76942 ECHO GUIDE FOR BIOPSY: CPT

## 2023-08-23 PROCEDURE — 94640 AIRWAY INHALATION TREATMENT: CPT

## 2023-08-23 PROCEDURE — 76700 US EXAM ABDOM COMPLETE: CPT

## 2023-08-23 PROCEDURE — 86901 BLOOD TYPING SEROLOGIC RH(D): CPT

## 2023-08-23 PROCEDURE — 93306 TTE W/DOPPLER COMPLETE: CPT

## 2023-08-23 PROCEDURE — 82533 TOTAL CORTISOL: CPT

## 2023-08-23 PROCEDURE — 85018 HEMOGLOBIN: CPT

## 2023-08-23 PROCEDURE — 85025 COMPLETE CBC W/AUTO DIFF WBC: CPT

## 2023-08-23 PROCEDURE — 80048 BASIC METABOLIC PNL TOTAL CA: CPT

## 2023-08-23 PROCEDURE — 84156 ASSAY OF PROTEIN URINE: CPT

## 2023-08-23 PROCEDURE — 99285 EMERGENCY DEPT VISIT HI MDM: CPT

## 2023-08-23 PROCEDURE — 84300 ASSAY OF URINE SODIUM: CPT

## 2023-08-23 PROCEDURE — 82435 ASSAY OF BLOOD CHLORIDE: CPT

## 2023-08-23 PROCEDURE — 84295 ASSAY OF SERUM SODIUM: CPT

## 2023-08-23 PROCEDURE — 87040 BLOOD CULTURE FOR BACTERIA: CPT

## 2023-08-23 PROCEDURE — 81001 URINALYSIS AUTO W/SCOPE: CPT

## 2023-08-23 PROCEDURE — 80074 ACUTE HEPATITIS PANEL: CPT

## 2023-08-23 PROCEDURE — 82272 OCCULT BLD FECES 1-3 TESTS: CPT

## 2023-08-23 PROCEDURE — 96376 TX/PRO/DX INJ SAME DRUG ADON: CPT

## 2023-08-23 PROCEDURE — 82728 ASSAY OF FERRITIN: CPT

## 2023-08-23 PROCEDURE — 87641 MR-STAPH DNA AMP PROBE: CPT

## 2023-08-23 PROCEDURE — 87340 HEPATITIS B SURFACE AG IA: CPT

## 2023-08-23 PROCEDURE — 86706 HEP B SURFACE ANTIBODY: CPT

## 2023-08-23 PROCEDURE — 86923 COMPATIBILITY TEST ELECTRIC: CPT

## 2023-08-23 PROCEDURE — 86900 BLOOD TYPING SEROLOGIC ABO: CPT

## 2023-08-23 PROCEDURE — 84484 ASSAY OF TROPONIN QUANT: CPT

## 2023-08-23 PROCEDURE — 82570 ASSAY OF URINE CREATININE: CPT

## 2023-08-23 PROCEDURE — 71045 X-RAY EXAM CHEST 1 VIEW: CPT

## 2023-08-23 PROCEDURE — 97110 THERAPEUTIC EXERCISES: CPT

## 2023-08-23 PROCEDURE — 77002 NEEDLE LOCALIZATION BY XRAY: CPT

## 2023-08-23 PROCEDURE — 85014 HEMATOCRIT: CPT

## 2023-08-23 PROCEDURE — 83880 ASSAY OF NATRIURETIC PEPTIDE: CPT

## 2023-08-23 PROCEDURE — 97163 PT EVAL HIGH COMPLEX 45 MIN: CPT

## 2023-08-23 PROCEDURE — 99239 HOSP IP/OBS DSCHRG MGMT >30: CPT

## 2023-08-23 PROCEDURE — 86850 RBC ANTIBODY SCREEN: CPT

## 2023-08-23 PROCEDURE — 86704 HEP B CORE ANTIBODY TOTAL: CPT

## 2023-08-23 PROCEDURE — C1769: CPT

## 2023-08-23 PROCEDURE — 84132 ASSAY OF SERUM POTASSIUM: CPT

## 2023-08-23 PROCEDURE — P9016: CPT

## 2023-08-23 PROCEDURE — 87086 URINE CULTURE/COLONY COUNT: CPT

## 2023-08-23 PROCEDURE — 93970 EXTREMITY STUDY: CPT

## 2023-08-23 PROCEDURE — 97116 GAIT TRAINING THERAPY: CPT

## 2023-08-23 PROCEDURE — 82803 BLOOD GASES ANY COMBINATION: CPT

## 2023-08-23 PROCEDURE — 83605 ASSAY OF LACTIC ACID: CPT

## 2023-08-23 PROCEDURE — 93005 ELECTROCARDIOGRAM TRACING: CPT

## 2023-08-23 PROCEDURE — 80053 COMPREHEN METABOLIC PANEL: CPT

## 2023-08-23 PROCEDURE — 88305 TISSUE EXAM BY PATHOLOGIST: CPT

## 2023-08-23 PROCEDURE — 36415 COLL VENOUS BLD VENIPUNCTURE: CPT

## 2023-08-23 PROCEDURE — 93930 UPPER EXTREMITY STUDY: CPT

## 2023-08-23 PROCEDURE — 87640 STAPH A DNA AMP PROBE: CPT

## 2023-08-23 PROCEDURE — 96374 THER/PROPH/DIAG INJ IV PUSH: CPT

## 2023-08-23 PROCEDURE — 88342 IMHCHEM/IMCYTCHM 1ST ANTB: CPT

## 2023-08-23 PROCEDURE — 74177 CT ABD & PELVIS W/CONTRAST: CPT | Mod: MA

## 2023-08-23 PROCEDURE — P9047: CPT

## 2023-08-23 PROCEDURE — 96375 TX/PRO/DX INJ NEW DRUG ADDON: CPT

## 2023-08-23 PROCEDURE — P9045: CPT

## 2023-08-23 PROCEDURE — 85027 COMPLETE CBC AUTOMATED: CPT

## 2023-08-23 PROCEDURE — 99261: CPT

## 2023-08-23 PROCEDURE — 85610 PROTHROMBIN TIME: CPT

## 2023-08-23 RX ADMIN — PANTOPRAZOLE SODIUM 40 MILLIGRAM(S): 20 TABLET, DELAYED RELEASE ORAL at 11:22

## 2023-08-23 RX ADMIN — BUDESONIDE AND FORMOTEROL FUMARATE DIHYDRATE 2 PUFF(S): 160; 4.5 AEROSOL RESPIRATORY (INHALATION) at 11:21

## 2023-08-23 RX ADMIN — Medication 1 TABLET(S): at 11:19

## 2023-08-23 RX ADMIN — AMIODARONE HYDROCHLORIDE 200 MILLIGRAM(S): 400 TABLET ORAL at 04:50

## 2023-08-23 RX ADMIN — Medication 81 MILLIGRAM(S): at 11:19

## 2023-08-23 RX ADMIN — Medication 325 MILLIGRAM(S): at 11:18

## 2023-08-23 RX ADMIN — APIXABAN 2.5 MILLIGRAM(S): 2.5 TABLET, FILM COATED ORAL at 04:50

## 2023-08-23 RX ADMIN — TIOTROPIUM BROMIDE AND OLODATEROL 2 PUFF(S): 3.124; 2.736 SPRAY, METERED RESPIRATORY (INHALATION) at 11:19

## 2023-08-23 NOTE — CHART NOTE - NSCHARTNOTESELECT_GEN_ALL_CORE
Event Note
IR preprocedure note
Event Note
ICU/Transfer Note
Nutrition Services
Nutrition Services

## 2023-08-23 NOTE — CHART NOTE - NSCHARTNOTEFT_GEN_A_CORE
Order placed for pt. to be discharged to subacute rehab after confirming with Dr. Ramirez and care management. Pt. to go to [facility] subacute rehab facility.

## 2023-08-23 NOTE — CHART NOTE - NSCHARTNOTEFT_GEN_A_CORE
Pt examined lying in bed  Dsc plan d/w Pt and pts daughter  dw Renal. Will need outpt f/u to determine degree of renal recovery

## 2023-12-14 NOTE — DISCHARGE NOTE NURSING/CASE MANAGEMENT/SOCIAL WORK - NSPROMEDSBROUGHTTOHOSP_GEN_A_NUR
no
robotic assist right total knee arthroplasty  labs - cbc,pt/ptt,bmp,t&s,nose cx,ekg  M/C required  cardiac clearance   preop 3 day Hibiclens instruction reviewed and given .instructed on if  nose cx positive use Mupirocin 5 days and checklist given  take routine meds DOS with sips of water. avoid NSAID and OTC supplements. verbalized understanding  information on proper nutrition , increase protein and better food choices provided in packet   ensure clear given  Anesthesiologist to review PST labs, EKG, required clearances and optimization for surgery.

## (undated) DEVICE — SYR IV FLUSH SALINE 10ML 30/TY

## (undated) DEVICE — TUBING ALARIS PUMP MODULE NON-DEHP

## (undated) DEVICE — DRSG 2X2

## (undated) DEVICE — TUBING IV EXTENSION MACRO W CLAVE 7"

## (undated) DEVICE — VENODYNE/SCD SLEEVE CALF MEDIUM

## (undated) DEVICE — BITE BLOCK ADULT 20 X 27MM (GREEN)

## (undated) DEVICE — FORCEP BIOPSY ENDOSCOPIC 2.8MM DISP

## (undated) DEVICE — SENSOR O2 FINGER ADULT

## (undated) DEVICE — MASK PROC EAR LOOP

## (undated) DEVICE — SOL IRR BAG H2O 1000ML

## (undated) DEVICE — SOL IRR BAG NS 0.9% 1000ML

## (undated) DEVICE — SYR LUER SLIP TIP 50CC

## (undated) DEVICE — PACK IV START WITH CHG

## (undated) DEVICE — SSH-COLONOSCOPE 2003010: Type: DURABLE MEDICAL EQUIPMENT

## (undated) DEVICE — UNDERPAD LINEN SAVER 23 X 36"

## (undated) DEVICE — CATH IV SAFE BC 22G X 1" (BLUE)

## (undated) DEVICE — DRSG CURITY GAUZE SPONGE 4 X 4" 12-PLY NON-STERILE

## (undated) DEVICE — SOL BAG NS 0.9% 1000ML

## (undated) DEVICE — GOWN IMPERV XL

## (undated) DEVICE — STERIS DEFENDO 3-PIECE KIT (AIR/WATER, SUCTION & BIOPSY VALVES)

## (undated) DEVICE — FORCEP RADIAL JAW 4 W NDL 2.4MM 2.8MM 240CM ORANGE DISP

## (undated) DEVICE — WARMING BLANKET FULL ADULT

## (undated) DEVICE — DENTURE CUP PINK

## (undated) DEVICE — SYR SLIP 10CC